# Patient Record
Sex: FEMALE | Race: OTHER | URBAN - METROPOLITAN AREA
[De-identification: names, ages, dates, MRNs, and addresses within clinical notes are randomized per-mention and may not be internally consistent; named-entity substitution may affect disease eponyms.]

---

## 2020-03-01 ENCOUNTER — OUTPATIENT (OUTPATIENT)
Dept: OUTPATIENT SERVICES | Facility: HOSPITAL | Age: 65
LOS: 1 days | End: 2020-03-01
Payer: MEDICAID

## 2020-03-01 PROCEDURE — G9001: CPT

## 2020-03-22 ENCOUNTER — INPATIENT (INPATIENT)
Facility: HOSPITAL | Age: 65
LOS: 2 days | Discharge: PSYCHIATRIC OR SUBSTANCE USE FACILITY | End: 2020-03-25
Attending: INTERNAL MEDICINE | Admitting: INTERNAL MEDICINE
Payer: MEDICAID

## 2020-03-22 VITALS
OXYGEN SATURATION: 96 % | WEIGHT: 149.91 LBS | TEMPERATURE: 97 F | DIASTOLIC BLOOD PRESSURE: 70 MMHG | HEART RATE: 67 BPM | SYSTOLIC BLOOD PRESSURE: 176 MMHG | RESPIRATION RATE: 16 BRPM

## 2020-03-22 DIAGNOSIS — Z95.1 PRESENCE OF AORTOCORONARY BYPASS GRAFT: Chronic | ICD-10-CM

## 2020-03-22 LAB
ALBUMIN SERPL ELPH-MCNC: 4.6 G/DL — SIGNIFICANT CHANGE UP (ref 3.5–5.2)
ALP SERPL-CCNC: 94 U/L — SIGNIFICANT CHANGE UP (ref 30–115)
ALT FLD-CCNC: 16 U/L — SIGNIFICANT CHANGE UP (ref 0–41)
ANION GAP SERPL CALC-SCNC: 15 MMOL/L — HIGH (ref 7–14)
APPEARANCE UR: CLEAR — SIGNIFICANT CHANGE UP
AST SERPL-CCNC: 26 U/L — SIGNIFICANT CHANGE UP (ref 0–41)
BASOPHILS # BLD AUTO: 0.03 K/UL — SIGNIFICANT CHANGE UP (ref 0–0.2)
BASOPHILS NFR BLD AUTO: 0.5 % — SIGNIFICANT CHANGE UP (ref 0–1)
BILIRUB SERPL-MCNC: 0.6 MG/DL — SIGNIFICANT CHANGE UP (ref 0.2–1.2)
BILIRUB UR-MCNC: NEGATIVE — SIGNIFICANT CHANGE UP
BUN SERPL-MCNC: 9 MG/DL — LOW (ref 10–20)
CALCIUM SERPL-MCNC: 10 MG/DL — SIGNIFICANT CHANGE UP (ref 8.5–10.1)
CHLORIDE SERPL-SCNC: 106 MMOL/L — SIGNIFICANT CHANGE UP (ref 98–110)
CO2 SERPL-SCNC: 20 MMOL/L — SIGNIFICANT CHANGE UP (ref 17–32)
COLOR SPEC: YELLOW — SIGNIFICANT CHANGE UP
CREAT SERPL-MCNC: 0.5 MG/DL — LOW (ref 0.7–1.5)
DIFF PNL FLD: NEGATIVE — SIGNIFICANT CHANGE UP
EOSINOPHIL # BLD AUTO: 0.05 K/UL — SIGNIFICANT CHANGE UP (ref 0–0.7)
EOSINOPHIL NFR BLD AUTO: 0.9 % — SIGNIFICANT CHANGE UP (ref 0–8)
ETHANOL SERPL-MCNC: <10 MG/DL — SIGNIFICANT CHANGE UP
GLUCOSE SERPL-MCNC: 94 MG/DL — SIGNIFICANT CHANGE UP (ref 70–99)
GLUCOSE UR QL: NEGATIVE — SIGNIFICANT CHANGE UP
HCT VFR BLD CALC: 46.5 % — SIGNIFICANT CHANGE UP (ref 37–47)
HGB BLD-MCNC: 15.6 G/DL — SIGNIFICANT CHANGE UP (ref 12–16)
IMM GRANULOCYTES NFR BLD AUTO: 0.5 % — HIGH (ref 0.1–0.3)
KETONES UR-MCNC: NEGATIVE — SIGNIFICANT CHANGE UP
LACTATE SERPL-SCNC: 1.5 MMOL/L — SIGNIFICANT CHANGE UP (ref 0.7–2)
LEUKOCYTE ESTERASE UR-ACNC: NEGATIVE — SIGNIFICANT CHANGE UP
LYMPHOCYTES # BLD AUTO: 1.93 K/UL — SIGNIFICANT CHANGE UP (ref 1.2–3.4)
LYMPHOCYTES # BLD AUTO: 33.3 % — SIGNIFICANT CHANGE UP (ref 20.5–51.1)
MCHC RBC-ENTMCNC: 32.4 PG — HIGH (ref 27–31)
MCHC RBC-ENTMCNC: 33.5 G/DL — SIGNIFICANT CHANGE UP (ref 32–37)
MCV RBC AUTO: 96.5 FL — SIGNIFICANT CHANGE UP (ref 81–99)
MONOCYTES # BLD AUTO: 0.44 K/UL — SIGNIFICANT CHANGE UP (ref 0.1–0.6)
MONOCYTES NFR BLD AUTO: 7.6 % — SIGNIFICANT CHANGE UP (ref 1.7–9.3)
NEUTROPHILS # BLD AUTO: 3.31 K/UL — SIGNIFICANT CHANGE UP (ref 1.4–6.5)
NEUTROPHILS NFR BLD AUTO: 57.2 % — SIGNIFICANT CHANGE UP (ref 42.2–75.2)
NITRITE UR-MCNC: NEGATIVE — SIGNIFICANT CHANGE UP
NRBC # BLD: 0 /100 WBCS — SIGNIFICANT CHANGE UP (ref 0–0)
PH UR: 6 — SIGNIFICANT CHANGE UP (ref 5–8)
PLATELET # BLD AUTO: 197 K/UL — SIGNIFICANT CHANGE UP (ref 130–400)
POTASSIUM SERPL-MCNC: 4.1 MMOL/L — SIGNIFICANT CHANGE UP (ref 3.5–5)
POTASSIUM SERPL-SCNC: 4.1 MMOL/L — SIGNIFICANT CHANGE UP (ref 3.5–5)
PROT SERPL-MCNC: 7.3 G/DL — SIGNIFICANT CHANGE UP (ref 6–8)
PROT UR-MCNC: SIGNIFICANT CHANGE UP
RBC # BLD: 4.82 M/UL — SIGNIFICANT CHANGE UP (ref 4.2–5.4)
RBC # FLD: 12.9 % — SIGNIFICANT CHANGE UP (ref 11.5–14.5)
SALICYLATES SERPL-MCNC: <0.3 MG/DL — LOW (ref 4–30)
SODIUM SERPL-SCNC: 141 MMOL/L — SIGNIFICANT CHANGE UP (ref 135–146)
SP GR SPEC: 1.02 — SIGNIFICANT CHANGE UP (ref 1.01–1.02)
UROBILINOGEN FLD QL: ABNORMAL
WBC # BLD: 5.79 K/UL — SIGNIFICANT CHANGE UP (ref 4.8–10.8)
WBC # FLD AUTO: 5.79 K/UL — SIGNIFICANT CHANGE UP (ref 4.8–10.8)

## 2020-03-22 PROCEDURE — 71045 X-RAY EXAM CHEST 1 VIEW: CPT | Mod: 26

## 2020-03-22 PROCEDURE — 99231 SBSQ HOSP IP/OBS SF/LOW 25: CPT

## 2020-03-22 PROCEDURE — 73590 X-RAY EXAM OF LOWER LEG: CPT | Mod: 26,LT

## 2020-03-22 PROCEDURE — 73700 CT LOWER EXTREMITY W/O DYE: CPT | Mod: 26,LT

## 2020-03-22 PROCEDURE — 99223 1ST HOSP IP/OBS HIGH 75: CPT | Mod: AI

## 2020-03-22 PROCEDURE — 76705 ECHO EXAM OF ABDOMEN: CPT | Mod: 26

## 2020-03-22 PROCEDURE — 70450 CT HEAD/BRAIN W/O DYE: CPT | Mod: 26

## 2020-03-22 PROCEDURE — 99285 EMERGENCY DEPT VISIT HI MDM: CPT

## 2020-03-22 PROCEDURE — 93010 ELECTROCARDIOGRAM REPORT: CPT

## 2020-03-22 PROCEDURE — 93971 EXTREMITY STUDY: CPT | Mod: 26,LT

## 2020-03-22 RX ORDER — LISINOPRIL 2.5 MG/1
2.5 TABLET ORAL DAILY
Refills: 0 | Status: DISCONTINUED | OUTPATIENT
Start: 2020-03-22 | End: 2020-03-25

## 2020-03-22 RX ORDER — SODIUM CHLORIDE 9 MG/ML
1000 INJECTION INTRAMUSCULAR; INTRAVENOUS; SUBCUTANEOUS
Refills: 0 | Status: DISCONTINUED | OUTPATIENT
Start: 2020-03-22 | End: 2020-03-23

## 2020-03-22 RX ORDER — ENOXAPARIN SODIUM 100 MG/ML
40 INJECTION SUBCUTANEOUS DAILY
Refills: 0 | Status: DISCONTINUED | OUTPATIENT
Start: 2020-03-22 | End: 2020-03-25

## 2020-03-22 RX ORDER — IPRATROPIUM/ALBUTEROL SULFATE 18-103MCG
3 AEROSOL WITH ADAPTER (GRAM) INHALATION EVERY 6 HOURS
Refills: 0 | Status: DISCONTINUED | OUTPATIENT
Start: 2020-03-22 | End: 2020-03-25

## 2020-03-22 RX ORDER — METOPROLOL TARTRATE 50 MG
12.5 TABLET ORAL
Refills: 0 | Status: DISCONTINUED | OUTPATIENT
Start: 2020-03-22 | End: 2020-03-25

## 2020-03-22 RX ORDER — ATORVASTATIN CALCIUM 80 MG/1
40 TABLET, FILM COATED ORAL AT BEDTIME
Refills: 0 | Status: DISCONTINUED | OUTPATIENT
Start: 2020-03-22 | End: 2020-03-25

## 2020-03-22 RX ORDER — MUPIROCIN 20 MG/G
1 OINTMENT TOPICAL
Refills: 0 | Status: DISCONTINUED | OUTPATIENT
Start: 2020-03-22 | End: 2020-03-25

## 2020-03-22 RX ORDER — ACETAMINOPHEN 500 MG
650 TABLET ORAL EVERY 6 HOURS
Refills: 0 | Status: DISCONTINUED | OUTPATIENT
Start: 2020-03-22 | End: 2020-03-25

## 2020-03-22 RX ORDER — PANTOPRAZOLE SODIUM 20 MG/1
40 TABLET, DELAYED RELEASE ORAL
Refills: 0 | Status: DISCONTINUED | OUTPATIENT
Start: 2020-03-22 | End: 2020-03-25

## 2020-03-22 RX ORDER — ASPIRIN/CALCIUM CARB/MAGNESIUM 324 MG
81 TABLET ORAL DAILY
Refills: 0 | Status: DISCONTINUED | OUTPATIENT
Start: 2020-03-22 | End: 2020-03-25

## 2020-03-22 RX ADMIN — SODIUM CHLORIDE 50 MILLILITER(S): 9 INJECTION INTRAMUSCULAR; INTRAVENOUS; SUBCUTANEOUS at 23:51

## 2020-03-22 RX ADMIN — Medication 100 MILLIGRAM(S): at 03:39

## 2020-03-22 RX ADMIN — ATORVASTATIN CALCIUM 40 MILLIGRAM(S): 80 TABLET, FILM COATED ORAL at 21:12

## 2020-03-22 RX ADMIN — Medication 100 MILLIGRAM(S): at 21:12

## 2020-03-22 RX ADMIN — Medication 12.5 MILLIGRAM(S): at 17:08

## 2020-03-22 RX ADMIN — MUPIROCIN 1 APPLICATION(S): 20 OINTMENT TOPICAL at 17:08

## 2020-03-22 RX ADMIN — Medication 81 MILLIGRAM(S): at 13:37

## 2020-03-22 RX ADMIN — ENOXAPARIN SODIUM 40 MILLIGRAM(S): 100 INJECTION SUBCUTANEOUS at 13:35

## 2020-03-22 RX ADMIN — Medication 100 MILLIGRAM(S): at 13:58

## 2020-03-22 NOTE — ED PROVIDER NOTE - PHYSICAL EXAMINATION
Constitutional: Well developed, well nourished. NAD  Head: Normocephalic, atraumatic.  Eyes: PERRL, EOMI.  ENT: No nasal discharge. Mucous membranes dry.  Neck: Supple. Painless ROM.  Cardiovascular:  Regular rate and rhythm.   Pulmonary: Normal respiratory rate and effort. Lungs clear to auscultation bilaterally.  Abdominal: Soft. Nondistended. No rebound, guarding, rigidity.  Extremities. Pelvis stable. + Left ant tibia 3x3 cm oval ulcer with blistering, resolving hematoma;   Skin: No rashes, cyanosis.  Neuro: AAOx3. No focal neurological deficits.  Psych: Normal mood. Normal affect.

## 2020-03-22 NOTE — ED PROVIDER NOTE - CLINICAL SUMMARY MEDICAL DECISION MAKING FREE TEXT BOX
65 y/o female with h/o htn, cad, asthma, unknown psych history, in ER with c/o redness/swelling/non-healing wound to L shin after banging it a week or 2 ago.  no f/c.  no cp/sob. labs ok.  pt given iv abx.  tib-fib x-ray with no sq air.  Pt acting bizarrely in ER, denies prior psych history, no old chart in system.  head ct with no acute pathology.  pt admitted for iv abx for LE wound (LE duplex pending on admission and MAR aware to f/u results), burn to see, and pt will need psych eval.  admitted with 1:1 observation.

## 2020-03-22 NOTE — H&P ADULT - HISTORY OF PRESENT ILLNESS
64 year old female patient with past medical history of CAD s/p CABG in 2006, asthma and hypertension here for evaluation of left lower extremity erythema and pain that she sustained after a recent trauma to the left shin. Patient non-cooperative during the encounter, no next of kin to reach so details of trauma could not be assessed. 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 Per chart no history of psychiatric disorder.   Patient was given Clindamycin in the ED, no evidence of sepsis, x ray of the tibia showed soft tissue swelling and no evidence of subcutaneous emphysema or osteomyelitis. The patient was admitted for workup of her psychosis as well as treatment of her cellulitis.

## 2020-03-22 NOTE — ED PROVIDER NOTE - ATTENDING CONTRIBUTION TO CARE
63 y/o female with h/o HTN, asthma, cad s/p cabg, in ER for eval of wound to L leg.  Pt states she hit her shin on the bus last week or a few weeks ago, and now is red and swollen.  no drainage, no fever.  no cp/sob.  no cough.  no ha/dizziness/loc.  Pt also states she was in [place] ~ 6 months ago, she [facility], she also states she was in [place] eating sushi.  Pt denies any si/hi, denies hallucinations.  denies any psych history or hospitalizations.  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  PE - nad, nc/at, eomi, perrl, op - clear, cta b/l, no w/r/r, rrr, abd - soft, nt/nd, nabs, from x 4, LLE - lower ant shin scab/blister with surrounding erythema, warmth, + swelling to lower leg, 2+ DP pulse, A&O x 3, cn 2- 12 intact, no motor/sensory deficits, pt calm, comfortable, not agitated.  - check labs, iv abx, LE duplex, will need psych eval, and also burn to see. 65 y/o female with h/o HTN, asthma, cad s/p cabg, in ER for eval of wound to L leg.  Pt states she hit her shin on the bus last week or a few weeks ago, and now is red and swollen.  no drainage, no fever.  no cp/sob.  no cough.  no ha/dizziness/loc.  Pt also states she was in [place] ~ 6 months ago, she [facility], she also states she was in [place] eating sushi.  Pt denies any si/hi, denies hallucinations.  denies any psych history or hospitalizations.  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 No prior chart/medical history available, no phone number call family.   PE - nad, nc/at, eomi, perrl, op - clear, cta b/l, no w/r/r, rrr, abd - soft, nt/nd, nabs, from x 4, LLE - lower ant shin scab/blister with surrounding erythema, warmth, + swelling to lower leg, 2+ DP pulse, A&O x 3, cn 2- 12 intact, no motor/sensory deficits, pt calm, comfortable, not agitated.  - check labs, iv abx, LE duplex, will need psych eval, and also burn to see. 63 y/o female with h/o HTN, asthma, cad s/p cabg, in ER for eval of wound to L leg.  Pt states she hit her shin on the bus last week or a few weeks ago, and now is red and swollen.  no drainage, no fever.  no cp/sob.  no cough.  no ha/dizziness/loc.  Pt also states she was in [place] ~ 6 months ago, she [facility], she also states she was in [place] eating sushi.  Pt denies any si/hi, denies hallucinations.  denies any psych history or hospitalizations.  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 No prior chart/medical history available, no phone number call family.   PE - nad, nc/at, eomi, perrl, op - clear, cta b/l, no w/r/r, rrr, abd - soft, nt/nd, nabs, from x 4, LLE - lower ant shin scab/blister with surrounding erythema, warmth, + swelling to lower leg, 2+ DP pulse, A&O x 3, cn 2- 12 intact, no motor/sensory deficits, pt calm, comfortable, not agitated.  - check labs, iv abx, LE duplex, will need psych eval, and also burn to see.

## 2020-03-22 NOTE — ED ADULT NURSE REASSESSMENT NOTE - NS ED NURSE REASSESS COMMENT FT1
Spoke with [facility]endar[facility] that is concerned about patient's travel to [place] six months ago. Although pt is suffering from acute psychosis as per MD, Primary nurse on 4b and assistant elinor feel unsafe to accept pt without pt being officially tested and ruled out for covid-19. ER MD noted that the travel was six months ago, pt has stable vital signs no fever or cough.

## 2020-03-22 NOTE — ED PROVIDER NOTE - OBJECTIVE STATEMENT
64 yold female to [facility] Cad/angina s/p cabg x 1 2006, Asthma; pt here specifically today because she sustained injury to Left ant tib area while getting on bus 1 week ago; pt c/o pain to site; denies fever, chills, n/v/back pain; pt also told triage RN about  ? getting cor[facility] from [place]? [facility] - ? visit to [place] 6 months ago; pt denies any psych hx; denies psychiatric admissions; currently lives with  in private apartment;

## 2020-03-22 NOTE — H&P ADULT - NSHPPHYSICALEXAM_GEN_ALL_CORE
Patient is disheveled, non-cooperative and not answering questions. Calm not violent.   General : No distress, alert, unable to assess if oriented as doesn't not answer questions   Lungs : Poor inspiratory effort bilaterally, on room air, no respiratory distress, no wheezing   Cardiovascular : Regular S1S2   Abdomen : Soft RUQ tenderness? (Patient states that her gallbladder hurts)   Extremities : LLE erythema and tenderness, no purulent discharge

## 2020-03-22 NOTE — H&P ADULT - ATTENDING COMMENTS
Patient seen and examined independently. Agree with resident note/ history / physical exam and plan of care with following exceptions/additions/updates. Case discussed with house-staff, nursing and patient  pt is a very poor historian.   spoke with burn team  ct leg ordered , fu results    full code

## 2020-03-22 NOTE — ED ADULT TRIAGE NOTE - CHIEF COMPLAINT QUOTE
pt BIBA for "check up". pt stated "I just gave birth and wanted a checkup" when asked about international travel pt stated "I just returned from [place], I picked up [facility]ona virus [facility]". pt has no medical complaints

## 2020-03-22 NOTE — H&P ADULT - ASSESSMENT
64 year old female patient with past medical history of CAD s/p CABG in 2006, asthma and hypertension here for evaluation of left lower extremity erythema and pain that she sustained after a recent trauma to the left shin    # left lower extremity erythema and tenderness s/p traumatic injury : rule out cellulitis   - No sepsis on admission   - No evidence of subcutaneous emphysema or OM on x ray   - F/U blood cultures   - CT of the leg non contrast to rule out hematoma    - Burn consult   - Continue Clindamycin 600 mg IV q8h   - Pain control     # Acute psychosis, unknown if any previous psychiatric history   - CT Head negative on admission   - F/U Vitamin B12, Folate, TSH, RPR, urine tox screen, ammonia level, serum alcohol and salicylate levels, urinalysis   - F/U psychiatry recommendations     # CAD s/p CABG   - Unknown home medication. Will start on Aspirin, Lopressor, Lipitor in hospital pending confirmation of home medication     # Asthma   - Duoneb q6h PRN for wheezing     # HTN   - Home medication unknown   - Will start Lisinopril 2.5 mg daily     # RUQ tenderness on exam   - LFTs normal  - F/U RUQ US     # Prolonged QTc 502 ms   - Caution with QT prolonging medication     # Miscellaneous   - DVT prophylaxis : Lovenox 40 mg daily   - GI prophylaxis : Protonix 40 mg daily   - Activity : bedrest for now   - Diet : DASH TLC   - Full code 64 year old female patient with past medical history of CAD s/p CABG in 2006, asthma and hypertension here for evaluation of left lower extremity erythema and pain that she sustained after a recent trauma to the left shin    # left lower extremity erythema and tenderness s/p traumatic injury : rule out cellulitis   - No sepsis on admission   - No evidence of subcutaneous emphysema or OM on x ray   - F/U blood cultures   - CT of the leg non contrast to rule out hematoma    - Burn consult   - Continue Clindamycin 600 mg IV q8h   - Pain control     # Acute psychosis, unknown if any previous psychiatric history   - CT Head negative on admission   - F/U Vitamin B12, Folate, TSH, RPR, urine tox screen, ammonia level, serum alcohol and salicylate levels, urinalysis   - F/U psychiatry recommendations     # CAD s/p CABG   - Unknown home medication. Will start on Aspirin, Lopressor, Lipitor in hospital pending confirmation of home medication     # Asthma   - Duoneb q6h PRN for wheezing     # HTN   - Home medication unknown   - Will start Lisinopril 2.5 mg daily     # RUQ tenderness on exam   - LFTs normal  - F/U RUQ US     # Prolonged QTc 502 ms   - Caution with QT prolonging medication   - check mag, keep mg>2 and k>4    # Miscellaneous   - DVT prophylaxis : Lovenox 40 mg daily   - GI prophylaxis : Protonix 40 mg daily   - Activity : bedrest for now   - Diet : DASH TLC   - Full code

## 2020-03-22 NOTE — ED ADULT NURSE NOTE - OBJECTIVE STATEMENT
Pt said she went to visit some ppl in [facility] and got Corona virus , she even brought the vaccine" pt looks unkept, disoriented and has flight of ideas. Her lower LL is inflamed with old wound and red. Pt denies smoking, found a lighter on her, said she had PICC on right hand, but non is visible.

## 2020-03-22 NOTE — CHART NOTE - NSCHARTNOTEFT_GEN_A_CORE
Writer attempted to evaluate pt. She would not open eyes, sit up, or engage in interview. Unable to make assessment at this time. Psychiatry team to f/u for evaluation.

## 2020-03-22 NOTE — ED PROVIDER NOTE - PROGRESS NOTE DETAILS
case d/w Burn resident Dr. Travis - will see pt in am; LE duplex pending on admission - MAR aware and they will f/u results.

## 2020-03-22 NOTE — PATIENT PROFILE ADULT - NSPROMUTPARTICIPCAREFT_GEN_A_NUR
----- Message from Kamilla Cowart [phone]   N/A

## 2020-03-22 NOTE — CONSULT NOTE ADULT - ASSESSMENT
ASSESSMENT:  LLE partial thickness wound with cellulitis    RECOMMENDATION:  Wound care - Bactroban/Xeroform/Kerlex/ACE Wrap BID to LLE  Venous Dopplers  No Surgical debridement   IV abx as indicated/ per ID  f/u Cx  Elevation

## 2020-03-22 NOTE — CONSULT NOTE ADULT - SUBJECTIVE AND OBJECTIVE BOX
64y  Female  HPI:  64 year old female patient with past medical history of CAD s/p CABG in 2006, asthma and hypertension here for evaluation of left lower extremity erythema and pain that she sustained after a recent trauma to the left shin. Patient non-cooperative during the encounter, no next of kin to reach so details of trauma could not be assessed. 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 Per chart no history of psychiatric disorder.   Patient was given Clindamycin in the ED, no evidence of sepsis, x ray of the tibia showed soft tissue swelling and no evidence of subcutaneous emphysema or osteomyelitis. The patient was admitted for workup of her psychosis as well as treatment of her cellulitis. (22 Mar 2020 10:55)    Hospital course***  Allergies    sulfa drugs (Unknown)    Intolerances      PAST MEDICAL & SURGICAL HISTORY:  Asthma  Hypertension  Coronary artery disease  S/P CABG (coronary artery bypass graft)      Labs:                        15.6   5.79  )-----------( 197      ( 22 Mar 2020 03:20 )             46.5       PE:  AAO x 3  erythema, edema to left leg  dry scab to ant leg with partial thickess wouynd  tenderness+  no fluctuation

## 2020-03-23 DIAGNOSIS — F05 DELIRIUM DUE TO KNOWN PHYSIOLOGICAL CONDITION: ICD-10-CM

## 2020-03-23 LAB
AMPHET UR-MCNC: NEGATIVE — SIGNIFICANT CHANGE UP
APPEARANCE UR: ABNORMAL
APPEARANCE UR: CLEAR — SIGNIFICANT CHANGE UP
BACTERIA # UR AUTO: ABNORMAL
BARBITURATES UR SCN-MCNC: NEGATIVE — SIGNIFICANT CHANGE UP
BENZODIAZ UR-MCNC: NEGATIVE — SIGNIFICANT CHANGE UP
BILIRUB UR-MCNC: NEGATIVE — SIGNIFICANT CHANGE UP
BILIRUB UR-MCNC: NEGATIVE — SIGNIFICANT CHANGE UP
COCAINE METAB.OTHER UR-MCNC: NEGATIVE — SIGNIFICANT CHANGE UP
COLOR SPEC: YELLOW — SIGNIFICANT CHANGE UP
COLOR SPEC: YELLOW — SIGNIFICANT CHANGE UP
CULTURE RESULTS: SIGNIFICANT CHANGE UP
DIFF PNL FLD: NEGATIVE — SIGNIFICANT CHANGE UP
DIFF PNL FLD: NEGATIVE — SIGNIFICANT CHANGE UP
EPI CELLS # UR: 2 /HPF — SIGNIFICANT CHANGE UP (ref 0–5)
FOLATE SERPL-MCNC: 14.9 NG/ML — SIGNIFICANT CHANGE UP
GLUCOSE UR QL: NEGATIVE — SIGNIFICANT CHANGE UP
GLUCOSE UR QL: NEGATIVE — SIGNIFICANT CHANGE UP
HYALINE CASTS # UR AUTO: 1 /LPF — SIGNIFICANT CHANGE UP (ref 0–7)
KETONES UR-MCNC: NEGATIVE — SIGNIFICANT CHANGE UP
KETONES UR-MCNC: NEGATIVE — SIGNIFICANT CHANGE UP
LEUKOCYTE ESTERASE UR-ACNC: NEGATIVE — SIGNIFICANT CHANGE UP
LEUKOCYTE ESTERASE UR-ACNC: NEGATIVE — SIGNIFICANT CHANGE UP
METHADONE UR-MCNC: NEGATIVE — SIGNIFICANT CHANGE UP
NITRITE UR-MCNC: NEGATIVE — SIGNIFICANT CHANGE UP
NITRITE UR-MCNC: NEGATIVE — SIGNIFICANT CHANGE UP
OPIATES UR-MCNC: NEGATIVE — SIGNIFICANT CHANGE UP
PCP SPEC-MCNC: SIGNIFICANT CHANGE UP
PH UR: 6 — SIGNIFICANT CHANGE UP (ref 5–8)
PH UR: 6.5 — SIGNIFICANT CHANGE UP (ref 5–8)
PROPOXYPHENE QUALITATIVE URINE RESULT: NEGATIVE — SIGNIFICANT CHANGE UP
PROT UR-MCNC: SIGNIFICANT CHANGE UP
PROT UR-MCNC: SIGNIFICANT CHANGE UP
RBC CASTS # UR COMP ASSIST: 2 /HPF — SIGNIFICANT CHANGE UP (ref 0–4)
SP GR SPEC: 1.02 — SIGNIFICANT CHANGE UP (ref 1.01–1.02)
SP GR SPEC: 1.02 — SIGNIFICANT CHANGE UP (ref 1.01–1.02)
SPECIMEN SOURCE: SIGNIFICANT CHANGE UP
T PALLIDUM AB TITR SER: NEGATIVE — SIGNIFICANT CHANGE UP
TSH SERPL-MCNC: 0.34 UIU/ML — SIGNIFICANT CHANGE UP (ref 0.27–4.2)
UROBILINOGEN FLD QL: ABNORMAL
UROBILINOGEN FLD QL: ABNORMAL
VIT B12 SERPL-MCNC: 932 PG/ML — SIGNIFICANT CHANGE UP (ref 232–1245)
WBC UR QL: 9 /HPF — HIGH (ref 0–5)

## 2020-03-23 PROCEDURE — 99232 SBSQ HOSP IP/OBS MODERATE 35: CPT | Mod: GC

## 2020-03-23 PROCEDURE — 99233 SBSQ HOSP IP/OBS HIGH 50: CPT

## 2020-03-23 PROCEDURE — 93010 ELECTROCARDIOGRAM REPORT: CPT

## 2020-03-23 RX ADMIN — Medication 100 MILLIGRAM(S): at 13:09

## 2020-03-23 RX ADMIN — Medication 81 MILLIGRAM(S): at 11:39

## 2020-03-23 RX ADMIN — Medication 12.5 MILLIGRAM(S): at 17:21

## 2020-03-23 RX ADMIN — Medication 12.5 MILLIGRAM(S): at 06:15

## 2020-03-23 RX ADMIN — ATORVASTATIN CALCIUM 40 MILLIGRAM(S): 80 TABLET, FILM COATED ORAL at 22:15

## 2020-03-23 RX ADMIN — Medication 100 MILLIGRAM(S): at 06:15

## 2020-03-23 RX ADMIN — LISINOPRIL 2.5 MILLIGRAM(S): 2.5 TABLET ORAL at 06:15

## 2020-03-23 RX ADMIN — Medication 650 MILLIGRAM(S): at 17:21

## 2020-03-23 RX ADMIN — MUPIROCIN 1 APPLICATION(S): 20 OINTMENT TOPICAL at 17:23

## 2020-03-23 RX ADMIN — MUPIROCIN 1 APPLICATION(S): 20 OINTMENT TOPICAL at 06:15

## 2020-03-23 RX ADMIN — Medication 100 MILLIGRAM(S): at 22:15

## 2020-03-23 RX ADMIN — PANTOPRAZOLE SODIUM 40 MILLIGRAM(S): 20 TABLET, DELAYED RELEASE ORAL at 06:15

## 2020-03-23 RX ADMIN — ENOXAPARIN SODIUM 40 MILLIGRAM(S): 100 INJECTION SUBCUTANEOUS at 11:39

## 2020-03-23 NOTE — BEHAVIORAL HEALTH ASSESSMENT NOTE - NSBHCHARTREVIEWVS_PSY_A_CORE FT
Vital Signs Last 24 Hrs  T(C): 35.7 (23 Mar 2020 08:00), Max: 37.3 (22 Mar 2020 23:48)  T(F): 96.2 (23 Mar 2020 08:00), Max: 99.2 (22 Mar 2020 23:48)  HR: 66 (23 Mar 2020 08:00) (66 - 80)  BP: 109/53 (23 Mar 2020 08:00) (101/51 - 145/64)  RR: 18 (23 Mar 2020 08:00) (18 - 18)

## 2020-03-23 NOTE — BEHAVIORAL HEALTH ASSESSMENT NOTE - NSBHCHARTREVIEWINVESTIGATE_PSY_A_CORE FT
12 Lead ECG (03.23.20 @ 08:38) >  QTC Calculation(Bezet) 488 ms  P Axis 6 degrees  R Axis 52 degrees  T Axis 54 degrees  Diagnosis Line Normal sinus rhythm  Right bundle branch block  Abnormal ECG

## 2020-03-23 NOTE — BEHAVIORAL HEALTH ASSESSMENT NOTE - OTHER PAST PSYCHIATRIC HISTORY (INCLUDE DETAILS REGARDING ONSET, COURSE OF ILLNESS, INPATIENT/OUTPATIENT TREATMENT)
Unknown - no known past psychiatric history - patient denies past suicide attempts, past IPP admissions

## 2020-03-23 NOTE — BEHAVIORAL HEALTH ASSESSMENT NOTE - COMMENTS ON SUICIDE RISK/PROTECTIVE FACTORS:
Protective factors include social support from  and children, no known past suicide attempts, no acute suicidality, access to emergency medical services.

## 2020-03-23 NOTE — BEHAVIORAL HEALTH ASSESSMENT NOTE - NSBHMEDSOTHERFT_PSY_A_CORE
Attempted to contact patients outpatient pharmacy [facility] at [phone] - they did not have a file for her patient information.

## 2020-03-23 NOTE — BEHAVIORAL HEALTH ASSESSMENT NOTE - HPI (INCLUDE ILLNESS QUALITY, SEVERITY, DURATION, TIMING, CONTEXT, MODIFYING FACTORS, ASSOCIATED SIGNS AND SYMPTOMS)
Ms. Silva is a 64-year-old, female, white, , with two adult children, domiciled in private residence in [facility]), with medical history of asthma, CAD s/p CABG in 2006, hypertension, unknown psychiatric history, who was admitted to medicine for left lower extremity cellulitis, psychiatry consulted for psychotic symptoms.     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 Silva declined talking to primary team, however last night frequently called on call resident for pain.     Upon approach, Ms. Silva was laying in bed, wincing in pain, she was alert, oriented to person, place (stated we at "[facility]"), and time (month and year tested). She stated that she had "no idea" why psychiatry was asked to see her. She denied mood disturbance, auditory hallucinations, perceptual disturbances, anxiety, sleep disturbance, appetite disturbance.     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 Denied suicidal ideations.     Attempted to contact  Abebe, and daughter (at same number) [phone] (number provided by Ms. Silva) - could not reach despite multiple attempts. Ms. Silva is a 64-year-old, female, white, , with two adult children, domiciled in private residence in [facility]), with medical history of asthma, CAD s/p CABG in 2006, hypertension, unknown psychiatric history, who was admitted to medicine for lower extremity cellulitis, psychiatry consulted for psychotic symptoms.     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 Silva declined talking to primary team, however last night frequently called on call resident for pain.     Upon approach, Ms. Silva was laying in bed, wincing in pain, she was alert, oriented to person, place (stated we at "[facility]"), and time (month and year tested). She stated that she had "no idea" why psychiatry was asked to see her. She denied mood disturbance, auditory hallucinations, perceptual disturbances, anxiety, sleep disturbance, appetite disturbance.     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 Denied suicidal ideations.     Attempted to contact  Abebe, and daughter (at same number) [phone] (number provided by Ms. Silva) - could not reach despite multiple attempts. Ms. Silva is a 64-year-old, female, white, , with two adult children, domiciled in private residence in [facility]), with medical history of asthma, CAD s/p CABG in 2006, hypertension, unknown psychiatric history, who was admitted to medicine for lower extremity cellulitis, psychiatry consulted for psychotic symptoms.     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 Silva declined talking to primary team, however last night frequently called on call resident for pain.     Upon approach, Ms. Silva was laying in bed, wincing in pain, she was alert, oriented to person, place (stated we at "[facility]"), and time (month and year tested). She stated that she had "no idea" why psychiatry was asked to see her. She denied mood disturbance, auditory hallucinations, perceptual disturbances, anxiety, sleep disturbance, appetite disturbance.     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. 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 Denied suicidal ideations.     Attempted to contact  Abebe, and daughter (at same number) [phone] (number provided by Ms. Silva) - could not reach despite multiple attempts.    As per nurse taking care of her today, patient was said to be agitated at night in the context of her pain, however patient has been in good behavioral control since the morning shift and has not been observed to be psychotic , maniac or depressed.

## 2020-03-23 NOTE — BEHAVIORAL HEALTH ASSESSMENT NOTE - SUMMARY
Ms. Silva is a 64-year-old, female, white, , with two adult children, domiciled in private residence in [facility]), with medical history of asthma, CAD s/p CABG in 2006, hypertension, unknown psychiatric history, who was admitted to medicine for lower extremity cellulitis, psychiatry consulted for psychotic symptoms.    Assessment was limited by inability to contact collateral. Recommend further attempts to contact family if possible to obtain baseline information and validate patient report. Impression; delirium secondary to a medical cause (likely cellulitis). Recommend frequent patient redirection; reassurance, circadian encouragement through natural light during day, noise control at night. Cautious use of benzodiazapines as will likely worsen cognitive sx of delirium. Ms. Silva is a 64-year-old, female, white, , with two adult children, domiciled in private residence in [facility]), with medical history of asthma, CAD s/p CABG in 2006, hypertension, unknown psychiatric history, who was admitted to medicine for lower extremity cellulitis, psychiatry consulted for psychotic symptoms.    Assessment was limited by inability to contact collateral and patient's limited participation in the linterview. Recommend further attempts to contact family if possible to obtain baseline information and validate patient report. Impression; delirium secondary to a medical cause (likely cellulitis). Recommend frequent patient redirection; reassurance, circadian encouragement through natural light during day, noise control at night. Cautious use of benzodiazapines as will likely worsen cognitive sx of delirium.

## 2020-03-23 NOTE — BEHAVIORAL HEALTH ASSESSMENT NOTE - NSBHCHARTREVIEWIMAGING_PSY_A_CORE FT
CT Tibia/Fibia No Cont, Left (03.22.20 @ 15:17) >  IMPRESSION:  Diffuse subcutaneous edema of the left leg without evidence of osteomyelitis, necrotizing fasciitis, or abscess.
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
## 2020-03-23 NOTE — BEHAVIORAL HEALTH ASSESSMENT NOTE - RISK ASSESSMENT
Low Acute Suicide Risk Risk factors include acute delirium, chronic pain, acute medical problem (cellulitis), irritability during hospitalization.  Protective factors include social support from  and children, no known past suicide attempts, no acute suicidality, access to emergency medical services.  Based on above, further information is needed for complete suicide risk assessment, however, no acute indication for IPP apparent at this moment in time.
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
## 2020-03-23 NOTE — BEHAVIORAL HEALTH ASSESSMENT NOTE - CASE SUMMARY
Ms. Silva is a 64-year-old woman with unclear psychiatric history who was admitted to the medical floor for the management of left lower extremity cellulitis. Psychiatry consult was called to evaluate patient for possible psychosis in the context of her episodic agitated and bizarre behavior.  Patient appears minimally cooperative but not bizarre. She alludes to different ideas which is unclear if they are based of facts or may be delusional. Since we were unable to obtain collateral information, we are unable to verify there information. For now, patient presentation is considered behavioral as a result of her pain versus delirium in the context of her medical problems and not as a result of a decompensation of a psychiatric illness .   Patient does not appear acutely psychotic , manic or depressed. She does not have current suicidal ideations, intent or plan.   At this time, patient is not considered an imminent danger to herself or others and does not need inpatient psychiatric hospitalization. We recommend social work evaluation to contact patient's next of kin ,possibly inquire about her baseline mental status and answers to other questions the medical team may have. There is no indication for psychotropic medication ( standing or PRN) at this time. Patient will continue to benefit from behavioral interventions and environmental modifications during her hospital stay.

## 2020-03-24 PROCEDURE — 99232 SBSQ HOSP IP/OBS MODERATE 35: CPT

## 2020-03-24 RX ADMIN — Medication 12.5 MILLIGRAM(S): at 05:11

## 2020-03-24 RX ADMIN — ATORVASTATIN CALCIUM 40 MILLIGRAM(S): 80 TABLET, FILM COATED ORAL at 21:40

## 2020-03-24 RX ADMIN — MUPIROCIN 1 APPLICATION(S): 20 OINTMENT TOPICAL at 10:09

## 2020-03-24 RX ADMIN — Medication 81 MILLIGRAM(S): at 11:14

## 2020-03-24 RX ADMIN — Medication 100 MILLIGRAM(S): at 13:34

## 2020-03-24 RX ADMIN — PANTOPRAZOLE SODIUM 40 MILLIGRAM(S): 20 TABLET, DELAYED RELEASE ORAL at 05:11

## 2020-03-24 RX ADMIN — Medication 12.5 MILLIGRAM(S): at 17:30

## 2020-03-24 RX ADMIN — LISINOPRIL 2.5 MILLIGRAM(S): 2.5 TABLET ORAL at 05:11

## 2020-03-24 RX ADMIN — Medication 450 MILLIGRAM(S): at 21:41

## 2020-03-24 RX ADMIN — ENOXAPARIN SODIUM 40 MILLIGRAM(S): 100 INJECTION SUBCUTANEOUS at 11:14

## 2020-03-24 RX ADMIN — Medication 100 MILLIGRAM(S): at 05:08
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
## 2020-03-24 NOTE — PROGRESS NOTE ADULT - ATTENDING COMMENTS
Patient seen and examined independently. Agree with resident note.  # LLE cellulitis-- on po clindamycin.patient refusing physical exam.  #psychosis--unlikely due to illness- family not available for discussion-- she is well groomed and does not seem that she has been homeless.  Psychiatry refused IPP and no major intervention suggested.

## 2020-03-24 NOTE — PROGRESS NOTE ADULT - SUBJECTIVE AND OBJECTIVE BOX
HPI:  64 year old female patient with past medical history of CAD s/p CABG in 2006, asthma and hypertension here for evaluation of left lower extremity erythema and pain that she sustained after a recent trauma to the left shin. Patient non-cooperative during the encounter, no next of kin to reach so details of trauma could not be assessed. 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 Per chart no history of psychiatric disorder.   Patient was given Clindamycin in the ED, no evidence of sepsis, x ray of the tibia showed soft tissue swelling and no evidence of subcutaneous emphysema or osteomyelitis. The patient was admitted for workup of her psychosis as well as treatment of her cellulitis. (22 Mar 2020 10:55)      T(C): 35.9 (03-24-20 @ 15:34), Max: 36.3 (03-23-20 @ 23:59)  HR: 78 (03-24-20 @ 15:34) (66 - 78)  BP: 128/66 (03-24-20 @ 15:34) (109/54 - 154/66)  RR: 18 (03-24-20 @ 15:34) (18 - 18)  SpO2: 95% (03-23-20 @ 21:50) (95% - 95%)    MOTOR EXAM 5/5      Physical Medicine And Rehabilitation Services are not indicated in this patient for the following Reason(s):    [    ] Patient is medically unstable      [    ]  Patient does not have appropriate activity orders      [     ] Patient has no weight bearing status for:      [  x   ] Patient is independently ambulating      [     ]  Patient is from Skilled Nursing Facility and is appropriate to return      [     ] Patient was non-ambulatory prior to admission      [     ]  Other      [facility] PM&R / PT request

## 2020-03-25 ENCOUNTER — INPATIENT (INPATIENT)
Facility: HOSPITAL | Age: 65
LOS: 91 days | Discharge: PSYCHIATRIC OR SUBSTANCE USE FACILITY | End: 2020-06-25
Attending: PSYCHIATRY & NEUROLOGY | Admitting: PSYCHIATRY & NEUROLOGY
Payer: MEDICAID

## 2020-03-25 VITALS
WEIGHT: 124.78 LBS | HEART RATE: 68 BPM | HEIGHT: 60 IN | SYSTOLIC BLOOD PRESSURE: 161 MMHG | RESPIRATION RATE: 17 BRPM | TEMPERATURE: 98 F | DIASTOLIC BLOOD PRESSURE: 69 MMHG

## 2020-03-25 VITALS
DIASTOLIC BLOOD PRESSURE: 63 MMHG | TEMPERATURE: 98 F | RESPIRATION RATE: 18 BRPM | HEART RATE: 70 BPM | SYSTOLIC BLOOD PRESSURE: 139 MMHG

## 2020-03-25 DIAGNOSIS — F20.9 SCHIZOPHRENIA, UNSPECIFIED: ICD-10-CM

## 2020-03-25 DIAGNOSIS — F29 UNSPECIFIED PSYCHOSIS NOT DUE TO A SUBSTANCE OR KNOWN PHYSIOLOGICAL CONDITION: ICD-10-CM

## 2020-03-25 DIAGNOSIS — Z95.1 PRESENCE OF AORTOCORONARY BYPASS GRAFT: Chronic | ICD-10-CM

## 2020-03-25 PROCEDURE — 99239 HOSP IP/OBS DSCHRG MGMT >30: CPT

## 2020-03-25 PROCEDURE — 99232 SBSQ HOSP IP/OBS MODERATE 35: CPT

## 2020-03-25 RX ORDER — LACTOBACILLUS ACIDOPHILUS 100MM CELL
1 CAPSULE ORAL
Qty: 0 | Refills: 0 | DISCHARGE
Start: 2020-03-25

## 2020-03-25 RX ORDER — LISINOPRIL 2.5 MG/1
1 TABLET ORAL
Qty: 0 | Refills: 0 | DISCHARGE
Start: 2020-03-25

## 2020-03-25 RX ORDER — LISINOPRIL 2.5 MG/1
2.5 TABLET ORAL DAILY
Refills: 0 | Status: DISCONTINUED | OUTPATIENT
Start: 2020-03-25 | End: 2020-03-29

## 2020-03-25 RX ORDER — LACTOBACILLUS ACIDOPHILUS 100MM CELL
1 CAPSULE ORAL
Refills: 0 | Status: DISCONTINUED | OUTPATIENT
Start: 2020-03-25 | End: 2020-03-25

## 2020-03-25 RX ORDER — LACTOBACILLUS ACIDOPHILUS 100MM CELL
1 CAPSULE ORAL DAILY
Refills: 0 | Status: DISCONTINUED | OUTPATIENT
Start: 2020-03-25 | End: 2020-06-25

## 2020-03-25 RX ORDER — HALOPERIDOL DECANOATE 100 MG/ML
2 INJECTION INTRAMUSCULAR EVERY 6 HOURS
Refills: 0 | Status: DISCONTINUED | OUTPATIENT
Start: 2020-03-25 | End: 2020-04-20

## 2020-03-25 RX ORDER — ASPIRIN/CALCIUM CARB/MAGNESIUM 324 MG
81 TABLET ORAL DAILY
Refills: 0 | Status: DISCONTINUED | OUTPATIENT
Start: 2020-03-25 | End: 2020-06-25

## 2020-03-25 RX ORDER — ASPIRIN/CALCIUM CARB/MAGNESIUM 324 MG
1 TABLET ORAL
Qty: 0 | Refills: 0 | DISCHARGE
Start: 2020-03-25

## 2020-03-25 RX ORDER — DIPHENHYDRAMINE HCL 50 MG
25 CAPSULE ORAL EVERY 6 HOURS
Refills: 0 | Status: DISCONTINUED | OUTPATIENT
Start: 2020-03-25 | End: 2020-03-25

## 2020-03-25 RX ORDER — HYDROXYZINE HCL 10 MG
25 TABLET ORAL EVERY 6 HOURS
Refills: 0 | Status: DISCONTINUED | OUTPATIENT
Start: 2020-03-25 | End: 2020-06-25

## 2020-03-25 RX ORDER — HALOPERIDOL DECANOATE 100 MG/ML
2.5 INJECTION INTRAMUSCULAR EVERY 6 HOURS
Refills: 0 | Status: DISCONTINUED | OUTPATIENT
Start: 2020-03-25 | End: 2020-03-25

## 2020-03-25 RX ORDER — ATORVASTATIN CALCIUM 80 MG/1
40 TABLET, FILM COATED ORAL AT BEDTIME
Refills: 0 | Status: DISCONTINUED | OUTPATIENT
Start: 2020-03-25 | End: 2020-06-25

## 2020-03-25 RX ORDER — ATORVASTATIN CALCIUM 80 MG/1
1 TABLET, FILM COATED ORAL
Qty: 0 | Refills: 0 | DISCHARGE
Start: 2020-03-25

## 2020-03-25 RX ORDER — METOPROLOL TARTRATE 50 MG
12.5 TABLET ORAL
Qty: 0 | Refills: 0 | DISCHARGE
Start: 2020-03-25

## 2020-03-25 RX ADMIN — Medication 1 TABLET(S): at 20:51

## 2020-03-25 RX ADMIN — LISINOPRIL 2.5 MILLIGRAM(S): 2.5 TABLET ORAL at 07:51

## 2020-03-25 RX ADMIN — Medication 25 MILLIGRAM(S): at 20:52

## 2020-03-25 RX ADMIN — LISINOPRIL 2.5 MILLIGRAM(S): 2.5 TABLET ORAL at 20:50

## 2020-03-25 RX ADMIN — Medication 12.5 MILLIGRAM(S): at 07:51

## 2020-03-25 RX ADMIN — Medication 450 MILLIGRAM(S): at 13:58

## 2020-03-25 RX ADMIN — Medication 450 MILLIGRAM(S): at 07:51

## 2020-03-25 RX ADMIN — Medication 81 MILLIGRAM(S): at 20:50

## 2020-03-25 RX ADMIN — ATORVASTATIN CALCIUM 40 MILLIGRAM(S): 80 TABLET, FILM COATED ORAL at 20:51

## 2020-03-25 RX ADMIN — Medication 450 MILLIGRAM(S): at 20:49
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
## 2020-03-25 NOTE — DISCHARGE NOTE PROVIDER - NSDCMRMEDTOKEN_GEN_ALL_CORE_FT
aspirin 81 mg oral tablet, chewable: 1 tab(s) orally once a day  atorvastatin 40 mg oral tablet: 1 tab(s) orally once a day (at bedtime)  clindamycin 150 mg oral capsule: 3 cap(s) orally 3 times a day  lactobacillus acidophilus oral capsule: 1 cap(s) orally once a day  lisinopril 2.5 mg oral tablet: 1 tab(s) orally once a day  metoprolol: 12.5 milligram(s) orally 2 times a day

## 2020-03-25 NOTE — H&P ADULT - ASSESSMENT
ASSESSMENT: Pt is a 63yo F w/ PMH CAD, HTN, and asthma presenting to [facility] for treatment of schizophrenia. Pt was transferred to [facility] from [facility] after a few days of treatment for LLE cellulitis. Pt treated with IV clindamycin and transitioned to PO prior to transfer (end date will be 3/28/2020). Pt c/o no current pain or edema to the LLE.        PLAN:  - Admit to inpatient level of care - [facility]  - Continue clindamycin until 3/28  - Continue acidophilus   - Wound care - bactroban w/ xeroform and kerlix QD   - Ambulate as tolerated  - DASH diet   - VTE: ambulation  - Rest of plan as per [facility]

## 2020-03-25 NOTE — PROGRESS NOTE ADULT - SUBJECTIVE AND OBJECTIVE BOX
Hospital Day:  3d    Chief Complaint: Patient is a 64y old  Female who presents with a chief complaint of Left lower extremity erythema and pain (25 Mar 2020 08:46)    24 hour events:  -No acute events overnight     Subjective:  -no new concerns  -Denies CP, SOB, palpitations, fevers/chills, abdominal pain, nausea/vomiting, diarrhea     Past Medical Hx:   Asthma  Hypertension  Coronary artery disease    Past Sx:  S/P CABG (coronary artery bypass graft)    Allergies:  sulfa drugs (Unknown)    Current Meds:   Standing Meds:  aspirin  chewable 81 milliGRAM(s) Oral daily  atorvastatin 40 milliGRAM(s) Oral at bedtime  clindamycin   Capsule 450 milliGRAM(s) Oral three times a day  enoxaparin Injectable 40 milliGRAM(s) SubCutaneous daily  lisinopril 2.5 milliGRAM(s) Oral daily  metoprolol tartrate 12.5 milliGRAM(s) Oral two times a day  mupirocin 2% Ointment 1 Application(s) Topical two times a day  pantoprazole    Tablet 40 milliGRAM(s) Oral before breakfast    PRN Meds:  acetaminophen   Tablet .. 650 milliGRAM(s) Oral every 6 hours PRN Temp greater or equal to 38C (100.4F), Mild Pain (1 - 3)  albuterol/ipratropium for Nebulization 3 milliLiter(s) Nebulizer every 6 hours PRN Shortness of Breath and/or Wheezing    HOME MEDICATIONS:      Vital Signs:   T(F): 97 (20 @ 07:30), Max: 97 (20 @ 07:30)  HR: 67 (20 @ 07:30) (67 - 78)  BP: 150/69 (20 @ 07:30) (128/66 - 150/69)  RR: 18 (20 @ 07:30) (18 - 18)  SpO2: --      20 @ 07:01  -  20 @ 07:00  --------------------------------------------------------  IN: 170 mL / OUT: 0 mL / NET: 170 mL    Physical Exam:   GENERAL: NAD  HEENT: NCAT  CHEST/LUNG: CTAB  HEART: Regular rate and rhythm. No murmurs, rubs, or gallops  ABDOMEN: soft, non-tender, non-distended  EXTREMITIES: No LE edema b/l. LLE bandaged, patient refused allowing me to unwrap dressing  NERVOUS SYSTEM: Alert and oriented x 3    Labs:     Urinalysis Basic - ( 23 Mar 2020 08:10 )    Color: Yellow / Appearance: Clear / S.024 / pH: x  Gluc: x / Ketone: Negative  / Bili: Negative / Urobili: 6 mg/dL   Blood: x / Protein: Trace / Nitrite: Negative   Leuk Esterase: Negative / RBC: x / WBC x   Sq Epi: x / Non Sq Epi: x / Bacteria: x      Culture - Blood (collected 20 @ 03:20)  Source: .Blood Blood-Peripheral  Preliminary Report (20 @ 13:02):    No growth to date.    Culture - Blood (collected 20 @ 03:20)  Source: .Blood Blood-Peripheral  Preliminary Report (20 @ 13:02):    No growth to date.        Radiology:   < from: CT Tibia/Fibia No Cont, Left (20 @ 15:17) >  IMPRESSION:    Diffuse subcutaneous edema of the left leg without evidence of osteomyelitis, necrotizing fasciitis, or abscess.    No acutely displaced fracture    < end of copied text >        Assessment and Plan:   64 year old female patient with past medical history of CAD s/p CABG in 2006, asthma and hypertension here for evaluation of left lower extremity erythema and pain that she sustained after a recent trauma to the left shin    # left lower extremity cellulitis s/p traumatic injury  - No sepsis on admission   - No evidence of subcutaneous emphysema or OM on x ray   - blood cultures - pending  - CT of the tibia/femur non contrast:  diffuse subq edema , no OM , abcess, nec fascitis  - LE duplex negative for DVT  - Burn consult : no sx Surgical debridement   - Wound care - Bactroban/Xeroform/Kerlex/ACE Wrap BID to LLE  - c/w  IV clindamycin; will switch  to PO clindamycin 450mg TID(end date: 2020)  - LLE elevation  - Pain control     # Behavioural disturbance  unknown if any previous psychiatric history   - CT Head negative on admission - ve  - Vitamin B12, Folate, TSH, RPR, urine tox screen, ammonia level, serum alcohol and salicylate levels, urinalysis : normal  - psychiatry recommendations : no rx or IPP. Will recall now that patient's daughter's phone number is available and daughter reports chronic history of schizophrenia  - re- orientation frequently and behavioural therapy    #Bacturia- resolved  - repeat UA, ucx negative  -no rx required    # CAD s/p CABG   - Unknown home medication. Will start on Aspirin, Lopressor, Lipitor in hospital pending confirmation of home medication     # Asthma   - Duoneb q6h PRN for wheezing   - stable    # HTN   - Home medication unknown   - Will start Lisinopril 2.5 mg daily     # RUQ tenderness on exam   - LFTs normal  - RUQ US - cholelithiasis without cholecystitis    # Prolonged QTc 502 ms   - Caution with QT prolonging medication   - check mag, keep mg>2 and k>4    # Miscellaneous   - DVT prophylaxis : Lovenox 40 mg daily   - GI prophylaxis : Protonix 40 mg daily   - Activity : bedrest for now   - Diet : DASH TLC   - Full code  - Pending: psych follow-up, dispo planning Hospital Day:  3d    Chief Complaint: Patient is a 64y old  Female who presents with a chief complaint of Left lower extremity erythema and pain (25 Mar 2020 08:46)    24 hour events:  -No acute events overnight     Subjective:  -no new concerns  -Denies CP, SOB, palpitations, fevers/chills, abdominal pain, nausea/vomiting, diarrhea     Past Medical Hx:   Asthma  Hypertension  Coronary artery disease    Past Sx:  S/P CABG (coronary artery bypass graft)    Allergies:  sulfa drugs (Unknown)    Current Meds:   Standing Meds:  aspirin  chewable 81 milliGRAM(s) Oral daily  atorvastatin 40 milliGRAM(s) Oral at bedtime  clindamycin   Capsule 450 milliGRAM(s) Oral three times a day  enoxaparin Injectable 40 milliGRAM(s) SubCutaneous daily  lisinopril 2.5 milliGRAM(s) Oral daily  metoprolol tartrate 12.5 milliGRAM(s) Oral two times a day  mupirocin 2% Ointment 1 Application(s) Topical two times a day  pantoprazole    Tablet 40 milliGRAM(s) Oral before breakfast    PRN Meds:  acetaminophen   Tablet .. 650 milliGRAM(s) Oral every 6 hours PRN Temp greater or equal to 38C (100.4F), Mild Pain (1 - 3)  albuterol/ipratropium for Nebulization 3 milliLiter(s) Nebulizer every 6 hours PRN Shortness of Breath and/or Wheezing    HOME MEDICATIONS:      Vital Signs:   T(F): 97 (20 @ 07:30), Max: 97 (20 @ 07:30)  HR: 67 (20 @ 07:30) (67 - 78)  BP: 150/69 (20 @ 07:30) (128/66 - 150/69)  RR: 18 (20 @ 07:30) (18 - 18)  SpO2: --      20 @ 07:01  -  20 @ 07:00  --------------------------------------------------------  IN: 170 mL / OUT: 0 mL / NET: 170 mL    Physical Exam:   GENERAL: NAD  HEENT: NCAT  CHEST/LUNG: CTAB  HEART: Regular rate and rhythm. No murmurs, rubs, or gallops  ABDOMEN: soft, non-tender, non-distended  EXTREMITIES: No LE edema b/l. LLE bandaged, patient refused allowing me to unwrap dressing  NERVOUS SYSTEM: Alert and oriented x 3    Labs:     Urinalysis Basic - ( 23 Mar 2020 08:10 )    Color: Yellow / Appearance: Clear / S.024 / pH: x  Gluc: x / Ketone: Negative  / Bili: Negative / Urobili: 6 mg/dL   Blood: x / Protein: Trace / Nitrite: Negative   Leuk Esterase: Negative / RBC: x / WBC x   Sq Epi: x / Non Sq Epi: x / Bacteria: x      Culture - Blood (collected 20 @ 03:20)  Source: .Blood Blood-Peripheral  Preliminary Report (20 @ 13:02):    No growth to date.    Culture - Blood (collected 20 @ 03:20)  Source: .Blood Blood-Peripheral  Preliminary Report (20 @ 13:02):    No growth to date.        Radiology:   < from: CT Tibia/Fibia No Cont, Left (20 @ 15:17) >  IMPRESSION:    Diffuse subcutaneous edema of the left leg without evidence of osteomyelitis, necrotizing fasciitis, or abscess.    No acutely displaced fracture    < end of copied text >        Assessment and Plan:   64 year old female patient with past medical history of CAD s/p CABG in 2006, asthma and hypertension here for evaluation of left lower extremity erythema and pain that she sustained after a recent trauma to the left shin    # left lower extremity cellulitis s/p traumatic injury  - No sepsis on admission   - No evidence of subcutaneous emphysema or OM on x ray   - blood cultures - pending  - CT of the tibia/femur non contrast:  diffuse subq edema , no OM , abcess, nec fascitis  - LE duplex negative for DVT  - Burn consult : no sx Surgical debridement   - Wound care - Bactroban/Xeroform/Kerlex/ACE Wrap BID to LLE  - c/w  IV clindamycin; will switch  to PO clindamycin 450mg TID(end date: 2020)  - LLE elevation  - Pain control     # Behavioural disturbance  unknown if any previous psychiatric history   - CT Head negative on admission - ve  - Vitamin B12, Folate, TSH, RPR, urine tox screen, ammonia level, serum alcohol and salicylate levels, urinalysis : normal  - psychiatry recommendations : no rx or IPP. Will recall now that patient's daughter's phone number is available and daughter reports chronic history of schizophrenia  - re- orientation frequently and behavioural therapy  -patient previously admitted to hospital under name Rubi Wylie    #Bacturia- resolved  - repeat UA, ucx negative  -no rx required    # CAD s/p CABG   - Unknown home medication. Will start on Aspirin, Lopressor, Lipitor in hospital pending confirmation of home medication     # Asthma   - Duoneb q6h PRN for wheezing   - stable    # HTN   - Home medication unknown   - Will start Lisinopril 2.5 mg daily     # RUQ tenderness on exam   - LFTs normal  - RUQ US - cholelithiasis without cholecystitis    # Prolonged QTc 502 ms   - Caution with QT prolonging medication   - check mag, keep mg>2 and k>4    # Miscellaneous   - DVT prophylaxis : Lovenox 40 mg daily   - GI prophylaxis : Protonix 40 mg daily   - Activity : bedrest for now   - Diet : DASH TLC   - Full code  - Pending: psych follow-up, dispo planning

## 2020-03-25 NOTE — H&P ADULT - NSHPLABSRESULTS_GEN_ALL_CORE
Lactate Trend  03-22 @ 03:20 Lactate:1.5                   Culture Results:   <10,000 CFU/mL Normal Urogenital Nallely (03-22 @ 14:32)  Culture Results:   No growth to date. (03-22 @ 03:20)  Culture Results:   No growth to date. (03-22 @ 03:20)      < from: CT Tibia/Fibia No Cont, Left (03.22.20 @ 15:17) >      IMPRESSION:    Diffuse subcutaneous edema of the left leg without evidence of osteomyelitis, necrotizing fasciitis, or abscess.    No acutely displaced fracture              MARIA ISABEL OSWALD M.D., RESIDENT RADIOLOGIST  This document has been electronically signed.  YUSEF LÓPEZ M.D., ATTENDING RADIOLOGIST  This document has been electronically signed. Mar 22 2020  3:34PM    < end of copied text >

## 2020-03-25 NOTE — PROGRESS NOTE BEHAVIORAL HEALTH - NSBHCONSULTRECOMMENDOTHER_PSY_A_CORE FT
Recommend Haldol 2.5mg p.o Q 6 hrs PRN , Benadryl 25mg P.O Q 6 hr PRN and Ativan 1mg p.o Q 6 hrs PRN  for agitation

## 2020-03-25 NOTE — PROGRESS NOTE BEHAVIORAL HEALTH - SUMMARY
Ms. Silva is a 64-year-old woman  ( otherwise known as Rubi Givens ) with unclear psychiatric history who was admitted to the medical floor for the management of left lower extremity cellulitis. Psychiatry consult was called to evaluate patient for possible psychosis in the context of her episodic agitated and bizarre behavior.  Patient is not cooperative with interview . it is unclear if her paranoid and grandiose delusions , disorganised behavior are acute or chronic. however given the collateral information we have, patient has a psychiatric illness , is non complaint with medication, is unable to take care of herself .   At this time, patient is considered an imminent danger to herself  needs inpatient psychiatric hospitalization. Patient will benefit from haldol 2.5mg p.o Q 6 hrs PRN for agitation in addition ot benadryl 25mg P.O Q 6 hr PRN and Ativan 1mg p.o Q 6 hrs PRN . The inpatient psychiatry team will need the plan for the would dressing and outpatient medical or surgical follow up .

## 2020-03-25 NOTE — PROGRESS NOTE BEHAVIORAL HEALTH - NSBHFUPINTERVALHXFT_PSY_A_CORE
Patient seen and interviewed today. interview is however limited because of poor participation by patient   When asked how she is doing , patient shrugged her shoulders and asked writer about the purpose of the visit. Patient was asked if she has ever used another last name she stated yes, I am Rubi Remy". Patient was asked if she has ever seen a psychiatrist in the past or been in any psychiatric medications. Patient states " no, why are you interested in that, I don't want to talk anymore, you need to  leave now" .  As per nurse taking care of patient, she is not compliant with medication or recommended medical care , she reports that patient is however not agitated and she has not observed that patient is talking to herself.   Collateral information was obtained from patient's daughter Britney ( [phone]). She reports that she hasn't seen patient in about 1 year and the last time she spoke to patient was about 5 months ago,. She reports that patient has estranged her self by changing her number frequently and not having a constant place of abode. She reports that patient has a history of schizophrenia , was on haldol decanote and has been admitted to the inpatient psychiatry [facility] several times . She reports that patient was admitted to [facility] inpatient psychiatry in 2008 under the name Rubi Givens  and has been admitted in [facility] in the past and also a hospital in [place]. She reports that patient is unable to take care of herself , is unable to stay in the different adult homes she has been admitted to. She reports that she was able to talk top patient yesterday and patient says that she has corona virus and also has the cure for this infection. She reports that patient continues to say Samuel is her  however , patient has no  and Abebe is a friend who has not been in contact with patient since 2009. She reports that patient does not have 2 children  as she is patient's only child. Patient's daughter reports that when ever patient is on her medication medication, she is able to function well however her current presentation does not appear to be her baseline mental status when she is taking her medication.

## 2020-03-25 NOTE — H&P ADULT - NSHPPHYSICALEXAM_GEN_ALL_CORE
PHYSICAL EXAM:  Vital Signs Last 24 Hrs  T(C): 36.4 (25 Mar 2020 17:52), Max: 36.6 (25 Mar 2020 15:02)  T(F): 97.6 (25 Mar 2020 17:52), Max: 97.8 (25 Mar 2020 15:02)  HR: 68 (25 Mar 2020 17:52) (67 - 70)  BP: 161/69 (25 Mar 2020 17:52) (139/63 - 161/69)  BP(mean): --  RR: 17 (25 Mar 2020 17:52) (17 - 18)  SpO2: --    Constitutional: NAD, A&O x3    Eyes: PERRLA, no conjuctivitis    Neck: no lymphadenopathy    Respiratory: +air entry, no rales, no rhonchi, no wheezes    Cardiovascular: +S1 and S2, regular rate and rhythm    Gastrointestinal: +BS, soft, non-tender, not distended    Extremities:  no edema, no calf tenderness, +LLE erythema w/ small anterior wound w/o pain/edema. +pulses b/l.     Skin: no rashes, normal turgor

## 2020-03-25 NOTE — H&P ADULT - HISTORY OF PRESENT ILLNESS
Pt is a 63yo F w/ PMH CAD, HTN, and asthma presenting to [facility] for treatment of schizophrenia. Pt was transferred to [facility] from [facility] after a few days of treatment for LLE cellulitis. Pt treated with IV clindamycin and transitioned to PO prior to transfer (end date will be 3/28/2020). Pt c/o no current pain or edema to the LLE. Denies HA, dizziness, cough, SOB, rhinorrhea, sore throat, fevers, chest pain, NVD, abdominal pain, change in urination and change in BM.       HPI as per behavioral health at [facility]: Ms. Silva is a 64-year-old, female, white, , with two adult children, domiciled in private residence in [facility]), with medical history of asthma, CAD s/p CABG in 2006, hypertension, unknown psychiatric history, who was admitted to medicine for lower extremity cellulitis, psychiatry consulted for psychotic symptoms.     	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 Silva declined talking to primary team, however last night frequently called on call resident for pain.     	Upon approach, Ms. Silva was laying in bed, wincing in pain, she was alert, oriented to person, place (stated we at "[facility]"), and time (month and year tested). She stated that she had "no idea" why psychiatry was asked to see her. She denied mood disturbance, auditory hallucinations, perceptual disturbances, anxiety, sleep disturbance, appetite disturbance.     	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 Denied suicidal ideations.     	Attempted to contact  Abebe, and daughter (at same number) [phone] (number provided by Ms. Silva) - could not reach despite multiple attempts.    	As per nurse taking care of her today, patient was said to be agitated at night in the context of her pain, however patient has been in good behavioral control since the morning shift and has not been observed to be psychotic , maniac or depressed.

## 2020-03-25 NOTE — PROGRESS NOTE BEHAVIORAL HEALTH - NSBHCHARTREVIEWVS_PSY_A_CORE FT
Vital Signs Last 24 Hrs  T(C): 36.1 (25 Mar 2020 07:30), Max: 36.1 (25 Mar 2020 07:30)  T(F): 97 (25 Mar 2020 07:30), Max: 97 (25 Mar 2020 07:30)  HR: 67 (25 Mar 2020 07:30) (67 - 78)  BP: 150/69 (25 Mar 2020 07:30) (128/66 - 150/69)  BP(mean): --  RR: 18 (25 Mar 2020 07:30) (18 - 18)  SpO2: --

## 2020-03-25 NOTE — PROVIDER CONTACT NOTE (OTHER) - ACTION/TREATMENT ORDERED:
OK to reschedule
ok to reschedule
Will start patient on fluids
provider aware of refusal. provider to speak with pt. provider placed order for 1:1.
provider spoke with pt and pt agreed to take medications. provider aware, pt refused skin assessment.

## 2020-03-25 NOTE — DISCHARGE NOTE NURSING/CASE MANAGEMENT/SOCIAL WORK - PATIENT PORTAL LINK FT
You can access the FollowMyHealth Patient Portal offered by [facility] by registering at the following website: [email or web address]. By joining [facility]’s FollowMyHealth portal, you will also be able to view your health information using other applications (apps) compatible with our system.

## 2020-03-25 NOTE — DISCHARGE NOTE PROVIDER - HOSPITAL COURSE
64 year old female patient with past medical history of CAD s/p CABG in 2006, asthma and hypertension here for evaluation of left lower extremity erythema and pain that she sustained after a recent trauma to the left shin. Patient non-cooperative during the encounter, no next of kin to reach so details of trauma could not be assessed. 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 Per chart no history of psychiatric disorder.     Patient was given Clindamycin in the ED, no evidence of sepsis, x ray of the tibia showed soft tissue swelling and no evidence of subcutaneous emphysema or osteomyelitis. The patient was admitted for workup of her psychosis as well as treatment of her cellulitis.         On the floor, patient was evaluated by psychiatry who did not recommend any acute intervention or IPP admission. She had a CT scan done of the left lower extremity which did not demonstrate any osteomyelitis, necrotizing fasciitis, or abscess. She was treated with IV clindamycin, with transition to oral on discharge. Her hospital course was complicated by inability to reach out to family members or obtain further medical history from patient. She was started on statin, lipitor, aspirin, and beta blocker given history of CAD a 64 year old female patient with past medical history of CAD s/p CABG in 2006, asthma and hypertension here for evaluation of left lower extremity erythema and pain that she sustained after a recent trauma to the left shin. Patient non-cooperative during the encounter, no next of kin to reach so details of trauma could not be assessed. 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 Per chart no history of psychiatric disorder.     Patient was given Clindamycin in the ED, no evidence of sepsis, x ray of the tibia showed soft tissue swelling and no evidence of subcutaneous emphysema or osteomyelitis. The patient was admitted for workup of her psychosis as well as treatment of her cellulitis.         On the floor, she had a CT scan done of the left lower extremity which did not demonstrate any osteomyelitis, necrotizing fasciitis, or abscess. She was treated with IV clindamycin, with transition to oral on discharge. The burn team evaluated her and did not recommend any surgical intervention. Her hospital course was complicated by inability to reach out to family members or obtain further medical history from patient. She was started on statin, lipitor, aspirin, and beta blocker given history of CAD and CABG. PAtient's daughter was eventually contacted who described a history of schizophrenia stretching back to 2008. The patient had been in between multiple adult homes and had poor follow-up with her doctors. The daughter had not spoken to the patient for many years as the patient had changed her phone number. Psychiatry was called to evaluate patient, they recommended involvuntary IPP admission as patient poses a danger to herself as she appears unable to take care of her own needs at home. 64 year old female patient with past medical history of CAD s/p CABG in 2006, asthma and hypertension here for evaluation of left lower extremity erythema and pain that she sustained after a recent trauma to the left shin. Patient non-cooperative during the encounter, no next of kin to reach so details of trauma could not be assessed. 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 Per chart no history of psychiatric disorder.     Patient was given Clindamycin in the ED, no evidence of sepsis, x ray of the tibia showed soft tissue swelling and no evidence of subcutaneous emphysema or osteomyelitis. The patient was admitted for workup of her psychosis as well as treatment of her cellulitis.         On the floor, she had a CT scan done of the left lower extremity which did not demonstrate any osteomyelitis, necrotizing fasciitis, or abscess. She was treated with IV clindamycin, with transition to oral on discharge. The burn team evaluated her and did not recommend any surgical intervention. Her hospital course was complicated by inability to reach out to family members or obtain further medical history from patient. She was started on statin, lipitor, aspirin, and beta blocker given history of CAD and CABG. PAtient's daughter was eventually contacted who described a history of schizophrenia stretching back to 2008. The patient had been in between multiple adult homes and had poor follow-up with her doctors. The daughter had not spoken to the patient for many years as the patient had changed her phone number. Psychiatry was called to evaluate patient, they recommended involvuntary IPP admission as patient poses a danger to herself as she appears unable to take care of her own needs at home.     Attending Attestation:    Patient was seen & examined independently. At least 10 systems were reviewed in ROS. All systems reviewed  are within normal limits. Latest vital signs and labs were reviewed today. Case was discussed with house staff in morning rounds for assessment and plan.  Patient is medically stable for discharge . About 38 mins spent on discharge disposition.

## 2020-03-25 NOTE — DISCHARGE NOTE PROVIDER - NSDCCPCAREPLAN_GEN_ALL_CORE_FT
PRINCIPAL DISCHARGE DIAGNOSIS  Diagnosis: Cellulitis of left lower extremity  Assessment and Plan of Treatment: Skin infection fo marizol lower extremity. No abscess or bone infection on imaging. No surgery per burn team.  Wound care instructions:  Bactroban/Xeroform/Kerlex/ACE Wrap BID to LLE      SECONDARY DISCHARGE DIAGNOSES  Diagnosis: S/P CABG x 1  Assessment and Plan of Treatment: History of CABG. No medication list available, started on statin, lisinopril, metoprolol, and aspirin.    Diagnosis: Psychosis  Assessment and Plan of Treatment: Patient with history of schizophrenia and has continued psychosis/delusions. Psychiatry consulted, recommending inpatient psych admission.

## 2020-03-26 DIAGNOSIS — I10 ESSENTIAL (PRIMARY) HYPERTENSION: ICD-10-CM

## 2020-03-26 DIAGNOSIS — I25.810 ATHEROSCLEROSIS OF CORONARY ARTERY BYPASS GRAFT(S) WITHOUT ANGINA PECTORIS: ICD-10-CM

## 2020-03-26 PROBLEM — J45.909 UNSPECIFIED ASTHMA, UNCOMPLICATED: Chronic | Status: ACTIVE | Noted: 2020-03-22

## 2020-03-26 PROBLEM — I25.10 ATHEROSCLEROTIC HEART DISEASE OF NATIVE CORONARY ARTERY WITHOUT ANGINA PECTORIS: Chronic | Status: ACTIVE | Noted: 2020-03-22

## 2020-03-26 LAB
CHOLEST SERPL-MCNC: 148 MG/DL — SIGNIFICANT CHANGE UP (ref 100–200)
ESTIMATED AVERAGE GLUCOSE: 103 MG/DL — SIGNIFICANT CHANGE UP (ref 68–114)
HBA1C BLD-MCNC: 5.2 % — SIGNIFICANT CHANGE UP (ref 4–5.6)
HDLC SERPL-MCNC: 32 MG/DL — LOW
LIPID PNL WITH DIRECT LDL SERPL: 91 MG/DL — SIGNIFICANT CHANGE UP (ref 4–129)
TOTAL CHOLESTEROL/HDL RATIO MEASUREMENT: 4.6 RATIO — SIGNIFICANT CHANGE UP (ref 4–5.5)
TRIGL SERPL-MCNC: 163 MG/DL — HIGH (ref 10–149)

## 2020-03-26 PROCEDURE — 99232 SBSQ HOSP IP/OBS MODERATE 35: CPT

## 2020-03-26 RX ORDER — BUDESONIDE AND FORMOTEROL FUMARATE DIHYDRATE 160; 4.5 UG/1; UG/1
2 AEROSOL RESPIRATORY (INHALATION)
Refills: 0 | Status: DISCONTINUED | OUTPATIENT
Start: 2020-03-26 | End: 2020-06-25

## 2020-03-26 RX ORDER — LORATADINE 10 MG/1
10 TABLET ORAL DAILY
Refills: 0 | Status: DISCONTINUED | OUTPATIENT
Start: 2020-03-26 | End: 2020-06-25

## 2020-03-26 RX ORDER — TIOTROPIUM BROMIDE 18 UG/1
1 CAPSULE ORAL; RESPIRATORY (INHALATION) DAILY
Refills: 0 | Status: DISCONTINUED | OUTPATIENT
Start: 2020-03-26 | End: 2020-06-25

## 2020-03-26 RX ORDER — ARIPIPRAZOLE 15 MG/1
10 TABLET ORAL AT BEDTIME
Refills: 0 | Status: DISCONTINUED | OUTPATIENT
Start: 2020-03-26 | End: 2020-03-26

## 2020-03-26 RX ADMIN — Medication 450 MILLIGRAM(S): at 13:52

## 2020-03-26 RX ADMIN — Medication 450 MILLIGRAM(S): at 20:23

## 2020-03-26 RX ADMIN — ATORVASTATIN CALCIUM 40 MILLIGRAM(S): 80 TABLET, FILM COATED ORAL at 20:23

## 2020-03-26 RX ADMIN — TIOTROPIUM BROMIDE 1 CAPSULE(S): 18 CAPSULE ORAL; RESPIRATORY (INHALATION) at 13:52

## 2020-03-26 RX ADMIN — LORATADINE 10 MILLIGRAM(S): 10 TABLET ORAL at 13:52

## 2020-03-26 RX ADMIN — Medication 1 TABLET(S): at 10:15

## 2020-03-26 RX ADMIN — Medication 25 MILLIGRAM(S): at 20:23

## 2020-03-26 RX ADMIN — LISINOPRIL 2.5 MILLIGRAM(S): 2.5 TABLET ORAL at 10:15

## 2020-03-26 RX ADMIN — BUDESONIDE AND FORMOTEROL FUMARATE DIHYDRATE 2 PUFF(S): 160; 4.5 AEROSOL RESPIRATORY (INHALATION) at 21:03

## 2020-03-26 RX ADMIN — Medication 450 MILLIGRAM(S): at 10:14

## 2020-03-26 RX ADMIN — Medication 81 MILLIGRAM(S): at 10:15

## 2020-03-26 NOTE — PROGRESS NOTE BEHAVIORAL HEALTH - NSBHCHARTREVIEWIMAGING_PSY_A_CORE FT
< from: CT Head No Cont (03.22.20 @ 06:48) >    IMPRESSION:     No acute intracranial hemorrhage or mass effect.    < end of copied text >  < from: CT Tibia/Fibia No Cont, Left (03.22.20 @ 15:17) >      IMPRESSION:    Diffuse subcutaneous edema of the left leg without evidence of osteomyelitis, necrotizing fasciitis, or abscess.    No acutely displaced fracture    < end of copied text >  < from: US Abdomen Limited (03.22.20 @ 14:44) >    IMPRESSION:    Cholelithiasis without evidence of cholecystitis.    < end of copied text >  < from: [facility] Duplex Lower Ext Vein Scan, Left (03.22.20 @ 08:43) >    Impression:    No evidence of deep venous thrombosis or superficial thrombophlebitis in left lower extremity.    < end of copied text >  < from: Xray Chest 1 View AP/PA (03.22.20 @ 03:47) >    Impression:      No radiographic evidence of acute cardiopulmonary disease.    < end of copied text >

## 2020-03-26 NOTE — CONSULT NOTE ADULT - ASSESSMENT
medically stable with no acute issues  see hpi pa note reviewd pt lying in bed in nad [address] [place], [place] [id number]

## 2020-03-26 NOTE — PROGRESS NOTE BEHAVIORAL HEALTH - NSBHADDHXPSYCHFT_PSY_A_CORE
As per daughter, multiple episodes of medication and treatment noncompliance in the past resulting in multiple IPP hospitalizations (most recent [facility] last year), 2 prior SAs (daughter does not elaborate)

## 2020-03-26 NOTE — PROGRESS NOTE BEHAVIORAL HEALTH - SUMMARY
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 On evaluation, pt presents cooperative and calm affect in bed, states her name is Rubi Silva, she is  to bAebe and has "too many children to count", states her daughter's name is . Pt denies any psychiatric diagnoses, states does not take any psychiatric medications, denies all psychiatric complaints. Pt states past medical history include asthma, COPD and heart problems, states "I go to only the best doctors", denies ever seeing a psychiatrist. Denies prior hospitalizations, SA/SIB. Pt appears to be a poor historian. Denies SI/HI, intent and plan. Collateral from only daughter shows otherwise - pt diagnosed with schizophrenia in 2008, has had multiple episodes of medication noncompliance resulting in multiple IPP hospitalizations (most recent in [facility] last year). Pt's daughter is unsure of medications, states haldol deconate and clozapine, clozapine REMS states pt is not in system, pt denies any recent shots. Of note, EKG shows QTc 488 ms. Pt will likely benefit from IPP for medication reinitiation and management at this time.     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 On evaluation, pt presents cooperative and calm affect in bed, states her name is Rubi Silva, she is  to Abebe and has "too many children to count", states her daughter's name is . Pt denies any psychiatric diagnoses, states does not take any psychiatric medications, denies all psychiatric complaints. Pt states past medical history include asthma, COPD and heart problems, states "I go to only the best doctors", denies ever seeing a psychiatrist. Denies prior hospitalizations, SA/SIB. Pt appears to be a poor historian. Denies SI/HI, intent and plan. Collateral from only daughter shows otherwise - pt diagnosed with schizophrenia in 2008, has had multiple episodes of medication noncompliance resulting in multiple IPP hospitalizations (most recent in [facility] last year). Pt's daughter is unsure of medications, states haldol deconate and clozapine, clozapine REMS states pt is not in system, pt denies any recent shots. Of note, EKG shows QTc 488 ms. Pt will likely benefit from IPP for medication reinitiation and management at this time.     #Schizophrenia  -pending repeat EKG d/t current QTc 488 ms. If QTc remains >450 ms, start abilify 10 mg qhs. 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 On evaluation, pt presents cooperative and calm affect in bed, states her name is Rubi Silva, she is  to Abebe and has "too many children to count", states her daughter's name is . Pt denies any psychiatric diagnoses, states does not take any psychiatric medications, denies all psychiatric complaints. Pt states past medical history include asthma, COPD and heart problems, states "I go to only the best doctors", denies ever seeing a psychiatrist. Denies prior hospitalizations, SA/SIB. Pt appears to be a poor historian. Denies SI/HI, intent and plan. Collateral from only daughter shows otherwise - pt diagnosed with schizophrenia in 2008, has had multiple episodes of medication noncompliance resulting in multiple IPP hospitalizations (most recent in [facility] last year). Pt's daughter is unsure of medications, states haldol decanoate and clozapine, clozapine REMS states pt is not in system, pt denies any recent shots. Of note, EKG shows QTc 488 ms. Pt will likely benefit from IPP for medication reinitiation and management at this time.     #Schizophrenia  -pending repeat EKG d/t current QTc 488 ms. If QTc remains >450 ms, start abilify 10 mg qhs. If QTc<450 ms, start haldol 5 mg bid d/t prior hx of haldol decanoate

## 2020-03-26 NOTE — PROGRESS NOTE BEHAVIORAL HEALTH - NSBHCHARTREVIEWVS_PSY_A_CORE FT
Vital Signs Last 24 Hrs  T(C): 35.6 (26 Mar 2020 07:39), Max: 36.4 (25 Mar 2020 17:52)  T(F): 96 (26 Mar 2020 07:39), Max: 97.6 (25 Mar 2020 17:52)  HR: 57 (26 Mar 2020 07:39) (57 - 68)  BP: 137/87 (26 Mar 2020 07:39) (137/87 - 161/69)  BP(mean): --  RR: 16 (26 Mar 2020 07:39) (16 - 17)  SpO2: --

## 2020-03-26 NOTE — CHART NOTE - NSCHARTNOTEFT_GEN_A_CORE
Social Work Admit Note:    Patient is 64 years of age female who was admitted for evaluation after signs of psychosis while on medicine. Patient is disorganized, delusional, unpredictable and with potential for agitation. Patient reports she has no history of mental illness and has never been treated in a psychiatric unit previously. Medica record indicates otherwise. Patient reports she lives in [place] with her  Smauel and daughter , writer attempted to contact at number provided by patient, no answer no voicemail option. Patient lacks insight as to why she is here on IPP and indicates she is here because the "MTA emani" hurt her leg.  Patient denies depression and anxiety. Patient denies history of inpatient psychiatric admissions or psychiatric treatment.  History of trauma denied.  Audio / visual hallucinations denied.  Alcohol or other substance use denied.      Sexual History – patient identifies as heterosexual. 	  Family relationships and history – patient reports she is  with two children.  She reports good relations with family members.    Leisure Activity Assessment – spending time with her family and children.       Community Supports – no known attendance in any self- help groups or other organizations.   Employment – patient is not employed at this time.   Substance Use Assessment – use of alcohol or other substances denied.     History of suicidality or self- injurious behaviors – denied.      Significant Loses – none identified.    Life Goals – patient verbalized goal of going home.       will continue to meet with patient 1:1 and with treatment team daily.  Discharge plan is for continued mental health treatment in outpatient setting.    Please refer to Social Work Psychosocial for additional information

## 2020-03-26 NOTE — CHART NOTE - NSCHARTNOTEFT_GEN_A_CORE
Patient with COPD:    Home Meds: Claritan 10mg QD                      Anoro 1 puff QD                      Spiriva 1 puff QD                      Breo-elipta 1 puff QD                      Advair 500/50 1 puff QD    Rx Add Spiriva, Symbicort, Claritan

## 2020-03-26 NOTE — PROGRESS NOTE BEHAVIORAL HEALTH - NSBHCHARTREVIEWINVESTIGATE_PSY_A_CORE FT
< from: 12 Lead ECG (03.23.20 @ 08:38) >      Ventricular Rate 65 BPM    Atrial Rate 65 BPM    P-R Interval 166 ms    QRS Duration 166 ms    Q-T Interval 470 ms    QTC Calculation(Bezet) 488 ms    P Axis 6 degrees    R Axis 52 degrees    T Axis 54 degrees    Diagnosis Line Normal sinus rhythm  Right bundle branch block  Abnormal ECG    < end of copied text >

## 2020-03-26 NOTE — CONSULT NOTE ADULT - SUBJECTIVE AND OBJECTIVE BOX
DAVE ORTIZ  64y  Female      Patient is a 64y old  Female who presents with a chief complaint of Schizophrenia (25 Mar 2020 18:35)    HPI:  Pt is a 65yo F w/ PMH CAD, HTN, and asthma presenting to [facility] for treatment of schizophrenia. Pt was transferred to [facility] from [facility] after a few days of treatment for LLE cellulitis. Pt treated with IV clindamycin and transitioned to PO prior to transfer (end date will be 3/28/2020). Pt c/o no current pain or edema to the LLE. Denies HA, dizziness, cough, SOB, rhinorrhea, sore throat, fevers, chest pain, NVD, abdominal pain, change in urination and change in BM.       HPI as per behavioral health at [place]: Ms. Ortiz is a 64-year-old, female, white, , with two adult children, domiciled in private residence in [facility], with medical history of asthma, CAD s/p CABG in 2006, hypertension, unknown psychiatric history, who was admitted to medicine for lower extremity cellulitis, psychiatry consulted for psychotic symptoms.     	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 Ortiz declined talking to primary team, however last night frequently called on call resident for pain.     	Upon approach, Ms. Ortiz was laying in bed, wincing in pain, she was alert, oriented to person, place (stated we at "[facility]"), and time (month and year tested). She stated that she had "no idea" why psychiatry was asked to see her. She denied mood disturbance, auditory hallucinations, perceptual disturbances, anxiety, sleep disturbance, appetite disturbance.     	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 Denied suicidal ideations.     	Attempted to contact  Abebe, and daughter (at same number) [phone] (number provided by Ms. Ortiz) - could not reach despite multiple attempts.    	As per nurse taking care of her today, patient was said to be agitated at night in the context of her pain, however patient has been in good behavioral control since the morning shift and has not been observed to be psychotic , maniac or depressed. (25 Mar 2020 18:35)    INTERVAL HPI/OVERNIGHT EVENTS:  HEALTH ISSUES - PROBLEM Dx:  Schizophrenia: Schizophrenia        PAST MEDICAL & SURGICAL HISTORY:  Schizophrenia  Asthma  Hypertension  Coronary artery disease  S/P CABG (coronary artery bypass graft)    FAMILY HISTORY:    aspirin  chewable 81 milliGRAM(s) Oral daily  atorvastatin 40 milliGRAM(s) Oral at bedtime  clindamycin   Capsule 450 milliGRAM(s) Oral three times a day  haloperidol     Tablet 2 milliGRAM(s) Oral every 6 hours PRN  hydrOXYzine hydrochloride 25 milliGRAM(s) Oral every 6 hours PRN  lactobacillus acidophilus 1 Tablet(s) Oral daily  lisinopril 2.5 milliGRAM(s) Oral daily      REVIEW OF SYSTEMS:  CONSTITUTIONAL: No fever, weight loss, or fatigue  EYES: No eye pain, visual disturbances, or discharge  ENMT:  No difficulty hearing, tinnitus, vertigo; No sinus or throat pain  NECK: No pain or stiffness  BREASTS: No pain, masses, or nipple discharge  RESPIRATORY: No cough, wheezing, chills or hemoptysis; No shortness of breath  CARDIOVASCULAR: No chest pain, palpitations, dizziness, or leg swelling  GASTROINTESTINAL: No abdominal or epigastric pain. No nausea, vomiting, or hematemesis; No diarrhea or constipation. No melena or hematochezia.  GENITOURINARY: No dysuria, frequency, hematuria, or incontinence  NEUROLOGICAL: No headaches, memory loss, loss of strength, numbness, or tremors  SKIN: No itching, burning, rashes, or lesions   LYMPH NODES: No enlarged glands  ENDOCRINE: No heat or cold intolerance; No hair loss  MUSCULOSKELETAL: No joint pain or swelling; No muscle, back, or extremity pain  PSYCHIATRIC: as per hpi and previous psych history  HEME/LYMPH: No easy bruising, or bleeding gums  ALLERY AND IMMUNOLOGIC: No hives or eczema    T(C): 36.4 (03-25-20 @ 17:52), Max: 36.6 (03-25-20 @ 15:02)  HR: 68 (03-25-20 @ 17:52) (67 - 70)  BP: 161/69 (03-25-20 @ 17:52) (139/63 - 161/69)  RR: 17 (03-25-20 @ 17:52) (17 - 18)  SpO2: --  Wt(kg): --Vital Signs Last 24 Hrs  T(C): 36.4 (25 Mar 2020 17:52), Max: 36.6 (25 Mar 2020 15:02)  T(F): 97.6 (25 Mar 2020 17:52), Max: 97.8 (25 Mar 2020 15:02)  HR: 68 (25 Mar 2020 17:52) (67 - 70)  BP: 161/69 (25 Mar 2020 17:52) (139/63 - 161/69)  BP(mean): --  RR: 17 (25 Mar 2020 17:52) (17 - 18)  SpO2: --    PHYSICAL EXAM:  GENERAL: NAD,well-developed  HEAD:  Atraumatic, Normocephalic  EYES: EOMI, PERRLA, conjunctiva and sclera clear  ENMT: No tonsillar erythema, exudates, or enlargement; Moist mucous membranes, Good dentition, No lesions  NECK: Supple, No JVD, Normal thyroid  CHEST/LUNG: Clear bs bilaterally; No rales, rhonchi, wheezing  HEART: Regular rate and rhythm; No murmurs, rubs, or gallops  ABDOMEN: Soft, Nontender, Nondistended; Bowel sounds present  EXTREMITIES:  2+ Peripheral Pulses, No clubbing, cyanosis, or edema  LYMPH: No lymphadenopathy noted  SKIN: No rashes venous stasis le  Neuro: alert  no focal deficits    Consultant(s) Notes Reviewed:  [x ] YES  [ ] NO  Care Discussed with Consultants/Other Providers [ x] YES  [ ] NO    LABS:              CAPILLARY BLOOD GLUCOSE                RADIOLOGY & ADDITIONAL TESTS:    Imaging Personally Reviewed:  [ ] YES  [ ] NO

## 2020-03-26 NOTE — PROGRESS NOTE BEHAVIORAL HEALTH - NSBHFUPADDHPIFT_PSY_A_CORE
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 for lower left extremity cellulitis, transferred to [facility] d/t bizarre bx, psychiatric medication non-compliance and questionable ability to care for self. On evaluation, pt presents cooperative and calm affect in bed, states her name is Rubi Silva, she is  to [place] and have "too many children to count", states her daughter's name is . Pt denies any psychiatric diagnoses, states does not take any psychiatric medications, denies all psychiatric complaints. Pt states mood "I'm happy", denies depression, carlotta, AVH, paranoia. Pt states past medical history include asthma, COPD and heart problems, states "I go to only the best doctors", denies ever seeing a psychiatrist. Denies prior hospitalizations, SA/SIB. Pt appears to be a poor historian. Denies SI/HI, intent and plan.     Collateral obtained from pt's daughter, Britney - as per daughter, pt's last name is incorrect in system, pt's name Rubi Givens (pending fax of proof to update medical records). Pt's daughter states she hasn't seen pt for 5-6 months, states when pt is taking medications she does well, but when she stops she changes her phone number and moves "I can't find her". She states pt is on SSD and last she knew, she was at [facility] and was having a home health aid managing care. Pt endorses pt was dx with schizophrenia in 2008, and has been hospitalized several times, most recent from [facility] last year. She is unsure of pt's psychiatric medications, states she remembers haldol decoanate and clozapine in the past. She states Samuel is not pt's  and she is the only child. She endorses concern that pt is homeless, is unsure if [facility] and is able to care for herself independently; states she does not feel safe letting pt move in with her d/t having 2 children in the house.     Collateral obtained from [facility] - there is no record of the pt in files (both for Rubi Silva and Rubi Givens).     Collateral obtained from pharmacy, [facility] ([phone]) - pt has no psychiatric medications listed, last medications filled for COPD 12/2019. Medication list faxed, added to chart, informed PA for medical medication reconciliation.    Attempted to obtain collateral from Dr. Jones, PCP (as per daughter) ([phone]), left VM and callback #. 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 On evaluation, pt presents cooperative and calm affect in bed, states her name is Rubi Silva, she is  to [place] and have "too many children to count", states her daughter's name is . Pt denies any psychiatric diagnoses, states does not take any psychiatric medications, denies all psychiatric complaints. Pt states mood "I'm happy", denies depression, carlotta, AVH, paranoia. Pt states past medical history include asthma, COPD and heart problems, states "I go to only the best doctors", denies ever seeing a psychiatrist. Denies prior hospitalizations, SA/SIB. Pt appears to be a poor historian. Denies SI/HI, intent and plan.     Collateral obtained from pt's daughter, Britney - as per daughter, pt's last name is incorrect in system, pt's name Rubi Givens (pending fax of proof to update medical records). Pt's daughter states she hasn't seen pt for 5-6 months, states when pt is taking medications she does well, but when she stops she changes her phone number and moves "I can't find her". She states pt is on SSD and last she knew, she was at [facility] and was having a home health aid managing care. Pt endorses pt was dx with schizophrenia in 2008, and has been hospitalized several times, most recent from [facility] last year. She is unsure of pt's psychiatric medications, states she remembers haldol decanoate and clozapine in the past. She states Samuel is not pt's  and she is the only child. She endorses concern that pt is homeless, is unsure if pt still has access to [place] [address] and is able to care for herself independently; states she does not feel safe letting pt move in with her d/t having 2 children in the house.     Collateral obtained from [facility] - there is no record of the pt in files (both for Rubi Silva and Rubi Givens).     Collateral obtained from pharmacy, [facility] ([phone]) - pt has no psychiatric medications listed, last medications filled for COPD 12/2019. Medication list faxed, added to chart, informed PA for medical medication reconciliation.    Attempted to obtain collateral from Dr. Jones, PCP (as per daughter) ([phone]), left VM and callback #.

## 2020-03-26 NOTE — PROGRESS NOTE BEHAVIORAL HEALTH - NSBHADMITIPBHPROVFT_PSY_A_CORE
Attempted to obtain collateral from [facility], unable to connect with last behavioral health provider.

## 2020-03-27 DIAGNOSIS — Z71.89 OTHER SPECIFIED COUNSELING: ICD-10-CM

## 2020-03-27 DIAGNOSIS — F20.0 PARANOID SCHIZOPHRENIA: ICD-10-CM

## 2020-03-27 LAB
CULTURE RESULTS: SIGNIFICANT CHANGE UP
CULTURE RESULTS: SIGNIFICANT CHANGE UP
HCV AB S/CO SERPL IA: 12.5 S/CO — HIGH (ref 0–0.99)
HCV AB SERPL-IMP: REACTIVE
SPECIMEN SOURCE: SIGNIFICANT CHANGE UP
SPECIMEN SOURCE: SIGNIFICANT CHANGE UP

## 2020-03-27 PROCEDURE — 99231 SBSQ HOSP IP/OBS SF/LOW 25: CPT

## 2020-03-27 RX ADMIN — TIOTROPIUM BROMIDE 1 CAPSULE(S): 18 CAPSULE ORAL; RESPIRATORY (INHALATION) at 08:11

## 2020-03-27 RX ADMIN — ATORVASTATIN CALCIUM 40 MILLIGRAM(S): 80 TABLET, FILM COATED ORAL at 20:39

## 2020-03-27 RX ADMIN — Medication 1 TABLET(S): at 08:10

## 2020-03-27 RX ADMIN — LISINOPRIL 2.5 MILLIGRAM(S): 2.5 TABLET ORAL at 08:10

## 2020-03-27 RX ADMIN — Medication 450 MILLIGRAM(S): at 14:37

## 2020-03-27 RX ADMIN — LORATADINE 10 MILLIGRAM(S): 10 TABLET ORAL at 08:11

## 2020-03-27 RX ADMIN — Medication 450 MILLIGRAM(S): at 20:39

## 2020-03-27 RX ADMIN — Medication 81 MILLIGRAM(S): at 08:11

## 2020-03-27 RX ADMIN — BUDESONIDE AND FORMOTEROL FUMARATE DIHYDRATE 2 PUFF(S): 160; 4.5 AEROSOL RESPIRATORY (INHALATION) at 20:39

## 2020-03-27 RX ADMIN — BUDESONIDE AND FORMOTEROL FUMARATE DIHYDRATE 2 PUFF(S): 160; 4.5 AEROSOL RESPIRATORY (INHALATION) at 08:11

## 2020-03-27 RX ADMIN — Medication 450 MILLIGRAM(S): at 08:11

## 2020-03-27 NOTE — CHART NOTE - NSCHARTNOTEFT_GEN_A_CORE
Social Work Note:    Treatment team met with patient to discuss treatment plan, medications and discharge plan.  Patient was in her room at time of interview.  She was calm and cooperative.  During the day patient is observed to be isolative to room.  Patient encouraged to participate in unit functions and group activities. Patient expressed understanding. Patient remains with delusions; reports to treatment team that she is a  [employer]. Patient continues to endorse that she is  to a man named Samuel and has a daughter named . Treatment team was able to obtain collateral information from daughter Britney. Britney has not seen or heard from her mother in over 5 months. Britney states she is questioning whether her mom really lives in that address she listed, states last she knew she was in [facility] and she believes her mom is homeless.  Patients true name is Rubi Givens.     Patient was encouraged to continue taking medications as prescribed and to attend groups during the day.  She was agreeable to same.  Patient denied suicidal / homicidal ideation.  Audio / visual hallucinations denied.  Patient remains delusional with poor reality testing.     Mental Status Exam:    Mood – Neutral   Sleep - Good  Appetite - Good  ADLs - Fair  Thought Process – Delusional    Observation – [id number]    Patients location prior to admission remains unclear. Possible placement/disposition barrier to discharge identified at this time. Plan is for referral for continued treatment in outpatient mental health setting.     At this time patient is not psychiatrically stable for discharge.

## 2020-03-27 NOTE — PROGRESS NOTE ADULT - SUBJECTIVE AND OBJECTIVE BOX
pt stable alert in NAD  no new complaints    DEPRESSION  ^DEPRESSION  Handoff  Schizophrenia  Asthma  Hypertension  Coronary artery disease  Coronary artery disease involving coronary bypass graft of native heart without angina pectoris  Essential hypertension  Schizophrenia  S/P CABG (coronary artery bypass graft)    HEALTH ISSUES - PROBLEM Dx:  Coronary artery disease involving coronary bypass graft of native heart without angina pectoris: Coronary artery disease involving coronary bypass graft of native heart without angina pectoris  Essential hypertension: Essential hypertension  Schizophrenia: Schizophrenia        PAST MEDICAL & SURGICAL HISTORY:  Schizophrenia  Asthma  Hypertension  Coronary artery disease  S/P CABG (coronary artery bypass graft)    Bananas (Unknown)  sulfa drugs (Unknown)      FAMILY HISTORY:      aspirin  chewable 81 milliGRAM(s) Oral daily  atorvastatin 40 milliGRAM(s) Oral at bedtime  budesonide 160 MICROgram(s)/formoterol 4.5 MICROgram(s) Inhaler 2 Puff(s) Inhalation two times a day  clindamycin   Capsule 450 milliGRAM(s) Oral three times a day  haloperidol     Tablet 2 milliGRAM(s) Oral every 6 hours PRN  hydrOXYzine hydrochloride 25 milliGRAM(s) Oral every 6 hours PRN  lactobacillus acidophilus 1 Tablet(s) Oral daily  lisinopril 2.5 milliGRAM(s) Oral daily  loratadine 10 milliGRAM(s) Oral daily  tiotropium 18 MICROgram(s) Capsule 1 Capsule(s) Inhalation daily      T(C): --  HR: --  BP: --  RR: --  SpO2: --    PE;  general:  no cahgnes nteod in nad    Lungs:    Heart:    EXT:    Neuro:  aelrt no deciits                          CAPILLARY BLOOD GLUCOSE

## 2020-03-28 PROCEDURE — 99232 SBSQ HOSP IP/OBS MODERATE 35: CPT

## 2020-03-28 RX ORDER — ARIPIPRAZOLE 15 MG/1
10 TABLET ORAL DAILY
Refills: 0 | Status: DISCONTINUED | OUTPATIENT
Start: 2020-03-28 | End: 2020-03-30

## 2020-03-28 RX ADMIN — Medication 81 MILLIGRAM(S): at 08:14

## 2020-03-28 RX ADMIN — ARIPIPRAZOLE 10 MILLIGRAM(S): 15 TABLET ORAL at 11:16

## 2020-03-28 RX ADMIN — LORATADINE 10 MILLIGRAM(S): 10 TABLET ORAL at 08:14

## 2020-03-28 RX ADMIN — Medication 1 TABLET(S): at 08:13

## 2020-03-28 RX ADMIN — BUDESONIDE AND FORMOTEROL FUMARATE DIHYDRATE 2 PUFF(S): 160; 4.5 AEROSOL RESPIRATORY (INHALATION) at 20:22

## 2020-03-28 RX ADMIN — ATORVASTATIN CALCIUM 40 MILLIGRAM(S): 80 TABLET, FILM COATED ORAL at 20:23

## 2020-03-28 RX ADMIN — Medication 450 MILLIGRAM(S): at 08:14

## 2020-03-28 RX ADMIN — LISINOPRIL 2.5 MILLIGRAM(S): 2.5 TABLET ORAL at 08:13

## 2020-03-28 NOTE — PROGRESS NOTE BEHAVIORAL HEALTH - NSBHFUPINTERVALHXFT_PSY_A_CORE
Pt remains psychotic, delusional, grandious. She was seen resting in her bed, eyes closed, reluctant to talk. She continues to report that she is a "[employer] ", Denies s/h ideation. Pt has poor insight stating that she was admitted to [facility] only "for her leg".

## 2020-03-28 NOTE — PROGRESS NOTE BEHAVIORAL HEALTH - NSBHCHARTREVIEWVS_PSY_A_CORE FT
Vital Signs Last 24 Hrs  T(C): 36.2 (28 Mar 2020 08:10), Max: 36.9 (28 Mar 2020 06:25)  T(F): 97.1 (28 Mar 2020 08:10), Max: 98.4 (28 Mar 2020 06:25)  HR: 64 (28 Mar 2020 08:10) (56 - 66)  BP: 124/60 (28 Mar 2020 08:10) (120/57 - 127/57)  BP(mean): --  RR: 18 (28 Mar 2020 08:10) (18 - 18)  SpO2: --

## 2020-03-29 RX ORDER — LISINOPRIL 2.5 MG/1
5 TABLET ORAL DAILY
Refills: 0 | Status: DISCONTINUED | OUTPATIENT
Start: 2020-03-29 | End: 2020-04-10

## 2020-03-29 RX ADMIN — TIOTROPIUM BROMIDE 1 CAPSULE(S): 18 CAPSULE ORAL; RESPIRATORY (INHALATION) at 08:22

## 2020-03-29 RX ADMIN — Medication 81 MILLIGRAM(S): at 08:22

## 2020-03-29 RX ADMIN — Medication 1 TABLET(S): at 08:22

## 2020-03-29 RX ADMIN — BUDESONIDE AND FORMOTEROL FUMARATE DIHYDRATE 2 PUFF(S): 160; 4.5 AEROSOL RESPIRATORY (INHALATION) at 08:22

## 2020-03-29 RX ADMIN — LISINOPRIL 2.5 MILLIGRAM(S): 2.5 TABLET ORAL at 08:22

## 2020-03-29 RX ADMIN — LORATADINE 10 MILLIGRAM(S): 10 TABLET ORAL at 08:22

## 2020-03-29 RX ADMIN — ATORVASTATIN CALCIUM 40 MILLIGRAM(S): 80 TABLET, FILM COATED ORAL at 20:42

## 2020-03-29 RX ADMIN — BUDESONIDE AND FORMOTEROL FUMARATE DIHYDRATE 2 PUFF(S): 160; 4.5 AEROSOL RESPIRATORY (INHALATION) at 20:42

## 2020-03-29 NOTE — PROGRESS NOTE ADULT - PROBLEM SELECTOR PLAN 1
continue present treatment as per psych plan as reviewed  Medically stable increase cwe to 5 mg  will continue to monitor medical status while being treated on psych

## 2020-03-29 NOTE — PROGRESS NOTE ADULT - SUBJECTIVE AND OBJECTIVE BOX
pt stable alert in NAD  no new complaints    DEPRESSION  ^DEPRESSION  Handoff  Schizophrenia  Asthma  Hypertension  Coronary artery disease  Paranoid schizophrenia  Coronary artery disease involving coronary bypass graft of native heart without angina pectoris  Essential hypertension  Schizophrenia  S/P CABG (coronary artery bypass graft)    HEALTH ISSUES - PROBLEM Dx:  Paranoid schizophrenia  Coronary artery disease involving coronary bypass graft of native heart without angina pectoris: Coronary artery disease involving coronary bypass graft of native heart without angina pectoris  Essential hypertension: Essential hypertension  Schizophrenia: Schizophrenia        PAST MEDICAL & SURGICAL HISTORY:  Schizophrenia  Asthma  Hypertension  Coronary artery disease  S/P CABG (coronary artery bypass graft)    Bananas (Unknown)  sulfa drugs (Unknown)      FAMILY HISTORY:      ARIPiprazole 10 milliGRAM(s) Oral daily  aspirin  chewable 81 milliGRAM(s) Oral daily  atorvastatin 40 milliGRAM(s) Oral at bedtime  budesonide 160 MICROgram(s)/formoterol 4.5 MICROgram(s) Inhaler 2 Puff(s) Inhalation two times a day  haloperidol     Tablet 2 milliGRAM(s) Oral every 6 hours PRN  hydrOXYzine hydrochloride 25 milliGRAM(s) Oral every 6 hours PRN  lactobacillus acidophilus 1 Tablet(s) Oral daily  lisinopril 2.5 milliGRAM(s) Oral daily  loratadine 10 milliGRAM(s) Oral daily  tiotropium 18 MICROgram(s) Capsule 1 Capsule(s) Inhalation daily      T(C): 36.1 (03-29-20 @ 06:18), Max: 36.1 (03-29-20 @ 06:18)  HR: 72 (03-29-20 @ 06:18) (72 - 72)  BP: 164/70 (03-29-20 @ 06:18) (164/70 - 164/70)  RR: 18 (03-29-20 @ 06:18) (18 - 18)  SpO2: --    PE;  general: stable no changes ntoed innad    Lungs:    Heart:    EXT:    Neuro:  no deficits                          CAPILLARY BLOOD GLUCOSE

## 2020-03-30 PROCEDURE — 99231 SBSQ HOSP IP/OBS SF/LOW 25: CPT

## 2020-03-30 RX ORDER — HALOPERIDOL DECANOATE 100 MG/ML
2.5 INJECTION INTRAMUSCULAR ONCE
Refills: 0 | Status: COMPLETED | OUTPATIENT
Start: 2020-03-30 | End: 2020-03-30

## 2020-03-30 RX ORDER — DIPHENHYDRAMINE HCL 50 MG
50 CAPSULE ORAL ONCE
Refills: 0 | Status: COMPLETED | OUTPATIENT
Start: 2020-03-30 | End: 2020-03-30

## 2020-03-30 RX ORDER — ARIPIPRAZOLE 15 MG/1
10 TABLET ORAL AT BEDTIME
Refills: 0 | Status: DISCONTINUED | OUTPATIENT
Start: 2020-03-30 | End: 2020-04-08

## 2020-03-30 RX ADMIN — Medication 1 MILLIGRAM(S): at 08:23

## 2020-03-30 RX ADMIN — Medication 50 MILLIGRAM(S): at 08:22

## 2020-03-30 RX ADMIN — HALOPERIDOL DECANOATE 2.5 MILLIGRAM(S): 100 INJECTION INTRAMUSCULAR at 08:22

## 2020-03-30 NOTE — PROGRESS NOTE BEHAVIORAL HEALTH - NSBHCHARTREVIEWINVESTIGATE_PSY_A_CORE FT
< from: 12 Lead ECG (03.26.20 @ 18:09) >      Ventricular Rate 61 BPM    Atrial Rate 61 BPM    P-R Interval 160 ms    QRS Duration 150 ms    Q-T Interval 474 ms    QTC Calculation(Bezet) 477 ms    P Axis 33 degrees    R Axis 65 degrees    T Axis 75 degrees    Diagnosis Line Normal sinus rhythm  Possible Left atrial enlargement  Right bundle branch block  T wave abnormality, consider lateral ischemia  Abnormal ECG    < end of copied text >

## 2020-03-30 NOTE — PROGRESS NOTE BEHAVIORAL HEALTH - NSBHCHARTREVIEWVS_PSY_A_CORE FT
Vital Signs Last 24 Hrs  T(C): 36.3 (30 Mar 2020 06:09), Max: 36.3 (30 Mar 2020 06:09)  T(F): 97.3 (30 Mar 2020 06:09), Max: 97.3 (30 Mar 2020 06:09)  HR: 72 (30 Mar 2020 06:09) (66 - 72)  BP: 146/67 (30 Mar 2020 06:09) (140/60 - 146/67)  BP(mean): --  RR: 18 (30 Mar 2020 06:09) (16 - 18)  SpO2: --

## 2020-03-30 NOTE — CHART NOTE - NSCHARTNOTEFT_GEN_A_CORE
Social Work Note:    Treatment team met with patient to discuss treatment plan, medications and discharge plan.  During the day patient is observed to be isolative to her room. Patient remains delusional unpredictable and with potential for agitation. Patient required IM PRN this morning due to agitation. Patient asleep for most of the day as a result. Treatment team unable to conduct meaningful assessment at this time.     Treatment team considering transfer to [facility] due to patients serious persistent mental illness and lack of stable safe support in the community. Treatment and discharge planning ensues.       Mental Status Exam:    Mood – Labile  Sleep - Fair  Appetite - Good  ADLs - Fair  Thought Process – Delusional    Observation – [id number]        At this time patient is not psychiatrically stable for discharge.

## 2020-03-30 NOTE — PROGRESS NOTE BEHAVIORAL HEALTH - NSBHCHARTREVIEWIMAGING_PSY_A_CORE FT
< from: CT Tibia/Fibia No Cont, Left (03.22.20 @ 15:17) >    IMPRESSION:    Diffuse subcutaneous edema of the left leg without evidence of osteomyelitis, necrotizing fasciitis, or abscess.    No acutely displaced fracture    < end of copied text >  < from: US Abdomen Limited (03.22.20 @ 14:44) >    IMPRESSION:    Cholelithiasis without evidence of cholecystitis.    < end of copied text >  < from: VA Duplex Lower Ext Vein Scan, Left (03.22.20 @ 08:43) >    Impression:    No evidence of deep venous thrombosis or superficial thrombophlebitis in left lower extremity.    < end of copied text >  < from: CT Head No Cont (03.22.20 @ 06:48) >    IMPRESSION:     No acute intracranial hemorrhage or mass effect.    < end of copied text >  < from: Xray Chest 1 View AP/PA (03.22.20 @ 03:47) >    Impression:      No radiographic evidence of acute cardiopulmonary disease.    < end of copied text >  < from: Xray Tibia + Fibula 2 Views, Left (03.22.20 @ 02:49) >    Findings/  impression: Soft tissue swelling without radiographic evidence of subcutaneous emphysema or osteomyelitis. Tricompartmental knee degenerative changes present.    < end of copied text >

## 2020-03-30 NOTE — PROGRESS NOTE BEHAVIORAL HEALTH - NSBHFUPINTERVALHXFT_PSY_A_CORE
Pt seen and evaluated, chart reviewed. As per nursing report, pt refused to take abilify over the weekend, however verbally redirectable and no behavioral issues, until this AM in which pt became agitated and started yelling requiring IM prns at 0820. On evaluation, pt presents in bed, sleeping. Later on reassessment ... Pt seen and evaluated, chart reviewed. As per nursing report, pt refused to take abilify over the weekend, however verbally redirectable and no behavioral issues, until this AM in which pt became agitated and started yelling requiring IM prns at 0820. On evaluation, pt presents in bed, sleeping. Later on reassessment, pt remains in bed, lethargic with eyes closed, states mood "I'm okay", denies all psychiatric complaints, states "I'm not sick". Pt denies SI/HI, intent and plan. Attempted to educate pt on necessity to take abilify tonight, pt does not verbalize acknowledgement, stating "no, I'm not sick", refuses to answer further questions. Limited interview.

## 2020-03-30 NOTE — CHART NOTE - NSCHARTNOTEFT_GEN_A_CORE
Patient increasing agitated, screaming, cursing at nursing staff, making verbal threats and aggressive gestures. Attempted verbal re-direction without de-escalation. Haldol 2.5mg, ativan 1 mg, benadryl 50mg IM x 1 STAT ordered as appears to be an acute danger to self/others.

## 2020-03-31 PROCEDURE — 99231 SBSQ HOSP IP/OBS SF/LOW 25: CPT

## 2020-03-31 RX ADMIN — BUDESONIDE AND FORMOTEROL FUMARATE DIHYDRATE 2 PUFF(S): 160; 4.5 AEROSOL RESPIRATORY (INHALATION) at 20:30

## 2020-03-31 RX ADMIN — BUDESONIDE AND FORMOTEROL FUMARATE DIHYDRATE 2 PUFF(S): 160; 4.5 AEROSOL RESPIRATORY (INHALATION) at 14:30

## 2020-03-31 RX ADMIN — ATORVASTATIN CALCIUM 40 MILLIGRAM(S): 80 TABLET, FILM COATED ORAL at 20:30

## 2020-03-31 RX ADMIN — Medication 1 TABLET(S): at 08:38

## 2020-03-31 RX ADMIN — TIOTROPIUM BROMIDE 1 CAPSULE(S): 18 CAPSULE ORAL; RESPIRATORY (INHALATION) at 14:32

## 2020-03-31 RX ADMIN — LISINOPRIL 5 MILLIGRAM(S): 2.5 TABLET ORAL at 08:38

## 2020-03-31 RX ADMIN — LORATADINE 10 MILLIGRAM(S): 10 TABLET ORAL at 08:38

## 2020-03-31 RX ADMIN — Medication 81 MILLIGRAM(S): at 08:38

## 2020-03-31 NOTE — PROGRESS NOTE BEHAVIORAL HEALTH - NSBHFUPINTERVALHXFT_PSY_A_CORE
Pt seen and evaluated during treatment team, chart reviewed. As per nursing report, pt refused abilify last night, otherwise no behavioral issues. On evaluation, pt presents in bed taking her AM medications, refuses abilify. Pt states "I don't need that, I don't have a mental illness", endorses good mood, sleep and appetite. Pt denies all psychiatric complaints. Pt continues with delusion of being  to [place] and having "too many children to count"; pt's daughter Britney states pt is single and she is her only child, as well as concern of pt being homeless. Pt pending possible [place] admission at d/c.

## 2020-03-31 NOTE — PROGRESS NOTE BEHAVIORAL HEALTH - NSBHCHARTREVIEWVS_PSY_A_CORE FT
Vital Signs Last 24 Hrs  T(C): 35.8 (31 Mar 2020 06:03), Max: 35.9 (30 Mar 2020 17:20)  T(F): 96.4 (31 Mar 2020 06:03), Max: 96.7 (30 Mar 2020 17:20)  HR: 70 (31 Mar 2020 06:03) (58 - 70)  BP: 142/66 (31 Mar 2020 06:03) (130/54 - 142/66)  BP(mean): --  RR: 22 (31 Mar 2020 06:03) (16 - 22)  SpO2: --

## 2020-03-31 NOTE — PROGRESS NOTE BEHAVIORAL HEALTH - NSBHCHARTREVIEWIMAGING_PSY_A_CORE FT
< from: CT Tibia/Fibia No Cont, Left (03.22.20 @ 15:17) >    IMPRESSION:    Diffuse subcutaneous edema of the left leg without evidence of osteomyelitis, necrotizing fasciitis, or abscess.    No acutely displaced fracture    < end of copied text >  < from: US Abdomen Limited (03.22.20 @ 14:44) >    IMPRESSION:    Cholelithiasis without evidence of cholecystitis.      < end of copied text >  < from: VA Duplex Lower Ext Vein Scan, Left (03.22.20 @ 08:43) >    Impression:    No evidence of deep venous thrombosis or superficial thrombophlebitis in left lower extremity.    < end of copied text >  < from: CT Head No Cont (03.22.20 @ 06:48) >    IMPRESSION:     No acute intracranial hemorrhage or mass effect.    < end of copied text >  < from: Xray Chest 1 View AP/PA (03.22.20 @ 03:47) >    Impression:      No radiographic evidence of acute cardiopulmonary disease.    < end of copied text >

## 2020-03-31 NOTE — PROGRESS NOTE BEHAVIORAL HEALTH - SUMMARY
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 On evaluation, pt presents cooperative and calm affect in bed, states her name is Rubi Silva, she is  to [place] and has "too many children to count", states her daughter's name is . Pt denies any psychiatric diagnoses, states does not take any psychiatric medications, denies all psychiatric complaints. Pt states past medical history include asthma, COPD and heart problems, states "I go to only the best doctors", denies ever seeing a psychiatrist. Denies prior hospitalizations, SA/SIB. Pt appears to be a poor historian. Denies SI/HI, intent and plan. Collateral from only daughter shows otherwise - pt diagnosed with schizophrenia in 2008, has had multiple episodes of medication noncompliance resulting in multiple [facility] hospitalizations (most recent in [facility] last year). Pt's daughter is unsure of medications, states haldol decanoate and clozapine, [facility] states pt is not in system, pt denies any recent shots. Of note, EKG shows QTc 488 ms, repeat QTc 477 ms (3/27). Pt will likely benefit from IPP for medication reinitiation and management at this time.     #Schizophrenia  -c/w abilify 10 mg qhs

## 2020-03-31 NOTE — PROGRESS NOTE ADULT - SUBJECTIVE AND OBJECTIVE BOX
pt stable alert in NAD  no new complaints    DEPRESSION  ^DEPRESSION  Handoff  Schizophrenia  Asthma  Hypertension  Coronary artery disease  Paranoid schizophrenia  Coronary artery disease involving coronary bypass graft of native heart without angina pectoris  Essential hypertension  Schizophrenia  S/P CABG (coronary artery bypass graft)    HEALTH ISSUES - PROBLEM Dx:  Paranoid schizophrenia  Coronary artery disease involving coronary bypass graft of native heart without angina pectoris: Coronary artery disease involving coronary bypass graft of native heart without angina pectoris  Essential hypertension: Essential hypertension  Schizophrenia: Schizophrenia        PAST MEDICAL & SURGICAL HISTORY:  Schizophrenia  Asthma  Hypertension  Coronary artery disease  S/P CABG (coronary artery bypass graft)    Bananas (Unknown)  sulfa drugs (Unknown)      FAMILY HISTORY:      ARIPiprazole 10 milliGRAM(s) Oral at bedtime  aspirin  chewable 81 milliGRAM(s) Oral daily  atorvastatin 40 milliGRAM(s) Oral at bedtime  budesonide 160 MICROgram(s)/formoterol 4.5 MICROgram(s) Inhaler 2 Puff(s) Inhalation two times a day  haloperidol     Tablet 2 milliGRAM(s) Oral every 6 hours PRN  hydrOXYzine hydrochloride 25 milliGRAM(s) Oral every 6 hours PRN  lactobacillus acidophilus 1 Tablet(s) Oral daily  lisinopril 5 milliGRAM(s) Oral daily  loratadine 10 milliGRAM(s) Oral daily  tiotropium 18 MICROgram(s) Capsule 1 Capsule(s) Inhalation daily      T(C): 35.8 (03-31-20 @ 06:03), Max: 35.9 (03-30-20 @ 17:20)  HR: 70 (03-31-20 @ 06:03) (58 - 70)  BP: 142/66 (03-31-20 @ 06:03) (130/54 - 142/66)  RR: 22 (03-31-20 @ 06:03) (16 - 22)  SpO2: --    PE;  general:  no changes no phillip in nad    Lungs:    Heart:    EXT:    Neuro:   alertrt no [id number]                        CAPILLARY BLOOD GLUCOSE

## 2020-04-01 PROCEDURE — 99231 SBSQ HOSP IP/OBS SF/LOW 25: CPT

## 2020-04-01 RX ADMIN — LISINOPRIL 5 MILLIGRAM(S): 2.5 TABLET ORAL at 08:37

## 2020-04-01 RX ADMIN — LORATADINE 10 MILLIGRAM(S): 10 TABLET ORAL at 08:37

## 2020-04-01 RX ADMIN — BUDESONIDE AND FORMOTEROL FUMARATE DIHYDRATE 2 PUFF(S): 160; 4.5 AEROSOL RESPIRATORY (INHALATION) at 20:28

## 2020-04-01 RX ADMIN — Medication 1 TABLET(S): at 08:37

## 2020-04-01 RX ADMIN — ATORVASTATIN CALCIUM 40 MILLIGRAM(S): 80 TABLET, FILM COATED ORAL at 20:28

## 2020-04-01 RX ADMIN — Medication 81 MILLIGRAM(S): at 08:37

## 2020-04-01 RX ADMIN — TIOTROPIUM BROMIDE 1 CAPSULE(S): 18 CAPSULE ORAL; RESPIRATORY (INHALATION) at 08:37

## 2020-04-01 RX ADMIN — BUDESONIDE AND FORMOTEROL FUMARATE DIHYDRATE 2 PUFF(S): 160; 4.5 AEROSOL RESPIRATORY (INHALATION) at 08:37

## 2020-04-01 NOTE — PROGRESS NOTE BEHAVIORAL HEALTH - NSBHCHARTREVIEWINVESTIGATE_PSY_A_CORE FT
< from: 12 Lead ECG (03.31.20 @ 14:44) >      Ventricular Rate 63 BPM    Atrial Rate 63 BPM    P-R Interval 164 ms    QRS Duration 164 ms    Q-T Interval 464 ms    QTC Calculation(Bezet) 474 ms    P Axis 71 degrees    R Axis 62 degrees    T Axis 79 degrees    Diagnosis Line Normal sinus rhythm  Possible Left atrial enlargement  Right bundle branch block  Septal infarct , age undetermined  T wave abnormality, consider lateral ischemia  Abnormal ECG    Confirmed by KIRK BERRY, GANESH ([id number]) on 3/31/2020 5:12:19 PM    < end of copied text >

## 2020-04-01 NOTE — CHART NOTE - NSCHARTNOTEFT_GEN_A_CORE
Patient remains on unit for continued treatment safety and observation. Patient is visible on unit and compliant with treatment as well as unit protocol. Writer meets with patient daily on rounds and or team meeting. Writer provides support and education to the patient daily. Patient to remain on unit until cleared by attending for discharge. Patient denies suicidal ideation, homicidal ideation and presents with no evidence of audio visual hallucinations. Patient refuses all psychotropic medications and insists she is not mentally ill. Patient has a lengthy medical history that suggests otherwise. Patient is delusional disorganized and with potential for agitation. Patient continues to report that she is [employer] and speaks to staff " yes Sir/Rachelle, thank you Sir/Rachelle, no Sir/ Rachelle.  Patient not cleared for discharge at this time    This worker outreached mitra Tan to check the status of patients identification. As per daughter Ricardo is not patient true last name rather it is Rosalia. Patients current mental state, refusal to take medications and lack of proper patient desmographic information are barriers to discharge. At this time patient is not psychiatrically stable for discharge.    Mental Status Exam:    Mood – Labile  Sleep - Fair  Appetite - Good  ADLs - Fair  Thought Process – Delusional    Observation – [id number]

## 2020-04-01 NOTE — PROGRESS NOTE BEHAVIORAL HEALTH - NSBHFUPINTERVALHXFT_PSY_A_CORE
As per nursing patient didn't present any behavioral issues yesterday evening. Patient seen and assessed on rounds. Patient was alert and calm initially during the assessment then became dismissive shortly after. Patient reported that her mood is "fine" and doesn't know why she's still in the hospital. Reports her leg is better and she doesn't have any "Psych issues". Patient was non-compliant with Abilify, stating "I don't need anything to interfere with my heart. Patient requesting to go home to live with  Abebe and daughter . Patient remains isolative to her despite encouragement to leave. Patient did eat breakfast and lunch. Patient asked for toiletries and new gowns but did not shower. Patient denies any auditory or visual hallucinations. Patient denies any suicidal or homicidal ideations. Patient did not endorse any acute medical concerns.

## 2020-04-01 NOTE — PROGRESS NOTE BEHAVIORAL HEALTH - SUMMARY
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 On evaluation, pt presents cooperative and calm affect in bed, states her name is Rubi Silva, she is  to Abebe and has "too many children to count", states her daughter's name is . Pt denies any psychiatric diagnoses, states does not take any psychiatric medications, denies all psychiatric complaints. Pt states past medical history include asthma, COPD and heart problems, states "I go to only the best doctors", denies ever seeing a psychiatrist. Denies prior hospitalizations, SA/SIB. Pt appears to be a poor historian. Denies SI/HI, intent and plan. Collateral from only daughter shows otherwise - pt diagnosed with schizophrenia in 2008, has had multiple episodes of medication noncompliance resulting in multiple IPP hospitalizations (most recent in [facility] last year). Pt's daughter is unsure of medications, states haldol decanoate and clozapine, clozapine REMS states pt is not in system, pt denies any recent shots. Of note, EKG shows QTc 488 ms, repeat QTc 477 ms (3/27). Pt will likely benefit from IPP for medication reinitiation and management at this time.     #Schizophrenia  -c/w abilify 10 mg qhs.  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 On evaluation, pt presents cooperative and calm affect in bed, states her name is Rubi Silva, she is  to Abebe and has "too many children to count", states her daughter's name is . Pt denies any psychiatric diagnoses, states does not take any psychiatric medications, denies all psychiatric complaints. Pt states past medical history include asthma, COPD and heart problems, states "I go to only the best doctors", denies ever seeing a psychiatrist. Denies prior hospitalizations, SA/SIB. Pt appears to be a poor historian. Denies SI/HI, intent and plan. Collateral from only daughter shows otherwise - pt diagnosed with schizophrenia in 2008, has had multiple episodes of medication noncompliance resulting in multiple IPP hospitalizations (most recent in [facility] last year). Pt's daughter is unsure of medications, states haldol decanoate and clozapine, clozapine REMS states pt is not in system, pt denies any recent shots. Of note, EKG shows QTc 488 ms, repeat QTc 477 ms (3/27). Pt will likely benefit from IPP for medication reinitiation and management at this time.     Seen and assessed on rounds, Patient mood irritable and dismissive. Patient guarded at times throughout assessment or would refused to answer certain questions. Patient remains isolative with poor ADL's. Patient remains delusional and lacks any insight or judgement involving her mental illness. Patient continues to refuse her Abilify at night but is compliant with her other medical medications.       #Schizophrenia  -c/w and encourage adherence with abilify 10 mg qhs.  Patient has been refusing treatment, will consider TOO

## 2020-04-01 NOTE — PROGRESS NOTE BEHAVIORAL HEALTH - NSBHCHARTREVIEWVS_PSY_A_CORE FT
Vital Signs Last 24 Hrs  T(C): 36.8 (01 Apr 2020 06:03), Max: 37 (31 Mar 2020 17:05)  T(F): 98.2 (01 Apr 2020 06:03), Max: 98.6 (31 Mar 2020 17:05)  HR: 58 (01 Apr 2020 06:03) (58 - 80)  BP: 106/56 (01 Apr 2020 06:03) (106/56 - 136/63)  RR: 20 (01 Apr 2020 06:03) (18 - 20)

## 2020-04-02 DIAGNOSIS — J44.9 CHRONIC OBSTRUCTIVE PULMONARY DISEASE, UNSPECIFIED: ICD-10-CM

## 2020-04-02 PROCEDURE — 99231 SBSQ HOSP IP/OBS SF/LOW 25: CPT

## 2020-04-02 RX ORDER — TUBERCULIN PURIFIED PROTEIN DERIVATIVE 5 [IU]/.1ML
5 INJECTION, SOLUTION INTRADERMAL ONCE
Refills: 0 | Status: COMPLETED | OUTPATIENT
Start: 2020-04-02 | End: 2020-04-27

## 2020-04-02 RX ADMIN — Medication 1 TABLET(S): at 08:38

## 2020-04-02 RX ADMIN — BUDESONIDE AND FORMOTEROL FUMARATE DIHYDRATE 2 PUFF(S): 160; 4.5 AEROSOL RESPIRATORY (INHALATION) at 20:27

## 2020-04-02 RX ADMIN — LISINOPRIL 5 MILLIGRAM(S): 2.5 TABLET ORAL at 08:38

## 2020-04-02 RX ADMIN — ATORVASTATIN CALCIUM 40 MILLIGRAM(S): 80 TABLET, FILM COATED ORAL at 20:27

## 2020-04-02 RX ADMIN — LORATADINE 10 MILLIGRAM(S): 10 TABLET ORAL at 08:38

## 2020-04-02 RX ADMIN — Medication 81 MILLIGRAM(S): at 08:38

## 2020-04-02 RX ADMIN — TIOTROPIUM BROMIDE 1 CAPSULE(S): 18 CAPSULE ORAL; RESPIRATORY (INHALATION) at 08:38

## 2020-04-02 RX ADMIN — BUDESONIDE AND FORMOTEROL FUMARATE DIHYDRATE 2 PUFF(S): 160; 4.5 AEROSOL RESPIRATORY (INHALATION) at 08:37

## 2020-04-02 NOTE — PROGRESS NOTE BEHAVIORAL HEALTH - SUMMARY
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 On evaluation, pt presents cooperative and calm affect in bed, states her name is Rubi Silva, she is  to Abebe and has "too many children to count", states her daughter's name is . Pt denies any psychiatric diagnoses, states does not take any psychiatric medications, denies all psychiatric complaints. Pt states past medical history include asthma, COPD and heart problems, states "I go to only the best doctors", denies ever seeing a psychiatrist. Denies prior hospitalizations, SA/SIB. Pt appears to be a poor historian. Denies SI/HI, intent and plan. Collateral from only daughter shows otherwise - pt diagnosed with schizophrenia in 2008, has had multiple episodes of medication noncompliance resulting in multiple IPP hospitalizations (most recent in [facility] last year). Pt's daughter is unsure of medications, states haldol decanoate and clozapine, clozapine REMS states pt is not in system, pt denies any recent shots. Of note, EKG shows QTc 488 ms, repeat QTc 477 ms (3/27). Pt will likely benefit from IPP for medication reinitiation and management at this time.     Seen and assessed on rounds, Patient mood irritable and dismissive. Patient guarded at times throughout assessment or would refused to answer certain questions. Patient remains isolative with poor ADL's. Patient remains paranoid and delusional, "your trying to kill me with these medications", lacks any insight or judgement involving her mental illness. Patient continues to refuse her Abilify at night but is compliant with her other medical medications.       #Schizophrenia  -c/w and encourage adherence with Abilify 10 mg qhs.  Patient has been refusing treatment, will consider TOO.

## 2020-04-02 NOTE — PROGRESS NOTE BEHAVIORAL HEALTH - NSBHFUPINTERVALHXFT_PSY_A_CORE
As per nursing, patient did not present any acute behavioral issues over the night. Patient seen and assessed on rounds, laying in bed. Patient would not get up from bed during the interview, not making eye contact with team. Patient mood irritable, poorly engaging in the interview. Patient denies any mental health issues and wants to be discharged. Patient reports that the treatment team is trying to kill her by giving her medication she doesn't need. Patient denies any suicidal or homicidal ideations. Patient remains isolative to room but does come out for meals. Patient ADl's and grooming remain poor despite frequent encouragement from staff.

## 2020-04-02 NOTE — PROGRESS NOTE BEHAVIORAL HEALTH - NSBHCHARTREVIEWVS_PSY_A_CORE FT
7/13/2017 1:46 PM 
 
Ms. Abad Irizarry [address] Dear Abad Irizarry: Please find your most recent results below. Resulted Orders CBC W/O DIFF Result Value Ref Range WBC 6.0 3.4 - 10.8 x10E3/uL  
 RBC 4.07 3.77 - 5.28 x10E6/uL HGB 13.2 11.1 - 15.9 g/dL HCT 38.8 34.0 - 46.6 % MCV 95 79 - 97 fL  
 MCH 32.4 26.6 - 33.0 pg  
 MCHC 34.0 31.5 - 35.7 g/dL  
 RDW 13.4 12.3 - 15.4 % PLATELET 214 495 - 554 x10E3/uL Narrative Performed at:  [address]  [id number] : Shirley Beverly MD, Phone:  [phone] CHOLESTEROL, HDL Result Value Ref Range HDL Cholesterol 72 >39 mg/dL Narrative Performed at:  [address]  [id number] : Shirley Beverly MD, Phone:  [phone] CHOLESTEROL, TOTAL Result Value Ref Range Cholesterol, total 170 100 - 199 mg/dL Narrative Performed at:  [address]  [id number] : Shirley Beverly MD, Phone:  [phone] LDL, DIRECT Result Value Ref Range LDL,Direct 90 0 - 99 mg/dL Narrative Performed at:  [address]  [id number] : Shirley Beverly MD, Phone:  [phone] TSH 3RD GENERATION Result Value Ref Range TSH 3.050 0.450 - 4.500 uIU/mL Narrative Performed at:  [address]  [id number] : Shirley Beverly MD, Phone:  [phone] METABOLIC PANEL, COMPREHENSIVE Result Value Ref Range Glucose 87 65 - 99 mg/dL BUN 16 8 - 27 mg/dL Creatinine 0.73 0.57 - 1.00 mg/dL GFR est non-AA 86 >59 mL/min/1.73 GFR est AA 99 >59 mL/min/1.73  
 BUN/Creatinine ratio 22 12 - 28 Sodium 140 134 - 144 mmol/L Potassium 4.3 3.5 - 5.2 mmol/L  Chloride 99 96 - 106 mmol/L  
 CO2 24 18 - 29 mmol/L  
 Calcium 9.5 8.7 - 10.3 mg/dL Protein, total 7.3 6.0 - 8.5 g/dL Albumin 4.4 3.6 - 4.8 g/dL GLOBULIN, TOTAL 2.9 1.5 - 4.5 g/dL A-G Ratio 1.5 1.2 - 2.2 Bilirubin, total 0.5 0.0 - 1.2 mg/dL Alk. phosphatase 104 39 - 117 IU/L  
 AST (SGOT) 30 0 - 40 IU/L  
 ALT (SGPT) 20 0 - 32 IU/L Narrative Performed at:  [address]  [id number] : Angelita Zarate MD, Phone:  [phone] CVD REPORT Result Value Ref Range INTERPRETATION Note Comment:  
   Supplement report is available. Narrative Performed at:  [address] [place] [address]  [id number] : Chiqui Reddy PhD, Phone:  [phone] Your thyroid is stable on your present dose of Synthroid 50 mcg daily.  Please continue this dose and recheck in 12 months.  Your other labs look OK If you need a hearing check, I suggest Dr. Harriett Soto #[phone]. Please call Arlander Fothergill to help arrange and authorize any tests and/or referrals. [facility] PHIPPS [facility] # [phone] Sincerely, Felice Steve MD 
 Vital Signs Last 24 Hrs  T(C): 36.5 (02 Apr 2020 06:23), Max: 36.9 (01 Apr 2020 15:50)  T(F): 97.7 (02 Apr 2020 06:23), Max: 98.5 (01 Apr 2020 15:50)  HR: 60 (02 Apr 2020 06:23) (60 - 66)  BP: 115/57 (02 Apr 2020 06:23) (115/57 - 120/60)  RR: 18 (02 Apr 2020 06:23) (18 - 18)

## 2020-04-02 NOTE — PROGRESS NOTE ADULT - SUBJECTIVE AND OBJECTIVE BOX
pt stable alert in NAD  no new complaints    DEPRESSION  ^DEPRESSION  Handoff  Schizophrenia  Asthma  Hypertension  Coronary artery disease  Paranoid schizophrenia  Coronary artery disease involving coronary bypass graft of native heart without angina pectoris  Essential hypertension  Schizophrenia  S/P CABG (coronary artery bypass graft)    HEALTH ISSUES - PROBLEM Dx:  Paranoid schizophrenia  Coronary artery disease involving coronary bypass graft of native heart without angina pectoris: Coronary artery disease involving coronary bypass graft of native heart without angina pectoris  Essential hypertension: Essential hypertension  Schizophrenia: Schizophrenia        PAST MEDICAL & SURGICAL HISTORY:  Schizophrenia  Asthma  Hypertension  Coronary artery disease  S/P CABG (coronary artery bypass graft)    Bananas (Unknown)  sulfa drugs (Unknown)      FAMILY HISTORY:      ARIPiprazole 10 milliGRAM(s) Oral at bedtime  aspirin  chewable 81 milliGRAM(s) Oral daily  atorvastatin 40 milliGRAM(s) Oral at bedtime  budesonide 160 MICROgram(s)/formoterol 4.5 MICROgram(s) Inhaler 2 Puff(s) Inhalation two times a day  haloperidol     Tablet 2 milliGRAM(s) Oral every 6 hours PRN  hydrOXYzine hydrochloride 25 milliGRAM(s) Oral every 6 hours PRN  lactobacillus acidophilus 1 Tablet(s) Oral daily  lisinopril 5 milliGRAM(s) Oral daily  loratadine 10 milliGRAM(s) Oral daily  tiotropium 18 MICROgram(s) Capsule 1 Capsule(s) Inhalation daily      T(C): 36.5 (04-02-20 @ 06:23), Max: 36.9 (04-01-20 @ 15:50)  HR: 60 (04-02-20 @ 06:23) (60 - 66)  BP: 115/57 (04-02-20 @ 06:23) (115/57 - 120/60)  RR: 18 (04-02-20 @ 06:23) (18 - 18)  SpO2: --    PE;  general:  no acute cahgn raul nad    Lungs:    Heart:    EXT:    Neuro:  no deficits                          CAPILLARY BLOOD GLUCOSE

## 2020-04-03 DIAGNOSIS — L03.116 CELLULITIS OF LEFT LOWER LIMB: ICD-10-CM

## 2020-04-03 DIAGNOSIS — J45.909 UNSPECIFIED ASTHMA, UNCOMPLICATED: ICD-10-CM

## 2020-04-03 DIAGNOSIS — Z87.891 PERSONAL HISTORY OF NICOTINE DEPENDENCE: ICD-10-CM

## 2020-04-03 DIAGNOSIS — F06.2 PSYCHOTIC DISORDER WITH DELUSIONS DUE TO KNOWN PHYSIOLOGICAL CONDITION: ICD-10-CM

## 2020-04-03 DIAGNOSIS — R82.71 BACTERIURIA: ICD-10-CM

## 2020-04-03 DIAGNOSIS — Z88.2 ALLERGY STATUS TO SULFONAMIDES: ICD-10-CM

## 2020-04-03 DIAGNOSIS — I10 ESSENTIAL (PRIMARY) HYPERTENSION: ICD-10-CM

## 2020-04-03 DIAGNOSIS — R94.31 ABNORMAL ELECTROCARDIOGRAM [ECG] [EKG]: ICD-10-CM

## 2020-04-03 DIAGNOSIS — Z91.018 ALLERGY TO OTHER FOODS: ICD-10-CM

## 2020-04-03 DIAGNOSIS — I25.10 ATHEROSCLEROTIC HEART DISEASE OF NATIVE CORONARY ARTERY WITHOUT ANGINA PECTORIS: ICD-10-CM

## 2020-04-03 DIAGNOSIS — Z95.1 PRESENCE OF AORTOCORONARY BYPASS GRAFT: ICD-10-CM

## 2020-04-03 LAB
ALBUMIN SERPL ELPH-MCNC: 4.3 G/DL — SIGNIFICANT CHANGE UP (ref 3.5–5.2)
ALP SERPL-CCNC: 90 U/L — SIGNIFICANT CHANGE UP (ref 30–115)
ALT FLD-CCNC: 15 U/L — SIGNIFICANT CHANGE UP (ref 0–41)
ANION GAP SERPL CALC-SCNC: 14 MMOL/L — SIGNIFICANT CHANGE UP (ref 7–14)
AST SERPL-CCNC: 17 U/L — SIGNIFICANT CHANGE UP (ref 0–41)
BASOPHILS # BLD AUTO: 0.05 K/UL — SIGNIFICANT CHANGE UP (ref 0–0.2)
BASOPHILS NFR BLD AUTO: 0.7 % — SIGNIFICANT CHANGE UP (ref 0–1)
BILIRUB SERPL-MCNC: 0.6 MG/DL — SIGNIFICANT CHANGE UP (ref 0.2–1.2)
BUN SERPL-MCNC: 16 MG/DL — SIGNIFICANT CHANGE UP (ref 10–20)
CALCIUM SERPL-MCNC: 9.9 MG/DL — SIGNIFICANT CHANGE UP (ref 8.5–10.1)
CHLORIDE SERPL-SCNC: 109 MMOL/L — SIGNIFICANT CHANGE UP (ref 98–110)
CO2 SERPL-SCNC: 21 MMOL/L — SIGNIFICANT CHANGE UP (ref 17–32)
CREAT SERPL-MCNC: 0.7 MG/DL — SIGNIFICANT CHANGE UP (ref 0.7–1.5)
EOSINOPHIL # BLD AUTO: 0.09 K/UL — SIGNIFICANT CHANGE UP (ref 0–0.7)
EOSINOPHIL NFR BLD AUTO: 1.3 % — SIGNIFICANT CHANGE UP (ref 0–8)
GLUCOSE SERPL-MCNC: 91 MG/DL — SIGNIFICANT CHANGE UP (ref 70–99)
HCT VFR BLD CALC: 47 % — SIGNIFICANT CHANGE UP (ref 37–47)
HGB BLD-MCNC: 15.6 G/DL — SIGNIFICANT CHANGE UP (ref 12–16)
IMM GRANULOCYTES NFR BLD AUTO: 0.3 % — SIGNIFICANT CHANGE UP (ref 0.1–0.3)
LYMPHOCYTES # BLD AUTO: 2.86 K/UL — SIGNIFICANT CHANGE UP (ref 1.2–3.4)
LYMPHOCYTES # BLD AUTO: 42.6 % — SIGNIFICANT CHANGE UP (ref 20.5–51.1)
MCHC RBC-ENTMCNC: 31.5 PG — HIGH (ref 27–31)
MCHC RBC-ENTMCNC: 33.2 G/DL — SIGNIFICANT CHANGE UP (ref 32–37)
MCV RBC AUTO: 94.9 FL — SIGNIFICANT CHANGE UP (ref 81–99)
MONOCYTES # BLD AUTO: 0.63 K/UL — HIGH (ref 0.1–0.6)
MONOCYTES NFR BLD AUTO: 9.4 % — HIGH (ref 1.7–9.3)
NEUTROPHILS # BLD AUTO: 3.06 K/UL — SIGNIFICANT CHANGE UP (ref 1.4–6.5)
NEUTROPHILS NFR BLD AUTO: 45.7 % — SIGNIFICANT CHANGE UP (ref 42.2–75.2)
NRBC # BLD: 0 /100 WBCS — SIGNIFICANT CHANGE UP (ref 0–0)
PLATELET # BLD AUTO: 170 K/UL — SIGNIFICANT CHANGE UP (ref 130–400)
POTASSIUM SERPL-MCNC: 5.2 MMOL/L — HIGH (ref 3.5–5)
POTASSIUM SERPL-SCNC: 5.2 MMOL/L — HIGH (ref 3.5–5)
PROT SERPL-MCNC: 6.9 G/DL — SIGNIFICANT CHANGE UP (ref 6–8)
RBC # BLD: 4.95 M/UL — SIGNIFICANT CHANGE UP (ref 4.2–5.4)
RBC # FLD: 13 % — SIGNIFICANT CHANGE UP (ref 11.5–14.5)
SODIUM SERPL-SCNC: 144 MMOL/L — SIGNIFICANT CHANGE UP (ref 135–146)
WBC # BLD: 6.71 K/UL — SIGNIFICANT CHANGE UP (ref 4.8–10.8)
WBC # FLD AUTO: 6.71 K/UL — SIGNIFICANT CHANGE UP (ref 4.8–10.8)

## 2020-04-03 PROCEDURE — 99231 SBSQ HOSP IP/OBS SF/LOW 25: CPT

## 2020-04-03 RX ADMIN — BUDESONIDE AND FORMOTEROL FUMARATE DIHYDRATE 2 PUFF(S): 160; 4.5 AEROSOL RESPIRATORY (INHALATION) at 08:54

## 2020-04-03 RX ADMIN — TIOTROPIUM BROMIDE 1 CAPSULE(S): 18 CAPSULE ORAL; RESPIRATORY (INHALATION) at 08:53

## 2020-04-03 RX ADMIN — LORATADINE 10 MILLIGRAM(S): 10 TABLET ORAL at 08:53

## 2020-04-03 RX ADMIN — Medication 81 MILLIGRAM(S): at 08:53

## 2020-04-03 RX ADMIN — LISINOPRIL 5 MILLIGRAM(S): 2.5 TABLET ORAL at 08:53

## 2020-04-03 RX ADMIN — ATORVASTATIN CALCIUM 40 MILLIGRAM(S): 80 TABLET, FILM COATED ORAL at 20:43

## 2020-04-03 RX ADMIN — Medication 1 TABLET(S): at 08:53

## 2020-04-03 NOTE — PROGRESS NOTE BEHAVIORAL HEALTH - NSBHFUPINTERVALHXFT_PSY_A_CORE
As per nursing patient did not present any acute behavioral issues yesterday night. Patient seen and assessed on rounds. Patient would not open eyes or even acknowledge treatment team. Several attempts made resulting in patient yelling at provider "You got my blood now leave me alone!" Patients mood is angry and irritable. Patient isolative to room. ADL's poor. Patient is eating half of her meal in her room. Patient continues to refuse Abilify despite staff encouragement.

## 2020-04-03 NOTE — PROGRESS NOTE BEHAVIORAL HEALTH - SUMMARY
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 On evaluation, pt presents cooperative and calm affect in bed, states her name is Rubi Silva, she is  to Abebe and has "too many children to count", states her daughter's name is . Pt denies any psychiatric diagnoses, states does not take any psychiatric medications, denies all psychiatric complaints. Pt states past medical history include asthma, COPD and heart problems, states "I go to only the best doctors", denies ever seeing a psychiatrist. Denies prior hospitalizations, SA/SIB. Pt appears to be a poor historian. Denies SI/HI, intent and plan. Collateral from only daughter shows otherwise - pt diagnosed with schizophrenia in 2008, has had multiple episodes of medication noncompliance resulting in multiple IPP hospitalizations (most recent in [facility] last year). Pt's daughter is unsure of medications, states haldol decanoate and clozapine, clozapine REMS states pt is not in system, pt denies any recent shots. Of note, EKG shows QTc 488 ms, repeat QTc 477 ms (3/27). Pt will likely benefit from IPP for medication reinitiation and management at this time.     Seen and assessed on rounds, Patient mood angry and wouldn't acknowledge treatment team on rounds. Several attempts made to interview patient but were unsuccessful. At one point patient did roll over and scream "you got the blood now leave me alone". Patient did not provide urine at this time. Patient remains isolative with poor ADL's. Patient refusing to come to dining-room for meals but eats her meals in her room. Patient took AM medical medication but continues to refuse Abilify QHS.     #Schizophrenia  -c/w and encourage adherence with Abilify 10 mg qhs.  Patient has been refusing treatment, will consider TOO.

## 2020-04-03 NOTE — CHART NOTE - NSCHARTNOTEFT_GEN_A_CORE
Treatment team met with patient to discuss treatment plan, medications and discharge plan. Patient refuses to engage with treatment team. During the day patient is observed to be isolative to his room.  Patient was educated to the benefits of attending and actively participating in groups that are offered on the unit.  Patient continues to refuse all psychotropic medications. Application for [facility] has been started and is underway.         Mental Status Exam:    Mood – Labile  Sleep - Fair  Appetite - Good  ADLs - Poor  Thought Process – Delusional    Observation – [id number]        At this time patient is not psychiatrically stable for discharge.

## 2020-04-04 LAB
HAV IGM SER-ACNC: SIGNIFICANT CHANGE UP
HBV CORE IGM SER-ACNC: SIGNIFICANT CHANGE UP
HBV SURFACE AG SER-ACNC: SIGNIFICANT CHANGE UP
HCV AB S/CO SERPL IA: 12.75 S/CO — HIGH (ref 0–0.99)
HCV AB SERPL-IMP: REACTIVE
TSH SERPL-MCNC: 0.53 UIU/ML — SIGNIFICANT CHANGE UP (ref 0.27–4.2)

## 2020-04-04 PROCEDURE — 99232 SBSQ HOSP IP/OBS MODERATE 35: CPT

## 2020-04-04 NOTE — PROGRESS NOTE ADULT - PROBLEM SELECTOR PLAN 1
continue present treatment as per psych plan as reviewed  Medically stable with no new changes in treatment  will continue to monitor medical status while being treated on psych  potssium noted no tx  ekg stable

## 2020-04-04 NOTE — PROGRESS NOTE BEHAVIORAL HEALTH - OTHER
Not responding to questions verbally Not assessed did not observe gait not responding verbally unable to assess unable to assess, but not noted to be responding to internal stimuli Wouldn't engage

## 2020-04-04 NOTE — PROGRESS NOTE ADULT - SUBJECTIVE AND OBJECTIVE BOX
pt stable alert in NAD  no new complaints    DEPRESSION  ^DEPRESSION  Handoff  Schizophrenia  Asthma  Hypertension  Coronary artery disease  COPD (chronic obstructive pulmonary disease)  Paranoid schizophrenia  Coronary artery disease involving coronary bypass graft of native heart without angina pectoris  Essential hypertension  Schizophrenia  S/P CABG (coronary artery bypass graft)    HEALTH ISSUES - PROBLEM Dx:  COPD (chronic obstructive pulmonary disease)  Paranoid schizophrenia  Coronary artery disease involving coronary bypass graft of native heart without angina pectoris: Coronary artery disease involving coronary bypass graft of native heart without angina pectoris  Essential hypertension: Essential hypertension  Schizophrenia: Schizophrenia        PAST MEDICAL & SURGICAL HISTORY:  Schizophrenia  Asthma  Hypertension  Coronary artery disease  S/P CABG (coronary artery bypass graft)    Bananas (Unknown)  sulfa drugs (Unknown)      FAMILY HISTORY:      ARIPiprazole 10 milliGRAM(s) Oral at bedtime  aspirin  chewable 81 milliGRAM(s) Oral daily  atorvastatin 40 milliGRAM(s) Oral at bedtime  budesonide 160 MICROgram(s)/formoterol 4.5 MICROgram(s) Inhaler 2 Puff(s) Inhalation two times a day  haloperidol     Tablet 2 milliGRAM(s) Oral every 6 hours PRN  hydrOXYzine hydrochloride 25 milliGRAM(s) Oral every 6 hours PRN  lactobacillus acidophilus 1 Tablet(s) Oral daily  lisinopril 5 milliGRAM(s) Oral daily  loratadine 10 milliGRAM(s) Oral daily  PPD  5 Tuberculin Unit(s) Injectable 5 Unit(s) IntraDermal once  tiotropium 18 MICROgram(s) Capsule 1 Capsule(s) Inhalation daily      T(C): 35.7 (04-04-20 @ 08:25), Max: 36.4 (04-03-20 @ 15:49)  HR: 56 (04-04-20 @ 08:25) (56 - 69)  BP: 145/67 (04-04-20 @ 08:25) (115/58 - 145/67)  RR: 14 (04-04-20 @ 08:25) (14 - 16)  SpO2: --    PE;  general: no acute changes in nad    Lungs:    Heart:    EXT:    Neuro:  aelrt no deficits      15.6  47.0  6.71  16  0.7  5.2  91      04-03    144  |  109  |  16  ----------------------------<  91  5.2<H>   |  21  |  0.7    Ca    9.9      03 Apr 2020 07:42    TPro  6.9  /  Alb  4.3  /  TBili  0.6  /  DBili  x   /  AST  17  /  ALT  15  /  AlkPhos  90  04-03      LIVER FUNCTIONS - ( 03 Apr 2020 07:42 )  Alb: 4.3 g/dL / Pro: 6.9 g/dL / ALK PHOS: 90 U/L / ALT: 15 U/L / AST: 17 U/L / GGT: x                                   15.6   6.71  )-----------( 170      ( 03 Apr 2020 07:42 )             47.0       CAPILLARY BLOOD GLUCOSE    ekg renetta rbbb qt ntoed

## 2020-04-04 NOTE — PROGRESS NOTE BEHAVIORAL HEALTH - NSBHFUPINTERVALHXFT_PSY_A_CORE
Pt is laying in hospital bed with eyes closed. Pt opened her eyes and nodded no when asked if any issues and if she needed anything.  Would not respond to questions otherwise.     Per nursing- pt is refusing medications. No agitation or prns required. K+ 5.2- medicine aware and no intervention indicated.  Poor po intake.

## 2020-04-04 NOTE — PROGRESS NOTE BEHAVIORAL HEALTH - SUMMARY
63 y/o single, questionably domiciled, on SS-D with Schizophrenia, Asthma, CAD s/p CABG  x2 (2016), HTN, COPD, who was originally admitted to medicine in [facility] for lower left extremity cellulitis, transferred to [facility] d/t bizarre bx, psychiatric medication non-compliance and questionable ability to care for self.   On assessment today pt is not responding verbally but did respond with shaking her head no when asked if any issues or if she needed anything. She is continuing to be isolative and poor ADLs. She is eating some meals but refused breakfast this morning. Refused her meds last night and this morning.     #Schizophrenia  -c/w and encourage adherence with Abilify 10 mg qhs.  Patient has been refusing treatment, treatment team considering TOO.    Continue to try to encourage patient to take her other medications    Hyperkalemia  Medicine aware, no intervention recommended at this time    Encourage po intake and monitor

## 2020-04-05 LAB
HCV RNA FLD QL NAA+PROBE: SIGNIFICANT CHANGE UP
HCV RNA SPEC QL PROBE+SIG AMP: SIGNIFICANT CHANGE UP

## 2020-04-05 RX ORDER — ACETAMINOPHEN 500 MG
650 TABLET ORAL EVERY 6 HOURS
Refills: 0 | Status: DISCONTINUED | OUTPATIENT
Start: 2020-04-05 | End: 2020-06-25

## 2020-04-06 PROCEDURE — 99231 SBSQ HOSP IP/OBS SF/LOW 25: CPT

## 2020-04-06 RX ADMIN — LORATADINE 10 MILLIGRAM(S): 10 TABLET ORAL at 08:38

## 2020-04-06 RX ADMIN — LISINOPRIL 5 MILLIGRAM(S): 2.5 TABLET ORAL at 08:38

## 2020-04-06 RX ADMIN — BUDESONIDE AND FORMOTEROL FUMARATE DIHYDRATE 2 PUFF(S): 160; 4.5 AEROSOL RESPIRATORY (INHALATION) at 08:38

## 2020-04-06 RX ADMIN — Medication 1 TABLET(S): at 08:38

## 2020-04-06 RX ADMIN — Medication 81 MILLIGRAM(S): at 08:38

## 2020-04-06 RX ADMIN — TIOTROPIUM BROMIDE 1 CAPSULE(S): 18 CAPSULE ORAL; RESPIRATORY (INHALATION) at 08:39

## 2020-04-06 NOTE — PROGRESS NOTE ADULT - SUBJECTIVE AND OBJECTIVE BOX
pt stable alert in NAD  no new complaints    DEPRESSION  ^DEPRESSION  Handoff  Schizophrenia  Asthma  Hypertension  Coronary artery disease  COPD (chronic obstructive pulmonary disease)  Paranoid schizophrenia  Coronary artery disease involving coronary bypass graft of native heart without angina pectoris  Essential hypertension  Schizophrenia  S/P CABG (coronary artery bypass graft)    HEALTH ISSUES - PROBLEM Dx:  COPD (chronic obstructive pulmonary disease)  Paranoid schizophrenia  Coronary artery disease involving coronary bypass graft of native heart without angina pectoris: Coronary artery disease involving coronary bypass graft of native heart without angina pectoris  Essential hypertension: Essential hypertension  Schizophrenia: Schizophrenia        PAST MEDICAL & SURGICAL HISTORY:  Schizophrenia  Asthma  Hypertension  Coronary artery disease  S/P CABG (coronary artery bypass graft)    Bananas (Unknown)  sulfa drugs (Unknown)      FAMILY HISTORY:      acetaminophen   Tablet .. 650 milliGRAM(s) Oral every 6 hours PRN  ARIPiprazole 10 milliGRAM(s) Oral at bedtime  aspirin  chewable 81 milliGRAM(s) Oral daily  atorvastatin 40 milliGRAM(s) Oral at bedtime  budesonide 160 MICROgram(s)/formoterol 4.5 MICROgram(s) Inhaler 2 Puff(s) Inhalation two times a day  haloperidol     Tablet 2 milliGRAM(s) Oral every 6 hours PRN  hydrOXYzine hydrochloride 25 milliGRAM(s) Oral every 6 hours PRN  lactobacillus acidophilus 1 Tablet(s) Oral daily  lisinopril 5 milliGRAM(s) Oral daily  loratadine 10 milliGRAM(s) Oral daily  PPD  5 Tuberculin Unit(s) Injectable 5 Unit(s) IntraDermal once  tiotropium 18 MICROgram(s) Capsule 1 Capsule(s) Inhalation daily      T(C): 35.8 (04-06-20 @ 06:06), Max: 36.1 (04-05-20 @ 16:48)  HR: 59 (04-06-20 @ 06:06) (59 - 82)  BP: 118/97 (04-06-20 @ 06:06) (111/56 - 158/95)  RR: 15 (04-06-20 @ 06:06) (15 - 18)  SpO2: --    PE;  general: no acute changes innad    Lungs:    Heart:    EXT:    Neuro:  aelrt no defciits                          CAPILLARY BLOOD GLUCOSE

## 2020-04-06 NOTE — PROGRESS NOTE BEHAVIORAL HEALTH - OTHER
Not assessed did not observe gait angry unable to assess unable to assess, but not noted to be responding to internal stimuli Wouldn't engage

## 2020-04-06 NOTE — PROGRESS NOTE BEHAVIORAL HEALTH - NSBHFUPINTERVALHXFT_PSY_A_CORE
As per nursing patient has been refusing all medication. Patient refused to participate in treatment team this morning. Patient seen at bedside, initially would open eyes or engage with team. Patient then requested a transfer to another hospital. Asked about taking her medical medication and what prompted her to stop she replied that she "doesn't want them". Patient would not continue with the rest of the interview, closing her eyes and not responding to staff questions.

## 2020-04-06 NOTE — PROGRESS NOTE BEHAVIORAL HEALTH - SUMMARY
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 On evaluation, pt presents cooperative and calm affect in bed, states her name is Rubi Silva, she is  to [place]be and has "too many children to count", states her daughter's name is . Pt denies any psychiatric diagnoses, states does not take any psychiatric medications, denies all psychiatric complaints. Pt states past medical history include asthma, COPD and heart problems, states "I go to only the best doctors", denies ever seeing a psychiatrist. Denies prior hospitalizations, SA/SIB. Pt appears to be a poor historian. Denies SI/HI, intent and plan. Collateral from only daughter shows otherwise - pt diagnosed with schizophrenia in 2008, has had multiple episodes of medication noncompliance resulting in multiple IPP hospitalizations (most recent in [facility] last year). Pt's daughter is unsure of medications, states haldol decanoate and clozapine, clozapine REMS states pt is not in system, pt denies any recent shots. Of note, EKG shows QTc 488 ms, repeat QTc 477 ms (3/27). Pt will likely benefit from IPP for medication reinitiation and management at this time.     Seen and assessed on rounds, as patient would not engage in treatment team meeting. Patient mood angry and wouldn't acknowledge treatment team on rounds. Patient requested to be transferred to another hospital and would not further engage after that. Patient remains isolative with poor ADL's. Patient refusing to come to dining-room for meals staff report  eats her meals in her room. Patient has now started to refuse medical medication which she was taking during the beginning of her stay.    #Schizophrenia  -c/w and encourage adherence with Abilify 10 mg qhs.  Patient has been refusing treatment, will consider TOO.    Hypertension  Continue to try to encourage patient to take her other medications  Monitor vital signs closely due to her recent non-adherence    Hyperkalemia  Medicine aware, no intervention recommended at this time    Encourage po intake and monitor

## 2020-04-06 NOTE — PROGRESS NOTE BEHAVIORAL HEALTH - NSBHCHARTREVIEWVS_PSY_A_CORE FT
Vital Signs Last 24 Hrs  T(C): 35.8 (06 Apr 2020 06:06), Max: 36.1 (05 Apr 2020 16:48)  T(F): 96.5 (06 Apr 2020 06:06), Max: 97 (05 Apr 2020 16:48)  HR: 59 (06 Apr 2020 06:06) (59 - 82)  BP: 118/97 (06 Apr 2020 06:06) (111/56 - 118/97)  RR: 15 (06 Apr 2020 06:06) (15 - 18)

## 2020-04-07 PROCEDURE — 99231 SBSQ HOSP IP/OBS SF/LOW 25: CPT

## 2020-04-07 RX ADMIN — BUDESONIDE AND FORMOTEROL FUMARATE DIHYDRATE 2 PUFF(S): 160; 4.5 AEROSOL RESPIRATORY (INHALATION) at 09:05

## 2020-04-07 RX ADMIN — LISINOPRIL 5 MILLIGRAM(S): 2.5 TABLET ORAL at 09:05

## 2020-04-07 RX ADMIN — Medication 81 MILLIGRAM(S): at 09:05

## 2020-04-07 RX ADMIN — LORATADINE 10 MILLIGRAM(S): 10 TABLET ORAL at 09:05

## 2020-04-07 RX ADMIN — ARIPIPRAZOLE 10 MILLIGRAM(S): 15 TABLET ORAL at 19:51

## 2020-04-07 RX ADMIN — TIOTROPIUM BROMIDE 1 CAPSULE(S): 18 CAPSULE ORAL; RESPIRATORY (INHALATION) at 09:06

## 2020-04-07 RX ADMIN — Medication 1 TABLET(S): at 09:04

## 2020-04-07 RX ADMIN — BUDESONIDE AND FORMOTEROL FUMARATE DIHYDRATE 2 PUFF(S): 160; 4.5 AEROSOL RESPIRATORY (INHALATION) at 19:51

## 2020-04-07 RX ADMIN — ATORVASTATIN CALCIUM 40 MILLIGRAM(S): 80 TABLET, FILM COATED ORAL at 19:52

## 2020-04-07 NOTE — PROGRESS NOTE BEHAVIORAL HEALTH - NSBHCHARTREVIEWVS_PSY_A_CORE FT
Vital Signs Last 24 Hrs  T(C): 37.2 (07 Apr 2020 09:00), Max: 37.2 (07 Apr 2020 09:00)  T(F): 98.9 (07 Apr 2020 09:00), Max: 98.9 (07 Apr 2020 09:00)  HR: 29 (07 Apr 2020 09:00) (29 - 89)  BP: 106/52 (07 Apr 2020 09:00) (106/52 - 124/58)  RR: 18 (07 Apr 2020 09:00) (18 - 18)

## 2020-04-07 NOTE — PROGRESS NOTE BEHAVIORAL HEALTH - NSBHFUPINTERVALHXFT_PSY_A_CORE
As per nursing patient did not present any acute behavioral issues yesterday evening. Staff report patient came out of her room for a pizza party yesterday and was more sociable. Patient seen and assessed on rounds today. Patient was cooperative with the entire interview. Patient reports her mood is good and she is ready to be discharged. She reports shes needs to get back to "[place] to help fight the COVID outbreak". She reports she a doctor there and her being in the hospital is not putting "skills to use". Patient showered today and has been eating her meals in the dining room at this time. Patient denies any suicidal ideations. Patient denies any homicidal ideations. Patient was compliant with AM medication but continues to refuse Abilify. Patient denies any medical concerns at this time.

## 2020-04-07 NOTE — PROGRESS NOTE BEHAVIORAL HEALTH - SUMMARY
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 On evaluation, pt presents cooperative and calm affect in bed, states her name is Rubi Silva, she is  to [place]be and has "too many children to count", states her daughter's name is . Pt denies any psychiatric diagnoses, states does not take any psychiatric medications, denies all psychiatric complaints. Pt states past medical history include asthma, COPD and heart problems, states "I go to only the best doctors", denies ever seeing a psychiatrist. Denies prior hospitalizations, SA/SIB. Pt appears to be a poor historian. Denies SI/HI, intent and plan. Collateral from only daughter shows otherwise - pt diagnosed with schizophrenia in 2008, has had multiple episodes of medication noncompliance resulting in multiple IPP hospitalizations (most recent in [facility] last year). Pt's daughter is unsure of medications, states haldol decanoate and clozapine, clozapine REMS states pt is not in system, pt denies any recent shots. Of note, EKG shows QTc 488 ms, repeat QTc 477 ms (3/27). Pt will likely benefit from IPP for medication reinitiation and management at this time.     Seen and assessed on rounds, patient cooperative with interview today. Patient mood anxious and delusional. Patient more visible on the unit and ADL's improving. Patient has started to eat meals in the dining-room. Patient was compliant with medical medication but continues to refuse Abilify.    #Schizophrenia  -c/w and encourage adherence with Abilify 10 mg qhs.  Patient has been refusing treatment, will consider TOO.  Application submitted to [facility] [place] [place] on 4/7/2020    Hypertension  Continue to try to encourage patient to take her other medications  Monitor vital signs closely due to her recent non-adherence    Hyperkalemia  Medicine aware, no intervention recommended at this time    Encourage po intake and monitor.

## 2020-04-08 PROCEDURE — 99231 SBSQ HOSP IP/OBS SF/LOW 25: CPT

## 2020-04-08 RX ORDER — DIPHENHYDRAMINE HCL 50 MG
25 CAPSULE ORAL ONCE
Refills: 0 | Status: COMPLETED | OUTPATIENT
Start: 2020-04-08 | End: 2020-04-08

## 2020-04-08 RX ORDER — HALOPERIDOL DECANOATE 100 MG/ML
5 INJECTION INTRAMUSCULAR ONCE
Refills: 0 | Status: COMPLETED | OUTPATIENT
Start: 2020-04-08 | End: 2020-04-08

## 2020-04-08 RX ADMIN — TIOTROPIUM BROMIDE 1 CAPSULE(S): 18 CAPSULE ORAL; RESPIRATORY (INHALATION) at 09:04

## 2020-04-08 RX ADMIN — Medication 81 MILLIGRAM(S): at 08:09

## 2020-04-08 RX ADMIN — BUDESONIDE AND FORMOTEROL FUMARATE DIHYDRATE 2 PUFF(S): 160; 4.5 AEROSOL RESPIRATORY (INHALATION) at 20:01

## 2020-04-08 RX ADMIN — LISINOPRIL 5 MILLIGRAM(S): 2.5 TABLET ORAL at 08:09

## 2020-04-08 RX ADMIN — Medication 1 TABLET(S): at 08:09

## 2020-04-08 RX ADMIN — ATORVASTATIN CALCIUM 40 MILLIGRAM(S): 80 TABLET, FILM COATED ORAL at 20:01

## 2020-04-08 RX ADMIN — HALOPERIDOL DECANOATE 5 MILLIGRAM(S): 100 INJECTION INTRAMUSCULAR at 09:04

## 2020-04-08 RX ADMIN — Medication 1 MILLIGRAM(S): at 09:04

## 2020-04-08 RX ADMIN — BUDESONIDE AND FORMOTEROL FUMARATE DIHYDRATE 2 PUFF(S): 160; 4.5 AEROSOL RESPIRATORY (INHALATION) at 08:09

## 2020-04-08 RX ADMIN — LORATADINE 10 MILLIGRAM(S): 10 TABLET ORAL at 08:09

## 2020-04-08 RX ADMIN — Medication 25 MILLIGRAM(S): at 09:03

## 2020-04-08 NOTE — CHART NOTE - NSCHARTNOTEFT_GEN_A_CORE
Treatment team met with patient to discuss treatment plan, medications and discharge plan.  During the day patient is observed to be isolative to her room. Patient remains delusional unpredictable and with potential for agitation. Patient required IM PRN this morning due to agitation. Patient asleep for most of the day as a result. Treatment team unable to conduct meaningful assessment at this time.     Treatment team considering treatment over objection. Treatment team aslo considering transfer to [facility] due to patients serious persistent mental illness and lack of stable safe support in the community. Treatment and discharge planning ensues.       Mental Status Exam:    Mood – Labile  Sleep - Fair  Appetite - Good  ADLs - Fair  Thought Process – Delusional    Observation – [id number]        At this time patient is not psychiatrically stable for discharge.

## 2020-04-08 NOTE — PROGRESS NOTE ADULT - SUBJECTIVE AND OBJECTIVE BOX
pt stable alert in NAD  no new complaints    DEPRESSION  ^DEPRESSION  Handoff  Schizophrenia  Asthma  Hypertension  Coronary artery disease  COPD (chronic obstructive pulmonary disease)  Paranoid schizophrenia  Coronary artery disease involving coronary bypass graft of native heart without angina pectoris  Essential hypertension  Schizophrenia  S/P CABG (coronary artery bypass graft)    HEALTH ISSUES - PROBLEM Dx:  COPD (chronic obstructive pulmonary disease)  Paranoid schizophrenia  Coronary artery disease involving coronary bypass graft of native heart without angina pectoris: Coronary artery disease involving coronary bypass graft of native heart without angina pectoris  Essential hypertension: Essential hypertension  Schizophrenia: Schizophrenia        PAST MEDICAL & SURGICAL HISTORY:  Schizophrenia  Asthma  Hypertension  Coronary artery disease  S/P CABG (coronary artery bypass graft)    Bananas (Unknown)  sulfa drugs (Unknown)      FAMILY HISTORY:      acetaminophen   Tablet .. 650 milliGRAM(s) Oral every 6 hours PRN  ARIPiprazole 10 milliGRAM(s) Oral at bedtime  aspirin  chewable 81 milliGRAM(s) Oral daily  atorvastatin 40 milliGRAM(s) Oral at bedtime  budesonide 160 MICROgram(s)/formoterol 4.5 MICROgram(s) Inhaler 2 Puff(s) Inhalation two times a day  haloperidol     Tablet 2 milliGRAM(s) Oral every 6 hours PRN  hydrOXYzine hydrochloride 25 milliGRAM(s) Oral every 6 hours PRN  lactobacillus acidophilus 1 Tablet(s) Oral daily  lisinopril 5 milliGRAM(s) Oral daily  loratadine 10 milliGRAM(s) Oral daily  PPD  5 Tuberculin Unit(s) Injectable 5 Unit(s) IntraDermal once  tiotropium 18 MICROgram(s) Capsule 1 Capsule(s) Inhalation daily      T(C): 36.2 (04-08-20 @ 08:16), Max: 36.2 (04-07-20 @ 15:45)  HR: 73 (04-08-20 @ 08:16) (72 - 73)  BP: 132/76 (04-08-20 @ 08:16) (114/61 - 132/76)  RR: 18 (04-08-20 @ 08:16) (16 - 18)  SpO2: --    PE;  general:  no acute changes in nad    Lungs:    Heart:    EXT:    Neuro:  no deficits                          CAPILLARY BLOOD GLUCOSE

## 2020-04-08 NOTE — PROGRESS NOTE BEHAVIORAL HEALTH - NSBHCHARTREVIEWVS_PSY_A_CORE FT
Vital Signs Last 24 Hrs  T(C): 36.2 (08 Apr 2020 08:16), Max: 36.2 (07 Apr 2020 15:45)  T(F): 97.1 (08 Apr 2020 08:16), Max: 97.1 (07 Apr 2020 15:45)  HR: 73 (08 Apr 2020 08:16) (72 - 73)  BP: 132/76 (08 Apr 2020 08:16) (114/61 - 132/76)  RR: 18 (08 Apr 2020 08:16) (16 - 18)

## 2020-04-08 NOTE — PROGRESS NOTE BEHAVIORAL HEALTH - NSBHFUPINTERVALHXFT_PSY_A_CORE
As per nursing patient did not present any behavioral issues last evening. Staff called for assistance at 0815 am. Patient was acutely agitated, screaming in the hallway, cursing at staff and appeared very paranoid. Attempt to redirect patient to her was met with difficulty. Patient making sexually inappropriate statements and gestures  to the provider. Patient refused PO PRN stating "it was poison" statin that she would take "A hot shot in her p***y", continuing to rub her genital. Stat Haldol 5mg, Ativan 1 mg and benadryl 25mg IM was give at 0830, with good effects. Patient much calmer after medication, was able to sit in TV room appropriately.

## 2020-04-08 NOTE — PROGRESS NOTE BEHAVIORAL HEALTH - SUMMARY
63 y/o, female, white, single, one adult child, domiciled in private residence in 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 On evaluation, pt presents cooperative and calm affect in bed, states her name is Rubi Silva, she is  to [place]be and has "too many children to count", states her daughter's name is . Pt denies any psychiatric diagnoses, states does not take any psychiatric medications, denies all psychiatric complaints. Pt states past medical history include asthma, COPD and heart problems, states "I go to only the best doctors", denies ever seeing a psychiatrist. Denies prior hospitalizations, SA/SIB. Pt appears to be a poor historian. Denies SI/HI, intent and plan. Collateral from only daughter shows otherwise - pt diagnosed with schizophrenia in 2008, has had multiple episodes of medication noncompliance resulting in multiple IPP hospitalizations (most recent in [facility] last year). Pt's daughter is unsure of medications, states haldol decanoate and clozapine, clozapine REMS states pt is not in system, pt denies any recent shots. Of note, EKG shows QTc 488 ms, repeat QTc 477 ms (3/27). Pt will likely benefit from IPP for medication reinitiation and management at this time.     Staff called provider and noted patient was acute agitated this morning. Patient observed with psychomotor agitation and increased paranoia. Patient behavior more disorganized and sexually inappropriate, rubbing her genitalia and making sexuall inappropriate comments towards provider.  Patient refused PO PRN and was given STAT IM.     #Schizophrenia  D/C Abilify due to non-adherence  ECG to monitor QTC after receiving haldol IM (cardiac history/ previous )  If QTC is stable will start Haldol 5mg PO QHS.  Patient has been refusing treatment, will consider TOO.    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 On evaluation, pt presents cooperative and calm affect in bed, states her name is Rubi Silva, she is  to [place] and has "too many children to count", states her daughter's name is . Pt denies any psychiatric diagnoses, states does not take any psychiatric medications, denies all psychiatric complaints. Pt states past medical history include asthma, COPD and heart problems, states "I go to only the best doctors", denies ever seeing a psychiatrist. Denies prior hospitalizations, SA/SIB. Pt appears to be a poor historian. Denies SI/HI, intent and plan. Collateral from only daughter shows otherwise - pt diagnosed with schizophrenia in 2008, has had multiple episodes of medication noncompliance resulting in multiple IPP hospitalizations (most recent in [facility] last year). Pt's daughter is unsure of medications, states haldol decanoate and clozapine, clozapine REMS states pt is not in system, pt denies any recent shots. Of note, EKG shows QTc 488 ms, repeat QTc 477 ms (3/27). Pt will likely benefit from IPP for medication reinitiation and management at this time.     Staff called provider and noted patient was acute agitated this morning. Patient observed with psychomotor agitation and increased paranoia. Patient behavior more disorganized and sexually inappropriate, rubbing her genitalia and making sexuall inappropriate comments towards provider.  Patient refused PO PRN and was given STAT IM.     #Schizophrenia  D/C Abilify due to non-adherence  ECG to monitor QTC after receiving haldol IM (cardiac history/ previous )  If QTC is stable will start Haldol 5mg PO QHS.  Patient has been refusing treatment, will consider TOO.    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 On evaluation, pt presents cooperative and calm affect in bed, states her name is Rubi Silva, she is  to [place] and has "too many children to count", states her daughter's name is . Pt denies any psychiatric diagnoses, states does not take any psychiatric medications, denies all psychiatric complaints. Pt states past medical history include asthma, COPD and heart problems, states "I go to only the best doctors", denies ever seeing a psychiatrist. Denies prior hospitalizations, SA/SIB. Pt appears to be a poor historian. Denies SI/HI, intent and plan. Collateral from only daughter shows otherwise - pt diagnosed with schizophrenia in 2008, has had multiple episodes of medication noncompliance resulting in multiple IPP hospitalizations (most recent in [facility] last year). Pt's daughter is unsure of medications, states haldol decanoate and clozapine, clozapine REMS states pt is not in system, pt denies any recent shots. Of note, EKG shows QTc 488 ms, repeat QTc 477 ms (3/27). Pt will likely benefit from IPP for medication reinitiation and management at this time.     Staff called provider and noted patient was acute agitated this morning. Patient observed with psychomotor agitation and increased paranoia. Patient behavior more disorganized and sexually inappropriate, rubbing her genitalia and making sexuall inappropriate comments towards provider.  Patient refused PO PRN and was given STAT IM.     #Schizophrenia  D/C Abilify due to non-adherence  ECG to monitor QTC after receiving haldol IM (cardiac history/ previous )  If QTC is stable will start Haldol 5mg PO QHS   Patient has been refusing treatment, will consider TOO.    COPD  Symbicort 160mcg 2 puffs BID  Spiriva 1 cap inhal daily      Hypertension  Lisinopril 5mg PO Daily    CAD  ASA 81mg PO daily  Atrovastatin 40mg PO QHS    Hyperkalemia  Medicine aware, no intervention recommended at this time

## 2020-04-09 PROCEDURE — 99231 SBSQ HOSP IP/OBS SF/LOW 25: CPT

## 2020-04-09 RX ORDER — HALOPERIDOL DECANOATE 100 MG/ML
5 INJECTION INTRAMUSCULAR AT BEDTIME
Refills: 0 | Status: DISCONTINUED | OUTPATIENT
Start: 2020-04-09 | End: 2020-04-20

## 2020-04-09 RX ADMIN — LISINOPRIL 5 MILLIGRAM(S): 2.5 TABLET ORAL at 08:39

## 2020-04-09 RX ADMIN — ATORVASTATIN CALCIUM 40 MILLIGRAM(S): 80 TABLET, FILM COATED ORAL at 20:52

## 2020-04-09 RX ADMIN — Medication 1 TABLET(S): at 08:39

## 2020-04-09 RX ADMIN — BUDESONIDE AND FORMOTEROL FUMARATE DIHYDRATE 2 PUFF(S): 160; 4.5 AEROSOL RESPIRATORY (INHALATION) at 20:52

## 2020-04-09 RX ADMIN — BUDESONIDE AND FORMOTEROL FUMARATE DIHYDRATE 2 PUFF(S): 160; 4.5 AEROSOL RESPIRATORY (INHALATION) at 08:39

## 2020-04-09 RX ADMIN — TIOTROPIUM BROMIDE 1 CAPSULE(S): 18 CAPSULE ORAL; RESPIRATORY (INHALATION) at 08:39

## 2020-04-09 RX ADMIN — LORATADINE 10 MILLIGRAM(S): 10 TABLET ORAL at 08:39

## 2020-04-09 RX ADMIN — Medication 81 MILLIGRAM(S): at 08:39

## 2020-04-09 NOTE — PROGRESS NOTE BEHAVIORAL HEALTH - SUMMARY
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 On evaluation, pt presents cooperative and calm affect in bed, states her name is Rubi Silva, she is  to [place] and has "too many children to count", states her daughter's name is . Pt denies any psychiatric diagnoses, states does not take any psychiatric medications, denies all psychiatric complaints. Pt states past medical history include asthma, COPD and heart problems, states "I go to only the best doctors", denies ever seeing a psychiatrist. Denies prior hospitalizations, SA/SIB. Pt appears to be a poor historian. Denies SI/HI, intent and plan. Collateral from only daughter shows otherwise - pt diagnosed with schizophrenia in 2008, has had multiple episodes of medication noncompliance resulting in multiple IPP hospitalizations (most recent in [facility] last year). Pt's daughter is unsure of medications, states haldol decanoate and clozapine, clozapine REMS states pt is not in system, pt denies any recent injections. Of note, EKG shows QTc 488 ms, repeat QTc 477 ms (3/27). Pt will likely benefit from IPP for medication reinitiation and management at this time.     Patient seen and assessed, compliant with all medical medication. Patient remains irritable and paranoid. Discussed treatment recommendation of starting Haldol PO tonight as patient had previous success on this medication and was meet with increasing hostility, refusing to take "that poison" and dismissing provider. Patient stating having no "psychiatric issues". Insight and judgement remain poor. TOO application submitted.      #Schizophrenia  4/8 , Dr. Carlitos hernández, cleared to start Haldol.  Haldol 5mg PO QHS   Patient has been refusing treatment, hospital applying for TOO.    COPD  Symbicort 160mcg 2 puffs BID  Spiriva 1 cap inhal daily      Hypertension  Lisinopril 5mg PO Daily    CAD  ASA 81mg PO daily  Atrovastatin 40mg PO QHS    Hyperkalemia  Medicine aware, no intervention recommended at this time.

## 2020-04-09 NOTE — PROGRESS NOTE BEHAVIORAL HEALTH - NSBHFUPINTERVALHXFT_PSY_A_CORE
As per nursing patient with periods of increased anxiety and paranoia yesterday. Staff report patient stating people were watching her in her room. Patient seen and assessed on rounds, later in the morning. Patient mood irritable and angry. Reports her leg has healed and she "doesn't need to be here". Asked about events yesterday night of people watching her in her and quickly terminated the interview walking away from the provider. Discussed treatment with Haldol and patient responded "take your own poison". Patient compliant with medical medication this morning. Denies any suicidal ideations, "never did"

## 2020-04-09 NOTE — PROGRESS NOTE BEHAVIORAL HEALTH - NSBHCHARTREVIEWINVESTIGATE_PSY_A_CORE FT
< from: 12 Lead ECG (04.08.20 @ 19:59) >      Ventricular Rate 56 BPM    Atrial Rate 56 BPM    P-R Interval 156 ms    QRS Duration 168 ms    Q-T Interval 492 ms    QTC Calculation(Bezet) 474 ms    P Axis 19 degrees    R Axis 58 degrees    T Axis 75 degrees    Diagnosis Line Sinus bradycardia  Right bundle branch block  Septal infarct , age undetermined  T wave abnormality, consider lateral ischemia  Abnormal ECG    Confirmed by VILLA BERRY, GABINO ([id number]) on 4/9/2020 8:19:45 AM    < end of copied text >

## 2020-04-09 NOTE — PROGRESS NOTE BEHAVIORAL HEALTH - NSBHCHARTREVIEWVS_PSY_A_CORE FT
Vital Signs Last 24 Hrs  T(C): --  T(F): --  HR: 73 (09 Apr 2020 06:01) (64 - 73)  BP: 109/54 (08 Apr 2020 15:40) (109/54 - 109/54)  RR: 16 (08 Apr 2020 15:40) (16 - 16)

## 2020-04-10 PROCEDURE — 99231 SBSQ HOSP IP/OBS SF/LOW 25: CPT

## 2020-04-10 RX ORDER — LISINOPRIL 2.5 MG/1
10 TABLET ORAL DAILY
Refills: 0 | Status: DISCONTINUED | OUTPATIENT
Start: 2020-04-10 | End: 2020-05-13

## 2020-04-10 RX ADMIN — LISINOPRIL 10 MILLIGRAM(S): 2.5 TABLET ORAL at 20:45

## 2020-04-10 RX ADMIN — LISINOPRIL 5 MILLIGRAM(S): 2.5 TABLET ORAL at 08:16

## 2020-04-10 RX ADMIN — Medication 1 TABLET(S): at 08:16

## 2020-04-10 RX ADMIN — TIOTROPIUM BROMIDE 1 CAPSULE(S): 18 CAPSULE ORAL; RESPIRATORY (INHALATION) at 08:16

## 2020-04-10 RX ADMIN — BUDESONIDE AND FORMOTEROL FUMARATE DIHYDRATE 2 PUFF(S): 160; 4.5 AEROSOL RESPIRATORY (INHALATION) at 08:17

## 2020-04-10 RX ADMIN — ATORVASTATIN CALCIUM 40 MILLIGRAM(S): 80 TABLET, FILM COATED ORAL at 20:44

## 2020-04-10 RX ADMIN — LORATADINE 10 MILLIGRAM(S): 10 TABLET ORAL at 08:16

## 2020-04-10 RX ADMIN — Medication 81 MILLIGRAM(S): at 08:16

## 2020-04-10 RX ADMIN — BUDESONIDE AND FORMOTEROL FUMARATE DIHYDRATE 2 PUFF(S): 160; 4.5 AEROSOL RESPIRATORY (INHALATION) at 20:44

## 2020-04-10 NOTE — PROGRESS NOTE BEHAVIORAL HEALTH - NSBHCHARTREVIEWVS_PSY_A_CORE FT
Vital Signs Last 24 Hrs  T(C): 36.7 (09 Apr 2020 15:54), Max: 36.7 (09 Apr 2020 15:54)  T(F): 98 (09 Apr 2020 15:54), Max: 98 (09 Apr 2020 15:54)  HR: 77 (09 Apr 2020 15:54) (77 - 77)  BP: 175/67 (09 Apr 2020 15:54) (175/67 - 175/67)  RR: 18 (09 Apr 2020 15:54) (18 - 18)

## 2020-04-10 NOTE — CHART NOTE - NSCHARTNOTEFT_GEN_A_CORE
Treatment over objectionhearing held today via phone with [facility] [place] .  Medication treatment plan was reviewed with patient; she states she doesn't need any, because she is in the [employer], and is also a .  Pt states that she wants to go to court.

## 2020-04-10 NOTE — PROGRESS NOTE BEHAVIORAL HEALTH - SECONDARY DX1
COPD (chronic obstructive pulmonary disease)
COPD (chronic obstructive pulmonary disease)
Essential hypertension
Coronary artery disease involving coronary bypass graft of native heart without angina pectoris
COPD (chronic obstructive pulmonary disease)
Essential hypertension
Essential hypertension
Coronary artery disease involving coronary bypass graft of native heart without angina pectoris

## 2020-04-10 NOTE — PROGRESS NOTE ADULT - SUBJECTIVE AND OBJECTIVE BOX
pt stable alert in NAD  no new complaints    DEPRESSION  ^DEPRESSION  Handoff  Schizophrenia  Asthma  Hypertension  Coronary artery disease  COPD (chronic obstructive pulmonary disease)  Paranoid schizophrenia  Coronary artery disease involving coronary bypass graft of native heart without angina pectoris  Essential hypertension  Schizophrenia  S/P CABG (coronary artery bypass graft)    HEALTH ISSUES - PROBLEM Dx:  COPD (chronic obstructive pulmonary disease)  Paranoid schizophrenia  Coronary artery disease involving coronary bypass graft of native heart without angina pectoris: Coronary artery disease involving coronary bypass graft of native heart without angina pectoris  Essential hypertension: Essential hypertension  Schizophrenia: Schizophrenia        PAST MEDICAL & SURGICAL HISTORY:  Schizophrenia  Asthma  Hypertension  Coronary artery disease  S/P CABG (coronary artery bypass graft)    Bananas (Unknown)  sulfa drugs (Unknown)      FAMILY HISTORY:      acetaminophen   Tablet .. 650 milliGRAM(s) Oral every 6 hours PRN  aspirin  chewable 81 milliGRAM(s) Oral daily  atorvastatin 40 milliGRAM(s) Oral at bedtime  budesonide 160 MICROgram(s)/formoterol 4.5 MICROgram(s) Inhaler 2 Puff(s) Inhalation two times a day  haloperidol     Tablet 5 milliGRAM(s) Oral at bedtime  haloperidol     Tablet 2 milliGRAM(s) Oral every 6 hours PRN  hydrOXYzine hydrochloride 25 milliGRAM(s) Oral every 6 hours PRN  lactobacillus acidophilus 1 Tablet(s) Oral daily  lisinopril 5 milliGRAM(s) Oral daily  loratadine 10 milliGRAM(s) Oral daily  PPD  5 Tuberculin Unit(s) Injectable 5 Unit(s) IntraDermal once  tiotropium 18 MICROgram(s) Capsule 1 Capsule(s) Inhalation daily      T(C): 36.7 (04-09-20 @ 15:54), Max: 36.7 (04-09-20 @ 15:54)  HR: 77 (04-09-20 @ 15:54) (77 - 77)  BP: 175/67 (04-09-20 @ 15:54) (175/67 - 175/67)  RR: 18 (04-09-20 @ 15:54) (18 - 18)  SpO2: --    PE;  general:  no changes noted in nad    Lungs:    Heart:    EXT:    Neuro:  alrt no deficits                          CAPILLARY BLOOD GLUCOSE

## 2020-04-10 NOTE — PROGRESS NOTE ADULT - PROBLEM SELECTOR PLAN 1
continue present treatment as per psych plan as reviewed  Medically stable with change in bp meds  will continue to monitor medical status while being treated on psych

## 2020-04-10 NOTE — PROGRESS NOTE BEHAVIORAL HEALTH - SUMMARY
65 y/o, female, white, single, one adult child, domiciled in private residence in 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 On evaluation, pt presents cooperative and calm affect in bed, states her name is Rubi Silva, she is  to [place] and has "too many children to count", states her daughter's name is . Pt denies any psychiatric diagnoses, states does not take any psychiatric medications, denies all psychiatric complaints. Pt states past medical history include asthma, COPD and heart problems, states "I go to only the best doctors", denies ever seeing a psychiatrist. Denies prior hospitalizations, SA/SIB. Pt appears to be a poor historian. Denies SI/HI, intent and plan. Collateral from only daughter shows otherwise - pt diagnosed with schizophrenia in 2008, has had multiple episodes of medication noncompliance resulting in multiple IPP hospitalizations (most recent in [facility] last year). Pt's daughter is unsure of medications, states haldol decanoate and clozapine, clozapine REMS states pt is not in system, pt denies any recent injections. Of note, EKG shows QTc 488 ms, repeat QTc 477 ms (3/27). Pt will likely benefit from IPP for medication reinitiation and management at this time.     Patient seen and assessed, compliant with all medical medication. Patient remains irritable, disorganized paranoid. Patient refused Haldol PO QHS. Patient reporting "I'm an army doctor" and she will tell that to the .  Insight and judgement remain poor.       #Schizophrenia  4/8 , Dr. Carlitos hernández, cleared to start Haldol.  Haldol 5mg PO QHS   Patient has been refusing treatment, hospital applying for TOO.    COPD  Symbicort 160mcg 2 puffs BID  Spiriva 1 cap inhal daily      Hypertension  Lisinopril 10mg PO Daily    CAD  ASA 81mg PO daily  Atrovastatin 40mg PO QHS    Hyperkalemia  Will repeat CMP in am

## 2020-04-10 NOTE — CHART NOTE - NSCHARTNOTEFT_GEN_A_CORE
Treatment team met with patient to discuss treatment plan, medications and discharge plan.  Patient refuses to wake up for assessment. During the day patient is observed to be isolative to his room.  Patient is unpredictable with potentia for agitation. Patient is refusing all psychotropic medications. Treatment over objection being perused. Patient referred to [facility] for long term inpatient psychiatric care. [facility] and medication [facility]. Writer unable to assess patient as patient refuses to engage.           At this time patient is not psychiatrically stable for discharge.

## 2020-04-10 NOTE — PROGRESS NOTE BEHAVIORAL HEALTH - SECONDARY DX3
Essential hypertension
Coronary artery disease involving coronary bypass graft of native heart without angina pectoris
Essential hypertension
COPD (chronic obstructive pulmonary disease)
Essential hypertension
Coronary artery disease involving coronary bypass graft of native heart without angina pectoris
COPD (chronic obstructive pulmonary disease)

## 2020-04-10 NOTE — PROGRESS NOTE BEHAVIORAL HEALTH - NSBHFUPINTERVALHXFT_PSY_A_CORE
Patient seen and assessed on rounds. Patient remains uncooperative with treatment team. Patient mood remains irritable and easily agitated. Patient behavior is disorganized and remains unpredictable. Discussed plan with patient and that we are moving forwards with treatment over objection. Patient stated she can't wait to go to court, she "an army doctor". Patient denies any auditory hallucination. Patient denies any suicidal/ homicidal ideations. BP as been elevated, Dr. Urbina aware, Lisinopril was increased. Will recheck CMP in am.

## 2020-04-10 NOTE — PROGRESS NOTE BEHAVIORAL HEALTH - SECONDARY DX2
Coronary artery disease involving coronary bypass graft of native heart without angina pectoris
COPD (chronic obstructive pulmonary disease)
Coronary artery disease involving coronary bypass graft of native heart without angina pectoris
Essential hypertension
Coronary artery disease involving coronary bypass graft of native heart without angina pectoris
COPD (chronic obstructive pulmonary disease)
Coronary artery disease involving coronary bypass graft of native heart without angina pectoris
Essential hypertension

## 2020-04-11 RX ADMIN — BUDESONIDE AND FORMOTEROL FUMARATE DIHYDRATE 2 PUFF(S): 160; 4.5 AEROSOL RESPIRATORY (INHALATION) at 08:20

## 2020-04-11 RX ADMIN — BUDESONIDE AND FORMOTEROL FUMARATE DIHYDRATE 2 PUFF(S): 160; 4.5 AEROSOL RESPIRATORY (INHALATION) at 20:29

## 2020-04-11 RX ADMIN — ATORVASTATIN CALCIUM 40 MILLIGRAM(S): 80 TABLET, FILM COATED ORAL at 20:15

## 2020-04-12 RX ADMIN — BUDESONIDE AND FORMOTEROL FUMARATE DIHYDRATE 2 PUFF(S): 160; 4.5 AEROSOL RESPIRATORY (INHALATION) at 20:02

## 2020-04-12 RX ADMIN — ATORVASTATIN CALCIUM 40 MILLIGRAM(S): 80 TABLET, FILM COATED ORAL at 20:01

## 2020-04-12 NOTE — PROGRESS NOTE ADULT - SUBJECTIVE AND OBJECTIVE BOX
pt stable alert in NAD  no new complaints    DEPRESSION  ^DEPRESSION  Handoff  Schizophrenia  Asthma  Hypertension  Coronary artery disease  COPD (chronic obstructive pulmonary disease)  Paranoid schizophrenia  Coronary artery disease involving coronary bypass graft of native heart without angina pectoris  Essential hypertension  Schizophrenia  S/P CABG (coronary artery bypass graft)    HEALTH ISSUES - PROBLEM Dx:  COPD (chronic obstructive pulmonary disease)  Paranoid schizophrenia  Coronary artery disease involving coronary bypass graft of native heart without angina pectoris: Coronary artery disease involving coronary bypass graft of native heart without angina pectoris  Essential hypertension: Essential hypertension  Schizophrenia: Schizophrenia        PAST MEDICAL & SURGICAL HISTORY:  Schizophrenia  Asthma  Hypertension  Coronary artery disease  S/P CABG (coronary artery bypass graft)    Bananas (Unknown)  sulfa drugs (Unknown)      FAMILY HISTORY:      acetaminophen   Tablet .. 650 milliGRAM(s) Oral every 6 hours PRN  aspirin  chewable 81 milliGRAM(s) Oral daily  atorvastatin 40 milliGRAM(s) Oral at bedtime  budesonide 160 MICROgram(s)/formoterol 4.5 MICROgram(s) Inhaler 2 Puff(s) Inhalation two times a day  haloperidol     Tablet 5 milliGRAM(s) Oral at bedtime  haloperidol     Tablet 2 milliGRAM(s) Oral every 6 hours PRN  hydrOXYzine hydrochloride 25 milliGRAM(s) Oral every 6 hours PRN  lactobacillus acidophilus 1 Tablet(s) Oral daily  lisinopril 10 milliGRAM(s) Oral daily  loratadine 10 milliGRAM(s) Oral daily  PPD  5 Tuberculin Unit(s) Injectable 5 Unit(s) IntraDermal once  tiotropium 18 MICROgram(s) Capsule 1 Capsule(s) Inhalation daily      T(C): 36.4 (04-11-20 @ 15:53), Max: 36.8 (04-11-20 @ 08:37)  HR: 64 (04-11-20 @ 15:53) (50 - 64)  BP: 101/65 (04-11-20 @ 15:53) (101/65 - 116/54)  RR: 16 (04-11-20 @ 15:53) (16 - 16)  SpO2: --    PE;  general:  no acute cahnges in nad lying in bed    Lungs:    Heart:    EXT:    Neuro:  alert no deficits                          CAPILLARY BLOOD GLUCOSE

## 2020-04-13 PROCEDURE — 99231 SBSQ HOSP IP/OBS SF/LOW 25: CPT

## 2020-04-13 RX ADMIN — Medication 1 TABLET(S): at 09:06

## 2020-04-13 RX ADMIN — TIOTROPIUM BROMIDE 1 CAPSULE(S): 18 CAPSULE ORAL; RESPIRATORY (INHALATION) at 09:05

## 2020-04-13 RX ADMIN — ATORVASTATIN CALCIUM 40 MILLIGRAM(S): 80 TABLET, FILM COATED ORAL at 20:26

## 2020-04-13 RX ADMIN — BUDESONIDE AND FORMOTEROL FUMARATE DIHYDRATE 2 PUFF(S): 160; 4.5 AEROSOL RESPIRATORY (INHALATION) at 09:05

## 2020-04-13 RX ADMIN — LISINOPRIL 10 MILLIGRAM(S): 2.5 TABLET ORAL at 09:05

## 2020-04-13 RX ADMIN — Medication 81 MILLIGRAM(S): at 09:06

## 2020-04-13 RX ADMIN — LORATADINE 10 MILLIGRAM(S): 10 TABLET ORAL at 09:05

## 2020-04-13 NOTE — PROGRESS NOTE BEHAVIORAL HEALTH - NSBHCHARTREVIEWVS_PSY_A_CORE FT
Vital Signs Last 24 Hrs  T(C): 36.1 (13 Apr 2020 08:25), Max: 36.1 (13 Apr 2020 08:25)  T(F): 96.9 (13 Apr 2020 08:25), Max: 96.9 (13 Apr 2020 08:25)  HR: 50 (13 Apr 2020 08:25) (50 - 50)  BP: 116/50 (13 Apr 2020 08:25) (116/50 - 116/50)  BP(mean): --  RR: 18 (13 Apr 2020 08:25) (18 - 18)  SpO2: --

## 2020-04-13 NOTE — CHART NOTE - NSCHARTNOTEFT_GEN_A_CORE
Social Work Note:    Treatment team met with patient to discuss treatment plan, medications and discharge plan.  During the day patient is observed to be isolative to her room.  Patient was educated to the benefits of attending and actively participating in groups that are offered on the unit. Patient is selective with medications but reports she will take the haldol tonight. Patient remains delusional and disorganized however patient is in good mood and is pleasant with treatment team during assessment. Patient denies SI HI and AVH. Patient reports she wants to go home to her . Application for [facility] is underway. [facility] consideration  is ongoing.       Mental Status Exam:    Mood – Neutral  Sleep - Good  Appetite - Good  ADLs - Fair  Thought Process – Delusional    Observation – [id number]    No barriers to discharge identified at this time.   At this time patient is not psychiatrically stable for discharge.

## 2020-04-13 NOTE — PROGRESS NOTE BEHAVIORAL HEALTH - SUMMARY
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 On evaluation, pt presents cooperative and calm affect in bed, states her name is Rubi Silva, she is  to [place] and has "too many children to count", states her daughter's name is . Pt denies any psychiatric diagnoses, states does not take any psychiatric medications, denies all psychiatric complaints. Pt states past medical history include asthma, COPD and heart problems, states "I go to only the best doctors", denies ever seeing a psychiatrist. Denies prior hospitalizations, SA/SIB. Pt appears to be a poor historian. Denies SI/HI, intent and plan. Collateral from only daughter shows otherwise - pt diagnosed with schizophrenia in 2008, has had multiple episodes of medication noncompliance resulting in multiple IPP hospitalizations (most recent in [facility] last year). Pt's daughter is unsure of medications, states haldol decanoate and clozapine, clozapine REMS states pt is not in system, pt denies any recent injections. Of note, EKG shows QTc 488 ms, repeat QTc 474 ms (4/8). Pt will likely benefit from IPP for medication reinitiation and management at this time.     #Schizophrenia  -c/w Haldol 5mg PO QHS (Dr. Urbina aware of QTc 474, cleared to start Haldol)  -Patient has been refusing treatment, hospital applying for TOO.    #COPD  -c/w Symbicort 160mcg 2 puffs BID  -c/w Spiriva 1 cap inhal daily      #Hypertension  -c/w Lisinopril 10mg PO Daily    #CAD  -c/w ASA 81mg PO daily  -c/w Atrovastatin 40mg PO QHS

## 2020-04-13 NOTE — PROGRESS NOTE BEHAVIORAL HEALTH - NSBHCHARTREVIEWINVESTIGATE_PSY_A_CORE FT
< from: 12 Lead ECG (04.08.20 @ 19:59) >      Ventricular Rate 56 BPM    Atrial Rate 56 BPM    P-R Interval 156 ms    QRS Duration 168 ms    Q-T Interval 492 ms    QTC Calculation(Bezet) 474 ms    P Axis 19 degrees    R Axis 58 degrees    T Axis 75 degrees    Diagnosis Line Sinus bradycardia  Right bundle branch block  Septal infarct , age undetermined  T wave abnormality, consider lateral ischemia  Abnormal ECG    < end of copied text >

## 2020-04-13 NOTE — PROGRESS NOTE BEHAVIORAL HEALTH - NSBHFUPINTERVALHXFT_PSY_A_CORE
Pt seen and evaluated, chart reviewed. As per nursing report, pt refused haldol all weekend and at times refused AM medications, otherwise no behavioral issues. On evaluation, pt presents in bed with continued delusion of being  to [employer] and living in a private apartment in [place]; pt's daughter Britney states pt is single and believes pt is homeless. Pt denies all psychiatric complaints, poor insight. Pt denies SI/HI, intent and plan. Visible on unit. In educating pt on benefits of taking haldol, pt states she will start taking the medication tonight.

## 2020-04-13 NOTE — PROGRESS NOTE BEHAVIORAL HEALTH - NSBHCHARTREVIEWIMAGING_PSY_A_CORE FT
< from: CT Tibia/Fibia No Cont, Left (03.22.20 @ 15:17) >    IMPRESSION:    Diffuse subcutaneous edema of the left leg without evidence of osteomyelitis, necrotizing fasciitis, or abscess.    No acutely displaced fracture    < end of copied text >  < from: VA Duplex Lower Ext Vein Scan, Left (03.22.20 @ 08:43) >    Impression:    No evidence of deep venous thrombosis or superficial thrombophlebitis in left lower extremity.    < end of copied text >  < from: CT Head No Cont (03.22.20 @ 06:48) >    IMPRESSION:     No acute intracranial hemorrhage or mass effect.    < end of copied text >

## 2020-04-14 PROCEDURE — 99231 SBSQ HOSP IP/OBS SF/LOW 25: CPT

## 2020-04-14 RX ADMIN — BUDESONIDE AND FORMOTEROL FUMARATE DIHYDRATE 2 PUFF(S): 160; 4.5 AEROSOL RESPIRATORY (INHALATION) at 08:15

## 2020-04-14 RX ADMIN — Medication 1 TABLET(S): at 08:15

## 2020-04-14 RX ADMIN — LISINOPRIL 10 MILLIGRAM(S): 2.5 TABLET ORAL at 08:15

## 2020-04-14 RX ADMIN — Medication 81 MILLIGRAM(S): at 08:15

## 2020-04-14 RX ADMIN — ATORVASTATIN CALCIUM 40 MILLIGRAM(S): 80 TABLET, FILM COATED ORAL at 21:09

## 2020-04-14 RX ADMIN — LORATADINE 10 MILLIGRAM(S): 10 TABLET ORAL at 08:15

## 2020-04-14 RX ADMIN — TIOTROPIUM BROMIDE 1 CAPSULE(S): 18 CAPSULE ORAL; RESPIRATORY (INHALATION) at 08:15

## 2020-04-14 NOTE — PROGRESS NOTE BEHAVIORAL HEALTH - NSBHCHARTREVIEWVS_PSY_A_CORE FT
Vital Signs Last 24 Hrs  T(C): 36.7 (14 Apr 2020 09:21), Max: 36.7 (14 Apr 2020 09:21)  T(F): 98.1 (14 Apr 2020 09:21), Max: 98.1 (14 Apr 2020 09:21)  HR: 54 (14 Apr 2020 09:21) (54 - 60)  BP: 110/70 (14 Apr 2020 09:21) (106/66 - 167/67)  BP(mean): --  RR: 16 (14 Apr 2020 09:21) (16 - 18)  SpO2: --

## 2020-04-14 NOTE — PROGRESS NOTE BEHAVIORAL HEALTH - NSBHCHARTREVIEWIMAGING_PSY_A_CORE FT
< from: CT Tibia/Fibia No Cont, Left (03.22.20 @ 15:17) >    IMPRESSION:    Diffuse subcutaneous edema of the left leg without evidence of osteomyelitis, necrotizing fasciitis, or abscess.    No acutely displaced fracture    < end of copied text >  < from: VA Duplex Lower Ext Vein Scan, Left (03.22.20 @ 08:43) >    Impression:    No evidence of deep venous thrombosis or superficial thrombophlebitis in left lower extremity.    < end of copied text >

## 2020-04-14 NOTE — PROGRESS NOTE BEHAVIORAL HEALTH - NSBHFUPINTERVALHXFT_PSY_A_CORE
Pt seen and evaluated during treatment team, chart reviewed. As per nursing report, pt continues to refuse haldol last night, otherwise no behavioral issues. On evaluation, pt presents in irritable in bed, stating "I am fine" with continued delusion of being  to [employer] and living in a private apartment in [place] she has no psychiatric history and denies all psychiatric complaints. Pt denies SI/HI, intent and plan. As per staff, pt with continued poor ADLs. Visible on unit. Pt seen and evaluated during treatment team, chart reviewed. As per nursing report, pt continues to refuse haldol last night, otherwise no behavioral issues. On evaluation, pt presents in pacing in the hallway, irritable with continued delusion of being  to [employer] and living in a private apartment in [place], states she has no psychiatric history and denies all psychiatric complaints. Pt presents disorganized and paranoid, states she is a  and an adhd doctor with a cure to the coronavirus, states the SW is a heart doctor trying to take her blood to donate, states this writer is trying to steal her heart for a donation. Pt denies need for medication. Pt denies SI/HI, intent and plan. As per staff, pt with continued poor ADLs. TOO started, pending.

## 2020-04-14 NOTE — PROGRESS NOTE BEHAVIORAL HEALTH - SUMMARY
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 On evaluation, pt presents cooperative and calm affect in bed, states her name is Rubi Silva, she is  to [place] and has "too many children to count", states her daughter's name is . Pt denies any psychiatric diagnoses, states does not take any psychiatric medications, denies all psychiatric complaints. Pt states past medical history include asthma, COPD and heart problems, states "I go to only the best doctors", denies ever seeing a psychiatrist. Denies prior hospitalizations, SA/SIB. Pt appears to be a poor historian. Denies SI/HI, intent and plan. Collateral from only daughter shows otherwise - pt diagnosed with schizophrenia in 2008, has had multiple episodes of medication noncompliance resulting in multiple IPP hospitalizations (most recent in [facility] last year). Pt's daughter is unsure of medications, states haldol decanoate and clozapine, clozapine REMS states pt is not in system, pt denies any recent injections. Of note, EKG shows QTc 488 ms, repeat QTc 474 ms (4/8). Pt will likely benefit from IPP for medication reinitiation and management at this time.     #Schizophrenia  -c/w Haldol 5mg PO QHS (Dr. Urbina aware of QTc 474, cleared to start Haldol)  -Patient has been refusing treatment, hospital applying for TOO.    #COPD  -c/w Symbicort 160mcg 2 puffs BID  -c/w Spiriva 1 cap inhal daily      #Hypertension  -c/w Lisinopril 10mg PO Daily    #CAD  -c/w ASA 81mg PO daily  -c/w Atorvastatin 40mg PO QHS

## 2020-04-14 NOTE — PROGRESS NOTE ADULT - SUBJECTIVE AND OBJECTIVE BOX
pt stable alert in NAD  no new complaints    DEPRESSION  ^DEPRESSION  Handoff  Schizophrenia  Asthma  Hypertension  Coronary artery disease  COPD (chronic obstructive pulmonary disease)  Paranoid schizophrenia  Coronary artery disease involving coronary bypass graft of native heart without angina pectoris  Essential hypertension  Schizophrenia  S/P CABG (coronary artery bypass graft)    HEALTH ISSUES - PROBLEM Dx:  COPD (chronic obstructive pulmonary disease)  Paranoid schizophrenia  Coronary artery disease involving coronary bypass graft of native heart without angina pectoris: Coronary artery disease involving coronary bypass graft of native heart without angina pectoris  Essential hypertension: Essential hypertension  Schizophrenia: Schizophrenia        PAST MEDICAL & SURGICAL HISTORY:  Schizophrenia  Asthma  Hypertension  Coronary artery disease  S/P CABG (coronary artery bypass graft)    Bananas (Unknown)  sulfa drugs (Unknown)      FAMILY HISTORY:      acetaminophen   Tablet .. 650 milliGRAM(s) Oral every 6 hours PRN  aspirin  chewable 81 milliGRAM(s) Oral daily  atorvastatin 40 milliGRAM(s) Oral at bedtime  budesonide 160 MICROgram(s)/formoterol 4.5 MICROgram(s) Inhaler 2 Puff(s) Inhalation two times a day  haloperidol     Tablet 5 milliGRAM(s) Oral at bedtime  haloperidol     Tablet 2 milliGRAM(s) Oral every 6 hours PRN  hydrOXYzine hydrochloride 25 milliGRAM(s) Oral every 6 hours PRN  lactobacillus acidophilus 1 Tablet(s) Oral daily  lisinopril 10 milliGRAM(s) Oral daily  loratadine 10 milliGRAM(s) Oral daily  PPD  5 Tuberculin Unit(s) Injectable 5 Unit(s) IntraDermal once  tiotropium 18 MICROgram(s) Capsule 1 Capsule(s) Inhalation daily      T(C): 35.7 (04-14-20 @ 05:41), Max: 36.1 (04-13-20 @ 08:25)  HR: 60 (04-14-20 @ 05:41) (50 - 60)  BP: 106/66 (04-14-20 @ 05:41) (106/66 - 167/67)  RR: 18 (04-14-20 @ 05:41) (18 - 18)  SpO2: --    PE;  general:  lying in bed innnad    Lungs:    Heart:    EXT:    Neuro:  alert no deficits                          CAPILLARY BLOOD GLUCOSE

## 2020-04-15 PROCEDURE — 99231 SBSQ HOSP IP/OBS SF/LOW 25: CPT

## 2020-04-15 RX ADMIN — BUDESONIDE AND FORMOTEROL FUMARATE DIHYDRATE 2 PUFF(S): 160; 4.5 AEROSOL RESPIRATORY (INHALATION) at 20:10

## 2020-04-15 RX ADMIN — ATORVASTATIN CALCIUM 40 MILLIGRAM(S): 80 TABLET, FILM COATED ORAL at 20:10

## 2020-04-15 NOTE — PROGRESS NOTE BEHAVIORAL HEALTH - SUMMARY
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 On evaluation, pt presents cooperative and calm affect in bed, states her name is Rubi Silva, she is  to [employer]be and has "too many children to count", states her daughter's name is . Pt denies any psychiatric diagnoses, states does not take any psychiatric medications, denies all psychiatric complaints. Pt states past medical history include asthma, COPD and heart problems, states "I go to only the best doctors", denies ever seeing a psychiatrist. Denies prior hospitalizations, SA/SIB. Pt appears to be a poor historian. Denies SI/HI, intent and plan. Collateral from only daughter shows otherwise - pt diagnosed with schizophrenia in 2008, has had multiple episodes of medication noncompliance resulting in multiple IPP hospitalizations (most recent in [facility] last year). Pt's daughter is unsure of medications, states haldol decanoate and clozapine, clozapine REMS states pt is not in system, pt denies any recent injections. Of note, EKG shows QTc 488 ms, repeat QTc 474 ms (4/8). Pt will likely benefit from IPP for medication reinitiation and management at this time. TOO started, pending.     #Schizophrenia  -c/w Haldol 5mg PO QHS (Dr. Urbina aware of QTc 474, cleared to start Haldol)  -Patient has been refusing treatment, hospital applying for TOO.    #COPD  -c/w Symbicort 160mcg 2 puffs BID  -c/w Spiriva 1 cap inhal daily      #Hypertension  -c/w Lisinopril 10mg PO Daily    #CAD  -c/w ASA 81mg PO daily  -c/w Atorvastatin 40mg PO QHS

## 2020-04-15 NOTE — CHART NOTE - NSCHARTNOTEFT_GEN_A_CORE
Writer met with patient; Pt assessed writer provided support and education. Patient became agitated and aggressive with writer during assessment. Patient got in writer face and threatened harm and death. Patient refusing all medications. Treatment over objection is in process court appearance is expected to be Monday 4-. Patient remains delusional, unpredictable and with potential for agitation and aggression.  Patient denies current suicidal and or homicidal ideations. Patient denies audio visual hallucinations. Activities of daily living are poor.    Mental Status Exam:    Mood – Labile  Sleep - Fair  Appetite - Good  ADLs - Poor  Thought Process – Delusional Disorganized Tangental  Observation – [id number]    No barriers to discharge identified at this time. Plan is for referral to [facility].

## 2020-04-15 NOTE — PROGRESS NOTE BEHAVIORAL HEALTH - NSBHFUPINTERVALHXFT_PSY_A_CORE
Pt seen and evaluated, chart reviewed. As per nursing report, pt continues to refuse haldol last night, at times irritable but verbally redirectable. On evaluation, pt continues to present disorganized and paranoid, states she is a  and works for [employer]. Pt became quickly agitated with aggressive posturing when questioned on the reality of her living situation. As per pt, she has lived at her private residence in [place] since 1982, then states it was condemned d/t unsanitary living conditions, but then states she will return there after discharge "it's mine, of course I'll go back". Pt continues to states she has no psychiatric history and denies all psychiatric complaints. Pt denies need for medication. Pt denies SI/HI, intent and plan. As per staff, pt with continued poor ADLs. TOO started, pending.    Spoke with pt's daughter, Britney- address listed in [place], [address], was pt's old address when pt first moved to [place]. As per daughter, pt was happiest during that time. She states Silva ("ll" pronounced as "y") is pt's maiden name. She states her father's name was Lucas and Abebe was pt's friend who she hasn't spoken to in years.

## 2020-04-15 NOTE — PROGRESS NOTE BEHAVIORAL HEALTH - NSBHCHARTREVIEWVS_PSY_A_CORE FT
Vital Signs Last 24 Hrs  T(C): 36.7 (15 Apr 2020 08:06), Max: 36.9 (15 Apr 2020 06:21)  T(F): 98 (15 Apr 2020 08:06), Max: 98.4 (15 Apr 2020 06:21)  HR: 71 (15 Apr 2020 08:06) (59 - 80)  BP: 111/63 (15 Apr 2020 08:06) (103/50 - 146/63)  BP(mean): --  RR: 16 (15 Apr 2020 08:06) (16 - 18)  SpO2: --

## 2020-04-16 PROCEDURE — 99231 SBSQ HOSP IP/OBS SF/LOW 25: CPT

## 2020-04-16 RX ORDER — HALOPERIDOL DECANOATE 100 MG/ML
5 INJECTION INTRAMUSCULAR ONCE
Refills: 0 | Status: COMPLETED | OUTPATIENT
Start: 2020-04-16 | End: 2020-04-16

## 2020-04-16 RX ADMIN — HALOPERIDOL DECANOATE 5 MILLIGRAM(S): 100 INJECTION INTRAMUSCULAR at 20:41

## 2020-04-16 RX ADMIN — BUDESONIDE AND FORMOTEROL FUMARATE DIHYDRATE 2 PUFF(S): 160; 4.5 AEROSOL RESPIRATORY (INHALATION) at 20:22

## 2020-04-16 RX ADMIN — ATORVASTATIN CALCIUM 40 MILLIGRAM(S): 80 TABLET, FILM COATED ORAL at 20:22

## 2020-04-16 NOTE — PROVIDER CONTACT NOTE (MEDICATION) - SITUATION
Patient came out of her room for snacks & began to yell & scream at staff for not bringing snacks out early: slamming doors  and cursing also. Pt did the same yesterday. very irritable & angry. Refuses redirection, ventilation, or active listening. Went to her room & came out again to curse, yelled in day room too. Pt has been refusing her antipsychotics for days. TOO on 4/20 pending.

## 2020-04-16 NOTE — PROVIDER CONTACT NOTE (MEDICATION) - ASSESSMENT
Pt angry, yelling, and refuses PO antipsychotics for days. Pt's ADLs are poor & she is scheduled for TOO on April 20th. Disorganized thought, sexually explicit comments.

## 2020-04-16 NOTE — PROGRESS NOTE ADULT - SUBJECTIVE AND OBJECTIVE BOX
pt stable alert in NAD  no new complaints    DEPRESSION  ^DEPRESSION  Handoff  Schizophrenia  Asthma  Hypertension  Coronary artery disease  COPD (chronic obstructive pulmonary disease)  Paranoid schizophrenia  Coronary artery disease involving coronary bypass graft of native heart without angina pectoris  Essential hypertension  Schizophrenia  S/P CABG (coronary artery bypass graft)    HEALTH ISSUES - PROBLEM Dx:  COPD (chronic obstructive pulmonary disease)  Paranoid schizophrenia  Coronary artery disease involving coronary bypass graft of native heart without angina pectoris: Coronary artery disease involving coronary bypass graft of native heart without angina pectoris  Essential hypertension: Essential hypertension  Schizophrenia: Schizophrenia        PAST MEDICAL & SURGICAL HISTORY:  Schizophrenia  Asthma  Hypertension  Coronary artery disease  S/P CABG (coronary artery bypass graft)    Bananas (Unknown)  sulfa drugs (Unknown)      FAMILY HISTORY:      acetaminophen   Tablet .. 650 milliGRAM(s) Oral every 6 hours PRN  aspirin  chewable 81 milliGRAM(s) Oral daily  atorvastatin 40 milliGRAM(s) Oral at bedtime  budesonide 160 MICROgram(s)/formoterol 4.5 MICROgram(s) Inhaler 2 Puff(s) Inhalation two times a day  haloperidol     Tablet 5 milliGRAM(s) Oral at bedtime  haloperidol     Tablet 2 milliGRAM(s) Oral every 6 hours PRN  hydrOXYzine hydrochloride 25 milliGRAM(s) Oral every 6 hours PRN  lactobacillus acidophilus 1 Tablet(s) Oral daily  lisinopril 10 milliGRAM(s) Oral daily  loratadine 10 milliGRAM(s) Oral daily  PPD  5 Tuberculin Unit(s) Injectable 5 Unit(s) IntraDermal once  tiotropium 18 MICROgram(s) Capsule 1 Capsule(s) Inhalation daily      T(C): 35.9 (04-16-20 @ 05:57), Max: 36.4 (04-15-20 @ 15:39)  HR: 53 (04-16-20 @ 05:57) (53 - 69)  BP: 154/65 (04-16-20 @ 05:57) (111/64 - 154/65)  RR: 18 (04-16-20 @ 05:57) (16 - 18)  SpO2: --    PE;  general:  no acuate cahnges innad    Lungs:    Heart:    EXT:    Neuro:  no deficits                          CAPILLARY BLOOD GLUCOSE

## 2020-04-16 NOTE — PROGRESS NOTE BEHAVIORAL HEALTH - NSBHCHARTREVIEWVS_PSY_A_CORE FT
Vital Signs Last 24 Hrs  T(C): 36.2 (16 Apr 2020 08:51), Max: 36.4 (15 Apr 2020 15:39)  T(F): 97.1 (16 Apr 2020 08:51), Max: 97.5 (15 Apr 2020 15:39)  HR: 74 (16 Apr 2020 08:51) (53 - 74)  BP: 145/71 (16 Apr 2020 08:51) (111/64 - 154/65)  BP(mean): --  RR: 16 (16 Apr 2020 08:51) (16 - 18)  SpO2: --

## 2020-04-16 NOTE — PROGRESS NOTE BEHAVIORAL HEALTH - SUMMARY
65 y/o, female, white, single, one adult child, domiciled in private residence in [facility]) vs homeless, previously worked in business administration for [employer] but now currently on SSD, PPhx: schizophrenia (dx 2008), PMH: asthma, CAD s/p CABG x2 (2016), HTN, COPD, who was originally admitted to medicine in [facility] for lower left extremity cellulitis, transferred to IPP d/t bizarre bx, psychiatric medication non-compliance and questionable ability to care for self. On evaluation, pt presents cooperative and calm affect in bed, states her name is Rubi Sivla, she is  to Abebe and has "too many children to count", states her daughter's name is . Pt denies any psychiatric diagnoses, states does not take any psychiatric medications, denies all psychiatric complaints. Pt states past medical history include asthma, COPD and heart problems, states "I go to only the best doctors", denies ever seeing a psychiatrist. Denies prior hospitalizations, SA/SIB. Pt appears to be a poor historian. Denies SI/HI, intent and plan. Collateral from only daughter shows otherwise - pt diagnosed with schizophrenia in 2008, has had multiple episodes of medication noncompliance resulting in multiple IPP hospitalizations (most recent in [facility] last year). Pt's daughter is unsure of medications, states haldol decanoate and clozapine, clozapine REMS states pt is not in system, pt denies any recent injections. Of note, EKG shows QTc 488 ms, repeat QTc 474 ms (4/8). Pt will likely benefit from IPP for medication reinitiation and management at this time.  TOO in process, court appearance expected 4/20/2020.  #Schizophrenia  -c/w Haldol 5mg PO QHS (Dr. Urbina aware of QTc 474, cleared to start Haldol)  -Patient has been refusing treatment, hospital applying for TOO.    #COPD  -c/w Symbicort 160mcg 2 puffs BID  -c/w Spiriva 1 cap inhal daily      #Hypertension  -c/w Lisinopril 10mg PO Daily    #CAD  -c/w ASA 81mg PO daily  -c/w Atorvastatin 40mg PO QHS

## 2020-04-17 PROCEDURE — 99231 SBSQ HOSP IP/OBS SF/LOW 25: CPT

## 2020-04-17 RX ADMIN — ATORVASTATIN CALCIUM 40 MILLIGRAM(S): 80 TABLET, FILM COATED ORAL at 20:10

## 2020-04-17 RX ADMIN — BUDESONIDE AND FORMOTEROL FUMARATE DIHYDRATE 2 PUFF(S): 160; 4.5 AEROSOL RESPIRATORY (INHALATION) at 08:46

## 2020-04-17 RX ADMIN — LISINOPRIL 10 MILLIGRAM(S): 2.5 TABLET ORAL at 16:16

## 2020-04-17 RX ADMIN — TIOTROPIUM BROMIDE 1 CAPSULE(S): 18 CAPSULE ORAL; RESPIRATORY (INHALATION) at 08:46

## 2020-04-17 NOTE — PROGRESS NOTE BEHAVIORAL HEALTH - SUMMARY
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 On evaluation, pt presents cooperative and calm affect in bed, states her name is Rubi Silva, she is  to Abebe and has "too many children to count", states her daughter's name is . Pt denies any psychiatric diagnoses, states does not take any psychiatric medications, denies all psychiatric complaints. Pt states past medical history include asthma, COPD and heart problems, states "I go to only the best doctors", denies ever seeing a psychiatrist. Denies prior hospitalizations, SA/SIB. Pt appears to be a poor historian. Denies SI/HI, intent and plan. Collateral from only daughter shows otherwise - pt diagnosed with schizophrenia in 2008, has had multiple episodes of medication noncompliance resulting in multiple IPP hospitalizations (most recent in [facility] last year). Pt's daughter is unsure of medications, states haldol decanoate and clozapine, clozapine REMS states pt is not in system, pt denies any recent injections. Of note, EKG shows QTc 488 ms, repeat QTc 474 ms (4/8). Pt will likely benefit from IPP for medication reinitiation and management at this time.  TOO in process, court appearance expected 4/20/2020.    #Schizophrenia  -c/w Haldol 5mg PO QHS (Dr. Urbina aware of QTc 474, cleared to start Haldol)  -Patient has been refusing treatment, hospital applying for TOO  -ECG q3 days to monitor QTc when pt begins treatment    #COPD  -c/w Symbicort 160mcg 2 puffs BID  -c/w Spiriva 1 cap inhal daily      #Hypertension  -c/w Lisinopril 10mg PO Daily    #CAD  -c/w ASA 81mg PO daily  -c/w Atorvastatin 40mg PO QHS

## 2020-04-17 NOTE — PROGRESS NOTE BEHAVIORAL HEALTH - NSBHFUPINTERVALHXFT_PSY_A_CORE
Pt seen and evaluated, chart reviewed. As per nursing report, pt continues to refuse haldol last night, had an episode of agitation (slamming doors, cursing at staff) not amenable to verbal redirection requiring haldol IM prn. On evaluation, pt presents in bed with covers over face, states "I'm fine", refuses to answer any further questions. Limited interview. As per staff, pt with continued poor ADLs. TOO in process, court appearance expected 4/20/2020.

## 2020-04-17 NOTE — PROGRESS NOTE BEHAVIORAL HEALTH - NSBHCHARTREVIEWVS_PSY_A_CORE FT
Pt refused     Vital Signs Last 24 Hrs  T(C): --  T(F): --  HR: --  BP: --  BP(mean): --  RR: --  SpO2: --

## 2020-04-17 NOTE — PROGRESS NOTE BEHAVIORAL HEALTH - NSBHCHARTREVIEWIMAGING_PSY_A_CORE FT
< from: CT Tibia/Fibia No Cont, Left (03.22.20 @ 15:17) >    IMPRESSION:    Diffuse subcutaneous edema of the left leg without evidence of osteomyelitis, necrotizing fasciitis, or abscess.    No acutely displaced fracture    < end of copied text >  < from: [facility] Duplex Lower Ext Vein Scan, Left (03.22.20 @ 08:43) >    Impression:    No evidence of deep venous thrombosis or superficial thrombophlebitis in left lower extremity.    < end of copied text >  < from: US Abdomen Limited (03.22.20 @ 14:44) >    IMPRESSION:    Cholelithiasis without evidence of cholecystitis.    < end of copied text >

## 2020-04-17 NOTE — CHART NOTE - NSCHARTNOTEFT_GEN_A_CORE
Treatment team met with patient to discuss treatment plan, medications and discharge plan.  Patient refused to engage with writer. Patient insists writer leave room. During the day patient is observed to be isolative to his room.  Treatment over objection scheduled for Monday 4-.    Mental Status Exam:    Mood – Agitated  Sleep - Fair  Appetite - Good  ADLs - Poor  Thought Process – Delusional   Observation – [id number]         At this time patient is not psychiatrically stable for discharge.

## 2020-04-18 RX ADMIN — ATORVASTATIN CALCIUM 40 MILLIGRAM(S): 80 TABLET, FILM COATED ORAL at 20:30

## 2020-04-18 NOTE — PROGRESS NOTE ADULT - SUBJECTIVE AND OBJECTIVE BOX
pt stable alert in NAD  no new complaints    DEPRESSION  ^DEPRESSION  Handoff  Schizophrenia  Asthma  Hypertension  Coronary artery disease  COPD (chronic obstructive pulmonary disease)  Paranoid schizophrenia  Coronary artery disease involving coronary bypass graft of native heart without angina pectoris  Essential hypertension  Schizophrenia  S/P CABG (coronary artery bypass graft)    HEALTH ISSUES - PROBLEM Dx:  COPD (chronic obstructive pulmonary disease)  Paranoid schizophrenia  Coronary artery disease involving coronary bypass graft of native heart without angina pectoris: Coronary artery disease involving coronary bypass graft of native heart without angina pectoris  Essential hypertension: Essential hypertension  Schizophrenia: Schizophrenia        PAST MEDICAL & SURGICAL HISTORY:  Schizophrenia  Asthma  Hypertension  Coronary artery disease  S/P CABG (coronary artery bypass graft)    Bananas (Unknown)  sulfa drugs (Unknown)      FAMILY HISTORY:      acetaminophen   Tablet .. 650 milliGRAM(s) Oral every 6 hours PRN  aspirin  chewable 81 milliGRAM(s) Oral daily  atorvastatin 40 milliGRAM(s) Oral at bedtime  budesonide 160 MICROgram(s)/formoterol 4.5 MICROgram(s) Inhaler 2 Puff(s) Inhalation two times a day  haloperidol     Tablet 5 milliGRAM(s) Oral at bedtime  haloperidol     Tablet 2 milliGRAM(s) Oral every 6 hours PRN  hydrOXYzine hydrochloride 25 milliGRAM(s) Oral every 6 hours PRN  lactobacillus acidophilus 1 Tablet(s) Oral daily  lisinopril 10 milliGRAM(s) Oral daily  loratadine 10 milliGRAM(s) Oral daily  PPD  5 Tuberculin Unit(s) Injectable 5 Unit(s) IntraDermal once  tiotropium 18 MICROgram(s) Capsule 1 Capsule(s) Inhalation daily      T(C): 36.1 (04-18-20 @ 08:08), Max: 36.4 (04-17-20 @ 15:43)  HR: 55 (04-18-20 @ 08:08) (55 - 64)  BP: 150/62 (04-18-20 @ 08:08) (150/62 - 174/71)  RR: 16 (04-18-20 @ 08:08) (16 - 18)  SpO2: --    PE;  general:  no changesa not ed in nad    Lungs:    Heart:    EXT:    Neuro:  no deficits                          CAPILLARY BLOOD GLUCOSE

## 2020-04-18 NOTE — PROGRESS NOTE ADULT - PROBLEM SELECTOR PLAN 1
continue present treatment as per psych plan as reviewed  Medically stable with no new changes in treatment  will continue to monitor medical status while being treated on psych  bp stable on current tx

## 2020-04-19 RX ADMIN — ATORVASTATIN CALCIUM 40 MILLIGRAM(S): 80 TABLET, FILM COATED ORAL at 20:26

## 2020-04-20 PROCEDURE — 99231 SBSQ HOSP IP/OBS SF/LOW 25: CPT

## 2020-04-20 RX ORDER — HALOPERIDOL DECANOATE 100 MG/ML
3 INJECTION INTRAMUSCULAR AT BEDTIME
Refills: 0 | Status: DISCONTINUED | OUTPATIENT
Start: 2020-04-20 | End: 2020-04-20

## 2020-04-20 RX ORDER — HALOPERIDOL DECANOATE 100 MG/ML
3 INJECTION INTRAMUSCULAR AT BEDTIME
Refills: 0 | Status: DISCONTINUED | OUTPATIENT
Start: 2020-04-20 | End: 2020-04-24

## 2020-04-20 RX ADMIN — ATORVASTATIN CALCIUM 40 MILLIGRAM(S): 80 TABLET, FILM COATED ORAL at 20:11

## 2020-04-20 NOTE — CHART NOTE - NSCHARTNOTEFT_GEN_A_CORE
Social Work Note:     Treatment team met with patient to discuss treatment plan, medications and discharge plan.  Patient was not agreeable to interview.  Attempt made to engage with patient with the goal of encouraging patient to be visible on the unit and sit in the day room.  Treatment over objection hearing occurred earlier this morning.      Discharge plan to be determined in collaboration with patient and treatment team.      Mental Status Exam:    Mood – Irritable  Sleep - Fair  Appetite - Good  ADLs - Poor  Thought Process – Delusional   Observation – [id number]         At this time patient is not psychiatrically stable for discharge.

## 2020-04-20 NOTE — PROGRESS NOTE BEHAVIORAL HEALTH - NSBHCHARTREVIEWVS_PSY_A_CORE FT
Vital Signs Last 24 Hrs  T(C): 35.3 (20 Apr 2020 09:37), Max: 36.2 (19 Apr 2020 16:30)  T(F): 95.6 (20 Apr 2020 09:37), Max: 97.1 (19 Apr 2020 16:30)  HR: 45 (20 Apr 2020 09:37) (45 - 61)  BP: 130/57 (20 Apr 2020 09:37) (130/57 - 160/69)  BP(mean): --  RR: 16 (20 Apr 2020 09:37) (16 - 16)  SpO2: --

## 2020-04-20 NOTE — PROGRESS NOTE BEHAVIORAL HEALTH - NSBHFUPINTERVALHXFT_PSY_A_CORE
Pt seen and evaluated, chart reviewed. As per nursing report, pt continues to refuse haldol last night, remains unpredictable with potential for agitation and aggression but has been verbally redirectable. On evaluation, pt presents in bed with covers over face, states "I'm fine", denies all psychiatric complaints with yes or no answers. Limited interview. As per staff, pt with continued poor ADLs. TOO in process, court appearance 4/20/2020, pending results. Pt seen and evaluated, chart reviewed. As per nursing report, pt continues to refuse haldol last night, remains unpredictable with potential for agitation and aggression but has been verbally redirectable. On evaluation, pt presents in bed with covers over face, states "I'm fine", denies all psychiatric complaints with yes or no answers. Limited interview. As per staff, pt with continued poor ADLs. Treatment over objection hearing today, TOO started. Pt seen and evaluated, chart reviewed. As per nursing report, pt continues to refuse haldol last night, remains unpredictable with potential for agitation and aggression but has been verbally redirectable. On evaluation, pt presents in bed with covers over face, states "I'm fine", denies all psychiatric complaints with yes or no answers. Limited interview. As per staff, pt with continued poor ADLs. Treatment over objection hearing today, approved, pending receipt of signed papers. Pt seen and evaluated, chart reviewed. As per nursing report, pt continues to refuse haldol and labs, remains unpredictable with potential for agitation and aggression but has been verbally redirectable. On evaluation, pt presents in bed with covers over face, states "I'm fine", denies all psychiatric complaints with yes or no answers. Limited interview. As per staff, pt with continued poor ADLs. Treatment over objection hearing today, approved, pending receipt of signed papers.

## 2020-04-20 NOTE — PROGRESS NOTE BEHAVIORAL HEALTH - NSBHCHARTREVIEWIMAGING_PSY_A_CORE FT
< from: CT Tibia/Fibia No Cont, Left (03.22.20 @ 15:17) >    IMPRESSION:    Diffuse subcutaneous edema of the left leg without evidence of osteomyelitis, necrotizing fasciitis, or abscess.    No acutely displaced fracture    < end of copied text >  < from: US Abdomen Limited (03.22.20 @ 14:44) >      IMPRESSION:    Cholelithiasis without evidence of cholecystitis.    < end of copied text >  < from: VA Duplex Lower Ext Vein Scan, Left (03.22.20 @ 08:43) >    Impression:    No evidence of deep venous thrombosis or superficial thrombophlebitis in left lower extremity.    < end of copied text >  < from: CT Head No Cont (03.22.20 @ 06:48) >    IMPRESSION:     No acute intracranial hemorrhage or mass effect.    < end of copied text >

## 2020-04-20 NOTE — PROGRESS NOTE ADULT - SUBJECTIVE AND OBJECTIVE BOX
pt stable alert in NAD  no new complaints    DEPRESSION  ^DEPRESSION  Handoff  Schizophrenia  Asthma  Hypertension  Coronary artery disease  COPD (chronic obstructive pulmonary disease)  Paranoid schizophrenia  Coronary artery disease involving coronary bypass graft of native heart without angina pectoris  Essential hypertension  Schizophrenia  S/P CABG (coronary artery bypass graft)    HEALTH ISSUES - PROBLEM Dx:  COPD (chronic obstructive pulmonary disease)  Paranoid schizophrenia  Coronary artery disease involving coronary bypass graft of native heart without angina pectoris: Coronary artery disease involving coronary bypass graft of native heart without angina pectoris  Essential hypertension: Essential hypertension  Schizophrenia: Schizophrenia        PAST MEDICAL & SURGICAL HISTORY:  Schizophrenia  Asthma  Hypertension  Coronary artery disease  S/P CABG (coronary artery bypass graft)    Bananas (Unknown)  sulfa drugs (Unknown)      FAMILY HISTORY:      acetaminophen   Tablet .. 650 milliGRAM(s) Oral every 6 hours PRN  aspirin  chewable 81 milliGRAM(s) Oral daily  atorvastatin 40 milliGRAM(s) Oral at bedtime  budesonide 160 MICROgram(s)/formoterol 4.5 MICROgram(s) Inhaler 2 Puff(s) Inhalation two times a day  haloperidol     Tablet 5 milliGRAM(s) Oral at bedtime  haloperidol     Tablet 2 milliGRAM(s) Oral every 6 hours PRN  hydrOXYzine hydrochloride 25 milliGRAM(s) Oral every 6 hours PRN  lactobacillus acidophilus 1 Tablet(s) Oral daily  lisinopril 10 milliGRAM(s) Oral daily  loratadine 10 milliGRAM(s) Oral daily  PPD  5 Tuberculin Unit(s) Injectable 5 Unit(s) IntraDermal once  tiotropium 18 MICROgram(s) Capsule 1 Capsule(s) Inhalation daily      T(C): 35.3 (04-20-20 @ 09:37), Max: 36.2 (04-19-20 @ 16:30)  HR: 45 (04-20-20 @ 09:37) (45 - 61)  BP: 130/57 (04-20-20 @ 09:37) (130/57 - 160/69)  RR: 16 (04-20-20 @ 09:37) (16 - 16)  SpO2: --    PE;  general:  no acute cahgnes in nnad    Lungs:    Heart:    EXT:    Neuro:  no deficits                          CAPILLARY BLOOD GLUCOSE

## 2020-04-20 NOTE — PROGRESS NOTE BEHAVIORAL HEALTH - SUMMARY
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 for lower left extremity cellulitis, transferred to IPP d/t bizarre bx, psychiatric medication non-compliance and questionable ability to care for self. On evaluation, pt presents cooperative and calm affect in bed, states her name is Rubi Silva, she is  to [place] and has "too many children to count", states her daughter's name is . Pt denies any psychiatric diagnoses, states does not take any psychiatric medications, denies all psychiatric complaints. Pt states past medical history include asthma, COPD and heart problems, states "I go to only the best doctors", denies ever seeing a psychiatrist. Denies prior hospitalizations, SA/SIB. Pt appears to be a poor historian. Denies SI/HI, intent and plan. Collateral from only daughter shows otherwise - pt diagnosed with schizophrenia in 2008, has had multiple episodes of medication noncompliance resulting in multiple IPP hospitalizations (most recent in [facility] last year). Pt's daughter is unsure of medications, states haldol decanoate and clozapine, clozapine REMS states pt is not in system, pt denies any recent injections. Of note, EKG shows QTc 488 ms, repeat QTc 474 ms (4/8). Pt will likely benefit from IPP for medication reinitiation and management at this time.  TOO in process, court appearance expected 4/20/2020.    #Schizophrenia  -c/w Haldol 5mg PO QHS (Dr. Urbina aware of QTc 474, cleared to start Haldol)  -Patient has been refusing treatment, hospital applying for TOO  -ECG q3 days to monitor QTc when pt begins treatment    #COPD  -c/w Symbicort 160mcg 2 puffs BID  -c/w Spiriva 1 cap inhal daily      #Hypertension  -c/w Lisinopril 10mg PO Daily    #CAD  -c/w ASA 81mg PO daily  -c/w Atorvastatin 40mg PO QHS    #Bradycardia, irregular rhythm  -pt denies cardiac sx  -ECG pending  -repeat CBC/CMP pending  -PA consult, appreciate recs 65 y/o, female, white, single, one adult child, domiciled in private residence in [facility]) vs homeless, 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 On evaluation, pt presents cooperative and calm affect in bed, states her name is Rubi Silav, she is  to [place] and has "too many children to count", states her daughter's name is . Pt denies any psychiatric diagnoses, states does not take any psychiatric medications, denies all psychiatric complaints. Pt states past medical history include asthma, COPD and heart problems, states "I go to only the best doctors", denies ever seeing a psychiatrist. Denies prior hospitalizations, SA/SIB. Pt appears to be a poor historian. Denies SI/HI, intent and plan. Collateral from only daughter shows otherwise - pt diagnosed with schizophrenia in 2008, has had multiple episodes of medication noncompliance resulting in multiple IPP hospitalizations (most recent in [facility] last year). Pt's daughter is unsure of medications, states haldol decanoate and clozapine, clozapine REMS states pt is not in system, pt denies any recent injections. Of note, EKG shows QTc 488 ms, repeat QTc 474 ms (4/8). Pt will likely benefit from IPP for medication reinitiation and management at this time.  TOO in process, court appearance expected 4/20/2020.    #Schizophrenia  -c/w Haldol 5mg PO QHS (Dr. 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 On evaluation, pt presents cooperative and calm affect in bed, states her name is Rubi Silva, she is  to [place]be and has "too many children to count", states her daughter's name is . Pt denies any psychiatric diagnoses, states does not take any psychiatric medications, denies all psychiatric complaints. Pt states past medical history include asthma, COPD and heart problems, states "I go to only the best doctors", denies ever seeing a psychiatrist. Denies prior hospitalizations, SA/SIB. Pt appears to be a poor historian. Denies SI/HI, intent and plan. Collateral from only daughter shows otherwise - pt diagnosed with schizophrenia in 2008, has had multiple episodes of medication noncompliance resulting in multiple IPP hospitalizations (most recent in [facility] last year). Pt's daughter is unsure of medications, states haldol decanoate and clozapine, clozapine REMS states pt is not in system, pt denies any recent injections. Of note, EKG shows QTc 488 ms, repeat QTc 474 ms (4/8). Pt will likely benefit from IPP for medication reinitiation and management at this time.  TOO in process, court appearance expected 4/20/2020.    #Schizophrenia  -c/w Haldol 3 mg PO QHS (Dr. Urbina aware of QTc 474, cleared to start Haldol)  -patient has been refusing treatment, TOO hearing today, started TOO 4/20/2020  -if pt refuses Haldol 3 mg PO QHS, then will give Haldol 3 mg IM QHS  -ECG q3 days to monitor QTc    #COPD  -c/w Symbicort 160mcg 2 puffs BID  -c/w Spiriva 1 cap inhal daily      #Hypertension  -c/w Lisinopril 10mg PO Daily    #CAD  -c/w ASA 81mg PO daily  -c/w Atorvastatin 40mg PO QHS    #Bradycardia, irregular rhythm  -pt denies cardiac sx  -ECG pending  -repeat CBC/CMP pending 65 y/o, female, white, single, one adult child, domiciled in private residence in [facility]) vs homeless, 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 On evaluation, pt presents cooperative and calm affect in bed, states her name is Rubi Silva, she is  to [place] and has "too many children to count", states her daughter's name is . Pt denies any psychiatric diagnoses, states does not take any psychiatric medications, denies all psychiatric complaints. Pt states past medical history include asthma, COPD and heart problems, states "I go to only the best doctors", denies ever seeing a psychiatrist. Denies prior hospitalizations, SA/SIB. Pt appears to be a poor historian. Denies SI/HI, intent and plan. Collateral from only daughter shows otherwise - pt diagnosed with schizophrenia in 2008, has had multiple episodes of medication noncompliance resulting in multiple IPP hospitalizations (most recent in [facility] last year). Pt's daughter is unsure of medications, states haldol decanoate and clozapine, clozapine REMS states pt is not in system, pt denies any recent injections. Of note, EKG shows QTc 488 ms, repeat QTc 474 ms (4/8). Pt will likely benefit from IPP for medication reinitiation and management at this time.  Treatment over objection hearing today, approved, pending receipt of signed papers.    #Schizophrenia  -c/w Haldol 3 mg PO QHS (Dr. 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 On evaluation, pt presents cooperative and calm affect in bed, states her name is Rubi Silva, she is  to [place] and has "too many children to count", states her daughter's name is . Pt denies any psychiatric diagnoses, states does not take any psychiatric medications, denies all psychiatric complaints. Pt states past medical history include asthma, COPD and heart problems, states "I go to only the best doctors", denies ever seeing a psychiatrist. Denies prior hospitalizations, SA/SIB. Pt appears to be a poor historian. Denies SI/HI, intent and plan. Collateral from only daughter shows otherwise - pt diagnosed with schizophrenia in 2008, has had multiple episodes of medication noncompliance resulting in multiple IPP hospitalizations (most recent in [facility] last year). Pt's daughter is unsure of medications, states haldol decanoate and clozapine, clozapine REMS states pt is not in system, pt denies any recent injections. Of note, EKG shows QTc 488 ms, repeat QTc 474 ms (4/8). Pt will likely benefit from IPP for medication reinitiation and management at this time.  Treatment over objection hearing today, approved, pending receipt of signed papers.    #Schizophrenia  -c/w Haldol 3 mg PO QHS (Dr. Urbina aware of QTc 474, cleared to start Haldol)  -patient has been refusing treatment, TOO hearing 4/20/20, approved, pending receipt of signed papers  -ECG q3 days to monitor QTc    #COPD  -c/w Symbicort 160mcg 2 puffs BID  -c/w Spiriva 1 cap inhal daily      #Hypertension  -c/w Lisinopril 10mg PO Daily    #CAD  -c/w ASA 81mg PO daily  -c/w Atorvastatin 40mg PO QHS    #Bradycardia, irregular rhythm  -pt denies cardiac sx  -ECG shows sinus bradycardia  -pending CMP, pt has been refusing labs

## 2020-04-20 NOTE — PROGRESS NOTE ADULT - PROBLEM SELECTOR PLAN 1
continue present treatment as per psych plan as reviewed  Medically stable with no new changes in treatment  will continue to monitor medical status while being treated on psych  \bp remasin stable

## 2020-04-21 LAB
ALBUMIN SERPL ELPH-MCNC: 4.3 G/DL — SIGNIFICANT CHANGE UP (ref 3.5–5.2)
ALP SERPL-CCNC: 97 U/L — SIGNIFICANT CHANGE UP (ref 30–115)
ALT FLD-CCNC: 11 U/L — SIGNIFICANT CHANGE UP (ref 0–41)
ANION GAP SERPL CALC-SCNC: 11 MMOL/L — SIGNIFICANT CHANGE UP (ref 7–14)
AST SERPL-CCNC: 13 U/L — SIGNIFICANT CHANGE UP (ref 0–41)
BILIRUB SERPL-MCNC: 0.6 MG/DL — SIGNIFICANT CHANGE UP (ref 0.2–1.2)
BUN SERPL-MCNC: 18 MG/DL — SIGNIFICANT CHANGE UP (ref 10–20)
CALCIUM SERPL-MCNC: 9.8 MG/DL — SIGNIFICANT CHANGE UP (ref 8.5–10.1)
CHLORIDE SERPL-SCNC: 105 MMOL/L — SIGNIFICANT CHANGE UP (ref 98–110)
CO2 SERPL-SCNC: 22 MMOL/L — SIGNIFICANT CHANGE UP (ref 17–32)
CREAT SERPL-MCNC: 0.7 MG/DL — SIGNIFICANT CHANGE UP (ref 0.7–1.5)
GLUCOSE SERPL-MCNC: 91 MG/DL — SIGNIFICANT CHANGE UP (ref 70–99)
POTASSIUM SERPL-MCNC: 4.9 MMOL/L — SIGNIFICANT CHANGE UP (ref 3.5–5)
POTASSIUM SERPL-SCNC: 4.9 MMOL/L — SIGNIFICANT CHANGE UP (ref 3.5–5)
PROT SERPL-MCNC: 6.8 G/DL — SIGNIFICANT CHANGE UP (ref 6–8)
SODIUM SERPL-SCNC: 138 MMOL/L — SIGNIFICANT CHANGE UP (ref 135–146)

## 2020-04-21 PROCEDURE — 99231 SBSQ HOSP IP/OBS SF/LOW 25: CPT

## 2020-04-21 RX ORDER — HALOPERIDOL DECANOATE 100 MG/ML
3 INJECTION INTRAMUSCULAR AT BEDTIME
Refills: 0 | Status: DISCONTINUED | OUTPATIENT
Start: 2020-04-21 | End: 2020-04-24

## 2020-04-21 RX ADMIN — LORATADINE 10 MILLIGRAM(S): 10 TABLET ORAL at 08:19

## 2020-04-21 RX ADMIN — Medication 1 TABLET(S): at 08:19

## 2020-04-21 RX ADMIN — Medication 81 MILLIGRAM(S): at 08:20

## 2020-04-21 RX ADMIN — HALOPERIDOL DECANOATE 3 MILLIGRAM(S): 100 INJECTION INTRAMUSCULAR at 20:37

## 2020-04-21 RX ADMIN — TIOTROPIUM BROMIDE 1 CAPSULE(S): 18 CAPSULE ORAL; RESPIRATORY (INHALATION) at 08:20

## 2020-04-21 RX ADMIN — BUDESONIDE AND FORMOTEROL FUMARATE DIHYDRATE 2 PUFF(S): 160; 4.5 AEROSOL RESPIRATORY (INHALATION) at 08:20

## 2020-04-21 RX ADMIN — LISINOPRIL 10 MILLIGRAM(S): 2.5 TABLET ORAL at 08:19

## 2020-04-21 NOTE — PROGRESS NOTE BEHAVIORAL HEALTH - SUMMARY
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 On evaluation, pt presents cooperative and calm affect in bed, states her name is Rubi Silva, she is  to [place] and has "too many children to count", states her daughter's name is . Pt denies any psychiatric diagnoses, states does not take any psychiatric medications, denies all psychiatric complaints. Pt states past medical history include asthma, COPD and heart problems, states "I go to only the best doctors", denies ever seeing a psychiatrist. Denies prior hospitalizations, SA/SIB. Pt appears to be a poor historian. Denies SI/HI, intent and plan. Collateral from only daughter shows otherwise - pt diagnosed with schizophrenia in 2008, has had multiple episodes of medication noncompliance resulting in multiple IPP hospitalizations (most recent in [facility] last year). Pt's daughter is unsure of medications, states haldol decanoate and clozapine, clozapine REMS states pt is not in system, pt denies any recent injections. Of note, EKG shows QTc 488 ms, repeat QTc 474 ms (4/8). Pt will likely benefit from IPP for medication reinitiation and management at this time.  Treatment over objection hearing 4/20/2020, approved, started TOO 4/21/2020.    #Schizophrenia  -c/w haldol 3 mg PO qhs (Dr. Urbina aware of QTc 474 ms, cleared to start Haldol)  -patient has been refusing treatment, TOO hearing 4/20/2020, approved, started TOO on 4/21/2020  -as per TOO, if pt refuses haldol 3 mg PO, then will give haldol 3 mg IM  -ECG q3 days to monitor QTc (4/20/2020, QTc 484 ms)    #COPD  -c/w Symbicort 160mcg 2 puffs BID  -c/w Spiriva 1 cap inhal daily      #Hypertension  -c/w Lisinopril 10mg PO Daily    #CAD  -c/w ASA 81mg PO daily  -c/w Atorvastatin 40mg PO QHS    #Bradycardia  -pt denies cardiac sx  -ECG shows sinus bradycardia  -CMP wnl

## 2020-04-21 NOTE — PROGRESS NOTE BEHAVIORAL HEALTH - NSBHCHARTREVIEWVS_PSY_A_CORE FT
Vital Signs Last 24 Hrs  T(C): 35.6 (21 Apr 2020 06:04), Max: 36.3 (20 Apr 2020 21:09)  T(F): 96 (21 Apr 2020 06:04), Max: 97.3 (20 Apr 2020 21:09)  HR: 58 (21 Apr 2020 06:04) (58 - 62)  BP: 98/52 (21 Apr 2020 06:04) (98/52 - 140/61)  BP(mean): --  RR: 17 (21 Apr 2020 06:04) (17 - 18)  SpO2: --

## 2020-04-21 NOTE — PROGRESS NOTE BEHAVIORAL HEALTH - NSBHCHARTREVIEWIMAGING_PSY_A_CORE FT
< from: CT Tibia/Fibia No Cont, Left (03.22.20 @ 15:17) >    IMPRESSION:    Diffuse subcutaneous edema of the left leg without evidence of osteomyelitis, necrotizing fasciitis, or abscess.    No acutely displaced fracture    < end of copied text >  < from: US Abdomen Limited (03.22.20 @ 14:44) >    IMPRESSION:    Cholelithiasis without evidence of cholecystitis.    < end of copied text >  < from: VA Duplex Lower Ext Vein Scan, Left (03.22.20 @ 08:43) >    Impression:    No evidence of deep venous thrombosis or superficial thrombophlebitis in left lower extremity.    < end of copied text >

## 2020-04-21 NOTE — PROGRESS NOTE BEHAVIORAL HEALTH - NSBHFUPINTERVALHXFT_PSY_A_CORE
Pt seen and evaluated, refused treatment team, chart reviewed. As per nursing report, pt continues to refuse haldol and labs, remains unpredictable with potential for agitation and aggression but has been verbally redirectable. On evaluation, pt presents in bed, states "I'm fine, thank you", denies all psychiatric complaints with yes or no answers. Limited interview. As per staff, pt with continued poor ADLs. Re-educated pt on the benefits of haldol. Treatment over objection hearing 4/20, approved and started TOO 4/21/2020, educated pt on TOO results and treatment plan.

## 2020-04-21 NOTE — PROGRESS NOTE BEHAVIORAL HEALTH - NSBHCHARTREVIEWINVESTIGATE_PSY_A_CORE FT
< from: 12 Lead ECG (04.20.20 @ 14:44) >      Ventricular Rate 58 BPM    Atrial Rate 58 BPM    P-R Interval 148 ms    QRS Duration 156 ms    Q-T Interval 492 ms    QTC Calculation(Bezet) 482 ms    P Axis 16 degrees    R Axis 63 degrees    T Axis 77 degrees    Diagnosis Line Sinus bradycardia  Possible Left atrial enlargement  Right bundle branch block  Abnormal ECG    < end of copied text >

## 2020-04-22 PROCEDURE — 99231 SBSQ HOSP IP/OBS SF/LOW 25: CPT

## 2020-04-22 RX ADMIN — TIOTROPIUM BROMIDE 1 CAPSULE(S): 18 CAPSULE ORAL; RESPIRATORY (INHALATION) at 08:12

## 2020-04-22 RX ADMIN — HALOPERIDOL DECANOATE 3 MILLIGRAM(S): 100 INJECTION INTRAMUSCULAR at 21:21

## 2020-04-22 RX ADMIN — LORATADINE 10 MILLIGRAM(S): 10 TABLET ORAL at 08:11

## 2020-04-22 RX ADMIN — ATORVASTATIN CALCIUM 40 MILLIGRAM(S): 80 TABLET, FILM COATED ORAL at 21:21

## 2020-04-22 RX ADMIN — Medication 81 MILLIGRAM(S): at 08:11

## 2020-04-22 RX ADMIN — BUDESONIDE AND FORMOTEROL FUMARATE DIHYDRATE 2 PUFF(S): 160; 4.5 AEROSOL RESPIRATORY (INHALATION) at 08:12

## 2020-04-22 RX ADMIN — Medication 25 MILLIGRAM(S): at 09:14

## 2020-04-22 RX ADMIN — Medication 1 TABLET(S): at 08:11

## 2020-04-22 RX ADMIN — LISINOPRIL 10 MILLIGRAM(S): 2.5 TABLET ORAL at 08:11

## 2020-04-22 NOTE — PROGRESS NOTE BEHAVIORAL HEALTH - NSBHFUPINTERVALHXFT_PSY_A_CORE
Pt seen and evaluated, chart reviewed. As per nursing report, pt refused haldol PO but took haldol IM with consent after TOO explained, remains unpredictable with potential for agitation and aggression but has been verbally redirectable. On evaluation, pt presents socializing with peers in hallway, continues with grandiose delusions of being an ADHD doctor and . Pt states she does not need haldol PO "it causes psychosis", denies all psychiatric complaints with yes or no answers. Limited interview. As per staff, pt with continued poor ADLs. Re-educated pt on the benefits of haldol. Treatment over objection hearing 4/20, approved and started TOO 4/21/2020, educated pt on TOO results and treatment plan.

## 2020-04-22 NOTE — CHART NOTE - NSCHARTNOTEFT_GEN_A_CORE
Writer met with patient; Pt assessed writer provided support and education. Treatment plan and discharge plan discussed. Patient is compliant with court ordered treatment; patient indicates she "wont do that to the , he doesn't deserve it no not his life:. Patient remains disorganized and delusional. Patient is unpredictable with potential for agitation. Patient denies current suicidal and or homicidal ideations. Patient denies audio visual hallucinations. Activities of daily living remain poor. Patient to remain on unit until cleared by attending for discharge or transferred to [place]. Discharge planning ensues. Trials required of [facility] at this time. TOO obtained.    Mental Status Exam:    Mood – Labile  Sleep - Fair  Appetite - Good  ADLs - Poor  Thought Process – Disorganized and delusional Linear    Observation – [id number]    No barriers to discharge identified at this time.

## 2020-04-22 NOTE — PROGRESS NOTE ADULT - PROBLEM SELECTOR PLAN 1
continue present treatment as per psych plan as reviewed  Medically stable with no new changes in treatment  will continue to monitor medical status while being treated on psych  copnt on med for bp  has been stable

## 2020-04-22 NOTE — PROGRESS NOTE BEHAVIORAL HEALTH - SUMMARY
63 y/o, female, white, single, one adult child, domiciled in private residence in 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 On evaluation, pt presents cooperative and calm affect in bed, states her name is Rubi Silva, she is  to [place] and has "too many children to count", states her daughter's name is . Pt denies any psychiatric diagnoses, states does not take any psychiatric medications, denies all psychiatric complaints. Pt states past medical history include asthma, COPD and heart problems, states "I go to only the best doctors", denies ever seeing a psychiatrist. Denies prior hospitalizations, SA/SIB. Pt appears to be a poor historian. Denies SI/HI, intent and plan. Collateral from only daughter shows otherwise - pt diagnosed with schizophrenia in 2008, has had multiple episodes of medication noncompliance resulting in multiple IPP hospitalizations (most recent in [facility] last year). Pt's daughter is unsure of medications, states haldol decanoate and clozapine, clozapine REMS states pt is not in system, pt denies any recent injections. Of note, EKG shows QTc 488 ms, repeat QTc 474 ms (4/8). Pt will likely benefit from IPP for medication reinitiation and management at this time.  Treatment over objection hearing 4/20/2020, approved, started TOO 4/21/2020.    #Schizophrenia  -c/w haldol 3 mg PO qhs (Dr. Urbina aware of QTc 474 ms, cleared to start Haldol)  -patient has been refusing treatment, TOO hearing 4/20/2020, approved, started TOO on 4/21/2020  -as per TOO, if pt refuses haldol 3 mg PO, then will give haldol 3 mg IM  -ECG q3 days to monitor QTc (4/20/2020, QTc 484 ms)    #COPD  -c/w Symbicort 160mcg 2 puffs BID  -c/w Spiriva 1 cap inhal daily      #Hypertension  -c/w Lisinopril 10mg PO Daily    #CAD  -c/w ASA 81mg PO daily  -c/w Atorvastatin 40mg PO QHS    #Bradycardia  -pt denies cardiac sx  -ECG shows sinus bradycardia  -CMP wnl

## 2020-04-22 NOTE — PROGRESS NOTE ADULT - SUBJECTIVE AND OBJECTIVE BOX
pt stable alert in NAD  no new complaints    DEPRESSION  ^DEPRESSION  Handoff  Schizophrenia  Asthma  Hypertension  Coronary artery disease  COPD (chronic obstructive pulmonary disease)  Paranoid schizophrenia  Coronary artery disease involving coronary bypass graft of native heart without angina pectoris  Essential hypertension  Schizophrenia  S/P CABG (coronary artery bypass graft)    HEALTH ISSUES - PROBLEM Dx:  COPD (chronic obstructive pulmonary disease)  Paranoid schizophrenia  Coronary artery disease involving coronary bypass graft of native heart without angina pectoris: Coronary artery disease involving coronary bypass graft of native heart without angina pectoris  Essential hypertension: Essential hypertension  Schizophrenia: Schizophrenia        PAST MEDICAL & SURGICAL HISTORY:  Schizophrenia  Asthma  Hypertension  Coronary artery disease  S/P CABG (coronary artery bypass graft)    Bananas (Unknown)  sulfa drugs (Unknown)      FAMILY HISTORY:      acetaminophen   Tablet .. 650 milliGRAM(s) Oral every 6 hours PRN  aspirin  chewable 81 milliGRAM(s) Oral daily  atorvastatin 40 milliGRAM(s) Oral at bedtime  budesonide 160 MICROgram(s)/formoterol 4.5 MICROgram(s) Inhaler 2 Puff(s) Inhalation two times a day  haloperidol     Tablet 3 milliGRAM(s) Oral at bedtime  haloperidol    Injectable 3 milliGRAM(s) IntraMuscular at bedtime PRN  hydrOXYzine hydrochloride 25 milliGRAM(s) Oral every 6 hours PRN  lactobacillus acidophilus 1 Tablet(s) Oral daily  lisinopril 10 milliGRAM(s) Oral daily  loratadine 10 milliGRAM(s) Oral daily  PPD  5 Tuberculin Unit(s) Injectable 5 Unit(s) IntraDermal once  tiotropium 18 MICROgram(s) Capsule 1 Capsule(s) Inhalation daily      T(C): --  HR: --  BP: --  RR: --  SpO2: --    PE;  general:  no cahnges noted in nad    Lungs:    Heart:    EXT:    Neuro:  alert no derfciits      --  --  --  18  0.7  4.9  91      04-21    138  |  105  |  18  ----------------------------<  91  4.9   |  22  |  0.7    Ca    9.8      21 Apr 2020 06:30    TPro  6.8  /  Alb  4.3  /  TBili  0.6  /  DBili  x   /  AST  13  /  ALT  11  /  AlkPhos  97  04-21      LIVER FUNCTIONS - ( 21 Apr 2020 06:30 )  Alb: 4.3 g/dL / Pro: 6.8 g/dL / ALK PHOS: 97 U/L / ALT: 11 U/L / AST: 13 U/L / GGT: x                 CAPILLARY BLOOD GLUCOSE

## 2020-04-22 NOTE — PROGRESS NOTE BEHAVIORAL HEALTH - NSBHCHARTREVIEWVS_PSY_A_CORE FT
Vital Signs Last 24 Hrs  T(C): --  T(F): --  HR: --  BP: --  BP(mean): --  RR: --  SpO2: --    (Pt refusing)

## 2020-04-23 PROCEDURE — 99231 SBSQ HOSP IP/OBS SF/LOW 25: CPT

## 2020-04-23 RX ADMIN — TIOTROPIUM BROMIDE 1 CAPSULE(S): 18 CAPSULE ORAL; RESPIRATORY (INHALATION) at 08:54

## 2020-04-23 RX ADMIN — Medication 81 MILLIGRAM(S): at 08:55

## 2020-04-23 RX ADMIN — Medication 1 TABLET(S): at 08:55

## 2020-04-23 RX ADMIN — ATORVASTATIN CALCIUM 40 MILLIGRAM(S): 80 TABLET, FILM COATED ORAL at 21:19

## 2020-04-23 RX ADMIN — LISINOPRIL 10 MILLIGRAM(S): 2.5 TABLET ORAL at 08:55

## 2020-04-23 RX ADMIN — BUDESONIDE AND FORMOTEROL FUMARATE DIHYDRATE 2 PUFF(S): 160; 4.5 AEROSOL RESPIRATORY (INHALATION) at 08:55

## 2020-04-23 RX ADMIN — LORATADINE 10 MILLIGRAM(S): 10 TABLET ORAL at 08:55

## 2020-04-23 RX ADMIN — HALOPERIDOL DECANOATE 3 MILLIGRAM(S): 100 INJECTION INTRAMUSCULAR at 18:06

## 2020-04-23 NOTE — PROGRESS NOTE BEHAVIORAL HEALTH - SUMMARY
63 y/o, female, white, single, one adult child, domiciled in private residence in 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 On evaluation, pt presents cooperative and calm affect in bed, states her name is Rubi Silva, she is  to Abebe and has "too many children to count", states her daughter's name is . Pt denies any psychiatric diagnoses, states does not take any psychiatric medications, denies all psychiatric complaints. Pt states past medical history include asthma, COPD and heart problems, states "I go to only the best doctors", denies ever seeing a psychiatrist. Denies prior hospitalizations, SA/SIB. Pt appears to be a poor historian. Denies SI/HI, intent and plan. Collateral from only daughter shows otherwise - pt diagnosed with schizophrenia in 2008, has had multiple episodes of medication noncompliance resulting in multiple IPP hospitalizations (most recent in [facility] last year). Pt's daughter is unsure of medications, states haldol decanoate and clozapine, clozapine REMS states pt is not in system, pt denies any recent injections. Of note, EKG shows QTc 488 ms, repeat QTc 474 ms (4/8). Pt will likely benefit from IPP for medication reinitiation and management at this time.  Treatment over objection approved, started 4/21/2020.    #Schizophrenia  -c/w haldol 3 mg PO qhs (Dr. Urbina aware of QTc 474 ms, cleared to start Haldol)  -patient has been refusing treatment, TOO approved, started 4/21/2020  -as per TOO, if pt refuses haldol 3 mg PO, then will give haldol 3 mg IM  -ECG q3 days to monitor QTc (4/20/2020, QTc 484 ms)    #COPD  -c/w Symbicort 160mcg 2 puffs BID  -c/w Spiriva 1 cap inhal daily      #Hypertension  -c/w Lisinopril 10mg PO Daily    #CAD  -c/w ASA 81mg PO daily  -c/w Atorvastatin 40mg PO QHS    #Bradycardia  -pt denies cardiac sx  -ECG shows sinus bradycardia  -CMP wnl

## 2020-04-23 NOTE — PROGRESS NOTE BEHAVIORAL HEALTH - NSBHFUPINTERVALHXFT_PSY_A_CORE
Pt seen and evaluated, chart reviewed. As per nursing report, pt refused haldol PO but took haldol IM after TOO explained, remains unpredictable with potential for agitation and aggression but has been verbally redirectable. On evaluation, pt presents in bed unwilling to engage with this writer despite several attempts, giving a nod only when asked if pt is fine. Limited interview. Treatment over objection approved, started 4/21/2020.    denies all psychiatric complaints with yes or no answers. Limited interview. As per staff, pt with continued poor ADLs. Re-educated pt on the benefits of haldol. Treatment over objection hearing 4/20, approved and started TOO 4/21/2020, educated pt on TOO results and treatment plan. Pt seen and evaluated, chart reviewed. As per nursing report, pt refused haldol PO but took haldol IM after TOO explained, remains unpredictable with potential for agitation and aggression but has been verbally redirectable. On evaluation, pt presents in bed unwilling to engage with this writer despite several attempts, giving a nod only when asked if pt is fine. Limited interview. Treatment over objection approved, started 4/21/2020.

## 2020-04-23 NOTE — PROGRESS NOTE BEHAVIORAL HEALTH - NSBHCHARTREVIEWVS_PSY_A_CORE FT
Vital Signs Last 24 Hrs  T(C): 36.3 (23 Apr 2020 06:08), Max: 36.3 (23 Apr 2020 06:08)  T(F): 97.3 (23 Apr 2020 06:08), Max: 97.3 (23 Apr 2020 06:08)  HR: 973 (23 Apr 2020 06:08) (973 - 973) **Error- manual pulse count, HR 52 on 4/23/2020 0945**  BP: 163/72 (23 Apr 2020 06:08) (163/72 - 163/72)  BP(mean): --  RR: 18 (23 Apr 2020 06:08) (18 - 18)  SpO2: --

## 2020-04-24 PROCEDURE — 99231 SBSQ HOSP IP/OBS SF/LOW 25: CPT

## 2020-04-24 RX ORDER — HALOPERIDOL DECANOATE 100 MG/ML
5 INJECTION INTRAMUSCULAR AT BEDTIME
Refills: 0 | Status: DISCONTINUED | OUTPATIENT
Start: 2020-04-24 | End: 2020-05-01

## 2020-04-24 RX ADMIN — LISINOPRIL 10 MILLIGRAM(S): 2.5 TABLET ORAL at 09:16

## 2020-04-24 RX ADMIN — Medication 1 TABLET(S): at 09:16

## 2020-04-24 RX ADMIN — ATORVASTATIN CALCIUM 40 MILLIGRAM(S): 80 TABLET, FILM COATED ORAL at 20:42

## 2020-04-24 RX ADMIN — BUDESONIDE AND FORMOTEROL FUMARATE DIHYDRATE 2 PUFF(S): 160; 4.5 AEROSOL RESPIRATORY (INHALATION) at 09:16

## 2020-04-24 RX ADMIN — TIOTROPIUM BROMIDE 1 CAPSULE(S): 18 CAPSULE ORAL; RESPIRATORY (INHALATION) at 09:16

## 2020-04-24 RX ADMIN — HALOPERIDOL DECANOATE 5 MILLIGRAM(S): 100 INJECTION INTRAMUSCULAR at 20:41

## 2020-04-24 RX ADMIN — Medication 81 MILLIGRAM(S): at 09:16

## 2020-04-24 RX ADMIN — BUDESONIDE AND FORMOTEROL FUMARATE DIHYDRATE 2 PUFF(S): 160; 4.5 AEROSOL RESPIRATORY (INHALATION) at 20:43

## 2020-04-24 RX ADMIN — LORATADINE 10 MILLIGRAM(S): 10 TABLET ORAL at 09:16

## 2020-04-24 NOTE — PROGRESS NOTE ADULT - SUBJECTIVE AND OBJECTIVE BOX
pt stable alert in NAD  no new complaints    DEPRESSION  ^DEPRESSION  Handoff  Schizophrenia  Asthma  Hypertension  Coronary artery disease  COPD (chronic obstructive pulmonary disease)  Paranoid schizophrenia  Coronary artery disease involving coronary bypass graft of native heart without angina pectoris  Essential hypertension  Schizophrenia  S/P CABG (coronary artery bypass graft)    HEALTH ISSUES - PROBLEM Dx:  COPD (chronic obstructive pulmonary disease)  Paranoid schizophrenia  Coronary artery disease involving coronary bypass graft of native heart without angina pectoris: Coronary artery disease involving coronary bypass graft of native heart without angina pectoris  Essential hypertension: Essential hypertension  Schizophrenia: Schizophrenia        PAST MEDICAL & SURGICAL HISTORY:  Schizophrenia  Asthma  Hypertension  Coronary artery disease  S/P CABG (coronary artery bypass graft)    Bananas (Unknown)  sulfa drugs (Unknown)      FAMILY HISTORY:      acetaminophen   Tablet .. 650 milliGRAM(s) Oral every 6 hours PRN  aspirin  chewable 81 milliGRAM(s) Oral daily  atorvastatin 40 milliGRAM(s) Oral at bedtime  budesonide 160 MICROgram(s)/formoterol 4.5 MICROgram(s) Inhaler 2 Puff(s) Inhalation two times a day  haloperidol     Tablet 3 milliGRAM(s) Oral at bedtime  haloperidol    Injectable 3 milliGRAM(s) IntraMuscular at bedtime PRN  hydrOXYzine hydrochloride 25 milliGRAM(s) Oral every 6 hours PRN  lactobacillus acidophilus 1 Tablet(s) Oral daily  lisinopril 10 milliGRAM(s) Oral daily  loratadine 10 milliGRAM(s) Oral daily  PPD  5 Tuberculin Unit(s) Injectable 5 Unit(s) IntraDermal once  tiotropium 18 MICROgram(s) Capsule 1 Capsule(s) Inhalation daily      T(C): 36.2 (04-24-20 @ 05:35), Max: 36.2 (04-24-20 @ 05:35)  HR: 67 (04-24-20 @ 05:35) (67 - 67)  BP: 108/57 (04-24-20 @ 05:35) (108/57 - 108/57)  RR: 17 (04-24-20 @ 05:35) (17 - 17)  SpO2: --    PE;  general:  no cahnges ntoeda in nad    Lungs:    Heart:    EXT:    Neuro:  aelrt no deficits                          CAPILLARY BLOOD GLUCOSE

## 2020-04-24 NOTE — PROGRESS NOTE BEHAVIORAL HEALTH - SUMMARY
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 On evaluation, pt presents cooperative and calm affect in bed, states her name is Rubi Silva, she is  to Abebe and has "too many children to count", states her daughter's name is . Pt denies any psychiatric diagnoses, states does not take any psychiatric medications, denies all psychiatric complaints. Pt states past medical history include asthma, COPD and heart problems, states "I go to only the best doctors", denies ever seeing a psychiatrist. Denies prior hospitalizations, SA/SIB. Pt appears to be a poor historian. Denies SI/HI, intent and plan. Collateral from only daughter shows otherwise - pt diagnosed with schizophrenia in 2008, has had multiple episodes of medication noncompliance resulting in multiple IPP hospitalizations (most recent in [facility] last year). Pt's daughter is unsure of medications, states haldol decanoate and clozapine, clozapine REMS states pt is not in system, pt denies any recent injections. Of note, EKG showed QTc 474 ms, cleared by medical to start haldol. TOO approved, started 4/21/2020. On evaluation, pt denies prior delusions, continues to deny psychiatric diagnosis and need for medication, continues with poor ADLs. Pt will likely benefit from IPP for medication management at this time.    #Schizophrenia  -titrate haldol 5 mg PO qhs (medical cleared to start Haldol)  -patient has been refusing treatment, TOO approved, started 4/21/2020  -as per TOO, if pt refuses haldol 5 mg PO, then will give haldol 5 mg IM  -ECG q3 days to monitor QTc (recent 4/24/2020, QTc 478 ms)    #COPD  -c/w Symbicort 160mcg 2 puffs BID  -c/w Spiriva 1 cap inhal daily      #Hypertension  -c/w Lisinopril 10mg PO Daily    #CAD  -c/w ASA 81mg PO daily  -c/w Atorvastatin 40mg PO QHS    #Bradycardia  -pt denies cardiac sx  -ECG shows sinus bradycardia  -CMP wnl

## 2020-04-24 NOTE — PROGRESS NOTE BEHAVIORAL HEALTH - NSBHCHARTREVIEWINVESTIGATE_PSY_A_CORE FT
< from: 12 Lead ECG (04.24.20 @ 09:29) >      Ventricular Rate 65 BPM    Atrial Rate 65 BPM    P-R Interval 178 ms    QRS Duration 160 ms    Q-T Interval 460 ms    QTC Calculation(Bezet) 478 ms    P Axis 39 degrees    R Axis 54 degrees    T Axis 79 degrees    Diagnosis Line Normal sinus rhythm  Right bundle branch block  T wave abnormality, consider lateral ischemia  Abnormal ECG    < end of copied text >
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
## 2020-04-24 NOTE — PROGRESS NOTE BEHAVIORAL HEALTH - NSBHCHARTREVIEWVS_PSY_A_CORE FT
Vital Signs Last 24 Hrs  T(C): 36.2 (24 Apr 2020 05:35), Max: 36.2 (24 Apr 2020 05:35)  T(F): 97.1 (24 Apr 2020 05:35), Max: 97.1 (24 Apr 2020 05:35)  HR: 67 (24 Apr 2020 05:35) (67 - 67)  BP: 108/57 (24 Apr 2020 05:35) (108/57 - 108/57)  BP(mean): --  RR: 17 (24 Apr 2020 05:35) (17 - 17)  SpO2: --

## 2020-04-25 RX ADMIN — LISINOPRIL 10 MILLIGRAM(S): 2.5 TABLET ORAL at 11:22

## 2020-04-25 RX ADMIN — BUDESONIDE AND FORMOTEROL FUMARATE DIHYDRATE 2 PUFF(S): 160; 4.5 AEROSOL RESPIRATORY (INHALATION) at 08:31

## 2020-04-25 RX ADMIN — BUDESONIDE AND FORMOTEROL FUMARATE DIHYDRATE 2 PUFF(S): 160; 4.5 AEROSOL RESPIRATORY (INHALATION) at 11:23

## 2020-04-25 RX ADMIN — TIOTROPIUM BROMIDE 1 CAPSULE(S): 18 CAPSULE ORAL; RESPIRATORY (INHALATION) at 08:31

## 2020-04-25 RX ADMIN — Medication 1 TABLET(S): at 11:23

## 2020-04-25 RX ADMIN — ATORVASTATIN CALCIUM 40 MILLIGRAM(S): 80 TABLET, FILM COATED ORAL at 20:43

## 2020-04-25 RX ADMIN — HALOPERIDOL DECANOATE 5 MILLIGRAM(S): 100 INJECTION INTRAMUSCULAR at 20:49

## 2020-04-25 RX ADMIN — LORATADINE 10 MILLIGRAM(S): 10 TABLET ORAL at 11:23

## 2020-04-25 RX ADMIN — Medication 81 MILLIGRAM(S): at 11:23

## 2020-04-26 RX ADMIN — Medication 25 MILLIGRAM(S): at 20:18

## 2020-04-26 RX ADMIN — BUDESONIDE AND FORMOTEROL FUMARATE DIHYDRATE 2 PUFF(S): 160; 4.5 AEROSOL RESPIRATORY (INHALATION) at 11:32

## 2020-04-26 RX ADMIN — Medication 1 TABLET(S): at 11:32

## 2020-04-26 RX ADMIN — LISINOPRIL 10 MILLIGRAM(S): 2.5 TABLET ORAL at 11:32

## 2020-04-26 RX ADMIN — ATORVASTATIN CALCIUM 40 MILLIGRAM(S): 80 TABLET, FILM COATED ORAL at 20:18

## 2020-04-26 RX ADMIN — TIOTROPIUM BROMIDE 1 CAPSULE(S): 18 CAPSULE ORAL; RESPIRATORY (INHALATION) at 11:32

## 2020-04-26 RX ADMIN — LORATADINE 10 MILLIGRAM(S): 10 TABLET ORAL at 11:32

## 2020-04-26 RX ADMIN — Medication 81 MILLIGRAM(S): at 11:32

## 2020-04-26 RX ADMIN — HALOPERIDOL DECANOATE 5 MILLIGRAM(S): 100 INJECTION INTRAMUSCULAR at 20:18

## 2020-04-26 NOTE — CHART NOTE - NSCHARTNOTEFT_GEN_A_CORE
PPD to be placed: My self and another Therapist Arrived to [facility] at 2115.WE  rang the bell and knocked on the door  for 15min with no response.

## 2020-04-26 NOTE — PROGRESS NOTE ADULT - SUBJECTIVE AND OBJECTIVE BOX
pt stable alert in NAD  no new complaints    DEPRESSION  ^DEPRESSION  Handoff  Schizophrenia  Asthma  Hypertension  Coronary artery disease  COPD (chronic obstructive pulmonary disease)  Paranoid schizophrenia  Coronary artery disease involving coronary bypass graft of native heart without angina pectoris  Essential hypertension  Schizophrenia  S/P CABG (coronary artery bypass graft)    HEALTH ISSUES - PROBLEM Dx:  COPD (chronic obstructive pulmonary disease)  Paranoid schizophrenia  Coronary artery disease involving coronary bypass graft of native heart without angina pectoris: Coronary artery disease involving coronary bypass graft of native heart without angina pectoris  Essential hypertension: Essential hypertension  Schizophrenia: Schizophrenia        PAST MEDICAL & SURGICAL HISTORY:  Schizophrenia  Asthma  Hypertension  Coronary artery disease  S/P CABG (coronary artery bypass graft)    Bananas (Unknown)  sulfa drugs (Unknown)      FAMILY HISTORY:      acetaminophen   Tablet .. 650 milliGRAM(s) Oral every 6 hours PRN  aspirin  chewable 81 milliGRAM(s) Oral daily  atorvastatin 40 milliGRAM(s) Oral at bedtime  budesonide 160 MICROgram(s)/formoterol 4.5 MICROgram(s) Inhaler 2 Puff(s) Inhalation two times a day  haloperidol     Tablet 5 milliGRAM(s) Oral at bedtime  haloperidol    Injectable 5 milliGRAM(s) IntraMuscular at bedtime PRN  hydrOXYzine hydrochloride 25 milliGRAM(s) Oral every 6 hours PRN  lactobacillus acidophilus 1 Tablet(s) Oral daily  lisinopril 10 milliGRAM(s) Oral daily  loratadine 10 milliGRAM(s) Oral daily  PPD  5 Tuberculin Unit(s) Injectable 5 Unit(s) IntraDermal once  tiotropium 18 MICROgram(s) Capsule 1 Capsule(s) Inhalation daily      T(C): 37.1 (04-26-20 @ 10:13), Max: 37.1 (04-26-20 @ 10:13)  HR: 65 (04-26-20 @ 10:13) (62 - 65)  BP: 115/60 (04-26-20 @ 10:13) (115/60 - 121/70)  RR: 16 (04-26-20 @ 10:13) (16 - 18)  SpO2: --    PE;  general:  no acute cahngeas in nad    Lungs:    Heart:    EXT:    Neuro:  aelrt no deficits                          CAPILLARY BLOOD GLUCOSE

## 2020-04-27 PROCEDURE — 99231 SBSQ HOSP IP/OBS SF/LOW 25: CPT

## 2020-04-27 RX ADMIN — BUDESONIDE AND FORMOTEROL FUMARATE DIHYDRATE 2 PUFF(S): 160; 4.5 AEROSOL RESPIRATORY (INHALATION) at 08:53

## 2020-04-27 RX ADMIN — ATORVASTATIN CALCIUM 40 MILLIGRAM(S): 80 TABLET, FILM COATED ORAL at 20:05

## 2020-04-27 RX ADMIN — LORATADINE 10 MILLIGRAM(S): 10 TABLET ORAL at 08:53

## 2020-04-27 RX ADMIN — TIOTROPIUM BROMIDE 1 CAPSULE(S): 18 CAPSULE ORAL; RESPIRATORY (INHALATION) at 15:19

## 2020-04-27 RX ADMIN — TUBERCULIN PURIFIED PROTEIN DERIVATIVE 5 UNIT(S): 5 INJECTION, SOLUTION INTRADERMAL at 20:19

## 2020-04-27 RX ADMIN — Medication 81 MILLIGRAM(S): at 08:53

## 2020-04-27 RX ADMIN — Medication 25 MILLIGRAM(S): at 20:05

## 2020-04-27 RX ADMIN — BUDESONIDE AND FORMOTEROL FUMARATE DIHYDRATE 2 PUFF(S): 160; 4.5 AEROSOL RESPIRATORY (INHALATION) at 20:05

## 2020-04-27 RX ADMIN — Medication 650 MILLIGRAM(S): at 21:22

## 2020-04-27 RX ADMIN — HALOPERIDOL DECANOATE 5 MILLIGRAM(S): 100 INJECTION INTRAMUSCULAR at 20:05

## 2020-04-27 RX ADMIN — LISINOPRIL 10 MILLIGRAM(S): 2.5 TABLET ORAL at 08:53

## 2020-04-27 RX ADMIN — Medication 1 TABLET(S): at 08:53

## 2020-04-27 NOTE — CHART NOTE - NSCHARTNOTEFT_GEN_A_CORE
Writer met with patient; Pt assessed writer provided support and education. Treatment plan and discharge plan discussed. Patient is compliant with court ordered  treatment and unit protocol. Patient is taking medications as prescribed and is compliant with unit rules. Patient contracts for safety on the unit. Patient is oriented on all spheres, denies current suicidal and or homicidal ideations. Patient denies audio visual hallucinations but remains delusional and unpredictable. Patient is in good behavioral control at this time. Activities of daily living are within normal limits. Patient to remain on unit until cleared by attending for discharge.    Mental Status Exam:    Mood – Labile  Sleep - Good  Appetite - Good  ADLs - Fair  Thought Process – Delusional    Observation – [id number]    No barriers to discharge identified at this time. Plan is for referral to [facility]. Application is underway.

## 2020-04-27 NOTE — PROGRESS NOTE BEHAVIORAL HEALTH - NSBHCHARTREVIEWVS_PSY_A_CORE FT
Vital Signs Last 24 Hrs  T(C): 35.6 (27 Apr 2020 05:47), Max: 35.6 (27 Apr 2020 05:47)  T(F): 96 (27 Apr 2020 05:47), Max: 96 (27 Apr 2020 05:47)  HR: 53 (27 Apr 2020 05:47) (53 - 53)  BP: 143/63 (27 Apr 2020 05:47) (143/63 - 143/63)  BP(mean): --  RR: 18 (27 Apr 2020 05:47) (18 - 18)  SpO2: --

## 2020-04-27 NOTE — PROGRESS NOTE BEHAVIORAL HEALTH - SUMMARY
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 for lower left extremity cellulitis, transferred to IPP d/t bizarre bx, psychiatric medication non-compliance and questionable ability to care for self. On evaluation, pt presents cooperative and calm affect in bed, states her name is Rubi Silva, she is  to [place]be and has "too many children to count", states her daughter's name is . Pt denies any psychiatric diagnoses, states does not take any psychiatric medications, denies all psychiatric complaints. Pt states past medical history include asthma, COPD and heart problems, states "I go to only the best doctors", denies ever seeing a psychiatrist. Denies prior hospitalizations, SA/SIB. Pt appears to be a poor historian. Denies SI/HI, intent and plan. Collateral from only daughter shows otherwise - pt diagnosed with schizophrenia in 2008, has had multiple episodes of medication noncompliance resulting in multiple IPP hospitalizations (most recent in [facility] last year). Pt's daughter is unsure of medications, states haldol decanoate and clozapine, clozapine REMS states pt is not in system, pt denies any recent injections. Of note, EKG showed QTc 474 ms, cleared by medical to start haldol. TOO approved, started 4/21/2020. On evaluation, pt denies prior delusions, continues to deny psychiatric diagnosis and need for medication, continues with poor ADLs. Pt will likely benefit from IPP for medication management at this time.    #Schizophrenia  -c/w haldol 5 mg PO qhs (medical cleared to start Haldol)  -patient has been refusing treatment, TOO approved, started 4/21/2020  -as per TOO, if pt refuses haldol 5 mg PO, then will give haldol 5 mg IM  -ECG q3 days to monitor QTc (recent 4/24/2020, QTc 478 ms)    #COPD  -c/w Symbicort 160mcg 2 puffs BID  -c/w Spiriva 1 cap inhal daily      #Hypertension  -c/w Lisinopril 10mg PO Daily    #CAD  -c/w ASA 81mg PO daily  -c/w Atorvastatin 40mg PO QHS    #Bradycardia  -pt denies cardiac sx  -ECG shows sinus bradycardia  -CMP wnl

## 2020-04-27 NOTE — CHART NOTE - NSCHARTNOTEFT_GEN_A_CORE
Called by nurse Luna to see pt with c/o "angina"  Upon entering room patient is currently sleeping with no signs of discomfort or distress.  Patient states she has had angina and pain x many years    BP -135/63  HR 55  O2 sat- 94% on RA    EKG- no changes when c/w study done approx 1 hr ago    exam-    NAD  S1, S2  Lungs CTA      No evidence of any active cardiac issue at this time  will monitor    plan dw Dr. Urbina

## 2020-04-27 NOTE — CHART NOTE - NSCHARTNOTEFT_GEN_A_CORE
PPD PLACED ON RFA, TO BE READ IN 48-72HRS PPD PLACED ON RFA, TO BE READ IN 48-72HRS    4/29/2020  PPD negative, 0mm  RRT: Lio Turner

## 2020-04-27 NOTE — PROGRESS NOTE BEHAVIORAL HEALTH - NSBHFUPINTERVALHXFT_PSY_A_CORE
Pt seen and evaluated, chart reviewed. As per nursing report, pt requested haldol IM rather than PO x1 over the weekend, otherwise medication compliant and no behavioral issues. Of note, pt c/o angina earlier s/p PA evaluation with no ECG changes and pt currently denying angina. On evaluation, pt presents in bed with covers over head, states "I'm fine". Pt AOx4, continues to deny prior delusions. Pt continues to state she does not need psychiatric medications and does not have a psychiatric diagnosis. States mood "fine", endorses good sleep and appetite. Denies AVH, paranoia. Denies SI/HI, intent and plan. Recent EKG shows QTc 464 ms. As per staff, pt continues to have poor ADLs. Treatment over objection approved, started 4/21/2020.

## 2020-04-28 PROCEDURE — 99231 SBSQ HOSP IP/OBS SF/LOW 25: CPT

## 2020-04-28 RX ADMIN — Medication 1 TABLET(S): at 08:35

## 2020-04-28 RX ADMIN — TIOTROPIUM BROMIDE 1 CAPSULE(S): 18 CAPSULE ORAL; RESPIRATORY (INHALATION) at 08:35

## 2020-04-28 RX ADMIN — LISINOPRIL 10 MILLIGRAM(S): 2.5 TABLET ORAL at 08:35

## 2020-04-28 RX ADMIN — Medication 650 MILLIGRAM(S): at 17:43

## 2020-04-28 RX ADMIN — Medication 81 MILLIGRAM(S): at 08:35

## 2020-04-28 RX ADMIN — HALOPERIDOL DECANOATE 5 MILLIGRAM(S): 100 INJECTION INTRAMUSCULAR at 20:17

## 2020-04-28 RX ADMIN — LORATADINE 10 MILLIGRAM(S): 10 TABLET ORAL at 08:35

## 2020-04-28 RX ADMIN — ATORVASTATIN CALCIUM 40 MILLIGRAM(S): 80 TABLET, FILM COATED ORAL at 20:17

## 2020-04-28 RX ADMIN — BUDESONIDE AND FORMOTEROL FUMARATE DIHYDRATE 2 PUFF(S): 160; 4.5 AEROSOL RESPIRATORY (INHALATION) at 20:17

## 2020-04-28 RX ADMIN — BUDESONIDE AND FORMOTEROL FUMARATE DIHYDRATE 2 PUFF(S): 160; 4.5 AEROSOL RESPIRATORY (INHALATION) at 08:35

## 2020-04-28 NOTE — PROGRESS NOTE ADULT - SUBJECTIVE AND OBJECTIVE BOX
pt stable alert in NAD  reviewed issue of chest pain yesteday with pa   pt stable in nad today ekg doen normal  no angingal sxs of ntoe    DEPRESSION  ^DEPRESSION  Handoff  Schizophrenia  Asthma  Hypertension  Coronary artery disease  COPD (chronic obstructive pulmonary disease)  Paranoid schizophrenia  Coronary artery disease involving coronary bypass graft of native heart without angina pectoris  Essential hypertension  Schizophrenia  S/P CABG (coronary artery bypass graft)    HEALTH ISSUES - PROBLEM Dx:  COPD (chronic obstructive pulmonary disease)  Paranoid schizophrenia  Coronary artery disease involving coronary bypass graft of native heart without angina pectoris: Coronary artery disease involving coronary bypass graft of native heart without angina pectoris  Essential hypertension: Essential hypertension  Schizophrenia: Schizophrenia        PAST MEDICAL & SURGICAL HISTORY:  Schizophrenia  Asthma  Hypertension  Coronary artery disease  S/P CABG (coronary artery bypass graft)    Bananas (Unknown)  sulfa drugs (Unknown)      FAMILY HISTORY:      acetaminophen   Tablet .. 650 milliGRAM(s) Oral every 6 hours PRN  aspirin  chewable 81 milliGRAM(s) Oral daily  atorvastatin 40 milliGRAM(s) Oral at bedtime  budesonide 160 MICROgram(s)/formoterol 4.5 MICROgram(s) Inhaler 2 Puff(s) Inhalation two times a day  haloperidol     Tablet 5 milliGRAM(s) Oral at bedtime  haloperidol    Injectable 5 milliGRAM(s) IntraMuscular at bedtime PRN  hydrOXYzine hydrochloride 25 milliGRAM(s) Oral every 6 hours PRN  lactobacillus acidophilus 1 Tablet(s) Oral daily  lisinopril 10 milliGRAM(s) Oral daily  loratadine 10 milliGRAM(s) Oral daily  tiotropium 18 MICROgram(s) Capsule 1 Capsule(s) Inhalation daily      T(C): 36.4 (04-28-20 @ 07:05), Max: 36.4 (04-28-20 @ 07:05)  HR: 57 (04-28-20 @ 07:05) (57 - 57)  BP: 138/56 (04-28-20 @ 07:05) (117/55 - 138/56)  RR: 18 (04-28-20 @ 07:05) (18 - 18)  SpO2: 98% (04-28-20 @ 07:05) (97% - 98%)    PE;  general:  nop acute changes noted    Lungs:    Heart:    EXT:    Neuro:  no defciits                          CAPILLARY BLOOD GLUCOSE      ekg reviewed and stable no acute changes

## 2020-04-28 NOTE — PROGRESS NOTE BEHAVIORAL HEALTH - SUMMARY
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 Pt denies any psychiatric diagnoses, states does not take any psychiatric medications, denies all psychiatric complaints. Pt presents with several delusions including living in a private apartment in [place], living with her  Abebe and having "too many children to count". Collateral from only daughter shows otherwise - pt diagnosed with schizophrenia in 2008, has had multiple episodes of medication noncompliance resulting in multiple IPP hospitalizations (most recent in [facility] last year). Pt's daughter is unsure of medications, states haldol decanoate and clozapine, clozapine REMS states pt is not in system, pt denies any recent injections. TOO approved, started 4/21/2020, haldol started with medical clearance d/t borderline QTc. On evaluation, pt denies prior delusions, continues to deny psychiatric diagnosis and need for medication, continues with poor ADLs. Pt will likely benefit from IPP for medication management at this time.    #Schizophrenia  -c/w haldol 5 mg PO qhs (medical cleared to start Haldol)  -patient has been refusing treatment, TOO approved, started 4/21/2020  -as per TOO, if pt refuses haldol 5 mg PO, then will give haldol 5 mg IM  -ECG q3 days to monitor QTc (recent 4/27/2020, QTc 462 ms)    #COPD  -c/w Symbicort 160mcg 2 puffs BID  -c/w Spiriva 1 cap inhal daily      #Hypertension  -c/w Lisinopril 10mg PO Daily    #CAD  -c/w ASA 81mg PO daily  -c/w Atorvastatin 40mg PO QHS    #Bradycardia  -pt denies cardiac sx  -ECG shows sinus bradycardia  -CMP wnl

## 2020-04-28 NOTE — PROGRESS NOTE BEHAVIORAL HEALTH - NSBHCHARTREVIEWIMAGING_PSY_A_CORE FT
97 < from: CT Tibia/Fibia No Cont, Left (03.22.20 @ 15:17) >    IMPRESSION:    Diffuse subcutaneous edema of the left leg without evidence of osteomyelitis, necrotizing fasciitis, or abscess.    No acutely displaced fracture    < end of copied text >  < from: US Abdomen Limited (03.22.20 @ 14:44) >    IMPRESSION:    Cholelithiasis without evidence of cholecystitis.    < end of copied text >  < from: VA Duplex Lower Ext Vein Scan, Left (03.22.20 @ 08:43) >    Impression:    No evidence of deep venous thrombosis or superficial thrombophlebitis in left lower extremity.    < end of copied text >  < from: CT Head No Cont (03.22.20 @ 06:48) >    IMPRESSION:     No acute intracranial hemorrhage or mass effect.    < end of copied text >  < from: Xray Chest 1 View AP/PA (03.22.20 @ 03:47) >    Impression:      No radiographic evidence of acute cardiopulmonary disease.    < end of copied text >

## 2020-04-28 NOTE — PROGRESS NOTE BEHAVIORAL HEALTH - NSBHFUPINTERVALHXFT_PSY_A_CORE
Pt seen and evaluated, chart reviewed. As per nursing report, pt medication compliant and no behavioral issues. On evaluation, pt presents in bed with covers over head, eyes closed, responsive however refuses to engage with this writer. Limited interview. Pt refuses to go to treatment team. As per nurse, pt c/o "not feeling well", did not elaborate, but is observed OOR for meals with no issues, VS WNL. As per staff, pt continues to have poor ADLs. Treatment over objection approved, started 4/21/2020.

## 2020-04-28 NOTE — PROGRESS NOTE BEHAVIORAL HEALTH - NSBHCHARTREVIEWINVESTIGATE_PSY_A_CORE FT
< from: 12 Lead ECG (04.27.20 @ 09:12) >      Ventricular Rate 51 BPM    Atrial Rate 51 BPM    P-R Interval 160 ms    QRS Duration 166 ms    Q-T Interval 502 ms    QTC Calculation(Bezet) 462 ms    P Axis 9 degrees    R Axis 43 degrees    T Axis 80 degrees    Diagnosis Line Sinus bradycardia  Right bundle branch block  Abnormal ECG    < end of copied text >

## 2020-04-28 NOTE — PROGRESS NOTE BEHAVIORAL HEALTH - NSBHCHARTREVIEWVS_PSY_A_CORE FT
Vital Signs Last 24 Hrs  T(C): 36.4 (28 Apr 2020 07:05), Max: 36.4 (28 Apr 2020 07:05)  T(F): 97.5 (28 Apr 2020 07:05), Max: 97.5 (28 Apr 2020 07:05)  HR: 72 (28 Apr 2020 09:00) (57 - 72)  BP: 126/60 (28 Apr 2020 09:00) (117/55 - 138/56)  BP(mean): --  RR: 18 (28 Apr 2020 09:00) (18 - 18)  SpO2: 98% (28 Apr 2020 07:05) (97% - 98%)

## 2020-04-28 NOTE — PROGRESS NOTE ADULT - PROBLEM SELECTOR PLAN 2
no current sx  ekg reviewed rbbb no acute changes  no changes from previos   no futher w/u  cotjaime m,eds

## 2020-04-29 PROCEDURE — 99231 SBSQ HOSP IP/OBS SF/LOW 25: CPT

## 2020-04-29 RX ADMIN — TIOTROPIUM BROMIDE 1 CAPSULE(S): 18 CAPSULE ORAL; RESPIRATORY (INHALATION) at 08:08

## 2020-04-29 RX ADMIN — ATORVASTATIN CALCIUM 40 MILLIGRAM(S): 80 TABLET, FILM COATED ORAL at 20:18

## 2020-04-29 RX ADMIN — LORATADINE 10 MILLIGRAM(S): 10 TABLET ORAL at 08:07

## 2020-04-29 RX ADMIN — Medication 1 TABLET(S): at 08:07

## 2020-04-29 RX ADMIN — BUDESONIDE AND FORMOTEROL FUMARATE DIHYDRATE 2 PUFF(S): 160; 4.5 AEROSOL RESPIRATORY (INHALATION) at 20:17

## 2020-04-29 RX ADMIN — LISINOPRIL 10 MILLIGRAM(S): 2.5 TABLET ORAL at 08:09

## 2020-04-29 RX ADMIN — HALOPERIDOL DECANOATE 5 MILLIGRAM(S): 100 INJECTION INTRAMUSCULAR at 20:18

## 2020-04-29 RX ADMIN — Medication 650 MILLIGRAM(S): at 21:34

## 2020-04-29 RX ADMIN — BUDESONIDE AND FORMOTEROL FUMARATE DIHYDRATE 2 PUFF(S): 160; 4.5 AEROSOL RESPIRATORY (INHALATION) at 08:08

## 2020-04-29 RX ADMIN — Medication 81 MILLIGRAM(S): at 08:07

## 2020-04-29 NOTE — PROGRESS NOTE BEHAVIORAL HEALTH - NSBHFUPINTERVALHXFT_PSY_A_CORE
Pt seen and evaluated, chart reviewed. As per nursing report, pt medication compliant and no behavioral issues. On evaluation, pt presents in bed with covers over head. Pt states mood "I'm fine", states she is ready to go home and requested this writer to order a car. Pt states she has no psychiatric illness, states she was previously diagnosed with schizophrenia "to keep me locked up because my sister wanted to steal my apartment". Pt states she used to take haldol and then haldol decoanate for her mother, "so she can sleep". Pt states she does not need to continue taking haldol presently, but states she will because it is ordered by the . Pt denies prior delusions, AVH, paranoia. Denies SI/HI, intent and plan. As per staff, pt continues to have poor ADLs. Treatment over objection approved, started 4/21/2020. [place] application started.

## 2020-04-29 NOTE — PROGRESS NOTE BEHAVIORAL HEALTH - NSBHCHARTREVIEWVS_PSY_A_CORE FT
Vital Signs Last 24 Hrs  T(C): 36.2 (29 Apr 2020 08:15), Max: 36.2 (29 Apr 2020 08:15)  T(F): 97.2 (29 Apr 2020 08:15), Max: 97.2 (29 Apr 2020 08:15)  HR: 58 (29 Apr 2020 08:15) (52 - 67)  BP: 152/69 (29 Apr 2020 08:15) (149/67 - 171/72)  BP(mean): --  RR: 16 (29 Apr 2020 08:15) (16 - 18)  SpO2: 96% (28 Apr 2020 17:55) (96% - 97%)

## 2020-04-29 NOTE — PROGRESS NOTE BEHAVIORAL HEALTH - SUMMARY
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 Pt denies any psychiatric diagnoses, states does not take any psychiatric medications, denies all psychiatric complaints. Pt presents with several delusions including living in a private apartment in [place], living with her  Abebe and having "too many children to count". Collateral from only daughter shows otherwise - pt diagnosed with schizophrenia in 2008, has had multiple episodes of medication noncompliance resulting in multiple IPP hospitalizations (most recent in [facility] last year). Pt's daughter is unsure of medications, states haldol decanoate and clozapine, clozapine REMS states pt is not in system, pt denies any recent injections. TOO approved, started 4/21/2020, haldol started with medical clearance d/t borderline QTc. On evaluation, pt denies prior delusions, continues to deny psychiatric diagnosis and need for medication, continues with poor ADLs. Pt will likely benefit from IPP for medication management at this time.    #Schizophrenia  -c/w haldol 5 mg PO qhs (medical cleared to start Haldol)  -patient has been refusing treatment, TOO approved, started 4/21/2020  -as per TOO, if pt refuses haldol 5 mg PO, then will give haldol 5 mg IM  -ECG q3 days to monitor QTc (recent 4/27/2020, QTc 462 ms)    #COPD  -c/w Symbicort 160mcg 2 puffs BID  -c/w Spiriva 1 cap inhal daily      #Hypertension  -c/w Lisinopril 10mg PO Daily    #CAD  -c/w ASA 81mg PO daily  -c/w Atorvastatin 40mg PO QHS    #Bradycardia  -pt denies cardiac sx  -ECG shows sinus bradycardia  -CMP wnl

## 2020-04-29 NOTE — CHART NOTE - NSCHARTNOTEFT_GEN_A_CORE
Social Work Note:    Treatment team met with patient to discuss treatment plan, medications and discharge plan.  Patient is currently taking medications as prescribed pursuant to treatment over objection order. Patient reports she wants to be discharged and provides the name and phone number of her friend Burak Mims who she lives with in [place]. Patient also provides information about a nurse  named Katelyn .  Writer contacted Nurse Katelyn collateral information obtained. Nurse Zepeda is part of pathways home a mobile integration team through [facility].  Nurse Zepeda reports patient is seriously persistently mentally ill and chronically homeless. Patient asks writer to work with Nurse Katelyn and "get her out of here".      Patient denies SI HI and AVH. Patient remains delusional and unpredictable. Patient asleep most of the day. Patient encouraged to participate in groups and other unit functions. Patient expressed understanding.      This worker met with patient to discuss discharge plan.  Patient is being referred to [place] for long term inpatient services. Application is underway.      Mental Status Exam:    Mood – unpredictable  Sleep - fair  Appetite - good  ADLs - fair  Thought Process – delusional   Observation – [id number]        At this time patient is not psychiatrically stable for discharge.

## 2020-04-30 PROCEDURE — 99231 SBSQ HOSP IP/OBS SF/LOW 25: CPT

## 2020-04-30 RX ADMIN — Medication 81 MILLIGRAM(S): at 08:32

## 2020-04-30 RX ADMIN — ATORVASTATIN CALCIUM 40 MILLIGRAM(S): 80 TABLET, FILM COATED ORAL at 20:04

## 2020-04-30 RX ADMIN — BUDESONIDE AND FORMOTEROL FUMARATE DIHYDRATE 2 PUFF(S): 160; 4.5 AEROSOL RESPIRATORY (INHALATION) at 19:51

## 2020-04-30 RX ADMIN — HALOPERIDOL DECANOATE 5 MILLIGRAM(S): 100 INJECTION INTRAMUSCULAR at 20:04

## 2020-04-30 RX ADMIN — Medication 650 MILLIGRAM(S): at 19:51

## 2020-04-30 RX ADMIN — LISINOPRIL 10 MILLIGRAM(S): 2.5 TABLET ORAL at 08:32

## 2020-04-30 RX ADMIN — TIOTROPIUM BROMIDE 1 CAPSULE(S): 18 CAPSULE ORAL; RESPIRATORY (INHALATION) at 08:32

## 2020-04-30 RX ADMIN — LORATADINE 10 MILLIGRAM(S): 10 TABLET ORAL at 08:32

## 2020-04-30 RX ADMIN — Medication 1 TABLET(S): at 08:32

## 2020-04-30 NOTE — PROGRESS NOTE BEHAVIORAL HEALTH - NSBHCHARTREVIEWVS_PSY_A_CORE FT
Vital Signs Last 24 Hrs  T(C): 36.1 (30 Apr 2020 08:40), Max: 36.8 (29 Apr 2020 19:02)  T(F): 96.9 (30 Apr 2020 08:40), Max: 98.3 (29 Apr 2020 19:02)  HR: 74 (30 Apr 2020 08:40) (56 - 74)  BP: 133/62 (30 Apr 2020 08:40) (121/57 - 146/67)  BP(mean): --  RR: 16 (30 Apr 2020 08:40) (16 - 16)  SpO2: --

## 2020-04-30 NOTE — PROGRESS NOTE BEHAVIORAL HEALTH - SUMMARY
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 Pt denies any psychiatric diagnoses, states does not take any psychiatric medications, denies all psychiatric complaints. Pt presents with several delusions including living in a private apartment in [place], living with her  Abebe and having "too many children to count". Collateral from only daughter shows otherwise - pt diagnosed with schizophrenia in 2008, has had multiple episodes of medication noncompliance resulting in multiple IPP hospitalizations (most recent in [facility] last year). Pt's daughter is unsure of medications, states haldol decanoate and clozapine, clozapine REMS states pt is not in system, pt denies any recent injections. TOO approved, started 4/21/2020, haldol started with medical clearance d/t borderline QTc. On evaluation, pt denies prior delusions, continues to deny psychiatric diagnosis and need for medication, continues with poor ADLs. Pt will likely benefit from IPP for medication management at this time.    #Schizophrenia  -c/w haldol 5 mg PO qhs (medical cleared to start Haldol)  -patient has been refusing treatment, TOO approved, started 4/21/2020  -as per TOO, if pt refuses haldol 5 mg PO, then will give haldol 5 mg IM  -ECG q3 days to monitor QTc (recent 4/27/2020, QTc 462 ms)    #COPD  -c/w Symbicort 160mcg 2 puffs BID  -c/w Spiriva 1 cap inhal daily      #Hypertension  -c/w Lisinopril 10mg PO Daily    #CAD  -c/w ASA 81mg PO daily  -c/w Atorvastatin 40mg PO QHS    #Bradycardia  -pt denies cardiac sx  -ECG shows sinus bradycardia  -CMP wnl

## 2020-04-30 NOTE — PROGRESS NOTE BEHAVIORAL HEALTH - NSBHFUPINTERVALHXFT_PSY_A_CORE
Pt seen and evaluated, chart reviewed. As per nursing report, pt medication compliant and no behavioral issues. On evaluation, pt presents in bed with covers over head. Pt states mood "I'm fine", states she is ready to go home, states she lives with her friend Burak in [place]. Pt continues to state she has no psychiatric illness, was previously diagnosed with schizophrenia "to keep me locked up because my sister wanted to steal my apartment". Pt states she used to take haldol and then haldol decoanate for her mother, "so she can sleep". Pt states she does not need to continue taking haldol but will do so while admitted because of TOO (started 4/21/20). Pt denies prior delusions and does not acknowledge having prior delusions. Denies AVH, paranoia. Denies SI/HI, intent and plan. As per staff, pt continues to have poor ADLs. Spoke with pt's friend Burak - states pt does not live with him, met pt in February 2020 while receiving psychiatric services at [facility], endorses pt uses his address for mailing address of her social security checks.

## 2020-04-30 NOTE — PROGRESS NOTE ADULT - SUBJECTIVE AND OBJECTIVE BOX
pt stable alert in NAD  no new complaints    DEPRESSION  ^DEPRESSION  Handoff  Schizophrenia  Asthma  Hypertension  Coronary artery disease  COPD (chronic obstructive pulmonary disease)  Paranoid schizophrenia  Coronary artery disease involving coronary bypass graft of native heart without angina pectoris  Essential hypertension  Schizophrenia  S/P CABG (coronary artery bypass graft)    HEALTH ISSUES - PROBLEM Dx:  COPD (chronic obstructive pulmonary disease)  Paranoid schizophrenia  Coronary artery disease involving coronary bypass graft of native heart without angina pectoris: Coronary artery disease involving coronary bypass graft of native heart without angina pectoris  Essential hypertension: Essential hypertension  Schizophrenia: Schizophrenia        PAST MEDICAL & SURGICAL HISTORY:  Schizophrenia  Asthma  Hypertension  Coronary artery disease  S/P CABG (coronary artery bypass graft)    Bananas (Unknown)  sulfa drugs (Unknown)      FAMILY HISTORY:      acetaminophen   Tablet .. 650 milliGRAM(s) Oral every 6 hours PRN  aspirin  chewable 81 milliGRAM(s) Oral daily  atorvastatin 40 milliGRAM(s) Oral at bedtime  budesonide 160 MICROgram(s)/formoterol 4.5 MICROgram(s) Inhaler 2 Puff(s) Inhalation two times a day  haloperidol     Tablet 5 milliGRAM(s) Oral at bedtime  haloperidol    Injectable 5 milliGRAM(s) IntraMuscular at bedtime PRN  hydrOXYzine hydrochloride 25 milliGRAM(s) Oral every 6 hours PRN  lactobacillus acidophilus 1 Tablet(s) Oral daily  lisinopril 10 milliGRAM(s) Oral daily  loratadine 10 milliGRAM(s) Oral daily  tiotropium 18 MICROgram(s) Capsule 1 Capsule(s) Inhalation daily      T(C): 36.7 (04-30-20 @ 05:50), Max: 36.8 (04-29-20 @ 19:02)  HR: 56 (04-30-20 @ 05:50) (56 - 64)  BP: 121/57 (04-30-20 @ 05:50) (121/57 - 146/67)  RR: 16 (04-30-20 @ 05:50) (16 - 16)  SpO2: --    PE;  general:  no changes no cardaic sxs noted    Lungs:    Heart:    EXT:    Neuro:  alert nod efciits                          CAPILLARY BLOOD GLUCOSE

## 2020-05-01 PROCEDURE — 99231 SBSQ HOSP IP/OBS SF/LOW 25: CPT

## 2020-05-01 RX ORDER — ARIPIPRAZOLE 15 MG/1
2 TABLET ORAL AT BEDTIME
Refills: 0 | Status: DISCONTINUED | OUTPATIENT
Start: 2020-05-01 | End: 2020-05-08

## 2020-05-01 RX ORDER — NICOTINE POLACRILEX 2 MG
1 GUM BUCCAL DAILY
Refills: 0 | Status: DISCONTINUED | OUTPATIENT
Start: 2020-05-02 | End: 2020-06-25

## 2020-05-01 RX ORDER — HALOPERIDOL DECANOATE 100 MG/ML
2 INJECTION INTRAMUSCULAR AT BEDTIME
Refills: 0 | Status: DISCONTINUED | OUTPATIENT
Start: 2020-05-01 | End: 2020-05-15

## 2020-05-01 RX ORDER — ARIPIPRAZOLE 15 MG/1
2 TABLET ORAL DAILY
Refills: 0 | Status: DISCONTINUED | OUTPATIENT
Start: 2020-05-01 | End: 2020-05-08

## 2020-05-01 RX ORDER — ARIPIPRAZOLE 15 MG/1
5 TABLET ORAL AT BEDTIME
Refills: 0 | Status: DISCONTINUED | OUTPATIENT
Start: 2020-05-01 | End: 2020-05-01

## 2020-05-01 RX ORDER — RANOLAZINE 500 MG/1
500 TABLET, FILM COATED, EXTENDED RELEASE ORAL
Refills: 0 | Status: DISCONTINUED | OUTPATIENT
Start: 2020-05-01 | End: 2020-06-25

## 2020-05-01 RX ADMIN — BUDESONIDE AND FORMOTEROL FUMARATE DIHYDRATE 2 PUFF(S): 160; 4.5 AEROSOL RESPIRATORY (INHALATION) at 08:21

## 2020-05-01 RX ADMIN — BUDESONIDE AND FORMOTEROL FUMARATE DIHYDRATE 2 PUFF(S): 160; 4.5 AEROSOL RESPIRATORY (INHALATION) at 20:37

## 2020-05-01 RX ADMIN — LORATADINE 10 MILLIGRAM(S): 10 TABLET ORAL at 08:22

## 2020-05-01 RX ADMIN — ARIPIPRAZOLE 2 MILLIGRAM(S): 15 TABLET ORAL at 20:37

## 2020-05-01 RX ADMIN — ATORVASTATIN CALCIUM 40 MILLIGRAM(S): 80 TABLET, FILM COATED ORAL at 20:37

## 2020-05-01 RX ADMIN — TIOTROPIUM BROMIDE 1 CAPSULE(S): 18 CAPSULE ORAL; RESPIRATORY (INHALATION) at 09:04

## 2020-05-01 RX ADMIN — Medication 650 MILLIGRAM(S): at 14:08

## 2020-05-01 RX ADMIN — RANOLAZINE 500 MILLIGRAM(S): 500 TABLET, FILM COATED, EXTENDED RELEASE ORAL at 20:37

## 2020-05-01 RX ADMIN — LISINOPRIL 10 MILLIGRAM(S): 2.5 TABLET ORAL at 08:21

## 2020-05-01 RX ADMIN — Medication 1 TABLET(S): at 08:21

## 2020-05-01 RX ADMIN — Medication 81 MILLIGRAM(S): at 08:21

## 2020-05-01 NOTE — PROGRESS NOTE BEHAVIORAL HEALTH - DETAILS
numbness and tingling of b/l feet Left lower leg wound scabbed sulfa drugs, bananas numbness and tingling of b/l feet, ?chronic angina

## 2020-05-01 NOTE — PROGRESS NOTE BEHAVIORAL HEALTH - NSBHCHARTREVIEWVS_PSY_A_CORE FT
Vital Signs Last 24 Hrs  T(C): 36.1 (01 May 2020 08:23), Max: 36.7 (30 Apr 2020 15:56)  T(F): 97 (01 May 2020 08:23), Max: 98.1 (30 Apr 2020 15:56)  HR: 73 (01 May 2020 08:23) (60 - 73)  BP: 150/67 (01 May 2020 08:23) (119/67 - 180/78)  BP(mean): --  RR: 16 (01 May 2020 08:23) (16 - 16)  SpO2: --

## 2020-05-01 NOTE — PROGRESS NOTE BEHAVIORAL HEALTH - SUMMARY
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 Pt denies any psychiatric diagnoses, states does not take any psychiatric medications, denies all psychiatric complaints. Pt presents with several delusions including living in a private apartment in [place], living with her  Abebe and having "too many children to count". Collateral from only daughter shows otherwise - pt diagnosed with schizophrenia in 2008, has had multiple episodes of medication noncompliance resulting in multiple IPP hospitalizations (most recent in [facility] last year). Pt's daughter is unsure of medications, states haldol decanoate and clozapine, clozapine REMS states pt is not in system, pt denies any recent injections. TOO approved, started 4/21/2020, haldol started with medical clearance d/t borderline QTc. On evaluation, pt denies prior delusions, continues to deny psychiatric diagnosis and need for medication, continues with poor ADLs. Pt will likely benefit from IPP for medication management at this time.    #Schizophrenia  -c/w haldol 5 mg PO qhs (medical cleared to start Haldol)  -patient has been refusing treatment, TOO approved, started 4/21/2020  -as per TOO, if pt refuses haldol 5 mg PO, then will give haldol 5 mg IM  -ECG q3 days to monitor QTc (recent 4/27/2020, QTc 462 ms)    #COPD  -c/w Symbicort 160mcg 2 puffs BID  -c/w Spiriva 1 cap inhal daily      #Hypertension  -c/w Lisinopril 10mg PO Daily    #CAD  -c/w ASA 81mg PO daily  -c/w Atorvastatin 40mg PO QHS    #Bradycardia  -ECG shows sinus bradycardia  -CMP wnl  -pt c/o numbness and tingling of feet, PA consult pending 65 y/o, female, white, single, one adult child, domiciled in private residence in 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 Pt denies any psychiatric diagnoses, states does not take any psychiatric medications, denies all psychiatric complaints. Pt presents with several delusions including living in a private apartment in [place], living with her  Abebe and having "too many children to count". Collateral from only daughter shows otherwise - pt diagnosed with schizophrenia in 2008, has had multiple episodes of medication noncompliance resulting in multiple IPP hospitalizations (most recent in [facility] last year). Pt's daughter is unsure of medications, states haldol decanoate and clozapine, clozapine REMS states pt is not in system, pt denies any recent injections. TOO approved, started 4/21/2020, haldol started with medical clearance d/t borderline QTc. On evaluation, pt denies prior delusions, continues to deny psychiatric diagnosis and need for medication with poor insight in behavioral health diagnosis. Pt will likely benefit from IPP for medication management at this time, [place] referral started.    #Schizophrenia  -c/w haldol 5 mg PO qhs (medical cleared to start Haldol)  -patient has been refusing treatment, TOO approved, started 4/21/2020  -as per TOO, if pt refuses haldol 5 mg PO, then will give haldol 5 mg IM  -ECG q3 days to monitor QTc (recent 4/27/2020, QTc 462 ms)    #COPD  -c/w Symbicort 160mcg 2 puffs BID  -c/w Spiriva 1 cap inhal daily      #Hypertension  -c/w Lisinopril 10mg PO Daily    #CAD  -c/w ASA 81mg PO daily  -c/w Atorvastatin 40mg PO QHS    #Bradycardia  -ECG shows sinus bradycardia  -CMP wnl  -pt c/o numbness and tingling of feet, PA consult pending 65 y/o, female, white, single, one adult child, domiciled in private residence in [facility]) vs homeless, 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 Pt denies any psychiatric diagnoses, states does not take any psychiatric medications, denies all psychiatric complaints. Pt presents with several delusions including living in a private apartment in [place], living with her  Abebe and having "too many children to count". Collateral from only daughter shows otherwise - pt diagnosed with schizophrenia in 2008, has had multiple episodes of medication noncompliance resulting in multiple IPP hospitalizations (most recent in [facility] last year). Pt's daughter is unsure of medications, states haldol decanoate and clozapine, clozapine REMS states pt is not in system, pt denies any recent injections. TOO approved, started 4/21/2020, haldol started with medical clearance d/t borderline QTc. On evaluation, pt denies prior delusions, continues to deny psychiatric diagnosis and need for medication with poor insight in behavioral health diagnosis. Pt will likely benefit from IP for medication management at this time, [place] referral started.    #Schizophrenia  -d/c haldol 5 mg PO qhs d/t QTc 511 ms (5/1/2020), pt c/o chronic? angina, pending cardiology consult  -cardiology consult, appreciate recs on haldol continuation  -start abilify 5 mg qhs  -patient has been refusing treatment, TOO approved, started 4/21/2020  -as per TOO, if pt refuses abilify 5 mg PO, then will give haldol 2 mg IM with f/u ECG  -ECG daily to monitor QTc (recent 5/1/2020, QTc 511 ms)    #COPD  -c/w Symbicort 160mcg 2 puffs BID  -c/w Spiriva 1 cap inhal daily      #Hypertension  -c/w Lisinopril 10mg PO Daily    #CAD  -c/w ASA 81mg PO daily  -c/w Atorvastatin 40mg PO QHS    #Angina  -pt states chronic?  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 Pt denies any psychiatric diagnoses, states does not take any psychiatric medications, denies all psychiatric complaints. Pt presents with several delusions including living in a private apartment in [place], living with her  Abebe and having "too many children to count". Collateral from only daughter shows otherwise - pt diagnosed with schizophrenia in 2008, has had multiple episodes of medication noncompliance resulting in multiple IPP hospitalizations (most recent in [facility] last year). Pt's daughter is unsure of medications, states haldol decanoate and clozapine, clozapine REMS states pt is not in system, pt denies any recent injections. TOO approved, started 4/21/2020, haldol started with medical clearance d/t borderline QTc.   On evaluation, pt presents clearer than yesterday with linear thought process, able to state last admission at [facility] and was on abilify 2 mg bid with good effect. Pt now endorses hx chronic angina and hx of heart failure, provided last pharmacy information [facility], medication list faxed and in chart for review. Pt will likely benefit from IPP for medication management at this time, [place] referral started.    #Schizophrenia  -d/c haldol 5 mg PO qhs d/t QTc 511 ms (5/1/2020), pt c/o chronic? angina, pending cardiology consult  -cardiology consult, appreciate recs on haldol continuation  -start abilify 2 mg bid  -patient has been refusing treatment, TOO approved, started 4/21/2020  -as per TOO, if pt refuses abilify 2 mg PO, then will give haldol 2 mg IM with f/u ECG  -ECG daily to monitor QTc (recent 5/1/2020, QTc 511 ms)    #COPD  -c/w Symbicort 160mcg 2 puffs BID  -c/w Spiriva 1 cap inhal daily      #Hypertension  -c/w Lisinopril 10mg PO Daily    #CAD  -c/w ASA 81mg PO daily  -c/w Atorvastatin 40mg PO QHS    #Angina, hx of HF?  -pt states hx of chronic angina and heart failure  -PA consult, restart home medication ranexa 500 mg bid ([facility] states last fill 3/2020)  -cardiology consult, appreciate recs

## 2020-05-01 NOTE — CHART NOTE - NSCHARTNOTEFT_GEN_A_CORE
Patient claims she is on Ranexa 500mg Q12H  per her Cardiologist.  -I called [facility] in [place] who stated she is on this medicaton and last filled a 30 day script in December 2019.  - Patient claims she got a script from [facility] pharmacy thereafter.    Patient also reporting tingling in right foot: known to have vascular disease: followed by her Cardiologist/Vascular physician.    PE  Right: + palpable dorsalis pedis pulse, warm    -told to follow up with Cardiologist who also is Vascular specialist.  -restart Renaxa

## 2020-05-01 NOTE — PROGRESS NOTE BEHAVIORAL HEALTH - NSBHFUPINTERVALHXFT_PSY_A_CORE
Pt seen and evaluated, chart reviewed. As per nursing report, pt medication compliant and no behavioral issues (TOO started 4/21). On evaluation, pt presents walking in hallway c/o numbness and tingling in feet, PA consult pending. Pt states mood "I'm fine", endorses good sleep and appetite, states she is ready to go home. Pt continues to have poor insight on her behavioral health diagnosis, continues to state she has never had a psychiatric illness despite her multiple psychiatric hospitalizations. Pt denies prior delusions, however does not acknowledge having prior delusions. Denies AVH, paranoia. Denies SI/HI, intent and plan. Spoke with pt's daughter Britney who states this is the cycle her mother has been having last several years - restarts medications while admitted, improves, then stops taking medications and decompensates. [place] referral started. Pt seen and evaluated, chart reviewed. As per nursing report, pt medication compliant and no behavioral issues (TOO started 4/21). On evaluation, pt presents walking in hallway c/o numbness and tingling in feet, PA consult done. Pt states mood "I'm fine", endorses good sleep and appetite, states she is ready to go home. Pt continues to have poor insight on her behavioral health diagnosis, states she has never had a psychiatric illness despite her multiple psychiatric hospitalizations. Pt denies prior delusions, however does not acknowledge having prior delusions. Denies AVH, paranoia. Denies SI/HI, intent and plan. Spoke with pt's daughter Britney who states this is the cycle her mother has been having last several years - restarts medications while admitted, improves, then stops taking medications and decompensates. [place] referral started.    Addendum (1400): pt presents clearer with linear thought process, able to state she was admitted to [facility] in February 2020. States she was placed on abilify 2 mg bid (confirmed by [facility], [phone], medication list faxed and in chart for review) with good effect. Pt states she was offered Abilify Maintena at [facility], but declined. As per pt, she is now willing to take Abilify Maintena for discharge. Pt also states she has chronic angina? with history of heart failure, cardiology consult pending. Pt seen and evaluated, chart reviewed. As per nursing report, pt medication compliant and no behavioral issues (TOO started 4/21). On evaluation, pt presents walking in hallway c/o numbness and tingling in feet, PA consult done. Pt states mood "I'm fine", endorses good sleep and appetite, states she is ready to go home. Pt continues to have poor insight on her behavioral health diagnosis, states she has never had a psychiatric illness despite her multiple psychiatric hospitalizations. Pt denies prior delusions, however does not acknowledge having prior delusions. Denies AVH, paranoia. Denies SI/HI, intent and plan. [place] referral started.    Addendum (1400): pt presents clearer with linear thought process, able to state she was admitted to [facility] in February 2020. States she was placed on abilify 2 mg bid (confirmed by [facility], [phone], medication list faxed and in chart for review) with good effect. Pt states she was offered Abilify Maintena at [facility], but declined. As per pt, she is now willing to take Abilify Maintena for discharge. Pt also states she has chronic angina? with history of heart failure, cardiology consult pending.

## 2020-05-01 NOTE — CHART NOTE - NSCHARTNOTEFT_GEN_A_CORE
Patient remains on unit for treatment safety and observation. Patient is visible on unit, compliant with unit protocol but continues to refuse all medications. Writer meets with patient daily on rounds and or team meeting. Writer provides support and education to the patient daily. Patient to remain on unit until cleared by attending for discharge. Patient denies suicidal ideation, homicidal ideation and presents with no evidence of audio visual hallucinations. Patient is labile, delusional, grandiose and disorganized. Patient not cleared for discharge at this time. Treatment over objection being explored.         Mental Status Exam:    Mood – Manic  Sleep - Fair  Appetite - Good  ADLs - Fair  Thought Process – Psychotic   Observation – [id number]    At this time patient is not psychiatrically stable for discharge. Patient remains on unit for treatment safety and observation. Patient is visible on unit, compliant with unit protocol and compliant with court ordered treatment.  Writer meets with patient daily on rounds and or team meeting. Writer provides support and education to the patient daily. Patient is a bit more clear in her thought process and in good behavioral control at this time. Patient remains with poor insight and poor judgement. Patient is eager for discharge and reports she will take her medications in the community. Patient agrees to long acting injectable. Application to [facility] underway. Patient is seriously persistently mentally ill and chronically homeless. Patient has a long history of noncompliance and is often hospitalized for long stretches of time as a result. In speaking with [facility] team Nurse Katelyn, patient unable to sustain herself safely in community; advocating strongly for state placement at this juncture. Patient to remain on unit until cleared by attending for discharge or transferred to [facility]. Patient denies suicidal ideation, homicidal ideation and AVH. Patient is not stable for discharge at this time.         Mental Status Exam:    Mood – Neutral  Sleep - Fair  Appetite - Good  ADLs - Fair  Thought Process – disorganized   Observation – [id number]    At this time patient is not psychiatrically stable for discharge.

## 2020-05-02 LAB
A1C WITH ESTIMATED AVERAGE GLUCOSE RESULT: 5.2 % — SIGNIFICANT CHANGE UP (ref 4–5.6)
ALBUMIN SERPL ELPH-MCNC: 4.4 G/DL — SIGNIFICANT CHANGE UP (ref 3.5–5.2)
ALP SERPL-CCNC: 93 U/L — SIGNIFICANT CHANGE UP (ref 30–115)
ALT FLD-CCNC: 10 U/L — SIGNIFICANT CHANGE UP (ref 0–41)
ANION GAP SERPL CALC-SCNC: 11 MMOL/L — SIGNIFICANT CHANGE UP (ref 7–14)
AST SERPL-CCNC: 12 U/L — SIGNIFICANT CHANGE UP (ref 0–41)
BILIRUB SERPL-MCNC: 0.3 MG/DL — SIGNIFICANT CHANGE UP (ref 0.2–1.2)
BUN SERPL-MCNC: 19 MG/DL — SIGNIFICANT CHANGE UP (ref 10–20)
CALCIUM SERPL-MCNC: 9.6 MG/DL — SIGNIFICANT CHANGE UP (ref 8.5–10.1)
CHLORIDE SERPL-SCNC: 109 MMOL/L — SIGNIFICANT CHANGE UP (ref 98–110)
CHOLEST SERPL-MCNC: 139 MG/DL — SIGNIFICANT CHANGE UP (ref 100–200)
CO2 SERPL-SCNC: 22 MMOL/L — SIGNIFICANT CHANGE UP (ref 17–32)
CREAT SERPL-MCNC: 0.6 MG/DL — LOW (ref 0.7–1.5)
ESTIMATED AVERAGE GLUCOSE: 103 MG/DL — SIGNIFICANT CHANGE UP (ref 68–114)
GLUCOSE SERPL-MCNC: 86 MG/DL — SIGNIFICANT CHANGE UP (ref 70–99)
HCT VFR BLD CALC: 44.9 % — SIGNIFICANT CHANGE UP (ref 37–47)
HDLC SERPL-MCNC: 46 MG/DL — LOW
HGB BLD-MCNC: 15.2 G/DL — SIGNIFICANT CHANGE UP (ref 12–16)
LIPID PNL WITH DIRECT LDL SERPL: 78 MG/DL — SIGNIFICANT CHANGE UP (ref 4–129)
MCHC RBC-ENTMCNC: 30.9 PG — SIGNIFICANT CHANGE UP (ref 27–31)
MCHC RBC-ENTMCNC: 33.9 G/DL — SIGNIFICANT CHANGE UP (ref 32–37)
MCV RBC AUTO: 91.3 FL — SIGNIFICANT CHANGE UP (ref 81–99)
NRBC # BLD: 0 /100 WBCS — SIGNIFICANT CHANGE UP (ref 0–0)
PLATELET # BLD AUTO: 122 K/UL — LOW (ref 130–400)
POTASSIUM SERPL-MCNC: 4.4 MMOL/L — SIGNIFICANT CHANGE UP (ref 3.5–5)
POTASSIUM SERPL-SCNC: 4.4 MMOL/L — SIGNIFICANT CHANGE UP (ref 3.5–5)
PROT SERPL-MCNC: 6.5 G/DL — SIGNIFICANT CHANGE UP (ref 6–8)
RBC # BLD: 4.92 M/UL — SIGNIFICANT CHANGE UP (ref 4.2–5.4)
RBC # FLD: 12.9 % — SIGNIFICANT CHANGE UP (ref 11.5–14.5)
SODIUM SERPL-SCNC: 142 MMOL/L — SIGNIFICANT CHANGE UP (ref 135–146)
TOTAL CHOLESTEROL/HDL RATIO MEASUREMENT: 3 RATIO — LOW (ref 4–5.5)
TRIGL SERPL-MCNC: 152 MG/DL — HIGH (ref 10–149)
WBC # BLD: 5.96 K/UL — SIGNIFICANT CHANGE UP (ref 4.8–10.8)
WBC # FLD AUTO: 5.96 K/UL — SIGNIFICANT CHANGE UP (ref 4.8–10.8)

## 2020-05-02 PROCEDURE — 99221 1ST HOSP IP/OBS SF/LOW 40: CPT

## 2020-05-02 RX ADMIN — Medication 1 TABLET(S): at 08:22

## 2020-05-02 RX ADMIN — LISINOPRIL 10 MILLIGRAM(S): 2.5 TABLET ORAL at 08:22

## 2020-05-02 RX ADMIN — ARIPIPRAZOLE 2 MILLIGRAM(S): 15 TABLET ORAL at 20:14

## 2020-05-02 RX ADMIN — TIOTROPIUM BROMIDE 1 CAPSULE(S): 18 CAPSULE ORAL; RESPIRATORY (INHALATION) at 08:23

## 2020-05-02 RX ADMIN — Medication 1 PATCH: at 17:08

## 2020-05-02 RX ADMIN — Medication 1 PATCH: at 08:34

## 2020-05-02 RX ADMIN — RANOLAZINE 500 MILLIGRAM(S): 500 TABLET, FILM COATED, EXTENDED RELEASE ORAL at 20:14

## 2020-05-02 RX ADMIN — Medication 650 MILLIGRAM(S): at 19:31

## 2020-05-02 RX ADMIN — Medication 81 MILLIGRAM(S): at 08:22

## 2020-05-02 RX ADMIN — ATORVASTATIN CALCIUM 40 MILLIGRAM(S): 80 TABLET, FILM COATED ORAL at 20:14

## 2020-05-02 RX ADMIN — BUDESONIDE AND FORMOTEROL FUMARATE DIHYDRATE 2 PUFF(S): 160; 4.5 AEROSOL RESPIRATORY (INHALATION) at 08:23

## 2020-05-02 RX ADMIN — LORATADINE 10 MILLIGRAM(S): 10 TABLET ORAL at 08:22

## 2020-05-02 RX ADMIN — BUDESONIDE AND FORMOTEROL FUMARATE DIHYDRATE 2 PUFF(S): 160; 4.5 AEROSOL RESPIRATORY (INHALATION) at 20:14

## 2020-05-02 RX ADMIN — RANOLAZINE 500 MILLIGRAM(S): 500 TABLET, FILM COATED, EXTENDED RELEASE ORAL at 08:22

## 2020-05-02 RX ADMIN — BUDESONIDE AND FORMOTEROL FUMARATE DIHYDRATE 2 PUFF(S): 160; 4.5 AEROSOL RESPIRATORY (INHALATION) at 09:23

## 2020-05-02 RX ADMIN — ARIPIPRAZOLE 2 MILLIGRAM(S): 15 TABLET ORAL at 08:22

## 2020-05-02 NOTE — PROGRESS NOTE ADULT - SUBJECTIVE AND OBJECTIVE BOX
pt stable alert in NAD  no new complaints    DEPRESSION  ^DEPRESSION  Handoff  Schizophrenia  Asthma  Hypertension  Coronary artery disease  COPD (chronic obstructive pulmonary disease)  Paranoid schizophrenia  Coronary artery disease involving coronary bypass graft of native heart without angina pectoris  Essential hypertension  Schizophrenia  S/P CABG (coronary artery bypass graft)    HEALTH ISSUES - PROBLEM Dx:  COPD (chronic obstructive pulmonary disease)  Paranoid schizophrenia  Coronary artery disease involving coronary bypass graft of native heart without angina pectoris: Coronary artery disease involving coronary bypass graft of native heart without angina pectoris  Essential hypertension: Essential hypertension  Schizophrenia: Schizophrenia        PAST MEDICAL & SURGICAL HISTORY:  Schizophrenia  Asthma  Hypertension  Coronary artery disease  S/P CABG (coronary artery bypass graft)    Bananas (Unknown)  sulfa drugs (Unknown)      FAMILY HISTORY:      acetaminophen   Tablet .. 650 milliGRAM(s) Oral every 6 hours PRN  ARIPiprazole 2 milliGRAM(s) Oral daily  ARIPiprazole 2 milliGRAM(s) Oral at bedtime  aspirin  chewable 81 milliGRAM(s) Oral daily  atorvastatin 40 milliGRAM(s) Oral at bedtime  budesonide 160 MICROgram(s)/formoterol 4.5 MICROgram(s) Inhaler 2 Puff(s) Inhalation two times a day  haloperidol    Injectable 2 milliGRAM(s) IntraMuscular at bedtime PRN  hydrOXYzine hydrochloride 25 milliGRAM(s) Oral every 6 hours PRN  lactobacillus acidophilus 1 Tablet(s) Oral daily  lisinopril 10 milliGRAM(s) Oral daily  loratadine 10 milliGRAM(s) Oral daily  nicotine - 21 mG/24Hr(s) Patch 1 patch Transdermal daily  ranolazine 500 milliGRAM(s) Oral two times a day  tiotropium 18 MICROgram(s) Capsule 1 Capsule(s) Inhalation daily      T(C): 35 (05-01-20 @ 18:07), Max: 36.1 (05-01-20 @ 08:23)  HR: 66 (05-01-20 @ 18:07) (66 - 73)  BP: 160/71 (05-01-20 @ 18:07) (150/67 - 160/71)  RR: 18 (05-01-20 @ 18:07) (16 - 18)  SpO2: --    PE;  general:  no acute changes note [facility]    Lungs:    Heart:    EXT:    Neuro:    aelrt no defciits                        CAPILLARY BLOOD GLUCOSE

## 2020-05-02 NOTE — CONSULT NOTE ADULT - SUBJECTIVE AND OBJECTIVE BOX
S :   64y year old Female seen at bedside for diabetic risk foot assessment with a chief complaint of   painful thickened, dystrophic, ingrowing  and long toenails digits 1-5 bilaterally  and preventative foot examination. Patient is medically managed  by Medicine/Hospitalists.  Patient denies any history o f trauma to both feet.  Patient has no other pedal complaints.  Patient is experiencing pain while standing, walking and in shoe gear.       Patient admits to  (-) Fevers, (-) Chills, (-) Nausea, (-) Vomiting, (-) Shortness of Breath (-) calf pain (-) chest pain       PMH: Schizophrenia  Asthma  Hypertension  Coronary artery disease    PSH:S/P CABG (coronary artery bypass graft)      Allergies:Bananas (Unknown)  sulfa drugs (Unknown)      Labs:                        15.2   5.96  )-----------( 122      ( 02 May 2020 07:00 )             44.9       WBC Trend  5.96 Date (05-02 @ 07:00)      Chem  05-02    142  |  109  |  19  ----------------------------<  86  4.4   |  22  |  0.6<L>    Ca    9.6      02 May 2020 07:00    TPro  6.5  /  Alb  4.4  /  TBili  0.3  /  DBili  x   /  AST  12  /  ALT  10  /  AlkPhos  93  05-02          T(F): 97.5 (05-02-20 @ 08:11), Max: 97.5 (05-02-20 @ 08:11)  HR: 65 (05-02-20 @ 08:11) (65 - 66)  BP: 108/61 (05-02-20 @ 08:11) (108/61 - 160/71)  RR: 18 (05-02-20 @ 08:11) (18 - 18)  SpO2: --  Wt(kg): --    O:   Integument:  Skin warm, dry and supple bilateral.  No open lesions or inter-digital macerations noted bilateral.   Toenails 1-5 Right and Left feet thickened, elongated, discolored, and dystrophic with subungual debris. There is pain upon palpation of all fungal and ingrowing nails 1-5 bilaterally.   Vascular: Dorsalis Pedis and Posterior Tibial pulses 2/4.  Capillary re-fill time less than 3 seconds digits 1-5 bilateral.   Neuro: Protective sensation intact to the level of the digits bilateral.  MSK: Muscle strength 5/5 all major muscle groups bilateral. No structural abnormality, bilaterally      A:   1. bilateral hypertrohic Onychomycosis digits 1-5   2. Pain from Elongated nails, ingrowing  and dystrophic nails    P: Discussed diagnosis and treatment with patient  Aseptic debridement and curretage  of all fungal and ingrowing  nails 1-5 bilateral with sterile nail pack  Discussed importance of daily foot examinations and proper shoe gear  Please re-consult as needed.    Spectra: [phone] S :   64y year old Female seen at bedside with a chief complaint of   painful thickened, dystrophic, ingrowing  and long toenails digits 1-5 bilaterally  and preventative foot examination. Patient is medically managed  by Medicine/Hospitalists.  Patient denies any history o f trauma to both feet.  Patient has no other pedal complaints.  Patient is experiencing pain while standing, walking and in shoe gear.       Patient admits to  (-) Fevers, (-) Chills, (-) Nausea, (-) Vomiting, (-) Shortness of Breath (-) calf pain (-) chest pain       PMH: Schizophrenia  Asthma  Hypertension  Coronary artery disease    PSH:S/P CABG (coronary artery bypass graft)      Allergies:Bananas (Unknown)  sulfa drugs (Unknown)      Labs:                        15.2   5.96  )-----------( 122      ( 02 May 2020 07:00 )             44.9       WBC Trend  5.96 Date (05-02 @ 07:00)      Chem  05-02    142  |  109  |  19  ----------------------------<  86  4.4   |  22  |  0.6<L>    Ca    9.6      02 May 2020 07:00    TPro  6.5  /  Alb  4.4  /  TBili  0.3  /  DBili  x   /  AST  12  /  ALT  10  /  AlkPhos  93  05-02          T(F): 97.5 (05-02-20 @ 08:11), Max: 97.5 (05-02-20 @ 08:11)  HR: 65 (05-02-20 @ 08:11) (65 - 66)  BP: 108/61 (05-02-20 @ 08:11) (108/61 - 160/71)  RR: 18 (05-02-20 @ 08:11) (18 - 18)  SpO2: --  Wt(kg): --    O:   Integument:  Skin warm, dry and supple bilateral.  No open lesions or inter-digital macerations noted bilateral.   Toenails 1-5 Right and Left feet thickened, elongated, discolored, and dystrophic with subungual debris. There is pain upon palpation of all fungal and ingrowing nails 1-5 bilaterally.   Vascular: Dorsalis Pedis and Posterior Tibial pulses 2/4.  Capillary re-fill time less than 3 seconds digits 1-5 bilateral.   Neuro: Protective sensation intact to the level of the digits bilateral.  MSK: Muscle strength 5/5 all major muscle groups bilateral. No structural abnormality, bilaterally      A:   1. bilateral hypertrohic Onychomycosis digits 1-5   2. Pain from Elongated nails, ingrowing  and dystrophic nails    P: Discussed diagnosis and treatment with patient  Aseptic debridement and curretage  of all fungal and ingrowing  nails 1-5 bilateral with sterile nail pack  Discussed importance of daily foot examinations and proper shoe gear  Please re-consult as needed.    Spectra: [phone]

## 2020-05-02 NOTE — CONSULT NOTE ADULT - ASSESSMENT
Patient history as above. She had CABG 2006. She claims chest pain for months. All the time. Not exertional. Reportedly  QT prolonged. EKG reviewed. QT acceptable. Can continue current meds. Qt was not prolonged upon review. Chest pain appears non cardiac

## 2020-05-02 NOTE — CONSULT NOTE ADULT - SUBJECTIVE AND OBJECTIVE BOX
CARDIOLOGY CONSULT NOTE     CHIEF COMPLAINT/REASON FOR CONSULT:    HPI:  Pt is a 65yo F w/ PMH CAD, HTN, and asthma presenting to [facility] for treatment of schizophrenia. Pt was transferred to [facility] from [facility] after a few days of treatment for LLE cellulitis. Pt treated with IV clindamycin and transitioned to PO prior to transfer (end date will be 3/28/2020). Pt c/o no current pain or edema to the LLE. Denies HA, dizziness, cough, SOB, rhinorrhea, sore throat, fevers, chest pain, NVD, abdominal pain, change in urination and change in BM.       HPI as per behavioral health at [place]: Ms. Silva is a 64-year-old, female, white, , with two adult children, domiciled in private residence in [facility]), with medical history of asthma, CAD s/p CABG in 2006, hypertension, unknown psychiatric history, who was admitted to medicine for lower extremity cellulitis, psychiatry consulted for psychotic symptoms.     	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 Silva declined talking to primary team, however last night frequently called on call resident for pain.     	Upon approach, Ms. Silva was laying in bed, wincing in pain, she was alert, oriented to person, place (stated we at "[facility]"), and time (month and year tested). She stated that she had "no idea" why psychiatry was asked to see her. She denied mood disturbance, auditory hallucinations, perceptual disturbances, anxiety, sleep disturbance, appetite disturbance.     	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 Denied suicidal ideations.     	Attempted to contact  Abebe, and daughter (at same number) [phone] (number provided by Ms. Silva) - could not reach despite multiple attempts.    	As per nurse taking care of her today, patient was said to be agitated at night in the context of her pain, however patient has been in good behavioral control since the morning shift and has not been observed to be psychotic , maniac or depressed. (25 Mar 2020 18:35)      PAST MEDICAL & SURGICAL HISTORY:  Schizophrenia  Asthma  Hypertension  Coronary artery disease  S/P CABG (coronary artery bypass graft)      Cardiac Risks:   [ x]HTN, [ ] DM, [ ] Smoking, [ ] FH,  [ ] Lipids        MEDICATIONS:  MEDICATIONS  (STANDING):  ARIPiprazole 2 milliGRAM(s) Oral daily  ARIPiprazole 2 milliGRAM(s) Oral at bedtime  aspirin  chewable 81 milliGRAM(s) Oral daily  atorvastatin 40 milliGRAM(s) Oral at bedtime  budesonide 160 MICROgram(s)/formoterol 4.5 MICROgram(s) Inhaler 2 Puff(s) Inhalation two times a day  lactobacillus acidophilus 1 Tablet(s) Oral daily  lisinopril 10 milliGRAM(s) Oral daily  loratadine 10 milliGRAM(s) Oral daily  nicotine - 21 mG/24Hr(s) Patch 1 patch Transdermal daily  ranolazine 500 milliGRAM(s) Oral two times a day  tiotropium 18 MICROgram(s) Capsule 1 Capsule(s) Inhalation daily      FAMILY HISTORY:      SOCIAL HISTORY:        Allergies    Bananas (Unknown)  sulfa drugs (Unknown)    	    REVIEW OF SYSTEMS:  CONSTITUTIONAL: No fever, weight loss, or fatigue  EYES: No eye pain, visual disturbances, or discharge  ENMT:  No difficulty hearing, tinnitus, vertigo; No sinus or throat pain  NECK: No pain or stiffness  RESPIRATORY: No cough, wheezing, chills or hemoptysis; No Shortness of Breath  CARDIOVASCULAR: See above  GASTROINTESTINAL: No abdominal or epigastric pain. No nausea, vomiting, or hematemesis; No diarrhea or constipation. No melena or hematochezia.  GENITOURINARY: No dysuria, frequency, hematuria, or incontinence  NEUROLOGICAL: No headaches, memory loss, loss of strength, numbness, or tremors  SKIN: No itching, burning, rashes, or lesions   	      PHYSICAL EXAM:  T(C): 36.4 (05-02-20 @ 08:11), Max: 36.4 (05-02-20 @ 08:11)  HR: 65 (05-02-20 @ 08:11) (65 - 66)  BP: 108/61 (05-02-20 @ 08:11) (108/61 - 160/71)  RR: 18 (05-02-20 @ 08:11) (18 - 18)  SpO2: --  Wt(kg): --  I&O's Summary      Appearance: Normal	  Psychiatry: A & O x 3, Mood & affect appropriate  HEENT:   Normal oral mucosa, PERRL, EOMI	  Lymphatic: No lymphadenopathy  Cardiovascular: Normal S1 S2,RRR, No JVD, No murmurs  Respiratory: Lungs clear to auscultation	  Gastrointestinal:  Soft, Non-tender, + BS	  Skin: No rashes, No ecchymoses, No cyanosis	  Neurologic: Non-focal  Extremities: Normal range of motion, No clubbing, cyanosis or edema  Vascular: Peripheral pulses palpable 2+ bilaterally      ECG:  	< from: 12 Lead ECG (05.02.20 @ 08:16) >    Diagnosis Line Sinus bradycardia  Right bundle branch block  Abnormal ECG    Confirmed by GABINO DRIVER MD ([id number]) on 5/2/2020 8:38:43 AM    < end of copied text >      	  LABS:	 	    CARDIAC MARKERS:                                    15.2   5.96  )-----------( 122      ( 02 May 2020 07:00 )             44.9     05-02    142  |  109  |  19  ----------------------------<  86  4.4   |  22  |  0.6<L>    Ca    9.6      02 May 2020 07:00    TPro  6.5  /  Alb  4.4  /  TBili  0.3  /  DBili  x   /  AST  12  /  ALT  10  /  AlkPhos  93  05-02

## 2020-05-03 RX ADMIN — Medication 81 MILLIGRAM(S): at 08:22

## 2020-05-03 RX ADMIN — ATORVASTATIN CALCIUM 40 MILLIGRAM(S): 80 TABLET, FILM COATED ORAL at 20:03

## 2020-05-03 RX ADMIN — Medication 1 PATCH: at 08:23

## 2020-05-03 RX ADMIN — BUDESONIDE AND FORMOTEROL FUMARATE DIHYDRATE 2 PUFF(S): 160; 4.5 AEROSOL RESPIRATORY (INHALATION) at 20:03

## 2020-05-03 RX ADMIN — ARIPIPRAZOLE 2 MILLIGRAM(S): 15 TABLET ORAL at 20:03

## 2020-05-03 RX ADMIN — RANOLAZINE 500 MILLIGRAM(S): 500 TABLET, FILM COATED, EXTENDED RELEASE ORAL at 20:03

## 2020-05-03 RX ADMIN — Medication 1 PATCH: at 20:03

## 2020-05-03 RX ADMIN — Medication 650 MILLIGRAM(S): at 08:30

## 2020-05-03 RX ADMIN — Medication 25 MILLIGRAM(S): at 21:44

## 2020-05-03 RX ADMIN — Medication 1 TABLET(S): at 08:22

## 2020-05-03 RX ADMIN — ARIPIPRAZOLE 2 MILLIGRAM(S): 15 TABLET ORAL at 08:22

## 2020-05-03 RX ADMIN — Medication 1 PATCH: at 08:29

## 2020-05-03 RX ADMIN — TIOTROPIUM BROMIDE 1 CAPSULE(S): 18 CAPSULE ORAL; RESPIRATORY (INHALATION) at 08:23

## 2020-05-03 RX ADMIN — LORATADINE 10 MILLIGRAM(S): 10 TABLET ORAL at 08:22

## 2020-05-03 RX ADMIN — LISINOPRIL 10 MILLIGRAM(S): 2.5 TABLET ORAL at 08:22

## 2020-05-03 RX ADMIN — BUDESONIDE AND FORMOTEROL FUMARATE DIHYDRATE 2 PUFF(S): 160; 4.5 AEROSOL RESPIRATORY (INHALATION) at 08:22

## 2020-05-03 RX ADMIN — RANOLAZINE 500 MILLIGRAM(S): 500 TABLET, FILM COATED, EXTENDED RELEASE ORAL at 08:22

## 2020-05-03 RX ADMIN — Medication 650 MILLIGRAM(S): at 16:49

## 2020-05-03 NOTE — PROGRESS NOTE ADULT - SUBJECTIVE AND OBJECTIVE BOX
pt stable alert in NAD  no new complaints    DEPRESSION  ^DEPRESSION  Handoff  Schizophrenia  Asthma  Hypertension  Coronary artery disease  COPD (chronic obstructive pulmonary disease)  Paranoid schizophrenia  Coronary artery disease involving coronary bypass graft of native heart without angina pectoris  Essential hypertension  Schizophrenia  S/P CABG (coronary artery bypass graft)    HEALTH ISSUES - PROBLEM Dx:  COPD (chronic obstructive pulmonary disease)  Paranoid schizophrenia  Coronary artery disease involving coronary bypass graft of native heart without angina pectoris: Coronary artery disease involving coronary bypass graft of native heart without angina pectoris  Essential hypertension: Essential hypertension  Schizophrenia: Schizophrenia        PAST MEDICAL & SURGICAL HISTORY:  Schizophrenia  Asthma  Hypertension  Coronary artery disease  S/P CABG (coronary artery bypass graft)    Bananas (Unknown)  sulfa drugs (Unknown)      FAMILY HISTORY:      acetaminophen   Tablet .. 650 milliGRAM(s) Oral every 6 hours PRN  ARIPiprazole 2 milliGRAM(s) Oral daily  ARIPiprazole 2 milliGRAM(s) Oral at bedtime  aspirin  chewable 81 milliGRAM(s) Oral daily  atorvastatin 40 milliGRAM(s) Oral at bedtime  budesonide 160 MICROgram(s)/formoterol 4.5 MICROgram(s) Inhaler 2 Puff(s) Inhalation two times a day  haloperidol    Injectable 2 milliGRAM(s) IntraMuscular at bedtime PRN  hydrOXYzine hydrochloride 25 milliGRAM(s) Oral every 6 hours PRN  lactobacillus acidophilus 1 Tablet(s) Oral daily  lisinopril 10 milliGRAM(s) Oral daily  loratadine 10 milliGRAM(s) Oral daily  nicotine - 21 mG/24Hr(s) Patch 1 patch Transdermal daily  ranolazine 500 milliGRAM(s) Oral two times a day  tiotropium 18 MICROgram(s) Capsule 1 Capsule(s) Inhalation daily      T(C): 35.7 (05-03-20 @ 08:07), Max: 36.7 (05-02-20 @ 16:30)  HR: 51 (05-03-20 @ 08:07) (51 - 54)  BP: 133/82 (05-03-20 @ 08:07) (125/56 - 133/82)  RR: 18 (05-03-20 @ 08:07) (16 - 18)  SpO2: --    PE;  general:   no acuate cahnges in nad    Lungs:    Heart:    EXT:    Neuro:   no deficits      15.2  44.9  5.96  19  0.6  4.4  86      05-02    142  |  109  |  19  ----------------------------<  86  4.4   |  22  |  0.6<L>    Ca    9.6      02 May 2020 07:00    TPro  6.5  /  Alb  4.4  /  TBili  0.3  /  DBili  x   /  AST  12  /  ALT  10  /  AlkPhos  93  05-02      LIVER FUNCTIONS - ( 02 May 2020 07:00 )  Alb: 4.4 g/dL / Pro: 6.5 g/dL / ALK PHOS: 93 U/L / ALT: 10 U/L / AST: 12 U/L / GGT: x                                   15.2   5.96  )-----------( 122      ( 02 May 2020 07:00 )             44.9       CAPILLARY BLOOD GLUCOSE

## 2020-05-04 PROCEDURE — 99231 SBSQ HOSP IP/OBS SF/LOW 25: CPT

## 2020-05-04 RX ADMIN — Medication 81 MILLIGRAM(S): at 17:28

## 2020-05-04 RX ADMIN — ARIPIPRAZOLE 2 MILLIGRAM(S): 15 TABLET ORAL at 17:28

## 2020-05-04 RX ADMIN — LORATADINE 10 MILLIGRAM(S): 10 TABLET ORAL at 17:28

## 2020-05-04 RX ADMIN — BUDESONIDE AND FORMOTEROL FUMARATE DIHYDRATE 2 PUFF(S): 160; 4.5 AEROSOL RESPIRATORY (INHALATION) at 17:48

## 2020-05-04 RX ADMIN — Medication 25 MILLIGRAM(S): at 09:55

## 2020-05-04 RX ADMIN — LISINOPRIL 10 MILLIGRAM(S): 2.5 TABLET ORAL at 17:28

## 2020-05-04 RX ADMIN — BUDESONIDE AND FORMOTEROL FUMARATE DIHYDRATE 2 PUFF(S): 160; 4.5 AEROSOL RESPIRATORY (INHALATION) at 21:00

## 2020-05-04 RX ADMIN — Medication 1 PATCH: at 17:28

## 2020-05-04 RX ADMIN — Medication 1 TABLET(S): at 17:28

## 2020-05-04 RX ADMIN — Medication 1 PATCH: at 17:48

## 2020-05-04 RX ADMIN — Medication 650 MILLIGRAM(S): at 09:55

## 2020-05-04 RX ADMIN — TIOTROPIUM BROMIDE 1 CAPSULE(S): 18 CAPSULE ORAL; RESPIRATORY (INHALATION) at 17:48

## 2020-05-04 RX ADMIN — RANOLAZINE 500 MILLIGRAM(S): 500 TABLET, FILM COATED, EXTENDED RELEASE ORAL at 17:29

## 2020-05-04 NOTE — CHART NOTE - NSCHARTNOTEFT_GEN_A_CORE
Writer met with patient; Pt assessed writer provided support and education. Treatment plan and discharge plan discussed. Patient is compliant with treatment and unit protocol. Patient is taking medications as prescribed pursuant to treatment over objection order. Patient is compliant with unit rules. Patient contracts for safety on the unit. Patient is oriented x2, not situation. Patient denies she is mentally ill. Patient denies events leading up to hospitalization and prior psychiatric treatment history. Patient denies current suicidal and or homicidal ideations, reports "no, not ever". Patient denies audio visual hallucinations. Patient is in good behavioral control at this time. Activities of daily living are within normal limits. Patient to remain on unit until cleared by attending for discharge. [place] application remains underway.     Mental Status Exam:    Mood – Neutral  Sleep - Fair  Appetite - Good  ADLs - Fair  Thought Process – Linear    Observation – [id number]

## 2020-05-04 NOTE — PROGRESS NOTE BEHAVIORAL HEALTH - NSBHFUPINTERVALHXFT_PSY_A_CORE
Pt seen and evaluated, chart reviewed. As per nursing report, pt medication compliant and no behavioral issues (TOO started 4/21). On evaluation, pt states mood "I'm fine", endorses good sleep and appetite, states she is ready to go home. Pt continues to have poor insight on her behavioral health diagnosis, states she has never had a psychiatric illness despite her multiple psychiatric hospitalizations. However, pt states she plans to continue to take abilify because it helps her stay off the streets. Denies AVH, paranoia. Denies SI/HI, intent and plan.

## 2020-05-04 NOTE — PROGRESS NOTE BEHAVIORAL HEALTH - SUMMARY
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 for lower left extremity cellulitis, transferred to IPP d/t bizarre bx, psychiatric medication non-compliance and questionable ability to care for self. Pt denies any psychiatric diagnoses, states does not take any psychiatric medications, denies all psychiatric complaints. Pt presents with several delusions including living in a private apartment in [place], living with her  Abebe and having "too many children to count". Collateral from only daughter shows otherwise - pt diagnosed with schizophrenia in 2008, has had multiple episodes of medication noncompliance resulting in multiple IPP hospitalizations (most recent in [facility] last year). Pt's daughter is unsure of medications, states haldol decanoate and clozapine, clozapine REMS states pt is not in system, pt denies any recent injections. TOO approved, started 4/21/2020, haldol started with medical clearance d/t borderline QTc.   On evaluation, pt presents clearer than yesterday with linear thought process, able to state last admission at [facility] and was on abilify 2 mg bid with good effect. Pt now endorses hx chronic angina and hx of heart failure, provided last pharmacy information [facility], medication list faxed and in chart for review. Pt will likely benefit from IPP for medication management at this time, [place] referral started.    #Schizophrenia  -d/c haldol 5 mg PO qhs d/t QTc 511 ms (5/1/2020), pt c/o chronic? angina, pending cardiology consult  -cardiology consulted  -c/w abilify 2 mg bid  -ECG QTc 484 ms (5/3)  -patient has been refusing treatment, TOO approved, started 4/21/2020  -as per TOO, if pt refuses abilify 2 mg PO, then will give haldol 2 mg IM with f/u ECG    #COPD  -c/w Symbicort 160mcg 2 puffs BID  -c/w Spiriva 1 cap inhal daily      #Hypertension  -c/w Lisinopril 10mg PO Daily    #CAD  -c/w ASA 81mg PO daily  -c/w Atorvastatin 40mg PO QHS    #Chronic angina  -PA consult, c/w home medication ranexa 500 mg bid ([facility]/2020)  -cardiology consulted  -pt denies cardiac sx

## 2020-05-04 NOTE — PROGRESS NOTE BEHAVIORAL HEALTH - NSBHCHARTREVIEWINVESTIGATE_PSY_A_CORE FT
< from: 12 Lead ECG (05.03.20 @ 09:18) >      Ventricular Rate 56 BPM    Atrial Rate 56 BPM    P-R Interval 144 ms    QRS Duration 146 ms    Q-T Interval 504 ms    QTC Calculation(Bezet) 486 ms    P Axis 25 degrees    R Axis 66 degrees    T Axis 74 degrees    Diagnosis Line Sinus bradycardia  Right bundle branch block  Abnormal ECG    < end of copied text >

## 2020-05-04 NOTE — PROGRESS NOTE BEHAVIORAL HEALTH - NSBHCHARTREVIEWVS_PSY_A_CORE FT
Vital Signs Last 24 Hrs  T(C): 35.9 (04 May 2020 06:14), Max: 36.8 (03 May 2020 16:43)  T(F): 96.6 (04 May 2020 06:14), Max: 98.3 (03 May 2020 16:43)  HR: 84 (04 May 2020 08:09) (56 - 84)  BP: 129/80 (04 May 2020 08:09) (124/68 - 149/64)  BP(mean): --  RR: 16 (04 May 2020 08:09) (16 - 18)  SpO2: --

## 2020-05-05 PROCEDURE — 99231 SBSQ HOSP IP/OBS SF/LOW 25: CPT

## 2020-05-05 RX ADMIN — Medication 25 MILLIGRAM(S): at 08:32

## 2020-05-05 RX ADMIN — ARIPIPRAZOLE 2 MILLIGRAM(S): 15 TABLET ORAL at 20:46

## 2020-05-05 RX ADMIN — Medication 1 TABLET(S): at 08:32

## 2020-05-05 RX ADMIN — LISINOPRIL 10 MILLIGRAM(S): 2.5 TABLET ORAL at 08:32

## 2020-05-05 RX ADMIN — RANOLAZINE 500 MILLIGRAM(S): 500 TABLET, FILM COATED, EXTENDED RELEASE ORAL at 01:25

## 2020-05-05 RX ADMIN — ATORVASTATIN CALCIUM 40 MILLIGRAM(S): 80 TABLET, FILM COATED ORAL at 01:24

## 2020-05-05 RX ADMIN — Medication 1 PATCH: at 20:46

## 2020-05-05 RX ADMIN — ARIPIPRAZOLE 2 MILLIGRAM(S): 15 TABLET ORAL at 01:24

## 2020-05-05 RX ADMIN — Medication 25 MILLIGRAM(S): at 01:25

## 2020-05-05 RX ADMIN — TIOTROPIUM BROMIDE 1 CAPSULE(S): 18 CAPSULE ORAL; RESPIRATORY (INHALATION) at 08:32

## 2020-05-05 RX ADMIN — Medication 1 PATCH: at 08:33

## 2020-05-05 RX ADMIN — RANOLAZINE 500 MILLIGRAM(S): 500 TABLET, FILM COATED, EXTENDED RELEASE ORAL at 20:46

## 2020-05-05 RX ADMIN — RANOLAZINE 500 MILLIGRAM(S): 500 TABLET, FILM COATED, EXTENDED RELEASE ORAL at 08:32

## 2020-05-05 RX ADMIN — ATORVASTATIN CALCIUM 40 MILLIGRAM(S): 80 TABLET, FILM COATED ORAL at 20:46

## 2020-05-05 RX ADMIN — LORATADINE 10 MILLIGRAM(S): 10 TABLET ORAL at 08:32

## 2020-05-05 RX ADMIN — Medication 1 PATCH: at 01:25

## 2020-05-05 RX ADMIN — ARIPIPRAZOLE 2 MILLIGRAM(S): 15 TABLET ORAL at 08:32

## 2020-05-05 RX ADMIN — Medication 650 MILLIGRAM(S): at 20:45

## 2020-05-05 RX ADMIN — BUDESONIDE AND FORMOTEROL FUMARATE DIHYDRATE 2 PUFF(S): 160; 4.5 AEROSOL RESPIRATORY (INHALATION) at 20:46

## 2020-05-05 RX ADMIN — Medication 650 MILLIGRAM(S): at 08:33

## 2020-05-05 RX ADMIN — Medication 25 MILLIGRAM(S): at 20:45

## 2020-05-05 RX ADMIN — Medication 81 MILLIGRAM(S): at 08:32

## 2020-05-05 NOTE — PROGRESS NOTE ADULT - SUBJECTIVE AND OBJECTIVE BOX
pt stable alert in NAD  no new complaints    DEPRESSION  ^DEPRESSION  Handoff  Schizophrenia  Asthma  Hypertension  Coronary artery disease  COPD (chronic obstructive pulmonary disease)  Paranoid schizophrenia  Coronary artery disease involving coronary bypass graft of native heart without angina pectoris  Essential hypertension  Schizophrenia  S/P CABG (coronary artery bypass graft)    HEALTH ISSUES - PROBLEM Dx:  COPD (chronic obstructive pulmonary disease)  Paranoid schizophrenia  Coronary artery disease involving coronary bypass graft of native heart without angina pectoris: Coronary artery disease involving coronary bypass graft of native heart without angina pectoris  Essential hypertension: Essential hypertension  Schizophrenia: Schizophrenia        PAST MEDICAL & SURGICAL HISTORY:  Schizophrenia  Asthma  Hypertension  Coronary artery disease  S/P CABG (coronary artery bypass graft)    Bananas (Unknown)  sulfa drugs (Unknown)      FAMILY HISTORY:      acetaminophen   Tablet .. 650 milliGRAM(s) Oral every 6 hours PRN  ARIPiprazole 2 milliGRAM(s) Oral daily  ARIPiprazole 2 milliGRAM(s) Oral at bedtime  aspirin  chewable 81 milliGRAM(s) Oral daily  atorvastatin 40 milliGRAM(s) Oral at bedtime  budesonide 160 MICROgram(s)/formoterol 4.5 MICROgram(s) Inhaler 2 Puff(s) Inhalation two times a day  haloperidol    Injectable 2 milliGRAM(s) IntraMuscular at bedtime PRN  hydrOXYzine hydrochloride 25 milliGRAM(s) Oral every 6 hours PRN  lactobacillus acidophilus 1 Tablet(s) Oral daily  lisinopril 10 milliGRAM(s) Oral daily  loratadine 10 milliGRAM(s) Oral daily  nicotine - 21 mG/24Hr(s) Patch 1 patch Transdermal daily  ranolazine 500 milliGRAM(s) Oral two times a day  tiotropium 18 MICROgram(s) Capsule 1 Capsule(s) Inhalation daily      T(C): --  HR: 84 (05-04-20 @ 08:09) (84 - 84)  BP: 129/80 (05-04-20 @ 08:09) (129/80 - 129/80)  RR: 16 (05-04-20 @ 08:09) (16 - 16)  SpO2: --    PE;  general:  stabel ambulating on unit in nad    Lungs:    Heart:    EXT:    Neuro:  no deficits                          CAPILLARY BLOOD GLUCOSE

## 2020-05-05 NOTE — PROGRESS NOTE BEHAVIORAL HEALTH - NSBHCHARTREVIEWVS_PSY_A_CORE FT
Vital Signs Last 24 Hrs  T(C): 36.7 (05 May 2020 08:22), Max: 36.7 (05 May 2020 08:22)  T(F): 98.1 (05 May 2020 08:22), Max: 98.1 (05 May 2020 08:22)  HR: 58 (05 May 2020 08:22) (58 - 58)  BP: 170/74 (05 May 2020 08:22) (170/74 - 170/74)  BP(mean): --  RR: 18 (05 May 2020 08:22) (18 - 18)  SpO2: --

## 2020-05-05 NOTE — PROGRESS NOTE BEHAVIORAL HEALTH - SUMMARY
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 Pt denies any psychiatric diagnoses, states does not take any psychiatric medications, denies all psychiatric complaints. Pt presents with several delusions including living in a private apartment in [place], living with her  Abebe and having "too many children to count". Collateral from only daughter shows otherwise - pt diagnosed with schizophrenia in 2008, has had multiple episodes of medication noncompliance resulting in multiple IPP hospitalizations (most recent in [facility] last year). Pt's daughter is unsure of medications, states haldol decanoate and clozapine, clozapine REMS states pt is not in system, pt denies any recent injections. TOO approved, started 4/21/2020, haldol started with medical clearance d/t borderline QTc.   On evaluation, pt presents clearer than yesterday with linear thought process, able to state last admission at [facility] and was on abilify 2 mg bid with good effect. Pt now endorses hx chronic angina and hx of heart failure, provided last pharmacy information [facility], medication list faxed and in chart for review. Pt will likely benefit from IPP for medication management at this time, [place] referral started.    #Schizophrenia  -d/c haldol 5 mg PO qhs d/t QTc 511 ms (5/1/2020), pt c/o chronic? angina  -cardiology consulted  -c/w abilify 2 mg bid  -ECG QTc 484 ms (5/3)  -patient has been refusing treatment, TOO approved, started 4/21/2020  -as per TOO, if pt refuses abilify 2 mg PO, then will give haldol 2 mg IM with f/u ECG    #COPD  -c/w Symbicort 160mcg 2 puffs BID  -c/w Spiriva 1 cap inhal daily      #Hypertension  -c/w Lisinopril 10mg PO Daily    #CAD  -c/w ASA 81mg PO daily  -c/w Atorvastatin 40mg PO QHS    #Chronic angina  -PA consult, c/w home medication ranexa 500 mg bid ([facility] last fill 3/2020)  -cardiology consulted  -pt denies cardiac sx

## 2020-05-05 NOTE — PROGRESS NOTE BEHAVIORAL HEALTH - NSBHFUPINTERVALHXFT_PSY_A_CORE
Pt seen and evaluated, chart reviewed. As per nursing report, pt medication compliant and no behavioral issues (TOO started 4/21). On evaluation, pt states mood "I'm fine", endorses good sleep and appetite, states she is ready to go home. Pt continues to have poor insight on her behavioral health diagnosis, however states abilify "clears my mind" and agrees to continue to take medication after discharge. Denies AVH, paranoia. Denies SI/HI, intent and plan. [place] referral made.

## 2020-05-06 PROCEDURE — 99231 SBSQ HOSP IP/OBS SF/LOW 25: CPT

## 2020-05-06 RX ORDER — ARIPIPRAZOLE 15 MG/1
300 TABLET ORAL ONCE
Refills: 0 | Status: COMPLETED | OUTPATIENT
Start: 2020-05-06 | End: 2020-05-06

## 2020-05-06 RX ADMIN — Medication 1 TABLET(S): at 09:38

## 2020-05-06 RX ADMIN — RANOLAZINE 500 MILLIGRAM(S): 500 TABLET, FILM COATED, EXTENDED RELEASE ORAL at 09:38

## 2020-05-06 RX ADMIN — Medication 1 PATCH: at 09:39

## 2020-05-06 RX ADMIN — BUDESONIDE AND FORMOTEROL FUMARATE DIHYDRATE 2 PUFF(S): 160; 4.5 AEROSOL RESPIRATORY (INHALATION) at 20:48

## 2020-05-06 RX ADMIN — Medication 1 PATCH: at 09:38

## 2020-05-06 RX ADMIN — ARIPIPRAZOLE 2 MILLIGRAM(S): 15 TABLET ORAL at 20:45

## 2020-05-06 RX ADMIN — LORATADINE 10 MILLIGRAM(S): 10 TABLET ORAL at 09:38

## 2020-05-06 RX ADMIN — ARIPIPRAZOLE 300 MILLIGRAM(S): 15 TABLET ORAL at 15:32

## 2020-05-06 RX ADMIN — Medication 1 PATCH: at 18:00

## 2020-05-06 RX ADMIN — ATORVASTATIN CALCIUM 40 MILLIGRAM(S): 80 TABLET, FILM COATED ORAL at 20:45

## 2020-05-06 RX ADMIN — Medication 81 MILLIGRAM(S): at 09:38

## 2020-05-06 RX ADMIN — TIOTROPIUM BROMIDE 1 CAPSULE(S): 18 CAPSULE ORAL; RESPIRATORY (INHALATION) at 09:38

## 2020-05-06 RX ADMIN — BUDESONIDE AND FORMOTEROL FUMARATE DIHYDRATE 2 PUFF(S): 160; 4.5 AEROSOL RESPIRATORY (INHALATION) at 09:39

## 2020-05-06 RX ADMIN — LISINOPRIL 10 MILLIGRAM(S): 2.5 TABLET ORAL at 09:38

## 2020-05-06 RX ADMIN — RANOLAZINE 500 MILLIGRAM(S): 500 TABLET, FILM COATED, EXTENDED RELEASE ORAL at 20:45

## 2020-05-06 RX ADMIN — ARIPIPRAZOLE 2 MILLIGRAM(S): 15 TABLET ORAL at 09:38

## 2020-05-06 NOTE — PROGRESS NOTE BEHAVIORAL HEALTH - NSBHADMITMEDEDUDETAILS_A_CORE FT
Continued discussion of the benefits of haldol and that the hospital is going for treatment over objection at this time
Continued discussion of the benefits of haldol and that the hospital is going for treatment over objection at this time
Continued discussion of the benefits of haldol and that the hospital is going for treatment over objection at this time.
Discussed starting Haldol tonight due to patient being on it previously and as per chart daughter reports patient did well on it. [id number], Dr. Urbina aware and cleared medically to start treatment. Will monitor ECG throughout course of treatment.
Educated on the risks and benefits of abilify maintena, and side effects profile. Provided medication information handout from [employer]. Pt verbalized understanding.
Continued discussion of the benefits of haldol and that the hospital is going for treatment over objection at this time
Continue to provide education on Abilify, patient not receptive at this time.
Educated pt on the risks and benefits of abilify, and side effects profile, pt verbalized understanding.

## 2020-05-06 NOTE — PROGRESS NOTE BEHAVIORAL HEALTH - NSBHCHARTREVIEWVS_PSY_A_CORE FT
Vital Signs Last 24 Hrs  T(C): 35.6 (06 May 2020 06:45), Max: 35.8 (05 May 2020 15:39)  T(F): 96 (06 May 2020 06:45), Max: 96.4 (05 May 2020 15:39)  HR: 51 (06 May 2020 06:45) (51 - 64)  BP: 106/59 (06 May 2020 06:45) (106/59 - 188/77)  BP(mean): --  RR: 16 (06 May 2020 06:45) (16 - 18)  SpO2: --

## 2020-05-06 NOTE — CHART NOTE - NSCHARTNOTEFT_GEN_A_CORE
Social Work Note:    Treatment team meets with patient daily to discuss treatment plan, medications, and discharge plan.  Patient engages appropriately with treatment team members.  On interview patient endorsed "I'm fine."  During the day patient has been observed to be visible on the unit.  She has been engaging with staff and peers appropriately.  Sleep endorsed as good.  Appetite also endorsed as good.  ADLs are improved.  Patient has been educated to the benefits of being adherent to prescribed medications and she was able to verbalize an understanding of same.  Suicidal / homicidal ideation denied.      Discharge plan discussed with patient.  Application for placement to [facility] has been in progress.  Patient is no longer appropriate for that level of care.  She is agreeable to a discharge to a shelter.  Department of Homeless Services contacted.  Patient has an assigned [facility] ID - [id number].  Her assigned shelter is [place] [address] [phone].        Mental Status Exam:    Mood – Neutral    Sleep - Fair    Appetite - Good    ADLs - Fair    Thought Process – Linear      Observation – [id number].      No barriers to discharge anticipated at this time.      At this time patient is not psychiatrically stable for discharge.

## 2020-05-06 NOTE — PROGRESS NOTE BEHAVIORAL HEALTH - NSBHFUPINTERVALHXFT_PSY_A_CORE
Pt seen and evaluated, chart reviewed. As per nursing report, pt medication compliant and no behavioral issues (TOO started 4/21). On evaluation, pt states mood "I'm fine", endorses good sleep and appetite, states she is ready to go home, agrees to discharge to shelter. Pt continues to have poor insight on her behavioral health diagnosis, however states abilify "clears my mind" and agrees to continue to take medication after discharge, agrees to [facility]. Denies AVH, paranoia. Denies SI/HI, intent and plan. Pt appears future-oriented, states plan to  SSI check and start housing application, endorses desire to follow-up with outpatient treatment and continue medications.

## 2020-05-06 NOTE — PROGRESS NOTE BEHAVIORAL HEALTH - SUMMARY
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 for lower left extremity cellulitis, transferred to [facility] d/t bizarre bx, psychiatric medication non-compliance and questionable ability to care for self. Pt denies any psychiatric diagnoses, states does not take any psychiatric medications, denies all psychiatric complaints. Pt presents with several delusions including living in a private apartment in [place], living with her  Abebe and having "too many children to count". Collateral from only daughter shows otherwise - pt diagnosed with schizophrenia in 2008, has had multiple episodes of medication noncompliance resulting in multiple P hospitalizations (most recent in [facility] last year). Pt's daughter is unsure of medications, states haldol decanoate and clozapine, clozapine REMS states pt is not in system, pt denies any recent injections. TOO approved, started 4/21/2020, haldol started with medical clearance d/t borderline QTc.   On evaluation, pt compensanted, presents clear with linear thought process, able to state last admission at [facility] and was on abilify 2 mg bid with good effect. Pt endorses hx chronic angina and hx of heart failure, provided last pharmacy information [facility], medication list faxed and in chart for review. Abilify 2 mg bid restarted with plan to transition to [facility] prior to discharge, pt amenable. Pt is future-oriented and hopeful about current treatment plan.     #Schizophrenia  -d/c haldol 5 mg PO qhs d/t QTc 511 ms (5/1/2020), pt c/o chronic? angina  -cardiology consulted  -ECG QTc 484 ms (5/3)  -c/w abilify 2 mg bid  -pt agrees to abilify maintena, start abilify maintena 300 mg IM once  -patient has been refusing treatment, TOO approved, started 4/21/2020  -as per TOO, if pt refuses abilify 2 mg PO, then will give haldol 2 mg IM with f/u ECG    #COPD  -c/w Symbicort 160mcg 2 puffs BID  -c/w Spiriva 1 cap inhal daily      #Hypertension  -c/w Lisinopril 10mg PO Daily    #CAD  -c/w ASA 81mg PO daily  -c/w Atorvastatin 40mg PO QHS    #Chronic angina  -PA consult, c/w home medication ranexa 500 mg bid ([facility] last fill 3/2020)  -cardiology consulted  -pt denies cardiac sx

## 2020-05-07 PROCEDURE — 99231 SBSQ HOSP IP/OBS SF/LOW 25: CPT

## 2020-05-07 RX ORDER — LISINOPRIL 2.5 MG/1
10 TABLET ORAL ONCE
Refills: 0 | Status: COMPLETED | OUTPATIENT
Start: 2020-05-07 | End: 2020-05-07

## 2020-05-07 RX ADMIN — Medication 1 PATCH: at 18:04

## 2020-05-07 RX ADMIN — TIOTROPIUM BROMIDE 1 CAPSULE(S): 18 CAPSULE ORAL; RESPIRATORY (INHALATION) at 09:39

## 2020-05-07 RX ADMIN — ARIPIPRAZOLE 2 MILLIGRAM(S): 15 TABLET ORAL at 08:53

## 2020-05-07 RX ADMIN — Medication 1 PATCH: at 13:47

## 2020-05-07 RX ADMIN — LISINOPRIL 10 MILLIGRAM(S): 2.5 TABLET ORAL at 08:53

## 2020-05-07 RX ADMIN — LORATADINE 10 MILLIGRAM(S): 10 TABLET ORAL at 08:53

## 2020-05-07 RX ADMIN — Medication 650 MILLIGRAM(S): at 16:18

## 2020-05-07 RX ADMIN — Medication 1 PATCH: at 09:38

## 2020-05-07 RX ADMIN — Medication 81 MILLIGRAM(S): at 08:53

## 2020-05-07 RX ADMIN — BUDESONIDE AND FORMOTEROL FUMARATE DIHYDRATE 2 PUFF(S): 160; 4.5 AEROSOL RESPIRATORY (INHALATION) at 09:39

## 2020-05-07 RX ADMIN — Medication 1 TABLET(S): at 08:53

## 2020-05-07 RX ADMIN — Medication 1 PATCH: at 08:53

## 2020-05-07 RX ADMIN — ATORVASTATIN CALCIUM 40 MILLIGRAM(S): 80 TABLET, FILM COATED ORAL at 20:31

## 2020-05-07 RX ADMIN — RANOLAZINE 500 MILLIGRAM(S): 500 TABLET, FILM COATED, EXTENDED RELEASE ORAL at 20:31

## 2020-05-07 RX ADMIN — LISINOPRIL 10 MILLIGRAM(S): 2.5 TABLET ORAL at 18:03

## 2020-05-07 RX ADMIN — ARIPIPRAZOLE 2 MILLIGRAM(S): 15 TABLET ORAL at 20:31

## 2020-05-07 RX ADMIN — BUDESONIDE AND FORMOTEROL FUMARATE DIHYDRATE 2 PUFF(S): 160; 4.5 AEROSOL RESPIRATORY (INHALATION) at 20:31

## 2020-05-07 RX ADMIN — RANOLAZINE 500 MILLIGRAM(S): 500 TABLET, FILM COATED, EXTENDED RELEASE ORAL at 08:53

## 2020-05-07 RX ADMIN — Medication 650 MILLIGRAM(S): at 07:19

## 2020-05-07 NOTE — CHART NOTE - NSCHARTNOTEFT_GEN_A_CORE
called by rn for elevated bp  pt asymptomatic no cp or sob.       Vital Signs Last 24 Hrs  T(C): 36.2 (07 May 2020 07:42), Max: 36.2 (07 May 2020 07:42)  T(F): 97.2 (07 May 2020 07:42), Max: 97.2 (07 May 2020 07:42)  HR: 63 (07 May 2020 07:42) (63 - 66)  BP: 183/74 (07 May 2020 07:42) (166/73 - 183/74)  BP(mean): --  RR: 16 (07 May 2020 07:42) (16 - 16)  SpO2: --    1.uncontrolled htn  -will give additional 10mg lisinopril now  -monitor bp  -attending to f/u

## 2020-05-07 NOTE — DISCHARGE NOTE BEHAVIORAL HEALTH - PAST PSYCHIATRIC HISTORY
As per daughter, dx with schizophrenia in 2008, has had multiple IPP hospitalizations and hx of medication and treatment noncompliance leading to decompensations. As per pt's last provider at the [facility]- dx schizoaffective disorder, tobacco use disorder, marijuana use; client has an extensive history of Psychiatric hospitalizations (approximately 11 inpatient medical and  admission in the last 12 months, last from [facility] February 2020), pt lacks insight into her  Diagnosis and has therefore refused to engage in treatment with a therapist.

## 2020-05-07 NOTE — DISCHARGE NOTE BEHAVIORAL HEALTH - NSBHDCADMRISKMITFT_PSY_A_CORE
Pt appears future-oriented, endorses plan to  SSI check and start housing application, reconnecting with her daughter for social support, endorses desire to follow-up with outpatient treatment and continue medications. Pt transferring to [facility] for further treatment

## 2020-05-07 NOTE — DISCHARGE NOTE BEHAVIORAL HEALTH - NSBHDCMEDSFT_PSY_A_CORE
Started pt on abilify 10 mg qhs, however, pt refused to take any antipsychotic medication. Treatment over objection pursued and approved, started 4/21/2020. Pt started on haldol 3 mg PO bid (as per TOO, if pt refused haldol 3 mg PO, then haldol 3 mg IM to be given), titrated to haldol 5 mg bid on 4/24/2020. However, d/t prolonged QTc 511 ms on 5/2/2020, haldol discontinued and abilify 2 mg bid started. Pt agreed to [facility], received Abilify Maintena 300 mg IM once on 5/6/2020. Pt tolerated the medications well, denied side effects. Started pt on abilify 10 mg qhs, however, pt refused to take any antipsychotic medication. Treatment over objection pursued and approved, started 4/21/2020. Pt started on haldol 3 mg PO bid (as per TOO, if pt refused haldol 3 mg PO, then haldol 3 mg IM to be given), titrated to haldol 5 mg bid on 4/24/2020. However, d/t prolonged QTc 511 ms on 5/2/2020, haldol discontinued and abilify 2 mg bid started. Pt agreed to TELLEZ, received Abilify Maintena 300 mg IM once on 5/6/2020. Pt had anticipated d/c on 5/8, however, pt became increasingly labile and d/c held. Pt continued abilify 2 mg po bid overlap, which was titrated to abilify 10 mg qhs (last dose 5/27). Pt received 2nd dose Abilify Maintena 400 mg IM once on 6/1/2020. D/t continued lability and psychosis, started on haldol 0.5 mg po qhs with parameter ECG QTc <500 ms. Pt tolerated medications well.

## 2020-05-07 NOTE — PROGRESS NOTE BEHAVIORAL HEALTH - SUMMARY
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 Pt denies any psychiatric diagnoses, states does not take any psychiatric medications, denies all psychiatric complaints. Pt presents with several delusions including living in a private apartment in [place], living with her  Abebe and having "too many children to count". Collateral from only daughter shows otherwise - pt diagnosed with schizophrenia in 2008, has had multiple episodes of medication noncompliance resulting in multiple P hospitalizations (most recent in [facility] last year). Pt's daughter is unsure of medications, states haldol decanoate and clozapine, clozapine REMS states pt is not in system, pt denies any recent injections. TOO approved, started 4/21/2020, haldol started with medical clearance d/t borderline QTc.   On evaluation, pt compensanted, presents clear with linear thought process, able to state last admission at [facility] and was on abilify 2 mg bid with good effect. Pt endorses hx chronic angina and hx of heart failure, provided last pharmacy information [facility], medication list faxed and in chart for review. Abilify 2 mg bid restarted with plan to transition to [facility] prior to discharge, pt amenable. Pt is future-oriented and hopeful about current treatment plan.     #Schizophrenia  -d/c haldol 5 mg PO qhs d/t QTc 511 ms (5/1/2020), pt c/o chronic? angina  -cardiology consulted  -ECG QTc 484 ms (5/3)  -c/w abilify 2 mg bid  -pt received abilify maintena 300 mg IM once on 5/6/2020  -patient has been refusing treatment, TOO approved, started 4/21/2020  -as per TOO, if pt refuses abilify 2 mg PO, then will give haldol 2 mg IM with f/u ECG    #COPD  -c/w Symbicort 160mcg 2 puffs BID  -c/w Spiriva 1 cap inhal daily      #Hypertension  -c/w Lisinopril 10mg PO Daily    #CAD  -c/w ASA 81mg PO daily  -c/w Atorvastatin 40mg PO QHS    #Chronic angina  -PA consult, c/w home medication ranexa 500 mg bid ([facility] last fill 3/2020)  -cardiology consulted  -pt denies cardiac sx

## 2020-05-07 NOTE — DISCHARGE NOTE BEHAVIORAL HEALTH - NSBHDCTESTSFT_PSY_A_CORE
Prolonged QTc 511 ms on 5/1/2020. Most recent ECG QTc 486 ms on 5/3/2020. Prolonged QTc 511 ms on 5/1/2020. Most recent ECG QTc 492 ms on 6/22/2020.

## 2020-05-07 NOTE — DISCHARGE NOTE BEHAVIORAL HEALTH - MEDICATION SUMMARY - MEDICATIONS TO STOP TAKING
I will STOP taking the medications listed below when I get home from the hospital:    metoprolol  -- 12.5 milligram(s) by mouth 2 times a day    clindamycin 150 mg oral capsule  -- 3 cap(s) by mouth 3 times a day

## 2020-05-07 NOTE — DISCHARGE NOTE BEHAVIORAL HEALTH - NSBHDCLABSFT_PSY_A_CORE
As appropriate for follow-up on medications monitoring. Last CBC, CMP, lipid profile, A1C on 5/2/2020, last ECG 5/2/2020. Continue to monitor weight, BP, CBC, CMP, Hemoglobin A1c, fasting glucose and Lipid profile, ECG.

## 2020-05-07 NOTE — DISCHARGE NOTE BEHAVIORAL HEALTH - NSBHDCTHERAPYFT_PSY_A_CORE
group milieu, supportive psychotherapy Patient was provided with milieu therapy as well as daily supportive psychotherapy. Patient has been actively engaged in treatment, attending several groups. Patient has attended group related to illness management and recovery as well as DBT crisis skill group. Reinforced positive coping skills learned on the unit, ie STOP skill.

## 2020-05-07 NOTE — DISCHARGE NOTE BEHAVIORAL HEALTH - NSBHDCSUBSTHXFT_PSY_A_CORE
Pt endorses hx of marijuana use, denies daily and recent use, does not quantify. Denies other illicit substance use, alcohol, cigarettes.
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
## 2020-05-07 NOTE — DISCHARGE NOTE BEHAVIORAL HEALTH - NSBHDCRESPONSEFT_PSY_A_CORE
Pt has made significant progress over the course of hospitalization. With continuous psychotherapy from the treatment team and the medications, patient reports feeling better, sleeping and eating well. Thought process and insight improved. Pt continues to have poor insight on her behavioral health diagnosis, however states abilify "clears my mind" and agrees to continue to take medication after discharge, agreed to TELLEZ. Denies AVH, paranoia. Denies SI/HI, intent and plan. Pt appears future-oriented, states plan to  SSI check and start housing application, endorses desire to follow-up with outpatient treatment and continue medications. Pt was calm and cooperative and was in good behavioral control. Patient denied any suicidal or homicidal ideations. Patient denied any auditory or visual hallucinations. Pt was evaluated by treatment team, pt is stable for discharge and patient shows no imminent danger to self, others or property at this time. Pt understands and agrees with treatment plan and following up with outpatient. Psychoeducation provided regarding diagnosis, medications, treatment and follow up. Risks, benefits, alternatives discussed, all questions and concerns addressed and answered. Initially, pt had made significant progress over the course of hospitalization with planned d/c on 5/8/2020 after TOO and starting haldol 2 mg bid with continuous psychotherapy. However, d/t prolonged QTc >500 ms, haldol PO switched to abilify PO and then abilify maintena leading to pt becoming increasingly labile and unpredictable, d/c held. Pt continues with poor insight on her behavioral health diagnosis. Denies AVH, paranoia. Denies SI/HI, intent and plan. Pt was evaluated by treatment team, pt is stable for transfer to [facility] for further treatment d/t concern for ability to care for self. Pt understands and agrees with treatment plan, and transfer to [facility]. Psychoeducation provided regarding diagnosis, medications, treatment and follow up. Risks, benefits, alternatives discussed, all questions and concerns addressed and answered.

## 2020-05-07 NOTE — DISCHARGE NOTE BEHAVIORAL HEALTH - MEDICATION SUMMARY - MEDICATIONS TO CHANGE
I will SWITCH the dose or number of times a day I take the medications listed below when I get home from the hospital:    lisinopril 2.5 mg oral tablet  -- 1 tab(s) by mouth once a day    atorvastatin 40 mg oral tablet  -- 1 tab(s) by mouth once a day (at bedtime)    aspirin 81 mg oral tablet, chewable  -- 1 tab(s) by mouth once a day    lactobacillus acidophilus oral capsule  -- 1 cap(s) by mouth once a day    Ranexa 500 mg oral tablet, extended release  -- 1 tab(s) by mouth 2 times a day

## 2020-05-07 NOTE — DISCHARGE NOTE BEHAVIORAL HEALTH - NSBHDCADDFT_PSY_A_CORE
**Abilify Maintena 300 mg IM (received 1st shot on 05/06/2020); Next shot due in 4 weeks: **06/03/2020  **Abilify 2 mg bid PO to be continued for 2 weeks, last PO dose: **05/20/2020 **Abilify Maintena 300 mg IM on 05/06/2020 (1st shot) --> Abilify Maintena 400 mg IM on 06/01/2020 (2nd shot) --> Next shot due (4 weeks): 06/29/2020

## 2020-05-07 NOTE — PROGRESS NOTE ADULT - SUBJECTIVE AND OBJECTIVE BOX
pt stable alert in NAD  no new complaints    DEPRESSION  ^DEPRESSION  Handoff  Schizophrenia  Asthma  Hypertension  Coronary artery disease  COPD (chronic obstructive pulmonary disease)  Paranoid schizophrenia  Coronary artery disease involving coronary bypass graft of native heart without angina pectoris  Essential hypertension  Schizophrenia  S/P CABG (coronary artery bypass graft)    HEALTH ISSUES - PROBLEM Dx:  COPD (chronic obstructive pulmonary disease)  Paranoid schizophrenia  Coronary artery disease involving coronary bypass graft of native heart without angina pectoris: Coronary artery disease involving coronary bypass graft of native heart without angina pectoris  Essential hypertension: Essential hypertension  Schizophrenia: Schizophrenia        PAST MEDICAL & SURGICAL HISTORY:  Schizophrenia  Asthma  Hypertension  Coronary artery disease  S/P CABG (coronary artery bypass graft)    Bananas (Unknown)  sulfa drugs (Unknown)      FAMILY HISTORY:      acetaminophen   Tablet .. 650 milliGRAM(s) Oral every 6 hours PRN  aluminum hydroxide/magnesium hydroxide/simethicone Suspension 30 milliLiter(s) Oral every 4 hours PRN  ARIPiprazole 2 milliGRAM(s) Oral daily  ARIPiprazole 2 milliGRAM(s) Oral at bedtime  aspirin  chewable 81 milliGRAM(s) Oral daily  atorvastatin 40 milliGRAM(s) Oral at bedtime  budesonide 160 MICROgram(s)/formoterol 4.5 MICROgram(s) Inhaler 2 Puff(s) Inhalation two times a day  haloperidol    Injectable 2 milliGRAM(s) IntraMuscular at bedtime PRN  hydrOXYzine hydrochloride 25 milliGRAM(s) Oral every 6 hours PRN  lactobacillus acidophilus 1 Tablet(s) Oral daily  lisinopril 10 milliGRAM(s) Oral daily  loratadine 10 milliGRAM(s) Oral daily  nicotine - 21 mG/24Hr(s) Patch 1 patch Transdermal daily  ranolazine 500 milliGRAM(s) Oral two times a day  tiotropium 18 MICROgram(s) Capsule 1 Capsule(s) Inhalation daily      T(C): 35.6 (05-07-20 @ 05:56), Max: 36 (05-06-20 @ 15:19)  HR: 66 (05-07-20 @ 05:56) (61 - 66)  BP: 166/73 (05-07-20 @ 05:56) (163/71 - 166/73)  RR: 16 (05-07-20 @ 05:56) (16 - 16)  SpO2: --    PE;  general:  stable lying in bed innad    Lungs:    Heart:    EXT:    Neuro:  aelrt no deficits                          CAPILLARY BLOOD GLUCOSE

## 2020-05-07 NOTE — DISCHARGE NOTE BEHAVIORAL HEALTH - MEDICATION SUMMARY - MEDICATIONS TO TAKE
I will START or STAY ON the medications listed below when I get home from the hospital:    aspirin 81 mg oral tablet, chewable  -- 1 tab(s) by mouth once a day Continue as prescribed until told otherwise by your provider  -- Indication: For CAD    lisinopril 20 mg oral tablet  -- 1 tab(s) by mouth once a day Continue as prescribed until told otherwise by your provider  -- Indication: For HTN    ranolazine 500 mg oral tablet, extended release  -- 1 tab(s) by mouth 2 times a day Continue as prescribed until told otherwise by your provider  -- Indication: For Chronic angina    loratadine 10 mg oral tablet  -- 1 tab(s) by mouth once a day Continue as prescribed until told otherwise by your provider  -- Indication: For Allergies    atorvastatin 40 mg oral tablet  -- 1 tab(s) by mouth once a day (at bedtime) Continue as prescribed until told otherwise by your provider  -- Indication: For HLD    tiotropium 18 mcg inhalation capsule  -- 1 cap(s) inhaled once a day Continue as prescribed until told otherwise by your provider  -- Indication: For COPD    budesonide-formoterol 160 mcg-4.5 mcg/inh inhalation aerosol  -- 2 puff(s) inhaled 2 times a day Continue as prescribed until told otherwise by your provider  -- Indication: For COPD    amLODIPine 5 mg oral tablet  -- 1 tab(s) by mouth once a day (at bedtime) Continue as prescribed until told otherwise by your provider  -- Indication: For HTN    lidocaine 5% topical film  -- Apply on skin to affected area daily Continue as prescribed until told otherwise by your provider  -- Indication: For Pain    lactobacillus acidophilus oral capsule  -- 1 cap(s) by mouth once a day Continue as prescribed until told otherwise by your provider  -- Indication: For Supplement    nicotine 21 mg/24 hr transdermal film, extended release  -- 1 patch by transdermal patch once a day Continue as prescribed until told otherwise by your provider  -- Indication: For NRT I will START or STAY ON the medications listed below when I get home from the hospital:    aspirin 81 mg oral tablet, chewable  -- 1 tab(s) by mouth once a day Continue as prescribed until told otherwise by your provider  -- Indication: For CAD    lisinopril 20 mg oral tablet  -- 1 tab(s) by mouth once a day Continue as prescribed until told otherwise by your provider  -- Indication: For HTN    ranolazine 500 mg oral tablet, extended release  -- 1 tab(s) by mouth 2 times a day Continue as prescribed until told otherwise by your provider  -- Indication: For Chronic angina    loratadine 10 mg oral tablet  -- 1 tab(s) by mouth once a day Continue as prescribed until told otherwise by your provider  -- Indication: For Allergies    atorvastatin 40 mg oral tablet  -- 1 tab(s) by mouth once a day (at bedtime) Continue as prescribed until told otherwise by your provider  -- Indication: For HLD    tiotropium 18 mcg inhalation capsule  -- 1 cap(s) inhaled once a day Continue as prescribed until told otherwise by your provider  -- Indication: For COPD    budesonide-formoterol 160 mcg-4.5 mcg/inh inhalation aerosol  -- 2 puff(s) inhaled 2 times a day Continue as prescribed until told otherwise by your provider  -- Indication: For COPD    amLODIPine 5 mg oral tablet  -- 1 tab(s) by mouth once a day (at bedtime) Continue as prescribed until told otherwise by your provider  -- Indication: For HTN    lidocaine 5% topical film  -- Apply on skin to affected area daily Continue as prescribed until told otherwise by your provider  -- Indication: For Pain    nicotine 21 mg/24 hr transdermal film, extended release  -- 1 patch by transdermal patch once a day Continue as prescribed until told otherwise by your provider  -- Indication: For NRT I will START or STAY ON the medications listed below when I get home from the hospital:    aspirin 81 mg oral tablet, chewable  -- 1 tab(s) by mouth once a day Continue as prescribed until told otherwise by your provider  -- Indication: For CAD    lisinopril 20 mg oral tablet  -- 1 tab(s) by mouth once a day Continue as prescribed until told otherwise by your provider  -- Indication: For HTN    ranolazine 500 mg oral tablet, extended release  -- 1 tab(s) by mouth 2 times a day Continue as prescribed until told otherwise by your provider  -- Indication: For Chronic angina    loratadine 10 mg oral tablet  -- 1 tab(s) by mouth once a day Continue as prescribed until told otherwise by your provider  -- Indication: For Allergies    atorvastatin 40 mg oral tablet  -- 1 tab(s) by mouth once a day (at bedtime) Continue as prescribed until told otherwise by your provider  -- Indication: For HLD    Abilify Maintena 400 mg intramuscular injection, extended release  -- 400 milligram(s) intramuscular once  Last dose given 06/01/2020  Next dose due 06/29/2020  Continue as prescribed until told otherwise by your prescriber  -- Indication: For Schizophrenia    budesonide-formoterol 160 mcg-4.5 mcg/inh inhalation aerosol  -- 2 puff(s) inhaled 2 times a day Continue as prescribed until told otherwise by your provider  -- Indication: For COPD    tiotropium 18 mcg inhalation capsule  -- 1 cap(s) inhaled once a day Continue as prescribed until told otherwise by your provider  -- Indication: For COPD    amLODIPine 5 mg oral tablet  -- 1 tab(s) by mouth once a day (at bedtime) Continue as prescribed until told otherwise by your provider  -- Indication: For HTN    lidocaine 5% topical film  -- Apply on skin to affected area daily Continue as prescribed until told otherwise by your provider  -- Indication: For Pain    nicotine 21 mg/24 hr transdermal film, extended release  -- 1 patch by transdermal patch once a day Continue as prescribed until told otherwise by your provider  -- Indication: For NRT

## 2020-05-07 NOTE — DISCHARGE NOTE BEHAVIORAL HEALTH - NSBHDCADMRISKREASONS_PSY_A_CORE
Poor engagement in outpt treatment/High utilizer of Inpateint/ED services/Non-adherence with medications/Co-occur mental health and medical/Poor residential stability

## 2020-05-07 NOTE — DISCHARGE NOTE BEHAVIORAL HEALTH - HPI (INCLUDE ILLNESS QUALITY, SEVERITY, DURATION, TIMING, CONTEXT, MODIFYING FACTORS, ASSOCIATED SIGNS AND SYMPTOMS)
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 On evaluation, pt presents cooperative and calm affect in bed, states her name is Rubi Silva, she is  to [place] and have "too many children to count", states her daughter's name is . Pt denies any psychiatric diagnoses, states does not take any psychiatric medications, denies all psychiatric complaints. Pt states mood "I'm happy", denies depression, carlotta, AVH, paranoia. Pt states past medical history include asthma, COPD and heart problems, states "I go to only the best doctors", denies ever seeing a psychiatrist. Denies prior hospitalizations, SA/SIB. Pt appears to be a poor historian. Denies SI/HI, intent and plan.     Collateral obtained from pt's daughter, Britney - as per daughter, pt's last name is incorrect in system, pt's name Rubi Lindsey (pending fax of proof to update medical records). Pt's daughter states she hasn't seen pt for 5-6 months, states when pt is taking medications she does well, but when she stops she changes her phone number and moves "I can't find her". She states pt is on SSD and last she knew, she was at [facility] and was having a home health aid managing care. Pt endorses pt was dx with schizophrenia in 2008, and has been hospitalized several times, most recent from [facility] last year. She is unsure of pt's psychiatric medications, states she remembers haldol decanoate and clozapine in the past. She states Samuel is not pt's  and she is the only child. She endorses concern that pt is homeless, states [place] [address] if an old address pt was evicted from years ago, and questions pt's ability to care for herself independently; states she does not feel safe letting pt move in with her d/t having 2 children in the house.     Collateral obtained from [facility] - there is no record of the pt in files (both for Rubi Ricardo and Rubi Givens).     Collateral obtained from pharmacy, [facility] ([phone]) - pt has no psychiatric medications listed, last medications filled for COPD 12/2019. Medication list faxed, added to chart, informed PA for medical medication reconciliation.    Attempted to obtain collateral from Dr. Jones, PCP (as per daughter) ([phone]), left VM and callback #. 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 On evaluation, pt presents cooperative and calm affect in bed, states her name is Rubi Silva, she is  to [place]be and have "too many children to count", states her daughter's name is . Pt denies any psychiatric diagnoses, states does not take any psychiatric medications, denies all psychiatric complaints. Pt states mood "I'm happy", denies depression, carlotta, AVH, paranoia. Pt states past medical history include asthma, COPD and heart problems, states "I go to only the best doctors", denies ever seeing a psychiatrist. Denies prior hospitalizations, SA/SIB. Pt appears to be a poor historian. Denies SI/HI, intent and plan.     Collateral obtained from pt's daughter, Britney Montelongo ([phone]) as per daughter, pt's last name is incorrect in system, pt's name Rubi Lindsey (pending fax of proof to update medical records). Pt's daughter states she hasn't seen pt for 5-6 months, states when pt is taking medications she does well, but when she stops she changes her phone number and moves "I can't find her". She states pt is on SSD and last she knew, she was at [facility] and was having a home health aid managing care. Pt endorses pt was dx with schizophrenia in 2008, and has been hospitalized several times, most recent from [facility] last year. She is unsure of pt's psychiatric medications, states she remembers haldol decanoate and clozapine in the past. She states Samuel is not pt's  and she is the only child. She endorses concern that pt is homeless, [place] [facility] an old [place] pt was evicted from years ago, and questions pt's ability to care for herself independently; states she does not feel safe letting pt move in with her d/t having 2 children in the house.     Collateral obtained from [facility] - there is no record of the pt in files (both for Rubi Silva and Rubi Tosha).     Collateral obtained from pharmacy, [facility] ([phone]) - pt has no psychiatric medications listed, last medications filled for COPD 12/2019. Medication list faxed, added to chart, informed PA for medical medication reconciliation.    Attempted to obtain collateral from Dr. Jones, PCP (as per daughter) ([phone]), left VM and callback #.

## 2020-05-07 NOTE — DISCHARGE NOTE BEHAVIORAL HEALTH - NSBHDCSWCOMMENTSFT_PSY_A_CORE
D/C Paperwork email over to [email or web address] @ [email or web address] on 6/25/2020 @8:30am

## 2020-05-07 NOTE — DISCHARGE NOTE BEHAVIORAL HEALTH - NSTOBACCOREFERRAL_GEN_A_NCS
Yes Registered and referral made for a callback two days in advance./Yes Patient declined information/Patient will be followed-up with nicotine cessation at [facility].

## 2020-05-07 NOTE — DISCHARGE NOTE BEHAVIORAL HEALTH - NSBHDCMEDICALFT_PSY_A_CORE
Pt originally admitted to medicine in [facility] for lower left extremity cellulitis on 3/22/2020 s/p clindamycin course. On admission, pt with prolonged QTc 502 ms with repeat ECG QTc 477 ms on 3/26/2020 when transferred to [facility]. Medical cleared pt to start haldol with ECG q3 days for QTc monitoring. On 5/1/2020, QTc 511 ms requiring discontinuation of haldol and switch to abilify. Follow-up ECG QTc 480 ms (5/2/2020). Also on admission, pt c/o SOB on transfer to [facility] and states has COPD, PA consult done with rx of Spiriva, Symbicort, Claritan. On 5/1/2020, pt also c/o chest pain and endorses hx of chronic angina. Cardiology consult done, states chest pain is non-cardiac, home medication ranexa 500 mg bid restarted. Podiatry debrided mycotic nails on 5/2/2020.

## 2020-05-07 NOTE — PROGRESS NOTE BEHAVIORAL HEALTH - NSBHCHARTREVIEWVS_PSY_A_CORE FT
Vital Signs Last 24 Hrs  T(C): 36.2 (07 May 2020 07:42), Max: 36.2 (07 May 2020 07:42)  T(F): 97.2 (07 May 2020 07:42), Max: 97.2 (07 May 2020 07:42)  HR: 63 (07 May 2020 07:42) (61 - 66)  BP: 183/74 (07 May 2020 07:42) (163/71 - 183/74)  BP(mean): --  RR: 16 (07 May 2020 07:42) (16 - 16)  SpO2: --

## 2020-05-08 PROCEDURE — 99231 SBSQ HOSP IP/OBS SF/LOW 25: CPT

## 2020-05-08 RX ORDER — ARIPIPRAZOLE 15 MG/1
5 TABLET ORAL DAILY
Refills: 0 | Status: DISCONTINUED | OUTPATIENT
Start: 2020-05-09 | End: 2020-05-15

## 2020-05-08 RX ADMIN — RANOLAZINE 500 MILLIGRAM(S): 500 TABLET, FILM COATED, EXTENDED RELEASE ORAL at 08:40

## 2020-05-08 RX ADMIN — Medication 650 MILLIGRAM(S): at 00:19

## 2020-05-08 RX ADMIN — Medication 1 MILLIGRAM(S): at 09:51

## 2020-05-08 RX ADMIN — ATORVASTATIN CALCIUM 40 MILLIGRAM(S): 80 TABLET, FILM COATED ORAL at 20:17

## 2020-05-08 RX ADMIN — Medication 1 PATCH: at 18:52

## 2020-05-08 RX ADMIN — Medication 1 TABLET(S): at 08:40

## 2020-05-08 RX ADMIN — ARIPIPRAZOLE 2 MILLIGRAM(S): 15 TABLET ORAL at 08:40

## 2020-05-08 RX ADMIN — Medication 1 PATCH: at 08:44

## 2020-05-08 RX ADMIN — LISINOPRIL 10 MILLIGRAM(S): 2.5 TABLET ORAL at 08:40

## 2020-05-08 RX ADMIN — Medication 81 MILLIGRAM(S): at 08:40

## 2020-05-08 RX ADMIN — Medication 650 MILLIGRAM(S): at 06:24

## 2020-05-08 RX ADMIN — Medication 1 PATCH: at 08:45

## 2020-05-08 RX ADMIN — Medication 650 MILLIGRAM(S): at 16:36

## 2020-05-08 RX ADMIN — BUDESONIDE AND FORMOTEROL FUMARATE DIHYDRATE 2 PUFF(S): 160; 4.5 AEROSOL RESPIRATORY (INHALATION) at 20:18

## 2020-05-08 RX ADMIN — TIOTROPIUM BROMIDE 1 CAPSULE(S): 18 CAPSULE ORAL; RESPIRATORY (INHALATION) at 08:40

## 2020-05-08 RX ADMIN — RANOLAZINE 500 MILLIGRAM(S): 500 TABLET, FILM COATED, EXTENDED RELEASE ORAL at 20:17

## 2020-05-08 RX ADMIN — LORATADINE 10 MILLIGRAM(S): 10 TABLET ORAL at 08:40

## 2020-05-08 RX ADMIN — BUDESONIDE AND FORMOTEROL FUMARATE DIHYDRATE 2 PUFF(S): 160; 4.5 AEROSOL RESPIRATORY (INHALATION) at 08:40

## 2020-05-08 RX ADMIN — Medication 650 MILLIGRAM(S): at 20:24

## 2020-05-08 NOTE — CHART NOTE - NSCHARTNOTEFT_GEN_A_CORE
Social Work Note     Treatment team met with patient on rounds this morning to discuss recent events and to go over discharge plan. Patient became irate and insistent upon discharge at that moment. Patient was difficult to redirect. Patient shouted expletives at treatment team and began posturing in a threatening manner. Patient presented with delusions similar in nature to admission. Limited interview due to need for IM PRN medication STAT. Discharge tabled, [place] application to be reconsidered.

## 2020-05-08 NOTE — PROGRESS NOTE BEHAVIORAL HEALTH - NSBHCHARTREVIEWIMAGING_PSY_A_CORE FT
< from: CT Tibia/Fibia No Cont, Left (03.22.20 @ 15:17) >    IMPRESSION:    Diffuse subcutaneous edema of the left leg without evidence of osteomyelitis, necrotizing fasciitis, or abscess.    No acutely displaced fracture    < end of copied text >  < from: US Abdomen Limited (03.22.20 @ 14:44) >    IMPRESSION:    Cholelithiasis without evidence of cholecystitis.    < end of copied text > < from: CT Tibia/Fibia No Cont, Left (03.22.20 @ 15:17) >    IMPRESSION:    Diffuse subcutaneous edema of the left leg without evidence of osteomyelitis, necrotizing fasciitis, or abscess.    No acutely displaced fracture    < end of copied text >  < from: US Abdomen Limited (03.22.20 @ 14:44) >    IMPRESSION:    Cholelithiasis without evidence of cholecystitis.    < end of copied text >  < from: VA Duplex Lower Ext Vein Scan, Left (03.22.20 @ 08:43) >    Impression:    No evidence of deep venous thrombosis or superficial thrombophlebitis in left lower extremity.    < end of copied text >  < from: CT Head No Cont (03.22.20 @ 06:48) >    IMPRESSION:     No acute intracranial hemorrhage or mass effect.    < end of copied text >  < from: Xray Chest 1 View AP/PA (03.22.20 @ 03:47) >    Impression:      No radiographic evidence of acute cardiopulmonary disease.    < end of copied text >  Findings/  impression: Soft tissue swelling without radiographic evidence of subcutaneous emphysema or osteomyelitis. Tricompartmental knee degenerative changes present.    < end of copied text >

## 2020-05-08 NOTE — PROGRESS NOTE ADULT - PROBLEM SELECTOR PLAN 1
continue present treatment as per psych plan as reviewed  Medically stable with no new changes in treatment  will continue to monitor medical status while being treated on psych  medically stable for d/c as per psych

## 2020-05-08 NOTE — PROGRESS NOTE BEHAVIORAL HEALTH - NSBHCHARTREVIEWVS_PSY_A_CORE FT
Vital Signs Last 24 Hrs  T(C): 35.5 (08 May 2020 07:43), Max: 36.1 (07 May 2020 16:10)  T(F): 95.9 (08 May 2020 07:43), Max: 96.9 (07 May 2020 16:10)  HR: 81 (08 May 2020 07:43) (76 - 81)  BP: 171/78 (08 May 2020 07:43) (152/81 - 178/80)  BP(mean): --  RR: 16 (08 May 2020 07:43) (16 - 18)  SpO2: --

## 2020-05-08 NOTE — PROGRESS NOTE ADULT - SUBJECTIVE AND OBJECTIVE BOX
pt stable alert in NAD  no new complaints    DEPRESSION  ^DEPRESSION  Handoff  Schizophrenia  Asthma  Hypertension  Coronary artery disease  Schizophrenia  COPD (chronic obstructive pulmonary disease)  Paranoid schizophrenia  Coronary artery disease involving coronary bypass graft of native heart without angina pectoris  Essential hypertension  Schizophrenia  S/P CABG (coronary artery bypass graft)    HEALTH ISSUES - PROBLEM Dx:  COPD (chronic obstructive pulmonary disease)  Paranoid schizophrenia  Coronary artery disease involving coronary bypass graft of native heart without angina pectoris: Coronary artery disease involving coronary bypass graft of native heart without angina pectoris  Essential hypertension: Essential hypertension  Schizophrenia: Schizophrenia        PAST MEDICAL & SURGICAL HISTORY:  Schizophrenia  Asthma  Hypertension  Coronary artery disease  S/P CABG (coronary artery bypass graft)    Bananas (Unknown)  sulfa drugs (Unknown)      FAMILY HISTORY:      acetaminophen   Tablet .. 650 milliGRAM(s) Oral every 6 hours PRN  aluminum hydroxide/magnesium hydroxide/simethicone Suspension 30 milliLiter(s) Oral every 4 hours PRN  ARIPiprazole 2 milliGRAM(s) Oral daily  ARIPiprazole 2 milliGRAM(s) Oral at bedtime  aspirin  chewable 81 milliGRAM(s) Oral daily  atorvastatin 40 milliGRAM(s) Oral at bedtime  budesonide 160 MICROgram(s)/formoterol 4.5 MICROgram(s) Inhaler 2 Puff(s) Inhalation two times a day  haloperidol    Injectable 2 milliGRAM(s) IntraMuscular at bedtime PRN  hydrOXYzine hydrochloride 25 milliGRAM(s) Oral every 6 hours PRN  lactobacillus acidophilus 1 Tablet(s) Oral daily  lisinopril 10 milliGRAM(s) Oral daily  loratadine 10 milliGRAM(s) Oral daily  nicotine - 21 mG/24Hr(s) Patch 1 patch Transdermal daily  ranolazine 500 milliGRAM(s) Oral two times a day  tiotropium 18 MICROgram(s) Capsule 1 Capsule(s) Inhalation daily      T(C): 35 (05-08-20 @ 06:12), Max: 36.1 (05-07-20 @ 16:10)  HR: 79 (05-08-20 @ 06:12) (76 - 79)  BP: 152/81 (05-08-20 @ 06:12) (152/81 - 178/80)  RR: 18 (05-08-20 @ 06:12) (16 - 18)  SpO2: --    PE;  general:  stable no changes ambulating on unitin nad    Lungs:    Heart:    EXT:    Neuro:  no deficits                          CAPILLARY BLOOD GLUCOSE
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
## 2020-05-08 NOTE — PROGRESS NOTE BEHAVIORAL HEALTH - SUMMARY
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 d/t bizarre bx, psychiatric medication non-compliance and questionable ability to care for self. Pt denies any psychiatric diagnoses, states does not take any psychiatric medications, denies all psychiatric complaints. Pt presents with several delusions including living in a private apartment in [place], living with her  Abebe and having "too many children to count". Collateral from only daughter shows otherwise - pt diagnosed with schizophrenia in 2008, has had multiple episodes of medication noncompliance resulting in multiple [facility] hospitalizations (most recent in [facility] last year). TOO approved, started 4/21/2020, haldol started with medical clearance d/t borderline QTc, which had to be switched to abilify d/t prolonged QTc. Pt compensated, agreed to abilify maintena on 5/6, with plan for discharge on 5/8. However, on day of discharge, discharge held d/t pt decompensating with return of prior delusions and agitation.     #Schizophrenia  -d/c haldol 5 mg PO qhs d/t QTc 511 ms (5/1/2020), pt c/o chronic angina  -cardiology consulted  -ECG QTc 484 ms (5/3)  -titrate abilify 5 mg daily (5/8/2020)  -pt received abilify maintena 300 mg IM once (5/6/2020)  -patient has been refusing treatment, TOO approved, started 4/21/2020  -as per TOO, if pt refuses abilify 5 mg PO, then will give haldol 2 mg IM with f/u ECG    #COPD  -c/w Symbicort 160mcg 2 puffs BID  -c/w Spiriva 1 cap inhal daily      #Hypertension  -c/w Lisinopril 10mg PO Daily  -medical consult, appreciate rec    #CAD  -c/w ASA 81mg PO daily  -c/w Atorvastatin 40mg PO QHS    #Chronic angina  -PA consult, c/w home medication ranexa 500 mg bid ([facility] last fill 3/2020)  -cardiology consulted  -pt denies cardiac sx 65 y/o, female, white, single, one adult child, undomiciled, 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 Pt denies any psychiatric diagnoses, states does not take any psychiatric medications, denies all psychiatric complaints. Pt presents with several delusions including living in a private apartment in [place], living with her  Abebe and having "too many children to count". Collateral from only daughter shows otherwise - pt diagnosed with schizophrenia in 2008, has had multiple episodes of medication noncompliance resulting in multiple [facility] hospitalizations (most recent in [facility] last year). TOO approved, started 4/21/2020, haldol started with medical clearance d/t borderline QTc, which had to be switched to abilify d/t prolonged QTc. Pt compensated, agreed to abilify maintena on 5/6, with plan for discharge on 5/8. However, on day of discharge, discharge held d/t pt decompensating with return of prior delusions and agitation.     #Schizophrenia  -d/c haldol 5 mg PO qhs d/t QTc 511 ms (5/1/2020), pt c/o chronic angina  -cardiology consulted  -ECG QTc 484 ms (5/3)  -titrate abilify 5 mg daily (5/8/2020)  -pt received abilify maintena 300 mg IM once (5/6/2020)  -patient has been refusing treatment, TOO approved, started 4/21/2020  -as per TOO, if pt refuses abilify 5 mg PO, then will give haldol 2 mg IM with f/u ECG    #COPD  -c/w Symbicort 160mcg 2 puffs BID  -c/w Spiriva 1 cap inhal daily      #Hypertension  -c/w Lisinopril 10mg PO Daily  -medical consult, appreciate rec    #CAD  -c/w ASA 81mg PO daily  -c/w Atorvastatin 40mg PO QHS    #Chronic angina  -PA consult, c/w home medication ranexa 500 mg bid (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 Pt denies any psychiatric diagnoses, states does not take any psychiatric medications, denies all psychiatric complaints. Pt presents with several delusions including living in a private apartment in [place], living with her  Abebe and having "too many children to count". Collateral from only daughter shows otherwise - pt diagnosed with schizophrenia in 2008, has had multiple episodes of medication noncompliance resulting in multiple [facility] hospitalizations (most recent known [facility] 2/2020). TOO approved, started 4/21/2020, haldol started with medical clearance d/t borderline QTc, which had to be switched to abilify d/t prolonged QTc. Pt compensated, agreed to abilify maintena on 5/6, with plan for discharge on 5/8. However, on day of discharge, discharge held d/t pt decompensating with return of prior delusions and agitation.     #Schizophrenia  -d/c haldol 5 mg PO qhs d/t QTc 511 ms (5/1/2020), pt c/o chronic angina  -cardiology consulted  -ECG QTc 484 ms (5/3)  -titrate abilify 5 mg daily (5/8/2020)  -pt received abilify maintena 300 mg IM once (5/6/2020)  -patient has been refusing treatment, TOO approved, started 4/21/2020  -as per TOO, if pt refuses abilify 5 mg PO, then will give haldol 2 mg IM with f/u ECG    #COPD  -c/w Symbicort 160mcg 2 puffs BID  -c/w Spiriva 1 cap inhal daily      #Hypertension  -c/w Lisinopril 10mg PO Daily  -medical consult, appreciate rec    #CAD  -c/w ASA 81mg PO daily  -c/w Atorvastatin 40mg PO QHS    #Chronic angina  -PA consult, c/w home medication ranexa 500 mg bid ([facility] last fill 3/2020)  -cardiology consulted  -pt denies cardiac sx 65 y/o, female, white, single, one adult child, undomiciled, 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 Pt denies any psychiatric diagnoses, states does not take any psychiatric medications, denies all psychiatric complaints. Pt presents with several delusions including living in a private apartment in [place], living with her  Abebe and having "too many children to count". Collateral from only daughter shows otherwise - pt diagnosed with schizophrenia in 2008, has had multiple episodes of medication noncompliance resulting in multiple [facility] hospitalizations ([facility] TOO approved, started 4/21/2020, haldol started with medical clearance d/t borderline QTc, which had to be switched to abilify d/t prolonged QTc. Pt compensated, agreed to abilify maintena on 5/6, with plan for discharge on 5/8. However, on day of discharge, discharge held d/t pt decompensating with return of prior delusions and agitation.     #Schizophrenia  -d/c haldol 5 mg PO qhs d/t QTc 511 ms (5/1/2020), pt c/o chronic angina  -cardiology consulted  -ECG QTc 484 ms (5/3)  -titrate abilify 5 mg daily (5/8/2020)  -pt received abilify maintena 300 mg IM once (5/6/2020)  -patient has been refusing treatment, TOO approved, started 4/21/2020  -as per TOO, if pt refuses abilify 5 mg PO, then will give haldol 2 mg IM with f/u ECG    #COPD  -c/w Symbicort 160mcg 2 puffs BID  -c/w Spiriva 1 cap inhal daily      #Hypertension  -c/w Lisinopril 10mg PO Daily  -medical consult, appreciate rec on elevated BP    #CAD  -c/w ASA 81mg PO daily  -c/w Atorvastatin 40mg PO QHS    #Chronic angina  -PA consult, c/w home medication ranexa 500 mg bid ([facility] last fill 3/2020)  -cardiology consulted  -pt denies cardiac sx 65 y/o, female, white, single, one adult child, undomiciled, 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 Pt denies any psychiatric diagnoses, states does not take any psychiatric medications, denies all psychiatric complaints. Pt presents with several delusions including living in a private apartment in [place], living with her  Abebe and having "too many children to count". Collateral from only daughter shows otherwise - pt diagnosed with schizophrenia in 2008, has had multiple episodes of medication noncompliance resulting in multiple [facility] hospitalizations (most recent known [facility] 2/2020). TOO approved, started 4/21/2020, haldol started with medical clearance d/t borderline QTc, which had to be switched to abilify d/t prolonged QTc. Pt compensated, agreed to abilify maintena on 5/6, with plan for discharge on 5/8. However, on day of discharge, discharge held d/t pt decompensating with return of prior delusions and agitation.     #Schizophrenia  -d/c haldol 5 mg PO qhs d/t QTc 511 ms (5/1/2020), pt c/o chronic angina  -cardiology consulted  -ECG QTc 484 ms (5/3)  -titrate abilify 5 mg daily (5/8/2020)  -pt received abilify maintena 300 mg IM once (5/6/2020)  -patient has been refusing treatment, TOO approved, started 4/21/2020  -as per TOO, if pt refuses abilify 5 mg PO, then will give haldol 2 mg IM with f/u ECG    #COPD  -c/w Symbicort 160mcg 2 puffs BID  -c/w Spiriva 1 cap inhal daily      #Hypertension  -c/w Lisinopril 10mg PO Daily  -medical consult, appreciate rec on elevated BP    #CAD  -c/w ASA 81mg PO daily  -c/w Atorvastatin 40mg PO QHS    #Chronic angina  -PA consult, c/w home medication ranexa 500 mg bid ([facility] states last fill 3/2020)  -cardiology consulted  -pt denies cardiac sx    #Agitation  -In the case of severe agitation not responsive to verbal redirection, may give ativan 1 mg PO (with escalation to IM if patient refusing)

## 2020-05-09 RX ADMIN — RANOLAZINE 500 MILLIGRAM(S): 500 TABLET, FILM COATED, EXTENDED RELEASE ORAL at 20:57

## 2020-05-09 RX ADMIN — Medication 1 TABLET(S): at 08:05

## 2020-05-09 RX ADMIN — ATORVASTATIN CALCIUM 40 MILLIGRAM(S): 80 TABLET, FILM COATED ORAL at 20:57

## 2020-05-09 RX ADMIN — Medication 1 PATCH: at 08:06

## 2020-05-09 RX ADMIN — LISINOPRIL 10 MILLIGRAM(S): 2.5 TABLET ORAL at 08:05

## 2020-05-09 RX ADMIN — Medication 25 MILLIGRAM(S): at 03:28

## 2020-05-09 RX ADMIN — LORATADINE 10 MILLIGRAM(S): 10 TABLET ORAL at 08:05

## 2020-05-09 RX ADMIN — TIOTROPIUM BROMIDE 1 CAPSULE(S): 18 CAPSULE ORAL; RESPIRATORY (INHALATION) at 08:05

## 2020-05-09 RX ADMIN — RANOLAZINE 500 MILLIGRAM(S): 500 TABLET, FILM COATED, EXTENDED RELEASE ORAL at 08:05

## 2020-05-09 RX ADMIN — Medication 81 MILLIGRAM(S): at 08:05

## 2020-05-09 RX ADMIN — ARIPIPRAZOLE 5 MILLIGRAM(S): 15 TABLET ORAL at 08:05

## 2020-05-09 RX ADMIN — BUDESONIDE AND FORMOTEROL FUMARATE DIHYDRATE 2 PUFF(S): 160; 4.5 AEROSOL RESPIRATORY (INHALATION) at 08:05

## 2020-05-10 RX ADMIN — Medication 1 TABLET(S): at 08:44

## 2020-05-10 RX ADMIN — Medication 1 PATCH: at 08:46

## 2020-05-10 RX ADMIN — BUDESONIDE AND FORMOTEROL FUMARATE DIHYDRATE 2 PUFF(S): 160; 4.5 AEROSOL RESPIRATORY (INHALATION) at 21:11

## 2020-05-10 RX ADMIN — TIOTROPIUM BROMIDE 1 CAPSULE(S): 18 CAPSULE ORAL; RESPIRATORY (INHALATION) at 08:45

## 2020-05-10 RX ADMIN — ATORVASTATIN CALCIUM 40 MILLIGRAM(S): 80 TABLET, FILM COATED ORAL at 21:11

## 2020-05-10 RX ADMIN — RANOLAZINE 500 MILLIGRAM(S): 500 TABLET, FILM COATED, EXTENDED RELEASE ORAL at 08:44

## 2020-05-10 RX ADMIN — LORATADINE 10 MILLIGRAM(S): 10 TABLET ORAL at 08:44

## 2020-05-10 RX ADMIN — LISINOPRIL 10 MILLIGRAM(S): 2.5 TABLET ORAL at 08:44

## 2020-05-10 RX ADMIN — Medication 25 MILLIGRAM(S): at 16:51

## 2020-05-10 RX ADMIN — Medication 81 MILLIGRAM(S): at 08:44

## 2020-05-10 RX ADMIN — Medication 1 PATCH: at 18:00

## 2020-05-10 RX ADMIN — BUDESONIDE AND FORMOTEROL FUMARATE DIHYDRATE 2 PUFF(S): 160; 4.5 AEROSOL RESPIRATORY (INHALATION) at 08:44

## 2020-05-10 RX ADMIN — ARIPIPRAZOLE 5 MILLIGRAM(S): 15 TABLET ORAL at 08:44

## 2020-05-10 RX ADMIN — RANOLAZINE 500 MILLIGRAM(S): 500 TABLET, FILM COATED, EXTENDED RELEASE ORAL at 21:11

## 2020-05-10 NOTE — PROGRESS NOTE ADULT - SUBJECTIVE AND OBJECTIVE BOX
pt stable alert in NAD  no new complaints    DEPRESSION  ^DEPRESSION  Handoff  Schizophrenia  Asthma  Hypertension  Coronary artery disease  Schizophrenia  COPD (chronic obstructive pulmonary disease)  Paranoid schizophrenia  Coronary artery disease involving coronary bypass graft of native heart without angina pectoris  Essential hypertension  Schizophrenia  S/P CABG (coronary artery bypass graft)    HEALTH ISSUES - PROBLEM Dx:  COPD (chronic obstructive pulmonary disease)  Paranoid schizophrenia  Coronary artery disease involving coronary bypass graft of native heart without angina pectoris: Coronary artery disease involving coronary bypass graft of native heart without angina pectoris  Essential hypertension: Essential hypertension  Schizophrenia: Schizophrenia        PAST MEDICAL & SURGICAL HISTORY:  Schizophrenia  Asthma  Hypertension  Coronary artery disease  S/P CABG (coronary artery bypass graft)    Bananas (Unknown)  sulfa drugs (Unknown)      FAMILY HISTORY:      acetaminophen   Tablet .. 650 milliGRAM(s) Oral every 6 hours PRN  aluminum hydroxide/magnesium hydroxide/simethicone Suspension 30 milliLiter(s) Oral every 4 hours PRN  ARIPiprazole 5 milliGRAM(s) Oral daily  aspirin  chewable 81 milliGRAM(s) Oral daily  atorvastatin 40 milliGRAM(s) Oral at bedtime  budesonide 160 MICROgram(s)/formoterol 4.5 MICROgram(s) Inhaler 2 Puff(s) Inhalation two times a day  haloperidol    Injectable 2 milliGRAM(s) IntraMuscular at bedtime PRN  hydrOXYzine hydrochloride 25 milliGRAM(s) Oral every 6 hours PRN  lactobacillus acidophilus 1 Tablet(s) Oral daily  lisinopril 10 milliGRAM(s) Oral daily  loratadine 10 milliGRAM(s) Oral daily  nicotine - 21 mG/24Hr(s) Patch 1 patch Transdermal daily  ranolazine 500 milliGRAM(s) Oral two times a day  tiotropium 18 MICROgram(s) Capsule 1 Capsule(s) Inhalation daily      T(C): 35.9 (05-10-20 @ 06:27), Max: 36.4 (05-09-20 @ 17:22)  HR: 61 (05-10-20 @ 06:27) (59 - 69)  BP: 128/58 (05-10-20 @ 06:27) (106/50 - 133/60)  RR: 16 (05-10-20 @ 06:27) (16 - 18)  SpO2: --    PE;  general:  no cahnge s in status in nad    Lungs:    Heart:    EXT:    Neuro:   aelrt nod efciits                        CAPILLARY BLOOD GLUCOSE

## 2020-05-11 PROCEDURE — 99231 SBSQ HOSP IP/OBS SF/LOW 25: CPT

## 2020-05-11 RX ADMIN — Medication 30 MILLILITER(S): at 17:49

## 2020-05-11 RX ADMIN — ATORVASTATIN CALCIUM 40 MILLIGRAM(S): 80 TABLET, FILM COATED ORAL at 20:21

## 2020-05-11 RX ADMIN — Medication 1 TABLET(S): at 08:16

## 2020-05-11 RX ADMIN — LISINOPRIL 10 MILLIGRAM(S): 2.5 TABLET ORAL at 08:16

## 2020-05-11 RX ADMIN — Medication 650 MILLIGRAM(S): at 09:19

## 2020-05-11 RX ADMIN — Medication 1 PATCH: at 08:17

## 2020-05-11 RX ADMIN — Medication 650 MILLIGRAM(S): at 19:19

## 2020-05-11 RX ADMIN — ARIPIPRAZOLE 5 MILLIGRAM(S): 15 TABLET ORAL at 08:16

## 2020-05-11 RX ADMIN — BUDESONIDE AND FORMOTEROL FUMARATE DIHYDRATE 2 PUFF(S): 160; 4.5 AEROSOL RESPIRATORY (INHALATION) at 08:17

## 2020-05-11 RX ADMIN — TIOTROPIUM BROMIDE 1 CAPSULE(S): 18 CAPSULE ORAL; RESPIRATORY (INHALATION) at 08:16

## 2020-05-11 RX ADMIN — RANOLAZINE 500 MILLIGRAM(S): 500 TABLET, FILM COATED, EXTENDED RELEASE ORAL at 20:21

## 2020-05-11 RX ADMIN — Medication 81 MILLIGRAM(S): at 08:16

## 2020-05-11 RX ADMIN — Medication 1 PATCH: at 08:23

## 2020-05-11 RX ADMIN — RANOLAZINE 500 MILLIGRAM(S): 500 TABLET, FILM COATED, EXTENDED RELEASE ORAL at 08:16

## 2020-05-11 RX ADMIN — LORATADINE 10 MILLIGRAM(S): 10 TABLET ORAL at 08:16

## 2020-05-11 RX ADMIN — BUDESONIDE AND FORMOTEROL FUMARATE DIHYDRATE 2 PUFF(S): 160; 4.5 AEROSOL RESPIRATORY (INHALATION) at 20:24

## 2020-05-11 NOTE — CHART NOTE - NSCHARTNOTEFT_GEN_A_CORE
Social Work Note:    Treatment team met with patient to discuss treatment plan, medications and discharge plan.  Patient reports "I feel fine I want to leave". Patient apologies for losing temper last week. Patient indicates she was just upset that she could not leave to spend the mothers day weekend with her daughter. When asked if she spoke to her daughter patient  responded with "no it would devastate her that I was not coming we have not spoken for very long time". Patient reports she must leave to interview for a job at [employer] that she applied to last year. 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 Patient remains delusional circumstantial and with very poor insight.  Patient agrees to Abilify indicates it helps her think more clearly. Patient visible on unit and in some groups. Patient was educated to the benefits of attending and actively participating in groups that are offered on the unit. Patient expressed understanding. Patient remains with a desire to discharge. Reports she is homeless and will stay at the shelter in [place]. Patient agrees to follow up with [facility] Nurse Kori. Treatment team unsure of plan at this time. Given patients serious persistent mental illness, chronic homelessness, poor treatment compliance and lack of any significant period of mental stability treatment team feels patient would benefit from long term hospitalization at [facility] however given patients compensation unsure if pt will be accepted. Application remains underway. Patient denies SI HI and AVH. Patient in good behavioral control at this time. Patient compliant with treatment and unit rules. Patient declines referral for Health Home. Education and support provided.       Mental Status Exam:    Mood – Low  Sleep - Fair  Appetite - Good  ADLs - Fair  Thought Process – Delusional  Observation – [id number]    No barriers to discharge identified at this time.     At this time patient is not psychiatrically stable for discharge.

## 2020-05-11 NOTE — PROGRESS NOTE BEHAVIORAL HEALTH - NSBHFUPINTERVALHXFT_PSY_A_CORE
Pt seen and evaluated, chart reviewed. As per nursing report, no acute issues over the weekend, medication compliant and no behavioral issues (TOO started 4/21). On evaluation, pt states mood "I'm fine", endorses good sleep and appetite. Pt denies prior delusions on Friday, states "I lost my temper back then, I'm sorry", denies living in [facility], states is single, states her SSI check is waiting for her at [facility], states plans to interview at [employer] and is currently unemployed. Pt continues to have poor insight on her behavioral health diagnosis, however states abilify "clears my mind" and agrees to continue to take medication after discharge. Denies AVH, paranoia. Denies SI/HI, intent and plan.

## 2020-05-11 NOTE — PROGRESS NOTE BEHAVIORAL HEALTH - NSBHCHARTREVIEWVS_PSY_A_CORE FT
Vital Signs Last 24 Hrs  T(C): 35.6 (11 May 2020 08:16), Max: 36.6 (10 May 2020 17:24)  T(F): 96 (11 May 2020 08:16), Max: 97.9 (10 May 2020 17:24)  HR: 56 (11 May 2020 06:03) (56 - 79)  BP: 106/65 (11 May 2020 08:16) (106/65 - 145/63)  BP(mean): --  RR: 18 (11 May 2020 08:16) (16 - 18)  SpO2: --

## 2020-05-11 NOTE — PROGRESS NOTE BEHAVIORAL HEALTH - SUMMARY
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 Pt denies any psychiatric diagnoses, states does not take any psychiatric medications, denies all psychiatric complaints. Pt presents with several delusions including living in a private apartment in [place], living with her  Abebe and having "too many children to count". Collateral from only daughter shows otherwise - pt diagnosed with schizophrenia in 2008, has had multiple episodes of medication noncompliance resulting in multiple [facility] hospitalizations (most recent known [facility] 2/2020). TOO approved, started 4/21/2020, haldol started with medical clearance d/t borderline QTc, which had to be switched to abilify d/t prolonged QTc. Pt compensated, agreed to abilify maintena on 5/6, with plan for discharge on 5/8. However, on day of discharge, discharge held d/t pt decompensating with return of prior delusions and agitation. On evaluation today, pt presents pleasant and in good behavioral control, denies prior delusions.     #Schizophrenia  -d/c haldol 5 mg PO qhs d/t QTc 511 ms (5/1/2020), pt c/o chronic angina  -cardiology consulted  -ECG QTc 484 ms (5/3)  -c/w abilify 5 mg daily (5/8/2020)  -pt received abilify maintena 300 mg IM once (5/6/2020)  -patient has been refusing treatment, TOO approved, started 4/21/2020  -as per TOO, if pt refuses abilify 5 mg PO, then will give haldol 2 mg IM with f/u ECG    #COPD  -c/w Symbicort 160mcg 2 puffs BID  -c/w Spiriva 1 cap inhal daily      #Hypertension  -c/w Lisinopril 10mg PO Daily    #CAD  -c/w ASA 81mg PO daily  -c/w Atorvastatin 40mg PO QHS    #Chronic angina  -PA consult, c/w home medication ranexa 500 mg bid ([facility] last fill 3/2020)  -cardiology consulted  -pt denies cardiac sx

## 2020-05-12 PROCEDURE — 99231 SBSQ HOSP IP/OBS SF/LOW 25: CPT

## 2020-05-12 RX ORDER — IBUPROFEN 200 MG
400 TABLET ORAL EVERY 12 HOURS
Refills: 0 | Status: COMPLETED | OUTPATIENT
Start: 2020-05-12 | End: 2020-05-14

## 2020-05-12 RX ORDER — IBUPROFEN 200 MG
400 TABLET ORAL ONCE
Refills: 0 | Status: COMPLETED | OUTPATIENT
Start: 2020-05-12 | End: 2020-05-12

## 2020-05-12 RX ADMIN — BUDESONIDE AND FORMOTEROL FUMARATE DIHYDRATE 2 PUFF(S): 160; 4.5 AEROSOL RESPIRATORY (INHALATION) at 22:22

## 2020-05-12 RX ADMIN — Medication 400 MILLIGRAM(S): at 08:13

## 2020-05-12 RX ADMIN — LISINOPRIL 10 MILLIGRAM(S): 2.5 TABLET ORAL at 08:14

## 2020-05-12 RX ADMIN — Medication 81 MILLIGRAM(S): at 08:14

## 2020-05-12 RX ADMIN — LORATADINE 10 MILLIGRAM(S): 10 TABLET ORAL at 08:14

## 2020-05-12 RX ADMIN — Medication 400 MILLIGRAM(S): at 22:23

## 2020-05-12 RX ADMIN — Medication 400 MILLIGRAM(S): at 13:40

## 2020-05-12 RX ADMIN — Medication 1 TABLET(S): at 08:14

## 2020-05-12 RX ADMIN — RANOLAZINE 500 MILLIGRAM(S): 500 TABLET, FILM COATED, EXTENDED RELEASE ORAL at 08:14

## 2020-05-12 RX ADMIN — Medication 1 PATCH: at 08:19

## 2020-05-12 RX ADMIN — Medication 1 PATCH: at 08:14

## 2020-05-12 RX ADMIN — ARIPIPRAZOLE 5 MILLIGRAM(S): 15 TABLET ORAL at 08:14

## 2020-05-12 RX ADMIN — RANOLAZINE 500 MILLIGRAM(S): 500 TABLET, FILM COATED, EXTENDED RELEASE ORAL at 22:23

## 2020-05-12 RX ADMIN — ATORVASTATIN CALCIUM 40 MILLIGRAM(S): 80 TABLET, FILM COATED ORAL at 22:23

## 2020-05-12 NOTE — PROGRESS NOTE ADULT - SUBJECTIVE AND OBJECTIVE BOX
pt stable alert in NAD  no new complaints    DEPRESSION  ^DEPRESSION  Handoff  Schizophrenia  Asthma  Hypertension  Coronary artery disease  Schizophrenia  COPD (chronic obstructive pulmonary disease)  Paranoid schizophrenia  Coronary artery disease involving coronary bypass graft of native heart without angina pectoris  Essential hypertension  Schizophrenia  S/P CABG (coronary artery bypass graft)    HEALTH ISSUES - PROBLEM Dx:  COPD (chronic obstructive pulmonary disease)  Paranoid schizophrenia  Coronary artery disease involving coronary bypass graft of native heart without angina pectoris: Coronary artery disease involving coronary bypass graft of native heart without angina pectoris  Essential hypertension: Essential hypertension  Schizophrenia: Schizophrenia        PAST MEDICAL & SURGICAL HISTORY:  Schizophrenia  Asthma  Hypertension  Coronary artery disease  S/P CABG (coronary artery bypass graft)    Bananas (Unknown)  sulfa drugs (Unknown)      FAMILY HISTORY:      acetaminophen   Tablet .. 650 milliGRAM(s) Oral every 6 hours PRN  aluminum hydroxide/magnesium hydroxide/simethicone Suspension 30 milliLiter(s) Oral every 4 hours PRN  ARIPiprazole 5 milliGRAM(s) Oral daily  aspirin  chewable 81 milliGRAM(s) Oral daily  atorvastatin 40 milliGRAM(s) Oral at bedtime  budesonide 160 MICROgram(s)/formoterol 4.5 MICROgram(s) Inhaler 2 Puff(s) Inhalation two times a day  haloperidol    Injectable 2 milliGRAM(s) IntraMuscular at bedtime PRN  hydrOXYzine hydrochloride 25 milliGRAM(s) Oral every 6 hours PRN  ibuprofen  Tablet. 400 milliGRAM(s) Oral every 12 hours PRN  lactobacillus acidophilus 1 Tablet(s) Oral daily  lisinopril 10 milliGRAM(s) Oral daily  loratadine 10 milliGRAM(s) Oral daily  nicotine - 21 mG/24Hr(s) Patch 1 patch Transdermal daily  ranolazine 500 milliGRAM(s) Oral two times a day  tiotropium 18 MICROgram(s) Capsule 1 Capsule(s) Inhalation daily      T(C): 35.9 (05-12-20 @ 05:57), Max: 36.6 (05-11-20 @ 18:26)  HR: 68 (05-12-20 @ 05:57) (68 - 75)  BP: 158/65 (05-12-20 @ 05:57) (106/65 - 158/65)  RR: 16 (05-12-20 @ 05:57) (16 - 18)  SpO2: --    PE;  general:  no acu te cahnges stable in nad    Lungs:    Heart:    EXT:    Neuro:  no deficits alert                          CAPILLARY BLOOD GLUCOSE

## 2020-05-12 NOTE — CHART NOTE - NSCHARTNOTEFT_GEN_A_CORE
Was called by RN to evaluate patient for chronic abdominal pain. Patient reports diagnosed gallstone >5 years ago with associated RUQ abdominal pain everyday x a few years. Says today's episode is similar to the past. Denies worsening or alleviating factors. Reports soft BM today. No constipation or diarrhea.   Reports eating fatty meal for breakfast this morning, however pain is not worse than other days.   Reports prior surgical hx: Intestinal surgery with reversal of colostomy about 10 years ago. Also reports following up with Gastroenterologist Dr. Alejandre at [facility]. No intervention given.   Denies CP, SOB, fever/chills, worsening symptoms, constipation, diarrhea, palpitations    Physical Exam:  ICU Vital Signs Last 24 Hrs  T(F): 96.1 (12 May 2020 09:13), Max: 97.9 (11 May 2020 18:26)  HR: 72 (12 May 2020 09:13) (68 - 75)  BP: 169/74 (12 May 2020 09:13) (121/75 - 169/74)  RR: 18 (12 May 2020 09:13) (16 - 18)    Constitutional: NAD, A&O x3. Non-diaphoretic, non-toxic appearing  Eyes: PERRLA. EOMI. Sclera white.   Respiratory: +air entry, no rales, no rhonchi, no wheezes  Cardiovascular: +S1 and S2, regular rate and rhythm. No murmurs, rubs, gallops. No JVD  Gastrointestinal: +BS, soft, not distended  -RUQ pain to palpation. Lau's point tenderness.   -No rebound tenderness. No guarding or rigidity   -Midline scar noted from umbilicus down- about 6 inches. Laparascopic port scar noted- all well healed.   -No jaundice  Extremities:  no edema, no calf tenderness  Vascular: +dorsal pedis and radial pulses, no extremity cyanosis    A/P: 65 yo F with known hx of cholelithasis with biliary colic   -Symptoms not worse than usual  -No fever or jaundice. VSS. No evidence for acute cholecystitis   -Will treat symptoms with Ibuprofen 400 mg once (already given 400 mg x1 this AM)   -Follow up with her outpatient GI- Dr. Alejandre at [facility]

## 2020-05-12 NOTE — PROGRESS NOTE BEHAVIORAL HEALTH - NSBHFUPINTERVALHXFT_PSY_A_CORE
Pt seen and evaluated during treatment team, chart reviewed. As per nursing report, pt had no acute events overnight, medication compliant and no behavioral issues. On evaluation, pt presents grandiose and delusional, states plan at discharge includes going to  her SSI check and then look for a job "I have many credentials, I am a ADHD specialist, I work for [employer], I can do many things", similar delusions that were present in the beginning of admission. Pt states mood "I'm good" endorses good sleep and appetite, however c/o abdominal pain "I have gallbladder stones", pending PA consult. Denies AVH. Denies SI/HI, intent and plan.

## 2020-05-12 NOTE — PROGRESS NOTE BEHAVIORAL HEALTH - NSBHCHARTREVIEWVS_PSY_A_CORE FT
Vital Signs Last 24 Hrs  T(C): 35.6 (12 May 2020 09:13), Max: 36.6 (11 May 2020 18:26)  T(F): 96.1 (12 May 2020 09:13), Max: 97.9 (11 May 2020 18:26)  HR: 72 (12 May 2020 09:13) (68 - 75)  BP: 169/74 (12 May 2020 09:13) (121/75 - 169/74)  BP(mean): --  RR: 18 (12 May 2020 09:13) (16 - 18)  SpO2: --

## 2020-05-12 NOTE — PROGRESS NOTE BEHAVIORAL HEALTH - SUMMARY
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 Pt denies any psychiatric diagnoses, states does not take any psychiatric medications, denies all psychiatric complaints. Pt presents with several delusions including living in a private apartment in [place], living with her  Abebe and having "too many children to count". Collateral from only daughter shows otherwise - pt diagnosed with schizophrenia in 2008, has had multiple episodes of medication noncompliance resulting in multiple [facility] hospitalizations (most recent known [facility] 2/2020). TOO approved, started 4/21/2020, haldol started with medical clearance d/t borderline QTc, which had to be switched to abilify d/t prolonged QTc. Pt compensated, agreed to abilify maintena on 5/6, with plan for discharge on 5/8. However, on day of discharge, discharge held d/t pt decompensating with return of prior delusions and agitation. On evaluation today, pt presents pleasant and in good behavioral control, denies prior delusions.     #Schizophrenia  -d/c haldol 5 mg PO qhs d/t QTc 511 ms (5/1/2020), pt c/o chronic angina  -cardiology consulted  -ECG QTc 484 ms (5/3)  -c/w abilify 5 mg daily (5/8/2020)  -pt received abilify maintena 300 mg IM once (5/6/2020)  -patient has been refusing treatment, TOO approved, started 4/21/2020  -as per TOO, if pt refuses abilify 5 mg PO, then will give haldol 2 mg IM with f/u ECG    #COPD  -c/w Symbicort 160mcg 2 puffs BID  -c/w Spiriva 1 cap inhal daily      #Hypertension  -c/w Lisinopril 10mg PO Daily    #CAD  -c/w ASA 81mg PO daily  -c/w Atorvastatin 40mg PO QHS    #Chronic angina  -PA consult, c/w home medication ranexa 500 mg bid ("[employer] [facility] last fill 3/2020)  -cardiology consulted  -pt denies cardiac sx

## 2020-05-13 LAB
ANION GAP SERPL CALC-SCNC: 11 MMOL/L — SIGNIFICANT CHANGE UP (ref 7–14)
BUN SERPL-MCNC: 16 MG/DL — SIGNIFICANT CHANGE UP (ref 10–20)
CALCIUM SERPL-MCNC: 9.6 MG/DL — SIGNIFICANT CHANGE UP (ref 8.5–10.1)
CHLORIDE SERPL-SCNC: 104 MMOL/L — SIGNIFICANT CHANGE UP (ref 98–110)
CO2 SERPL-SCNC: 25 MMOL/L — SIGNIFICANT CHANGE UP (ref 17–32)
CREAT SERPL-MCNC: 0.7 MG/DL — SIGNIFICANT CHANGE UP (ref 0.7–1.5)
GLUCOSE SERPL-MCNC: 101 MG/DL — HIGH (ref 70–99)
MAGNESIUM SERPL-MCNC: 2.1 MG/DL — SIGNIFICANT CHANGE UP (ref 1.8–2.4)
PHOSPHATE SERPL-MCNC: 4 MG/DL — SIGNIFICANT CHANGE UP (ref 2.1–4.9)
POTASSIUM SERPL-MCNC: 4.4 MMOL/L — SIGNIFICANT CHANGE UP (ref 3.5–5)
POTASSIUM SERPL-SCNC: 4.4 MMOL/L — SIGNIFICANT CHANGE UP (ref 3.5–5)
SODIUM SERPL-SCNC: 140 MMOL/L — SIGNIFICANT CHANGE UP (ref 135–146)

## 2020-05-13 PROCEDURE — 99231 SBSQ HOSP IP/OBS SF/LOW 25: CPT

## 2020-05-13 RX ORDER — LISINOPRIL 2.5 MG/1
10 TABLET ORAL ONCE
Refills: 0 | Status: COMPLETED | OUTPATIENT
Start: 2020-05-13 | End: 2020-05-13

## 2020-05-13 RX ORDER — ARIPIPRAZOLE 15 MG/1
2 TABLET ORAL AT BEDTIME
Refills: 0 | Status: DISCONTINUED | OUTPATIENT
Start: 2020-05-13 | End: 2020-05-15

## 2020-05-13 RX ORDER — LISINOPRIL 2.5 MG/1
20 TABLET ORAL DAILY
Refills: 0 | Status: DISCONTINUED | OUTPATIENT
Start: 2020-05-13 | End: 2020-06-25

## 2020-05-13 RX ADMIN — LISINOPRIL 10 MILLIGRAM(S): 2.5 TABLET ORAL at 09:11

## 2020-05-13 RX ADMIN — Medication 1 PATCH: at 19:15

## 2020-05-13 RX ADMIN — ATORVASTATIN CALCIUM 40 MILLIGRAM(S): 80 TABLET, FILM COATED ORAL at 19:51

## 2020-05-13 RX ADMIN — LISINOPRIL 20 MILLIGRAM(S): 2.5 TABLET ORAL at 19:52

## 2020-05-13 RX ADMIN — BUDESONIDE AND FORMOTEROL FUMARATE DIHYDRATE 2 PUFF(S): 160; 4.5 AEROSOL RESPIRATORY (INHALATION) at 19:52

## 2020-05-13 RX ADMIN — Medication 1 TABLET(S): at 09:11

## 2020-05-13 RX ADMIN — Medication 81 MILLIGRAM(S): at 09:11

## 2020-05-13 RX ADMIN — Medication 400 MILLIGRAM(S): at 09:29

## 2020-05-13 RX ADMIN — Medication 1 PATCH: at 09:21

## 2020-05-13 RX ADMIN — ARIPIPRAZOLE 5 MILLIGRAM(S): 15 TABLET ORAL at 09:11

## 2020-05-13 RX ADMIN — TIOTROPIUM BROMIDE 1 CAPSULE(S): 18 CAPSULE ORAL; RESPIRATORY (INHALATION) at 09:20

## 2020-05-13 RX ADMIN — RANOLAZINE 500 MILLIGRAM(S): 500 TABLET, FILM COATED, EXTENDED RELEASE ORAL at 09:11

## 2020-05-13 RX ADMIN — BUDESONIDE AND FORMOTEROL FUMARATE DIHYDRATE 2 PUFF(S): 160; 4.5 AEROSOL RESPIRATORY (INHALATION) at 09:20

## 2020-05-13 RX ADMIN — RANOLAZINE 500 MILLIGRAM(S): 500 TABLET, FILM COATED, EXTENDED RELEASE ORAL at 19:51

## 2020-05-13 RX ADMIN — LORATADINE 10 MILLIGRAM(S): 10 TABLET ORAL at 09:11

## 2020-05-13 RX ADMIN — Medication 1 PATCH: at 09:11

## 2020-05-13 RX ADMIN — Medication 650 MILLIGRAM(S): at 19:51

## 2020-05-13 RX ADMIN — LISINOPRIL 10 MILLIGRAM(S): 2.5 TABLET ORAL at 12:12

## 2020-05-13 NOTE — PROGRESS NOTE BEHAVIORAL HEALTH - GROOMING
6      Goals  (Total # of Visits to Date: 4)   Short Term Goals - Time Frame for Short term goals: 3 weeks     Short term goal 1: Patient will be initiated with a HEP- MET                                        []Met   []Partially met  []Not met   Short term goal 2: Patient will report a 25% reduction in pain levels - MET  []Met   []Partially met  []Not met      []Met   []Partially met  []Not met      []Met   []Partially met  []Not met     Long Term Goals - Time Frame for Long term goals : 6 weeks  Long term goal 1: Patient will improve right shoulder strength to 5/5 for ADLs.    []Met  []Partially met  []Not met   Long term goal 2: Patient will improve right shoulder external rotation ROM to 90 degrees for ADL performance []Met  []Partially met  []Not met   Long term goal 3: Patient will report decreased pain to <4/10 at worst. []Met  []Partially met  []Not met     []Met  []Partially met  []Not met     []Met  []Partially met  []Not met       Minutes Tracking:  Time In: 1500  Time Out: 1603  Minutes: 63    Jose Baca Date: 2/19/2018 [place]

## 2020-05-13 NOTE — PROGRESS NOTE BEHAVIORAL HEALTH - NSBHCHARTREVIEWIMAGING_PSY_A_CORE FT
< from: CT Tibia/Fibia No Cont, Left (03.22.20 @ 15:17) >    IMPRESSION:    Diffuse subcutaneous edema of the left leg without evidence of osteomyelitis, necrotizing fasciitis, or abscess.    No acutely displaced fracture    < end of copied text >  < from: US Abdomen Limited (03.22.20 @ 14:44) >    IMPRESSION:    Cholelithiasis without evidence of cholecystitis.    < end of copied text >< from: VA Duplex Lower Ext Vein Scan, Left (03.22.20 @ 08:43) >    Impression:    No evidence of deep venous thrombosis or superficial thrombophlebitis in left lower extremity.    < end of copied text >  < from: CT Head No Cont (03.22.20 @ 06:48) >    IMPRESSION:     No acute intracranial hemorrhage or mass effect.    < end of copied text >  < from: Xray Chest 1 View AP/PA (03.22.20 @ 03:47) >    Impression:      No radiographic evidence of acute cardiopulmonary disease.    < end of copied text >  < from: Xray Tibia + Fibula 2 Views, Left (03.22.20 @ 02:49) >    Findings/  impression: Soft tissue swelling without radiographic evidence of subcutaneous emphysema or osteomyelitis. Tricompartmental knee degenerative changes present.    < end of copied text >

## 2020-05-13 NOTE — PROGRESS NOTE BEHAVIORAL HEALTH - SUMMARY
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 Pt denies any psychiatric diagnoses, states does not take any psychiatric medications, denies all psychiatric complaints. Pt presents with several delusions including living in a private apartment in [place], living with her  Abebe and having "too many children to count". Collateral from only daughter shows otherwise - pt diagnosed with schizophrenia in 2008, has had multiple episodes of medication noncompliance resulting in multiple [facility] hospitalizations ([facility] TOO approved, started 4/21/2020, haldol started with medical clearance d/t borderline QTc, which had to be switched to abilify d/t prolonged QTc. Pt compensated, agreed to abilify maintena on 5/6, with plan for discharge on 5/8. However, on day of discharge, discharge held d/t pt decompensating with return of prior delusions and agitation. On evaluation today, pt presents pleasant and in good behavioral control, denies prior delusions.     #Schizophrenia  -d/c haldol 5 mg PO qhs d/t QTc 511 ms (5/1), pt c/o chronic angina  -cardiology consulted, will re-consult d/t recent  ms (5/13)  -daily ECG d/t prolonged QTc  -titrate abilify 5 mg daily (5/8), abilify 2 mg qhs (5/13)  -pt received abilify maintena 300 mg IM once (5/6/2020)  -patient has been refusing treatment, TOO approved, started 4/21/2020  -as per TOO, if pt refuses abilify 5 mg PO, then will give haldol 2 mg IM with f/u ECG    #COPD  -c/w Symbicort 160mcg 2 puffs BID  -c/w Spiriva 1 cap inhal daily      #Hypertension  -c/w Lisinopril 10mg PO Daily    #CAD  -c/w ASA 81mg PO daily  -c/w Atorvastatin 40mg PO QHS    #Chronic angina  -PA consult, c/w home medication ranexa 500 mg bid ([facility] last fill 3/2020)  -cardiology consulted  -pt denies cardiac sx 63 y/o, female, white, single, one adult child, undomiciled, 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 Pt denies any psychiatric diagnoses, states does not take any psychiatric medications, denies all psychiatric complaints. Pt presents with several delusions including living in a private apartment in [place], living with her  Abebe and having "too many children to count". Collateral from only daughter shows otherwise - pt diagnosed with schizophrenia in 2008, has had multiple episodes of medication noncompliance resulting in [facility] TOO approved, started 4/21/2020, haldol started with medical clearance d/t borderline QTc, which had to be switched to abilify d/t prolonged QTc. Pt compensated, agreed to abilify maintena on 5/6, with plan for discharge on 5/8. However, on day of discharge, discharge held d/t pt decompensating with return of prior delusions and agitation. On evaluation today, pt presents pleasant and in good behavioral control, denies prior delusions.     #Schizophrenia  -d/c haldol 5 mg PO qhs d/t QTc 511 ms (5/1), pt c/o chronic angina, cardiology consulted  -titrate abilify 5 mg daily (5/8), abilify 2 mg qhs (5/13)  -pt received abilify maintena 300 mg IM once (5/6/2020)  -patient has been refusing treatment, TOO approved, started 4/21/2020  -as per TOO, if pt refuses abilify 5 mg PO, then will give haldol 2 mg IM with f/u ECG    #Prolonged QTc  -ECG QTc 509 ms (5/13), PA consulted  -daily ECG d/t prolonged QTc 509 ms (5/13)     #Chronic angina  -PA consult, c/w home medication ranexa 500 mg bid ([facility] states last fill 3/2020)  -cardiology consulted  -pt denies cardiac sx    #COPD  -c/w Symbicort 160mcg 2 puffs BID  -c/w Spiriva 1 cap inhal daily      #Hypertension  -elevated BP, PA consulted, titrate lisinopril 20 mg daily    #CAD  -c/w ASA 81mg PO daily  -c/w Atorvastatin 40mg PO QHS 65 y/o, female, white, single, one adult child, undomiciled, 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 Pt denies any psychiatric diagnoses, states does not take any psychiatric medications, denies all psychiatric complaints. Pt presents with several delusions including living in a private apartment in [place], living with her  Abebe and having "too many children to count". Collateral from only daughter shows otherwise - pt diagnosed with schizophrenia in 2008, has had multiple episodes of medication noncompliance resulting in multiple [facility] hospitalizations (most recent known [facility] 2/2020). TOO approved, started 4/21/2020, haldol started with medical clearance d/t borderline QTc, which had to be switched to abilify d/t prolonged QTc. Pt compensated, agreed to abilify maintena on 5/6, with plan for discharge on 5/8. However, on day of discharge, discharge held d/t pt decompensating with return of prior delusions and agitation. 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    #Schizophrenia  -d/c haldol 5 mg PO qhs d/t QTc 511 ms (5/1), pt c/o chronic angina, cardiology consulted  -titrate abilify 5 mg daily (5/8), abilify 2 mg qhs (5/13)  -pt received abilify maintena 300 mg IM once (5/6/2020)  -patient has been refusing treatment, TOO approved, started 4/21/2020  -as per TOO, if pt refuses abilify 5 mg PO, then will give haldol 2 mg IM with f/u ECG    #Prolonged QTc  -ECG QTc 509 ms (5/13), PA consulted  -daily ECG d/t prolonged QTc 509 ms (5/13)     #Abdominal pain  -PA consulted    #Chronic angina  -PA consult, c/w home medication ranexa 500 mg bid ([facility] last fill 3/2020)  -cardiology consulted    #COPD  -c/w Symbicort 160mcg 2 puffs BID  -c/w Spiriva 1 cap inhal daily      #Hypertension  -elevated BP, PA consulted, titrate lisinopril 20 mg daily    #CAD  -c/w ASA 81mg PO daily  -c/w Atorvastatin 40mg PO QHS 65 y/o, female, white, single, one adult child, undomiciled, 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 Pt denies any psychiatric diagnoses, states does not take any psychiatric medications, denies all psychiatric complaints. Pt presents with several delusions including living in a private apartment in [place], living with her  Abebe and having "too many children to count". Collateral from only daughter shows otherwise - pt diagnosed with schizophrenia in 2008, has had multiple episodes of medication noncompliance resulting in multiple [facility] hospitalizations (most recent known [facility] 2/2020). TOO approved, started 4/21/2020, haldol started with medical clearance d/t borderline QTc, which had to be switched to abilify d/t prolonged QTc. Pt compensated, agreed to abilify maintena on 5/6, with plan for discharge on 5/8. However, on day of discharge, discharge held d/t pt decompensating with return of prior delusions and agitation. 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    #Schizophrenia  -d/c haldol 5 mg PO qhs d/t QTc 511 ms (5/1), pt c/o chronic angina, cardiology consulted  -titrate abilify 5 mg daily (5/8), abilify 2 mg qhs (5/13)  -pt received abilify maintena 300 mg IM once (5/6/2020)  -patient has been refusing treatment, TOO approved, started 4/21/2020  -as per TOO, if pt refuses abilify 5 mg PO, then will give haldol 2 mg IM with f/u ECG    #Prolonged QTc  -ECG QTc 509 ms (5/13), PA consulted  -daily ECG d/t prolonged QTc 509 ms (5/13)     #Abdominal pain  -PA consulted    #Chronic angina  -PA consulted, c/w home medication ranexa 500 mg bid ([facility] last fill 3/2020)  -cardiology consulted    #COPD  -c/w Symbicort 160mcg 2 puffs BID  -c/w Spiriva 1 cap inhal daily      #Hypertension  -elevated BP, PA consulted, titrate lisinopril 20 mg daily    #CAD  -c/w ASA 81mg PO daily  -c/w Atorvastatin 40mg PO QHS

## 2020-05-13 NOTE — PROGRESS NOTE BEHAVIORAL HEALTH - NSBHCHARTREVIEWVS_PSY_A_CORE FT
Vital Signs Last 24 Hrs  T(C): 35.7 (13 May 2020 09:00), Max: 36.2 (12 May 2020 15:46)  T(F): 96.3 (13 May 2020 09:00), Max: 97.1 (12 May 2020 15:46)  HR: 60 (13 May 2020 09:00) (60 - 71)  BP: 180/77 (13 May 2020 09:00) (180/77 - 186/88)  BP(mean): --  RR: 18 (13 May 2020 09:00) (18 - 19)  SpO2: --

## 2020-05-13 NOTE — PROGRESS NOTE BEHAVIORAL HEALTH - THOUGHT CONTENT
Pre-Op Summary    Pt arrived via car with family/friend and is oriented to time, place, person and situation. Patient with steady gait with none assistive devices. Visit Vitals  BP (!) 130/94 (BP 1 Location: Left arm, BP Patient Position: At rest)   Pulse 75   Temp 98.4 °F (36.9 °C)   Resp 16   Ht 5' 3\" (1.6 m)   Wt 71.9 kg (158 lb 8 oz)   SpO2 100%   BMI 28.08 kg/m²       Peripheral IV located on Right hand . Patients belongings are located sister. Patient's point of contact is sister. They will be in the waiting room. They are able to receive medication information. They will be admitted. Delusions/Other

## 2020-05-13 NOTE — CHART NOTE - NSCHARTNOTEFT_GEN_A_CORE
CAlled by psych team, qtc is 509 today and BP high    Vital Signs Last 24 Hrs  T(F): 96.3 (13 May 2020 09:00), Max: 97.1 (12 May 2020 15:46)  HR: 60 (13 May 2020 09:00) (60 - 71)  BP: 180/77 (13 May 2020 09:00) (180/77 - 186/88)  RR: 18 (13 May 2020 09:00) (18 - 19)      BP trend reviewed running in 160-180 range systolic  Increase lisinopril to 20mg daily for uncontrolled htn  monitor qtc--repeat ekg tomorrow  d/w DR Urbina  will check labs today

## 2020-05-13 NOTE — PROGRESS NOTE BEHAVIORAL HEALTH - NSBHFUPINTERVALHXFT_PSY_A_CORE
Pt seen and evaluated, chart reviewed. As per nursing report, pt had no acute events overnight, medication compliant and no behavioral issues. 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 Pt states mood "I'm good" endorses good sleep and appetite, c/o continued abdominal pain states 2/2 gallbladder stones, PA consulted, see note. Denies AVH. Denies SI/HI, intent and plan. [place] referral started. Pt seen and evaluated, chart reviewed. As per nursing report, pt had no acute events overnight, medication compliant and no behavioral issues. 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 Pt states mood "I'm good" endorses good sleep and appetite. ECG QTc 59 ms, pt c/o continued abdominal pain states 2/2 gallbladder stones, PA consulted, see notes. Denies AVH. Denies SI/HI, intent and plan. [place] referral re-started. Pt seen and evaluated, chart reviewed. As per nursing report, pt had no acute events overnight, medication compliant and no behavioral issues. 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 Pt states mood "I'm good" endorses good sleep and appetite. ECG QTc 509 ms, pt c/o continued abdominal pain states 2/2 gallbladder stones, PA consulted, see notes. Denies AVH. Denies SI/HI, intent and plan. [place] referral re-started.

## 2020-05-14 PROCEDURE — 99231 SBSQ HOSP IP/OBS SF/LOW 25: CPT

## 2020-05-14 RX ORDER — AMLODIPINE BESYLATE 2.5 MG/1
5 TABLET ORAL AT BEDTIME
Refills: 0 | Status: DISCONTINUED | OUTPATIENT
Start: 2020-05-14 | End: 2020-06-25

## 2020-05-14 RX ADMIN — ARIPIPRAZOLE 2 MILLIGRAM(S): 15 TABLET ORAL at 20:58

## 2020-05-14 RX ADMIN — LORATADINE 10 MILLIGRAM(S): 10 TABLET ORAL at 08:19

## 2020-05-14 RX ADMIN — ATORVASTATIN CALCIUM 40 MILLIGRAM(S): 80 TABLET, FILM COATED ORAL at 20:59

## 2020-05-14 RX ADMIN — RANOLAZINE 500 MILLIGRAM(S): 500 TABLET, FILM COATED, EXTENDED RELEASE ORAL at 08:19

## 2020-05-14 RX ADMIN — ARIPIPRAZOLE 5 MILLIGRAM(S): 15 TABLET ORAL at 08:19

## 2020-05-14 RX ADMIN — Medication 1 PATCH: at 08:29

## 2020-05-14 RX ADMIN — TIOTROPIUM BROMIDE 1 CAPSULE(S): 18 CAPSULE ORAL; RESPIRATORY (INHALATION) at 08:18

## 2020-05-14 RX ADMIN — Medication 81 MILLIGRAM(S): at 08:19

## 2020-05-14 RX ADMIN — Medication 1 PATCH: at 21:36

## 2020-05-14 RX ADMIN — BUDESONIDE AND FORMOTEROL FUMARATE DIHYDRATE 2 PUFF(S): 160; 4.5 AEROSOL RESPIRATORY (INHALATION) at 20:58

## 2020-05-14 RX ADMIN — Medication 650 MILLIGRAM(S): at 11:17

## 2020-05-14 RX ADMIN — Medication 1 TABLET(S): at 08:19

## 2020-05-14 RX ADMIN — RANOLAZINE 500 MILLIGRAM(S): 500 TABLET, FILM COATED, EXTENDED RELEASE ORAL at 20:59

## 2020-05-14 RX ADMIN — BUDESONIDE AND FORMOTEROL FUMARATE DIHYDRATE 2 PUFF(S): 160; 4.5 AEROSOL RESPIRATORY (INHALATION) at 08:18

## 2020-05-14 RX ADMIN — Medication 400 MILLIGRAM(S): at 06:15

## 2020-05-14 RX ADMIN — Medication 400 MILLIGRAM(S): at 21:06

## 2020-05-14 NOTE — PROGRESS NOTE BEHAVIORAL HEALTH - SUMMARY
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 Pt denies any psychiatric diagnoses, states does not take any psychiatric medications, denies all psychiatric complaints. Pt presents with several delusions including living in a private apartment in [place], living with her  Abebe and having "too many children to count". Collateral from only daughter shows otherwise - pt diagnosed with schizophrenia in 2008, has had multiple episodes of medication noncompliance resulting in multiple [facility] hospitalizations (most recent known [facility] 2/2020). TOO approved, started 4/21/2020, haldol started with medical clearance d/t borderline QTc, which had to be switched to abilify d/t prolonged QTc. Pt compensated, agreed to abilify maintena on 5/6, with plan for discharge on 5/8. However, on day of discharge, discharge held d/t pt decompensating with return of prior delusions and agitation. 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    #Schizophrenia  -d/c haldol 5 mg PO qhs d/t QTc 511 ms (5/1), pt c/o chronic angina, cardiology consulted  -c/w abilify 5 mg daily (5/8), abilify 2 mg qhs (5/13)  -pt received abilify maintena 300 mg IM once (5/6/2020)  -patient has been refusing treatment, TOO approved, started 4/21/2020  -as per TOO, if pt refuses abilify 5 mg PO, then will give haldol 2 mg IM with f/u ECG    #Prolonged QTc  -ECG QTc 509 ms (5/13), PA consulted  -daily ECG  -5/14 QTc 498 ms    #Abdominal pain  -PA consulted    #Chronic angina  -PA consulted, c/w home medication ranexa 500 mg bid (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 Pt denies any psychiatric diagnoses, states does not take any psychiatric medications, denies all psychiatric complaints. Pt presents with several delusions including living in a private apartment in [place], living with her  Abebe and having "too many children to count". Collateral from only daughter shows otherwise - pt diagnosed with schizophrenia in 2008, has had multiple episodes of medication noncompliance resulting in multiple [facility] hospitalizations (most recent known [facility] 2/2020). TOO approved, started 4/21/2020, haldol started with medical clearance d/t borderline QTc, which had to be switched to abilify d/t prolonged QTc. Pt compensated, agreed to abilify maintena on 5/6, with plan for discharge on 5/8. However, on day of discharge, discharge held d/t pt decompensating with return of prior delusions and agitation. 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    #Schizophrenia  -d/c haldol 5 mg PO qhs d/t QTc 511 ms (5/1), pt c/o chronic angina, cardiology consulted  -c/w abilify 5 mg daily (5/8), abilify 2 mg qhs (5/13)  -pt received abilify maintena 300 mg IM once (5/6/2020)  -patient has been refusing treatment, TOO approved, started 4/21/2020  -as per TOO, if pt refuses abilify PO, then will give haldol 2 mg IM with f/u ECG    #Prolonged QTc  -ECG QTc 509 ms (5/13), PA consulted  -daily ECG  -5/14 QTc 498 ms    #Abdominal pain  -PA consulted    #Chronic angina  -PA consulted, c/[facility]  -cardiology consulted    #COPD  -c/w Symbicort 160mcg 2 puffs BID  -c/w Spiriva 1 cap inhal daily      #Hypertension  -elevated BP, PA consulted, c/w lisinopril 20 mg daily    #CAD  -c/w ASA 81mg PO daily  -c/w Atorvastatin 40mg PO QHS

## 2020-05-14 NOTE — PROGRESS NOTE ADULT - SUBJECTIVE AND OBJECTIVE BOX
pt stable alert in NAD  no new complaints  no cardiac sxs    DEPRESSION  ^DEPRESSION  Handoff  Schizophrenia  Asthma  Hypertension  Coronary artery disease  Schizophrenia  COPD (chronic obstructive pulmonary disease)  Paranoid schizophrenia  Coronary artery disease involving coronary bypass graft of native heart without angina pectoris  Essential hypertension  Schizophrenia  S/P CABG (coronary artery bypass graft)    HEALTH ISSUES - PROBLEM Dx:  COPD (chronic obstructive pulmonary disease)  Paranoid schizophrenia  Coronary artery disease involving coronary bypass graft of native heart without angina pectoris: Coronary artery disease involving coronary bypass graft of native heart without angina pectoris  Essential hypertension: Essential hypertension  Schizophrenia: Schizophrenia        PAST MEDICAL & SURGICAL HISTORY:  Schizophrenia  Asthma  Hypertension  Coronary artery disease  S/P CABG (coronary artery bypass graft)    Bananas (Unknown)  sulfa drugs (Unknown)      FAMILY HISTORY:      acetaminophen   Tablet .. 650 milliGRAM(s) Oral every 6 hours PRN  aluminum hydroxide/magnesium hydroxide/simethicone Suspension 30 milliLiter(s) Oral every 4 hours PRN  ARIPiprazole 5 milliGRAM(s) Oral daily  ARIPiprazole 2 milliGRAM(s) Oral at bedtime  aspirin  chewable 81 milliGRAM(s) Oral daily  atorvastatin 40 milliGRAM(s) Oral at bedtime  budesonide 160 MICROgram(s)/formoterol 4.5 MICROgram(s) Inhaler 2 Puff(s) Inhalation two times a day  haloperidol    Injectable 2 milliGRAM(s) IntraMuscular at bedtime PRN  hydrOXYzine hydrochloride 25 milliGRAM(s) Oral every 6 hours PRN  ibuprofen  Tablet. 400 milliGRAM(s) Oral every 12 hours PRN  lactobacillus acidophilus 1 Tablet(s) Oral daily  lisinopril 20 milliGRAM(s) Oral daily  loratadine 10 milliGRAM(s) Oral daily  nicotine - 21 mG/24Hr(s) Patch 1 patch Transdermal daily  ranolazine 500 milliGRAM(s) Oral two times a day  tiotropium 18 MICROgram(s) Capsule 1 Capsule(s) Inhalation daily      T(C): 35.9 (05-14-20 @ 06:13), Max: 35.9 (05-13-20 @ 15:44)  HR: 69 (05-14-20 @ 06:13) (60 - 72)  BP: 176/72 (05-13-20 @ 15:44) (176/72 - 180/77)  RR: 18 (05-13-20 @ 15:44) (18 - 18)  SpO2: --    PE;  general:  stable in nad    Lungs:    Heart:    EXT:    Neuro:  no deficits      --  --  --  16  0.7  4.4  101      05-13    140  |  104  |  16  ----------------------------<  101<H>  4.4   |  25  |  0.7    Ca    9.6      13 May 2020 15:25  Phos  4.0     05-13  Mg     2.1     05-13                CAPILLARY BLOOD GLUCOSE

## 2020-05-14 NOTE — PROGRESS NOTE ADULT - PROBLEM SELECTOR PLAN 2
continue present treatment as per psych plan as reviewed  Medically stable with add med for bp  will continue to monitor medical status while being treated on psych

## 2020-05-14 NOTE — PROGRESS NOTE ADULT - ASSESSMENT
medically stable with no acute issues  bp has been elavetrd   pt stes she had been on nitor as outpt  ekgf changes reviewed with pa yesteerday no futher w/u will tasha

## 2020-05-14 NOTE — PROGRESS NOTE BEHAVIORAL HEALTH - NSBHCHARTREVIEWVS_PSY_A_CORE FT
Vital Signs Last 24 Hrs  T(C): 35.5 (14 May 2020 08:15), Max: 35.9 (13 May 2020 15:44)  T(F): 95.9 (14 May 2020 08:15), Max: 96.7 (13 May 2020 15:44)  HR: 76 (14 May 2020 08:15) (69 - 76)  BP: 196/79 (14 May 2020 08:15) (176/72 - 196/79)  BP(mean): --  RR: 18 (14 May 2020 08:15) (18 - 18)  SpO2: -- Vital Signs Last 24 Hrs  T(C): 35.5 (14 May 2020 08:15), Max: 35.9 (13 May 2020 15:44)  T(F): 95.9 (14 May 2020 08:15), Max: 96.7 (13 May 2020 15:44)  HR: 68 (14 May 2020 11:13) (68 - 76)  BP: 161/73 (14 May 2020 11:13) (161/73 - 196/79)  BP(mean): --  RR: 18 (14 May 2020 08:15) (18 - 18)  SpO2: --

## 2020-05-14 NOTE — PROGRESS NOTE BEHAVIORAL HEALTH - NSBHFUPINTERVALHXFT_PSY_A_CORE
Pt seen and evaluated, chart reviewed. As per nursing report, pt refused night dose of abilify, otherwise no behavioral issues. 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 Pt states mood "I'm good" endorses good sleep and appetite. Pt c/o continued abdominal pain states 2/2 gallbladder stones, PA consulted, see notes. Denies AVH. Denies SI/HI, intent and plan. ECG QTc 509 ms (5/13), repeat QTc 498 ms (5/14). [place] referral re-started. Pt seen and evaluated, chart reviewed. As per nursing report, pt refused night dose of abilify, otherwise no behavioral issues. 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 Pt states she did not need the night dose of abilify, states she wants to go back to her regular doctors. Pt states mood "I'm good" endorses good sleep and appetite. Pt c/o continued abdominal pain states 2/2 gallbladder stones, PA consulted, see notes. Denies AVH. Denies SI/HI, intent and plan. ECG QTc 509 ms (5/13), repeat QTc 498 ms (5/14). [place] referral re-started.

## 2020-05-14 NOTE — CHART NOTE - NSCHARTNOTEFT_GEN_A_CORE
Ms. Silva remains on the unit for continued treatment, safety and observation. She met with treatment team to discuss her treatment plan and medications. She refused some of her medications and addressed this concern with NP. She engaged in interview with staff and reports "ok" mood, with good sleep and appetite. She denies and shows no evidence of suicidal or homicidal ideation or plan. She remains grandiose, delusional and irritable at times as she told treatment team she has to leave "to go to work". She is in good behavioral control and there have been no issues or recent behavioral outbursts on the unit. Ms. Silva attends groups sporadically and continues to be encouraged to do so by  and treatment team. [facility] referral is currently in progress.     Mental Status Exam:    Mood – Neutral/ Good/ Irritable at times  Sleep - Good  Appetite - Good  ADLs - Fair  Thought Process –Illogical  Observation – [id number]    Ms. Silva will continue to meet with  one on one and with treatment team daily. [facility] application has been initiated/ restarted. At this time patient is not psychiatrically stable for discharge.

## 2020-05-14 NOTE — PROGRESS NOTE BEHAVIORAL HEALTH - NSBHCHARTREVIEWINVESTIGATE_PSY_A_CORE FT
< from: 12 Lead ECG (05.13.20 @ 08:01) >      Ventricular Rate 65 BPM    Atrial Rate 65 BPM    P-R Interval 176 ms    QRS Duration 158 ms    Q-T Interval 490 ms    QTC Calculation(Bezet) 509 ms    P Axis 52 degrees    R Axis 35 degrees    T Axis 69 degrees    Diagnosis Line Normal sinus rhythm  Possible Left atrial enlargement  Right bundle branch block  Abnormal ECG    < end of copied text >

## 2020-05-15 PROCEDURE — 99231 SBSQ HOSP IP/OBS SF/LOW 25: CPT

## 2020-05-15 RX ORDER — HALOPERIDOL DECANOATE 100 MG/ML
2 INJECTION INTRAMUSCULAR DAILY
Refills: 0 | Status: DISCONTINUED | OUTPATIENT
Start: 2020-05-15 | End: 2020-05-27

## 2020-05-15 RX ORDER — HALOPERIDOL DECANOATE 100 MG/ML
2 INJECTION INTRAMUSCULAR
Refills: 0 | Status: DISCONTINUED | OUTPATIENT
Start: 2020-05-15 | End: 2020-05-15

## 2020-05-15 RX ORDER — ARIPIPRAZOLE 15 MG/1
10 TABLET ORAL DAILY
Refills: 0 | Status: DISCONTINUED | OUTPATIENT
Start: 2020-05-16 | End: 2020-05-27

## 2020-05-15 RX ADMIN — LISINOPRIL 20 MILLIGRAM(S): 2.5 TABLET ORAL at 08:05

## 2020-05-15 RX ADMIN — RANOLAZINE 500 MILLIGRAM(S): 500 TABLET, FILM COATED, EXTENDED RELEASE ORAL at 08:05

## 2020-05-15 RX ADMIN — HALOPERIDOL DECANOATE 2 MILLIGRAM(S): 100 INJECTION INTRAMUSCULAR at 09:34

## 2020-05-15 RX ADMIN — TIOTROPIUM BROMIDE 1 CAPSULE(S): 18 CAPSULE ORAL; RESPIRATORY (INHALATION) at 08:04

## 2020-05-15 RX ADMIN — Medication 1 PATCH: at 07:45

## 2020-05-15 RX ADMIN — Medication 81 MILLIGRAM(S): at 08:05

## 2020-05-15 RX ADMIN — Medication 1 MILLIGRAM(S): at 16:00

## 2020-05-15 RX ADMIN — BUDESONIDE AND FORMOTEROL FUMARATE DIHYDRATE 2 PUFF(S): 160; 4.5 AEROSOL RESPIRATORY (INHALATION) at 08:05

## 2020-05-15 RX ADMIN — LORATADINE 10 MILLIGRAM(S): 10 TABLET ORAL at 08:05

## 2020-05-15 RX ADMIN — Medication 1 PATCH: at 19:00

## 2020-05-15 RX ADMIN — Medication 1 PATCH: at 08:06

## 2020-05-15 RX ADMIN — Medication 1 TABLET(S): at 08:05

## 2020-05-15 RX ADMIN — Medication 1 PATCH: at 13:42

## 2020-05-15 NOTE — PROGRESS NOTE ADULT - SUBJECTIVE AND OBJECTIVE BOX
pt stable alert in NAD  no new complaints    DEPRESSION  ^DEPRESSION  Handoff  Schizophrenia  Asthma  Hypertension  Coronary artery disease  Schizophrenia  COPD (chronic obstructive pulmonary disease)  Paranoid schizophrenia  Coronary artery disease involving coronary bypass graft of native heart without angina pectoris  Essential hypertension  Schizophrenia  S/P CABG (coronary artery bypass graft)    HEALTH ISSUES - PROBLEM Dx:  COPD (chronic obstructive pulmonary disease)  Paranoid schizophrenia  Coronary artery disease involving coronary bypass graft of native heart without angina pectoris: Coronary artery disease involving coronary bypass graft of native heart without angina pectoris  Essential hypertension: Essential hypertension  Schizophrenia: Schizophrenia        PAST MEDICAL & SURGICAL HISTORY:  Schizophrenia  Asthma  Hypertension  Coronary artery disease  S/P CABG (coronary artery bypass graft)    Bananas (Unknown)  sulfa drugs (Unknown)      FAMILY HISTORY:      acetaminophen   Tablet .. 650 milliGRAM(s) Oral every 6 hours PRN  aluminum hydroxide/magnesium hydroxide/simethicone Suspension 30 milliLiter(s) Oral every 4 hours PRN  amLODIPine   Tablet 5 milliGRAM(s) Oral at bedtime  ARIPiprazole 2 milliGRAM(s) Oral at bedtime  ARIPiprazole 5 milliGRAM(s) Oral daily  aspirin  chewable 81 milliGRAM(s) Oral daily  atorvastatin 40 milliGRAM(s) Oral at bedtime  budesonide 160 MICROgram(s)/formoterol 4.5 MICROgram(s) Inhaler 2 Puff(s) Inhalation two times a day  haloperidol    Injectable 2 milliGRAM(s) IntraMuscular at bedtime PRN  hydrOXYzine hydrochloride 25 milliGRAM(s) Oral every 6 hours PRN  lactobacillus acidophilus 1 Tablet(s) Oral daily  lisinopril 20 milliGRAM(s) Oral daily  loratadine 10 milliGRAM(s) Oral daily  nicotine - 21 mG/24Hr(s) Patch 1 patch Transdermal daily  ranolazine 500 milliGRAM(s) Oral two times a day  tiotropium 18 MICROgram(s) Capsule 1 Capsule(s) Inhalation daily      T(C): 36.1 (05-14-20 @ 17:40), Max: 36.1 (05-14-20 @ 17:40)  HR: 75 (05-14-20 @ 17:40) (68 - 76)  BP: 148/68 (05-14-20 @ 17:40) (148/68 - 196/79)  RR: 18 (05-14-20 @ 17:40) (18 - 18)  SpO2: --    PE;  general: no changes noted no compliants sitting up innad    Lungs:    Heart:    EXT:    Neuro:  alert no defciits      --  --  --  16  0.7  4.4  101      05-13    140  |  104  |  16  ----------------------------<  101<H>  4.4   |  25  |  0.7    Ca    9.6      13 May 2020 15:25  Phos  4.0     05-13  Mg     2.1     05-13                CAPILLARY BLOOD GLUCOSE

## 2020-05-15 NOTE — PROGRESS NOTE BEHAVIORAL HEALTH - SUMMARY
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 Pt denies any psychiatric diagnoses, states does not take any psychiatric medications, denies all psychiatric complaints. Pt presents with several delusions including living in a private apartment in [place], living with her  Abebe and having "too many children to count". Collateral from only daughter shows otherwise - pt diagnosed with schizophrenia in 2008, has had multiple episodes of medication noncompliance resulting in multiple [facility] hospitalizations (most recent known [facility] 2/2020). TOO approved, started 4/21/2020, haldol started with medical clearance d/t borderline QTc, which had to be switched to abilify d/t prolonged QTc. Pt compensated, agreed to abilify maintena on 5/6, with plan for discharge on 5/8. However, on day of discharge, discharge held d/t pt decompensating with return of prior delusions and agitation. 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    #Schizophrenia  -d/c haldol 5 mg PO qhs d/t QTc 511 ms (5/1), pt c/o chronic angina, cardiology consulted  -c/w abilify 5 mg daily (5/8), abilify 2 mg qhs (5/13)  -pt received abilify maintena 300 mg IM once (5/6/2020)  -patient has been refusing treatment, TOO approved, started 4/21/2020  -as per TOO, if pt refuses abilify PO, then will give haldol 2 mg IM with f/u ECG    #Prolonged QTc  -ECG QTc 509 ms (5/13), PA consulted  -daily ECG  -5/14 QTc 498 ms    #Abdominal pain  -PA consulted    #Chronic angina  -PA consulted, c/w home medication ranexa 500 mg bid ([facility] last fill 3/2020)  -cardiology consulted    #COPD  -c/w Symbicort 160mcg 2 puffs BID  -c/w Spiriva 1 cap inhal daily      #Hypertension  -elevated BP, PA consulted  -c/w lisinopril 20 mg daily  -c/w norvasc 5 mg qhs    #CAD  -c/w ASA 81mg PO daily  -c/w Atorvastatin 40mg PO QHS 63 y/o, female, white, single, one adult child, undomiciled, 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 for lower left extremity cellulitis, transferred to [facility] d/t bizarre bx, psychiatric medication non-compliance and questionable ability to care for self. Pt denies any psychiatric diagnoses, states does not take any psychiatric medications, denies all psychiatric complaints. Pt presents with several delusions including living in a private apartment in [place], living with her  Abebe and having "too many children to count". Collateral from only daughter shows otherwise - pt diagnosed with schizophrenia in 2008, has had multiple episodes of medication noncompliance resulting in multiple [facility] hospitalizations (most recent known [facility] 2/2020). TOO approved, started 4/21/2020, haldol started with medical clearance d/t borderline QTc, which had to be switched to abilify d/t prolonged QTc. Pt compensated, agreed to abilify maintena on 5/6, with plan for discharge on 5/8. However, on day of discharge, discharge held d/t pt decompensating with return of prior delusions and agitation. 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    #Schizophrenia  -d/c haldol 5 mg PO qhs d/t QTc 511 ms (5/1), pt c/o chronic angina, cardiology consulted  -c/w abilify 5 mg daily (5/8), abilify 2 mg qhs (5/13)  -pt received abilify maintena 300 mg IM once (5/6/2020)  -patient has been refusing treatment, TOO approved, started 4/21/2020  -as per TOO, if pt refuses abilify PO, then will give haldol 2 mg IM with f/u ECG    #Prolonged QTc  -ECG QTc 509 ms (5/13), PA consulted  -daily ECG  -5/14 QTc 498 ms, 5/15 484 ms    #Abdominal pain  -PA consulted    #Chronic angina  -PA consulted, c/w home medication ranexa 500 mg bid ([facility] last fill 3/2020)  -cardiology consulted    #COPD  -c/w Symbicort 160mcg 2 puffs BID  -c/w Spiriva 1 cap inhal daily      #Hypertension  -elevated BP, PA consulted  -c/w lisinopril 20 mg daily  -c/w norvasc 5 mg qhs    #CAD  -c/w ASA 81mg PO daily  -c/w Atorvastatin 40mg PO QHS 63 y/o, female, white, single, one adult child, undomiciled, 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 for lower left extremity cellulitis, transferred to [facility] d/t bizarre bx, psychiatric medication non-compliance and questionable ability to care for self. Pt denies any psychiatric diagnoses, states does not take any psychiatric medications, denies all psychiatric complaints. Pt presents with several delusions including living in a private apartment in [place], living with her  Abebe and having "too many children to count". Collateral from only daughter shows otherwise - pt diagnosed with schizophrenia in 2008, has had multiple episodes of medication noncompliance resulting in multiple [facility] hospitalizations (most recent known [facility] 2/2020). TOO approved, started 4/21/2020, haldol started with medical clearance d/t borderline QTc, which had to be switched to abilify d/t prolonged QTc. Pt compensated, agreed to abilify maintena on 5/6, with plan for discharge on 5/8. However, on day of discharge, discharge held d/t pt decompensating with return of prior delusions and agitation. 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    #Schizophrenia  -d/c haldol 5 mg PO qhs d/t QTc 511 ms (5/1), pt c/o chronic angina, cardiology consulted  -start abilify 5 mg daily (5/8), abilify 2 mg qhs (5/13) --> titrate abilify 10 mg daily (5/16)  -pt received abilify maintena 300 mg IM once (5/6/2020)  -patient has been refusing treatment, TOO approved, started 4/21/2020  -as per TOO, if pt refuses abilify PO, then will give haldol 2 mg IM with f/u ECG    #Prolonged QTc  -ECG QTc 509 ms (5/13), PA consulted  -daily ECG  -5/14 QTc 498 ms, 5/15 484 ms    #Abdominal pain  -PA consulted    #Chronic angina  -PA consulted, c/w home medication ranexa 500 mg bid ([facility] states last fill 3/2020)  -cardiology consulted    #COPD  -c/w Symbicort 160mcg 2 puffs BID  -c/w Spiriva 1 cap inhal daily      #Hypertension  -elevated BP, PA consulted  -c/w lisinopril 20 mg daily  -c/w norvasc 5 mg qhs    #CAD  -c/w ASA 81mg PO daily  -c/w Atorvastatin 40mg PO QHS

## 2020-05-15 NOTE — PROGRESS NOTE BEHAVIORAL HEALTH - NSBHFUPINTERVALHXFT_PSY_A_CORE
Pt seen and evaluated, chart reviewed. As per nursing report, pt selective of medications, took night dose of abilify but refused antihypertensive medications. 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 Pt states she does not need abilify, states she wants to go back to her regular doctors, demands to be released becoming increasingly agitated at this writer with aggressive posturing and cursing. D/t agitation, limited interview. ECG QTc 509 ms (5/13), repeat QTc 498 ms (5/14), repeat QTc 484 ms (5/15). [place] referral re-started.

## 2020-05-15 NOTE — PROGRESS NOTE BEHAVIORAL HEALTH - NSBHCHARTREVIEWVS_PSY_A_CORE FT
Vital Signs Last 24 Hrs  T(C): 36 (15 May 2020 08:06), Max: 36.1 (14 May 2020 17:40)  T(F): 96.8 (15 May 2020 08:06), Max: 96.9 (14 May 2020 17:40)  HR: 86 (15 May 2020 08:06) (68 - 86)  BP: 170/83 (15 May 2020 08:06) (148/68 - 170/83)  BP(mean): --  RR: 18 (15 May 2020 08:06) (18 - 18)  SpO2: --

## 2020-05-16 RX ADMIN — BUDESONIDE AND FORMOTEROL FUMARATE DIHYDRATE 2 PUFF(S): 160; 4.5 AEROSOL RESPIRATORY (INHALATION) at 08:39

## 2020-05-16 RX ADMIN — ATORVASTATIN CALCIUM 40 MILLIGRAM(S): 80 TABLET, FILM COATED ORAL at 20:25

## 2020-05-16 RX ADMIN — LORATADINE 10 MILLIGRAM(S): 10 TABLET ORAL at 08:39

## 2020-05-16 RX ADMIN — Medication 1 PATCH: at 08:38

## 2020-05-16 RX ADMIN — Medication 1 PATCH: at 17:32

## 2020-05-16 RX ADMIN — TIOTROPIUM BROMIDE 1 CAPSULE(S): 18 CAPSULE ORAL; RESPIRATORY (INHALATION) at 08:39

## 2020-05-16 RX ADMIN — Medication 1 TABLET(S): at 08:39

## 2020-05-16 RX ADMIN — RANOLAZINE 500 MILLIGRAM(S): 500 TABLET, FILM COATED, EXTENDED RELEASE ORAL at 08:39

## 2020-05-16 RX ADMIN — BUDESONIDE AND FORMOTEROL FUMARATE DIHYDRATE 2 PUFF(S): 160; 4.5 AEROSOL RESPIRATORY (INHALATION) at 20:32

## 2020-05-16 RX ADMIN — RANOLAZINE 500 MILLIGRAM(S): 500 TABLET, FILM COATED, EXTENDED RELEASE ORAL at 20:26

## 2020-05-16 RX ADMIN — ARIPIPRAZOLE 10 MILLIGRAM(S): 15 TABLET ORAL at 08:39

## 2020-05-16 RX ADMIN — Medication 1 PATCH: at 15:34

## 2020-05-16 RX ADMIN — LISINOPRIL 20 MILLIGRAM(S): 2.5 TABLET ORAL at 08:39

## 2020-05-16 RX ADMIN — AMLODIPINE BESYLATE 5 MILLIGRAM(S): 2.5 TABLET ORAL at 20:25

## 2020-05-16 RX ADMIN — Medication 1 PATCH: at 07:17

## 2020-05-16 RX ADMIN — Medication 81 MILLIGRAM(S): at 08:39

## 2020-05-17 RX ORDER — IBUPROFEN 200 MG
400 TABLET ORAL ONCE
Refills: 0 | Status: COMPLETED | OUTPATIENT
Start: 2020-05-17 | End: 2020-05-17

## 2020-05-17 RX ADMIN — LISINOPRIL 20 MILLIGRAM(S): 2.5 TABLET ORAL at 08:12

## 2020-05-17 RX ADMIN — LORATADINE 10 MILLIGRAM(S): 10 TABLET ORAL at 08:12

## 2020-05-17 RX ADMIN — AMLODIPINE BESYLATE 5 MILLIGRAM(S): 2.5 TABLET ORAL at 21:15

## 2020-05-17 RX ADMIN — Medication 1 PATCH: at 11:30

## 2020-05-17 RX ADMIN — ATORVASTATIN CALCIUM 40 MILLIGRAM(S): 80 TABLET, FILM COATED ORAL at 21:15

## 2020-05-17 RX ADMIN — BUDESONIDE AND FORMOTEROL FUMARATE DIHYDRATE 2 PUFF(S): 160; 4.5 AEROSOL RESPIRATORY (INHALATION) at 08:11

## 2020-05-17 RX ADMIN — Medication 81 MILLIGRAM(S): at 08:12

## 2020-05-17 RX ADMIN — Medication 400 MILLIGRAM(S): at 13:02

## 2020-05-17 RX ADMIN — TIOTROPIUM BROMIDE 1 CAPSULE(S): 18 CAPSULE ORAL; RESPIRATORY (INHALATION) at 08:11

## 2020-05-17 RX ADMIN — Medication 1 TABLET(S): at 08:12

## 2020-05-17 RX ADMIN — RANOLAZINE 500 MILLIGRAM(S): 500 TABLET, FILM COATED, EXTENDED RELEASE ORAL at 21:15

## 2020-05-17 RX ADMIN — RANOLAZINE 500 MILLIGRAM(S): 500 TABLET, FILM COATED, EXTENDED RELEASE ORAL at 08:12

## 2020-05-17 RX ADMIN — Medication 650 MILLIGRAM(S): at 15:21

## 2020-05-18 PROCEDURE — 99231 SBSQ HOSP IP/OBS SF/LOW 25: CPT

## 2020-05-18 RX ORDER — IBUPROFEN 200 MG
400 TABLET ORAL ONCE
Refills: 0 | Status: COMPLETED | OUTPATIENT
Start: 2020-05-18 | End: 2020-05-18

## 2020-05-18 RX ADMIN — Medication 1 PATCH: at 07:09

## 2020-05-18 RX ADMIN — Medication 400 MILLIGRAM(S): at 14:50

## 2020-05-18 RX ADMIN — ARIPIPRAZOLE 10 MILLIGRAM(S): 15 TABLET ORAL at 09:49

## 2020-05-18 RX ADMIN — Medication 81 MILLIGRAM(S): at 08:34

## 2020-05-18 RX ADMIN — TIOTROPIUM BROMIDE 1 CAPSULE(S): 18 CAPSULE ORAL; RESPIRATORY (INHALATION) at 08:34

## 2020-05-18 RX ADMIN — Medication 400 MILLIGRAM(S): at 20:31

## 2020-05-18 RX ADMIN — Medication 1 TABLET(S): at 08:34

## 2020-05-18 RX ADMIN — Medication 1 PATCH: at 08:34

## 2020-05-18 RX ADMIN — Medication 1 PATCH: at 19:37

## 2020-05-18 RX ADMIN — RANOLAZINE 500 MILLIGRAM(S): 500 TABLET, FILM COATED, EXTENDED RELEASE ORAL at 08:34

## 2020-05-18 RX ADMIN — ATORVASTATIN CALCIUM 40 MILLIGRAM(S): 80 TABLET, FILM COATED ORAL at 20:09

## 2020-05-18 RX ADMIN — RANOLAZINE 500 MILLIGRAM(S): 500 TABLET, FILM COATED, EXTENDED RELEASE ORAL at 20:09

## 2020-05-18 RX ADMIN — Medication 650 MILLIGRAM(S): at 19:26

## 2020-05-18 RX ADMIN — BUDESONIDE AND FORMOTEROL FUMARATE DIHYDRATE 2 PUFF(S): 160; 4.5 AEROSOL RESPIRATORY (INHALATION) at 20:11

## 2020-05-18 RX ADMIN — BUDESONIDE AND FORMOTEROL FUMARATE DIHYDRATE 2 PUFF(S): 160; 4.5 AEROSOL RESPIRATORY (INHALATION) at 08:34

## 2020-05-18 RX ADMIN — Medication 650 MILLIGRAM(S): at 09:54

## 2020-05-18 RX ADMIN — AMLODIPINE BESYLATE 5 MILLIGRAM(S): 2.5 TABLET ORAL at 20:09

## 2020-05-18 RX ADMIN — LORATADINE 10 MILLIGRAM(S): 10 TABLET ORAL at 08:34

## 2020-05-18 RX ADMIN — LISINOPRIL 20 MILLIGRAM(S): 2.5 TABLET ORAL at 08:34

## 2020-05-18 NOTE — PROGRESS NOTE BEHAVIORAL HEALTH - NSBHFUPINTERVALHXFT_PSY_A_CORE
Pt seen and evaluated, chart reviewed. As per nursing report, pt had no acute events over the weekend, refused abilify on Sunday d/t pt confusion on increased dose. On evaluation, pt presents with improved behavioral control, states mood "I'm ok", endorses good sleep and appetite. Pt continues to have poor insight on her behavioral health diagnosis, states she has never had a psychiatric illness despite her multiple psychiatric hospitalizations. Reeducated pt on benefits of abilify, pt verbalized understanding and compliant with medication. Denies AVH, paranoia. Denies SI/HI, intent and plan. ECG QTc 509 ms (5/13), repeat QTc 498 ms (5/14), repeat QTc 484 ms (5/15), repeat QTc 471 ms (5/16). [place] referral re-started.    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 Pt states she does not need abilify, states she wants to go back to her regular doctors, demands to be released becoming increasingly agitated at this writer with aggressive posturing and cursing. D/t agitation, limited interview. ECG QTc 509 ms (5/13), repeat QTc 498 ms (5/14), repeat QTc 484 ms (5/15). [place] referral re-started. Pt seen and evaluated, chart reviewed. As per nursing report, pt had no acute events over the weekend, refused abilify on Sunday d/t pt confusion on increased dose. On evaluation, pt presents with improved behavioral control, states mood "I'm ok", endorses good sleep and appetite. Pt continues to have poor insight on her behavioral health diagnosis, states she has never had a psychiatric illness despite her multiple psychiatric hospitalizations. Reeducated pt on benefits of abilify, pt verbalized understanding and compliant with medication. Denies prior delusions. Denies AVH, paranoia. Denies SI/HI, intent and plan. ECG QTc 509 ms (5/13), repeat QTc 498 ms (5/14), repeat QTc 484 ms (5/15), repeat QTc 471 ms (5/16). [place] referral re-started.

## 2020-05-18 NOTE — PROGRESS NOTE BEHAVIORAL HEALTH - SUMMARY
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 Pt denies any psychiatric diagnoses, states does not take any psychiatric medications, denies all psychiatric complaints. Pt presents with several delusions including living in a private apartment in [place], living with her  Abebe and having "too many children to count". Collateral from only daughter shows otherwise - pt diagnosed with schizophrenia in 2008, has had multiple episodes of medication noncompliance resulting in multiple [facility] hospitalizations (most recent known [facility] 2/2020). TOO approved, started 4/21/2020, haldol started with medical clearance d/t borderline QTc, which had to be switched to abilify d/t prolonged QTc. Pt compensated, agreed to abilify maintena on 5/6, with plan for discharge on 5/8. However, on day of discharge, discharge held d/t pt decompensating with return of prior delusions and agitation. 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    #Schizophrenia  -d/c haldol 5 mg PO qhs d/t QTc 511 ms (5/1), pt c/o chronic angina, cardiology consulted  -start abilify 5 mg daily (5/8), abilify 2 mg qhs (5/13) --> titrate abilify 10 mg daily (5/16)  -pt received abilify maintena 300 mg IM once (5/6/2020)  -patient has been refusing treatment, TOO approved, started 4/21/2020  -as per TOO, if pt refuses abilify PO, then will give haldol 2 mg IM with f/u ECG    #Prolonged QTc  -ECG QTc 509 ms (5/13), PA consulted  -daily ECG  -5/14 QTc 498 ms, 5/15 484 ms, 5/16 471 ms    #Abdominal pain  -PA consulted    #Chronic angina  -PA consulted, c/w home medication ranexa 500 mg bid ([facility] states last fill 3/2020)  -cardiology consulted    #COPD  -c/w Symbicort 160mcg 2 puffs BID  -c/w Spiriva 1 cap inhal daily      #Hypertension  -elevated BP, PA consulted  -c/w lisinopril 20 mg daily  -c/w norvasc 5 mg qhs    #CAD  -c/w ASA 81mg PO daily  -c/w Atorvastatin 40mg PO QHS

## 2020-05-18 NOTE — PROGRESS NOTE ADULT - SUBJECTIVE AND OBJECTIVE BOX
pt stable alert in NAD  no new complaints    DEPRESSION  ^DEPRESSION  Handoff  Schizophrenia  Asthma  Hypertension  Coronary artery disease  Schizophrenia  COPD (chronic obstructive pulmonary disease)  Paranoid schizophrenia  Coronary artery disease involving coronary bypass graft of native heart without angina pectoris  Essential hypertension  Schizophrenia  S/P CABG (coronary artery bypass graft)    HEALTH ISSUES - PROBLEM Dx:  COPD (chronic obstructive pulmonary disease)  Paranoid schizophrenia  Coronary artery disease involving coronary bypass graft of native heart without angina pectoris: Coronary artery disease involving coronary bypass graft of native heart without angina pectoris  Essential hypertension: Essential hypertension  Schizophrenia: Schizophrenia        PAST MEDICAL & SURGICAL HISTORY:  Schizophrenia  Asthma  Hypertension  Coronary artery disease  S/P CABG (coronary artery bypass graft)    Bananas (Unknown)  sulfa drugs (Unknown)      FAMILY HISTORY:      acetaminophen   Tablet .. 650 milliGRAM(s) Oral every 6 hours PRN  aluminum hydroxide/magnesium hydroxide/simethicone Suspension 30 milliLiter(s) Oral every 4 hours PRN  amLODIPine   Tablet 5 milliGRAM(s) Oral at bedtime  ARIPiprazole 10 milliGRAM(s) Oral daily  aspirin  chewable 81 milliGRAM(s) Oral daily  atorvastatin 40 milliGRAM(s) Oral at bedtime  budesonide 160 MICROgram(s)/formoterol 4.5 MICROgram(s) Inhaler 2 Puff(s) Inhalation two times a day  haloperidol    Injectable 2 milliGRAM(s) IntraMuscular daily PRN  hydrOXYzine hydrochloride 25 milliGRAM(s) Oral every 6 hours PRN  lactobacillus acidophilus 1 Tablet(s) Oral daily  lisinopril 20 milliGRAM(s) Oral daily  loratadine 10 milliGRAM(s) Oral daily  nicotine - 21 mG/24Hr(s) Patch 1 patch Transdermal daily  ranolazine 500 milliGRAM(s) Oral two times a day  tiotropium 18 MICROgram(s) Capsule 1 Capsule(s) Inhalation daily      T(C): 36.2 (05-17-20 @ 16:00), Max: 36.8 (05-17-20 @ 08:15)  HR: 70 (05-17-20 @ 16:00) (64 - 70)  BP: 126/66 (05-17-20 @ 16:00) (126/66 - 139/69)  RR: 16 (05-17-20 @ 16:00) (16 - 18)  SpO2: --    PE;  general: no acute chagnaes noted in nad    Lungs:    Heart:    EXT:    Neuro: no deficits  alert                          CAPILLARY BLOOD GLUCOSE

## 2020-05-18 NOTE — PROGRESS NOTE BEHAVIORAL HEALTH - NSBHCHARTREVIEWINVESTIGATE_PSY_A_CORE FT
< from: 12 Lead ECG (05.16.20 @ 08:44) >      Ventricular Rate 53 BPM    Atrial Rate 53 BPM    P-R Interval 170 ms    QRS Duration 150 ms    Q-T Interval 502 ms    QTC Calculation(Bezet) 471 ms    P Axis 1 degrees    R Axis 56 degrees    T Axis 64 degrees    Diagnosis Line Sinus bradycardia  Right bundle branch block  Abnormal ECG    < end of copied text >

## 2020-05-18 NOTE — CHART NOTE - NSCHARTNOTEFT_GEN_A_CORE
Ms. Silva remains on the unit for continued treatment, safety and observation. She met with treatment team to discuss her treatment plan and medications. She refused some of her medications and addressed this concern with NP. She engaged in interview with staff and reports "ok" mood, with good sleep and appetite. She denies and shows no evidence of suicidal or homicidal ideation or plan. She remains grandiose and  delusional at times. She is in good behavioral control and there have been no issues or recent behavioral outbursts on the unit.  Ms. Silva attends groups sporadically and continues to be encouraged to do so by  and treatment team.     Mental Status Exam:    Mood – Neutral  Sleep - Good  Appetite - Good  ADLs - Fair  Thought Process –Illogical  Observation – [id number]    Ms. Silva will continue to meet with  one on one and with treatment team daily. [facility] application has been initiated/ restarted. At this time patient is not psychiatrically stable for discharge.

## 2020-05-18 NOTE — PROGRESS NOTE BEHAVIORAL HEALTH - NSBHCHARTREVIEWVS_PSY_A_CORE FT
Vital Signs Last 24 Hrs  T(C): 36.7 (18 May 2020 09:49), Max: 36.7 (18 May 2020 09:49)  T(F): 98.1 (18 May 2020 09:49), Max: 98.1 (18 May 2020 09:49)  HR: 76 (18 May 2020 09:49) (70 - 76)  BP: 135/74 (18 May 2020 09:49) (126/66 - 135/74)  BP(mean): --  RR: 18 (18 May 2020 09:49) (16 - 18)  SpO2: --

## 2020-05-19 PROCEDURE — 99231 SBSQ HOSP IP/OBS SF/LOW 25: CPT

## 2020-05-19 RX ADMIN — LORATADINE 10 MILLIGRAM(S): 10 TABLET ORAL at 08:26

## 2020-05-19 RX ADMIN — AMLODIPINE BESYLATE 5 MILLIGRAM(S): 2.5 TABLET ORAL at 20:38

## 2020-05-19 RX ADMIN — Medication 650 MILLIGRAM(S): at 16:53

## 2020-05-19 RX ADMIN — Medication 1 TABLET(S): at 08:26

## 2020-05-19 RX ADMIN — LISINOPRIL 20 MILLIGRAM(S): 2.5 TABLET ORAL at 08:26

## 2020-05-19 RX ADMIN — ATORVASTATIN CALCIUM 40 MILLIGRAM(S): 80 TABLET, FILM COATED ORAL at 20:38

## 2020-05-19 RX ADMIN — ARIPIPRAZOLE 10 MILLIGRAM(S): 15 TABLET ORAL at 08:26

## 2020-05-19 RX ADMIN — Medication 1 PATCH: at 08:26

## 2020-05-19 RX ADMIN — RANOLAZINE 500 MILLIGRAM(S): 500 TABLET, FILM COATED, EXTENDED RELEASE ORAL at 20:38

## 2020-05-19 RX ADMIN — Medication 81 MILLIGRAM(S): at 08:26

## 2020-05-19 RX ADMIN — Medication 650 MILLIGRAM(S): at 09:34

## 2020-05-19 RX ADMIN — BUDESONIDE AND FORMOTEROL FUMARATE DIHYDRATE 2 PUFF(S): 160; 4.5 AEROSOL RESPIRATORY (INHALATION) at 09:21

## 2020-05-19 RX ADMIN — RANOLAZINE 500 MILLIGRAM(S): 500 TABLET, FILM COATED, EXTENDED RELEASE ORAL at 08:26

## 2020-05-19 RX ADMIN — Medication 650 MILLIGRAM(S): at 01:48

## 2020-05-19 RX ADMIN — TIOTROPIUM BROMIDE 1 CAPSULE(S): 18 CAPSULE ORAL; RESPIRATORY (INHALATION) at 09:22

## 2020-05-19 NOTE — PROGRESS NOTE BEHAVIORAL HEALTH - NSBHCHARTREVIEWVS_PSY_A_CORE FT
Vital Signs Last 24 Hrs  T(C): 35.8 (19 May 2020 08:24), Max: 35.9 (18 May 2020 15:42)  T(F): 96.4 (19 May 2020 08:24), Max: 96.6 (18 May 2020 15:42)  HR: 73 (19 May 2020 08:24) (65 - 73)  BP: 170/88 (19 May 2020 10:44) (147/93 - 195/84)  BP(mean): --  RR: 18 (19 May 2020 08:24) (16 - 18)  SpO2: --

## 2020-05-19 NOTE — PROGRESS NOTE BEHAVIORAL HEALTH - NSBHFUPINTERVALHXFT_PSY_A_CORE
Pt seen and evaluated during treatment team, chart reviewed. As per nursing report, pt appeared elated overnight, medication compliant and no behavioral issues. On evaluation, pt endorses good mood, sleep and appetite. Endorses somatic complaints of back pain and gallbladder stones. Pt states she spent all morning talking to "[place] and the state", states she is working to get her SSI check fixed because someone stole her identity. 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 Most recent ECG QTc 471 ms (5/16). [place] referral sent.

## 2020-05-19 NOTE — CHART NOTE - NSCHARTNOTEFT_GEN_A_CORE
RN called on a pt with /84. Pt was given Lisinopril 20 mg PO an hour before vitals were taken.  - repeat /88 half an hour later;  - will monitor BP and give medication if pt remains hypertensive.

## 2020-05-19 NOTE — PROGRESS NOTE BEHAVIORAL HEALTH - SUMMARY
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 Pt denies any psychiatric diagnoses, states does not take any psychiatric medications, denies all psychiatric complaints. Pt presents with several delusions including living in a private apartment in [place], living with her  Abebe and having "too many children to count". Collateral from only daughter shows otherwise - pt diagnosed with schizophrenia in 2008, has had multiple episodes of medication noncompliance resulting in multiple [facility] hospitalizations (most recent known [facility] 2/2020). TOO approved, started 4/21/2020, haldol started with medical clearance d/t borderline QTc, which had to be switched to abilify d/t prolonged QTc. Pt compensated, agreed to abilify maintena on 5/6, with plan for discharge on 5/8. However, on day of discharge, discharge held d/t pt decompensating with return of prior delusions and agitation. 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    #Schizophrenia  -d/c haldol 5 mg PO qhs d/t QTc 511 ms (5/1), pt c/o chronic angina, cardiology consulted  -start abilify 5 mg daily (5/8), abilify 2 mg qhs (5/13) --> c/w abilify 10 mg daily (5/16)  -pt received abilify maintena 300 mg IM once (5/6/2020)  -patient has been refusing treatment, TOO approved, started 4/21/2020  -as per TOO, if pt refuses abilify PO, then will give haldol 2 mg IM with f/u ECG    #Prolonged QTc  -ECG QTc 509 ms (5/13), PA consulted  -daily ECG  -5/14 QTc 498 ms, 5/15 484 ms, 5/16 471 ms    #Abdominal pain  -PA consulted    #Chronic angina  -PA consulted, c/w home medication ranexa 500 mg bid ([facility] states last fill 3/2020)  -cardiology consulted    #COPD  -c/w Symbicort 160mcg 2 puffs BID  -c/w Spiriva 1 cap inhal daily      #Hypertension  -elevated BP, PA consulted  -c/w lisinopril 20 mg daily  -c/w norvasc 5 mg qhs    #CAD  -c/w ASA 81mg PO daily  -c/w Atorvastatin 40mg PO QHS.     Dispo: [place]

## 2020-05-20 PROCEDURE — 99231 SBSQ HOSP IP/OBS SF/LOW 25: CPT

## 2020-05-20 RX ADMIN — Medication 1 PATCH: at 08:04

## 2020-05-20 RX ADMIN — Medication 1 PATCH: at 07:33

## 2020-05-20 RX ADMIN — Medication 1 PATCH: at 08:09

## 2020-05-20 RX ADMIN — BUDESONIDE AND FORMOTEROL FUMARATE DIHYDRATE 2 PUFF(S): 160; 4.5 AEROSOL RESPIRATORY (INHALATION) at 08:04

## 2020-05-20 RX ADMIN — ARIPIPRAZOLE 10 MILLIGRAM(S): 15 TABLET ORAL at 08:03

## 2020-05-20 RX ADMIN — AMLODIPINE BESYLATE 5 MILLIGRAM(S): 2.5 TABLET ORAL at 20:33

## 2020-05-20 RX ADMIN — TIOTROPIUM BROMIDE 1 CAPSULE(S): 18 CAPSULE ORAL; RESPIRATORY (INHALATION) at 08:04

## 2020-05-20 RX ADMIN — Medication 1 TABLET(S): at 08:03

## 2020-05-20 RX ADMIN — LORATADINE 10 MILLIGRAM(S): 10 TABLET ORAL at 08:03

## 2020-05-20 RX ADMIN — LISINOPRIL 20 MILLIGRAM(S): 2.5 TABLET ORAL at 08:03

## 2020-05-20 RX ADMIN — Medication 81 MILLIGRAM(S): at 08:03

## 2020-05-20 RX ADMIN — RANOLAZINE 500 MILLIGRAM(S): 500 TABLET, FILM COATED, EXTENDED RELEASE ORAL at 20:33

## 2020-05-20 RX ADMIN — RANOLAZINE 500 MILLIGRAM(S): 500 TABLET, FILM COATED, EXTENDED RELEASE ORAL at 08:03

## 2020-05-20 RX ADMIN — ATORVASTATIN CALCIUM 40 MILLIGRAM(S): 80 TABLET, FILM COATED ORAL at 20:33

## 2020-05-20 NOTE — PROGRESS NOTE BEHAVIORAL HEALTH - NSBHCHARTREVIEWVS_PSY_A_CORE FT
Vital Signs Last 24 Hrs  T(C): 35.2 (20 May 2020 08:18), Max: 35.9 (19 May 2020 15:29)  T(F): 95.4 (20 May 2020 08:18), Max: 96.7 (19 May 2020 15:29)  HR: 69 (20 May 2020 08:18) (63 - 70)  BP: 180/73 (20 May 2020 08:18) (148/67 - 180/73)  BP(mean): --  RR: 16 (20 May 2020 08:18) (16 - 18)  SpO2: --

## 2020-05-20 NOTE — PROGRESS NOTE BEHAVIORAL HEALTH - SUMMARY
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 Pt denies any psychiatric diagnoses, states does not take any psychiatric medications, denies all psychiatric complaints. Pt presents with several delusions including living in a private apartment in [place], living with her  Abebe and having "too many children to count". Collateral from only daughter shows otherwise - pt diagnosed with schizophrenia in 2008, has had multiple episodes of medication noncompliance resulting in multiple [facility] hospitalizations (most recent known [facility] 2/2020). TOO approved, started 4/21/2020, haldol started with medical clearance d/t borderline QTc, which had to be switched to abilify d/t prolonged QTc. Pt compensated, agreed to abilify maintena on 5/6, with plan for discharge on 5/8. However, on day of discharge, discharge held d/t pt decompensating with return of prior delusions and agitation. 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    #Schizophrenia  -d/c haldol 5 mg PO qhs d/t QTc 511 ms (5/1), pt c/o chronic angina, cardiology consulted  -start abilify 5 mg daily (5/8), abilify 2 mg qhs (5/13) --> c/w abilify 10 mg daily (5/16)  -pt received abilify maintena 300 mg IM once (5/6/2020)  -patient has been refusing treatment, TOO approved, started 4/21/2020  -as per TOO, if pt refuses abilify PO, then will give haldol 2 mg IM with f/u ECG    #Prolonged QTc  -ECG QTc 509 ms (5/13), PA consulted  -daily ECG  -5/14 QTc 498 ms, 5/15 484 ms, 5/16 471 ms    #Abdominal pain  -PA consulted    #Chronic angina  -PA consulted, c/w home medication ranexa 500 mg bid ([facility] states last fill 3/2020)  -cardiology consulted    #COPD  -c/w Symbicort 160mcg 2 puffs BID  -c/w Spiriva 1 cap inhal daily      #Hypertension  -elevated BP, PA consulted  -c/w lisinopril 20 mg daily  -c/w norvasc 5 mg qhs    #CAD  -c/w ASA 81mg PO daily  -c/w Atorvastatin 40mg PO QHS.     Dispo: [place]

## 2020-05-20 NOTE — CHART NOTE - NSCHARTNOTEFT_GEN_A_CORE
Ms. Silva remains on the unit for continued treatment, safety and observation. She met with treatment team to discuss her treatment plan and medications. During meeting pt was able to verbalize specific questions about medication and NP was able to address concerns. When discussing current discharge plan of [facility], pt stated that she cannot go there due to her current obligations at work. Ms. Silva shows poor insight and her thoughts at this time are illogical. Ms. Silva has been accepted to [facility] we are just waiting on a transfer date. SW will continue to follow.     Mood – Neutral  Sleep - Good  Appetite - Good  ADLs - Fair  Thought Process –Illogical  Observation – [id number]    Ms. Silva will continue to meet with  one on one and with treatment team daily. [facility] application has been submitted and pt has been accepted just pending transfer date. At this time patient is not psychiatrically stable for discharge.

## 2020-05-20 NOTE — PROGRESS NOTE BEHAVIORAL HEALTH - NSBHFUPINTERVALHXFT_PSY_A_CORE
Pt seen and evaluated, chart reviewed. As per nursing report, pt had no acute issues overnight, medication compliant and no behavioral issues. On evaluation, pt endorses good mood, sleep and appetite. Endorses somatic complaints of back pain and gallbladder stones, PA has been consulted. 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 Denies AVH, paranoia. Denies SI/HI, intent and plan. Most recent ECG QTc 471 ms (5/16). [place] referral sent.

## 2020-05-20 NOTE — PROGRESS NOTE BEHAVIORAL HEALTH - NSBHFUPDXUPDATE_PSY_A_CORE
no

## 2020-05-20 NOTE — PROGRESS NOTE ADULT - SUBJECTIVE AND OBJECTIVE BOX
pt stable alert in NAD  c/o groin rash/irrititaon    DEPRESSION  ^DEPRESSION  Handoff  Schizophrenia  Asthma  Hypertension  Coronary artery disease  Schizophrenia  COPD (chronic obstructive pulmonary disease)  Paranoid schizophrenia  Coronary artery disease involving coronary bypass graft of native heart without angina pectoris  Essential hypertension  Schizophrenia  S/P CABG (coronary artery bypass graft)    HEALTH ISSUES - PROBLEM Dx:  COPD (chronic obstructive pulmonary disease)  Paranoid schizophrenia  Coronary artery disease involving coronary bypass graft of native heart without angina pectoris: Coronary artery disease involving coronary bypass graft of native heart without angina pectoris  Essential hypertension: Essential hypertension  Schizophrenia: Schizophrenia        PAST MEDICAL & SURGICAL HISTORY:  Schizophrenia  Asthma  Hypertension  Coronary artery disease  S/P CABG (coronary artery bypass graft)    Bananas (Unknown)  sulfa drugs (Unknown)      FAMILY HISTORY:      acetaminophen   Tablet .. 650 milliGRAM(s) Oral every 6 hours PRN  aluminum hydroxide/magnesium hydroxide/simethicone Suspension 30 milliLiter(s) Oral every 4 hours PRN  amLODIPine   Tablet 5 milliGRAM(s) Oral at bedtime  ARIPiprazole 10 milliGRAM(s) Oral daily  aspirin  chewable 81 milliGRAM(s) Oral daily  atorvastatin 40 milliGRAM(s) Oral at bedtime  budesonide 160 MICROgram(s)/formoterol 4.5 MICROgram(s) Inhaler 2 Puff(s) Inhalation two times a day  haloperidol    Injectable 2 milliGRAM(s) IntraMuscular daily PRN  hydrOXYzine hydrochloride 25 milliGRAM(s) Oral every 6 hours PRN  lactobacillus acidophilus 1 Tablet(s) Oral daily  lisinopril 20 milliGRAM(s) Oral daily  loratadine 10 milliGRAM(s) Oral daily  nicotine - 21 mG/24Hr(s) Patch 1 patch Transdermal daily  ranolazine 500 milliGRAM(s) Oral two times a day  tiotropium 18 MICROgram(s) Capsule 1 Capsule(s) Inhalation daily      T(C): 35.9 (05-20-20 @ 05:52), Max: 35.9 (05-19-20 @ 15:29)  HR: 63 (05-20-20 @ 05:52) (63 - 73)  BP: 148/67 (05-20-20 @ 05:52) (148/67 - 195/84)  RR: 16 (05-20-20 @ 05:52) (16 - 18)  SpO2: --    PE;  general:  stable no acute change sin nad    Lungs:    Heart:    EXT:    Neuro:  no deficits                          CAPILLARY BLOOD GLUCOSE

## 2020-05-20 NOTE — PROGRESS NOTE ADULT - PROBLEM SELECTOR PLAN 1
continue present treatment as per psych plan as reviewed  Medically stable with no new changes in treatment  will continue to monitor medical status while being treated on psych  local care to [place] area if worsens renetta f/u

## 2020-05-21 PROCEDURE — 99231 SBSQ HOSP IP/OBS SF/LOW 25: CPT

## 2020-05-21 RX ADMIN — ARIPIPRAZOLE 10 MILLIGRAM(S): 15 TABLET ORAL at 08:44

## 2020-05-21 RX ADMIN — Medication 1 PATCH: at 08:44

## 2020-05-21 RX ADMIN — ATORVASTATIN CALCIUM 40 MILLIGRAM(S): 80 TABLET, FILM COATED ORAL at 20:11

## 2020-05-21 RX ADMIN — RANOLAZINE 500 MILLIGRAM(S): 500 TABLET, FILM COATED, EXTENDED RELEASE ORAL at 20:11

## 2020-05-21 RX ADMIN — Medication 650 MILLIGRAM(S): at 15:06

## 2020-05-21 RX ADMIN — Medication 1 TABLET(S): at 08:44

## 2020-05-21 RX ADMIN — LISINOPRIL 20 MILLIGRAM(S): 2.5 TABLET ORAL at 08:44

## 2020-05-21 RX ADMIN — BUDESONIDE AND FORMOTEROL FUMARATE DIHYDRATE 2 PUFF(S): 160; 4.5 AEROSOL RESPIRATORY (INHALATION) at 08:43

## 2020-05-21 RX ADMIN — Medication 650 MILLIGRAM(S): at 05:19

## 2020-05-21 RX ADMIN — AMLODIPINE BESYLATE 5 MILLIGRAM(S): 2.5 TABLET ORAL at 20:11

## 2020-05-21 RX ADMIN — TIOTROPIUM BROMIDE 1 CAPSULE(S): 18 CAPSULE ORAL; RESPIRATORY (INHALATION) at 08:43

## 2020-05-21 RX ADMIN — Medication 1 PATCH: at 08:52

## 2020-05-21 RX ADMIN — RANOLAZINE 500 MILLIGRAM(S): 500 TABLET, FILM COATED, EXTENDED RELEASE ORAL at 08:45

## 2020-05-21 RX ADMIN — Medication 81 MILLIGRAM(S): at 08:44

## 2020-05-21 RX ADMIN — LORATADINE 10 MILLIGRAM(S): 10 TABLET ORAL at 08:44

## 2020-05-21 NOTE — PROGRESS NOTE BEHAVIORAL HEALTH - NSBHCHARTREVIEWVS_PSY_A_CORE FT
Vital Signs Last 24 Hrs  T(C): 36.1 (21 May 2020 07:55), Max: 36.1 (20 May 2020 18:11)  T(F): 96.9 (21 May 2020 07:55), Max: 96.9 (20 May 2020 18:11)  HR: 63 (21 May 2020 07:55) (60 - 71)  BP: 158/73 (21 May 2020 07:55) (125/54 - 158/73)  BP(mean): --  RR: 16 (21 May 2020 07:55) (16 - 18)  SpO2: --

## 2020-05-21 NOTE — PROGRESS NOTE BEHAVIORAL HEALTH - SUMMARY
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 Pt denies any psychiatric diagnoses, states does not take any psychiatric medications, denies all psychiatric complaints. Pt presents with several delusions including living in a private apartment in [place], living with her  Abebe and having "too many children to count". Collateral from only daughter shows otherwise - pt diagnosed with schizophrenia in 2008, has had multiple episodes of medication noncompliance resulting in multiple [facility] hospitalizations (most recent known [facility] 2/2020). TOO approved, started 4/21/2020, haldol started with medical clearance d/t borderline QTc, which had to be switched to abilify d/t prolonged QTc. Pt compensated, agreed to abilify maintena on 5/6, with plan for discharge on 5/8. However, on day of discharge, discharge held d/t pt decompensating with return of prior delusions and agitation. 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    #Schizophrenia  -d/c haldol 5 mg PO qhs d/t QTc 511 ms (5/1), pt c/o chronic angina, cardiology consulted  -start abilify 5 mg daily (5/8), abilify 2 mg qhs (5/13) --> c/w abilify 10 mg daily (5/16)  -pt received abilify maintena 300 mg IM once (5/6/2020)  -patient has been refusing treatment, TOO approved, started 4/21/2020  -as per TOO, if pt refuses abilify PO, then will give haldol 2 mg IM with f/u ECG    #Prolonged QTc  -ECG QTc 509 ms (5/13), PA consulted  -daily ECG  -5/14 QTc 498 ms, 5/15 484 ms, 5/16 471 ms    #Abdominal pain  -PA consulted    #Chronic angina  -PA consulted, c/w home medication ranexa 500 mg bid ([facility] last fill 3/2020)  -cardiology consulted    #COPD  -c/w Symbicort 160mcg 2 puffs BID  -c/w Spiriva 1 cap inhal daily      #Hypertension  -elevated BP, PA consulted  -c/w lisinopril 20 mg daily  -c/w norvasc 5 mg qhs    #CAD  -c/w ASA 81mg PO daily  -c/w Atorvastatin 40mg PO QHS.     Dispo: [place]

## 2020-05-21 NOTE — PROGRESS NOTE BEHAVIORAL HEALTH - NSBHFUPINTERVALHXFT_PSY_A_CORE
Pt seen and evaluated, chart reviewed. As per nursing report, pt had no acute issues overnight, medication compliant and no behavioral issues. On evaluation, pt endorses good mood, sleep and appetite. Endorses somatic complaints of back pain and gallbladder stones, PA has been consulted. 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 Patient continues to have poor insight in behavioral health diagnosis and long-term treatment. Denies AVH, paranoia. Denies SI/HI, intent and plan. Most recent ECG QTc 471 ms (5/16). [place] referral sent, mood stabilizer recommended, however pt refuses additional medication, denies need despite multiple attempts at education.

## 2020-05-22 LAB
ALBUMIN SERPL ELPH-MCNC: 4.6 G/DL — SIGNIFICANT CHANGE UP (ref 3.5–5.2)
ALP SERPL-CCNC: 89 U/L — SIGNIFICANT CHANGE UP (ref 30–115)
ALT FLD-CCNC: 10 U/L — SIGNIFICANT CHANGE UP (ref 0–41)
ANION GAP SERPL CALC-SCNC: 10 MMOL/L — SIGNIFICANT CHANGE UP (ref 7–14)
AST SERPL-CCNC: 12 U/L — SIGNIFICANT CHANGE UP (ref 0–41)
BILIRUB SERPL-MCNC: 0.4 MG/DL — SIGNIFICANT CHANGE UP (ref 0.2–1.2)
BUN SERPL-MCNC: 16 MG/DL — SIGNIFICANT CHANGE UP (ref 10–20)
CALCIUM SERPL-MCNC: 9.7 MG/DL — SIGNIFICANT CHANGE UP (ref 8.5–10.1)
CHLORIDE SERPL-SCNC: 106 MMOL/L — SIGNIFICANT CHANGE UP (ref 98–110)
CHOLEST SERPL-MCNC: 198 MG/DL — SIGNIFICANT CHANGE UP (ref 100–200)
CO2 SERPL-SCNC: 25 MMOL/L — SIGNIFICANT CHANGE UP (ref 17–32)
CREAT SERPL-MCNC: 0.6 MG/DL — LOW (ref 0.7–1.5)
GLUCOSE SERPL-MCNC: 93 MG/DL — SIGNIFICANT CHANGE UP (ref 70–99)
HCT VFR BLD CALC: 45 % — SIGNIFICANT CHANGE UP (ref 37–47)
HDLC SERPL-MCNC: 65 MG/DL — SIGNIFICANT CHANGE UP
HGB BLD-MCNC: 15.2 G/DL — SIGNIFICANT CHANGE UP (ref 12–16)
LIPID PNL WITH DIRECT LDL SERPL: 129 MG/DL — SIGNIFICANT CHANGE UP (ref 4–129)
MCHC RBC-ENTMCNC: 30.7 PG — SIGNIFICANT CHANGE UP (ref 27–31)
MCHC RBC-ENTMCNC: 33.8 G/DL — SIGNIFICANT CHANGE UP (ref 32–37)
MCV RBC AUTO: 90.9 FL — SIGNIFICANT CHANGE UP (ref 81–99)
NRBC # BLD: 0 /100 WBCS — SIGNIFICANT CHANGE UP (ref 0–0)
PLATELET # BLD AUTO: 162 K/UL — SIGNIFICANT CHANGE UP (ref 130–400)
POTASSIUM SERPL-MCNC: 4.2 MMOL/L — SIGNIFICANT CHANGE UP (ref 3.5–5)
POTASSIUM SERPL-SCNC: 4.2 MMOL/L — SIGNIFICANT CHANGE UP (ref 3.5–5)
PROT SERPL-MCNC: 6.8 G/DL — SIGNIFICANT CHANGE UP (ref 6–8)
RBC # BLD: 4.95 M/UL — SIGNIFICANT CHANGE UP (ref 4.2–5.4)
RBC # FLD: 13.9 % — SIGNIFICANT CHANGE UP (ref 11.5–14.5)
SODIUM SERPL-SCNC: 141 MMOL/L — SIGNIFICANT CHANGE UP (ref 135–146)
TOTAL CHOLESTEROL/HDL RATIO MEASUREMENT: 3 RATIO — LOW (ref 4–5.5)
TRIGL SERPL-MCNC: 104 MG/DL — SIGNIFICANT CHANGE UP (ref 10–149)
WBC # BLD: 7.05 K/UL — SIGNIFICANT CHANGE UP (ref 4.8–10.8)
WBC # FLD AUTO: 7.05 K/UL — SIGNIFICANT CHANGE UP (ref 4.8–10.8)

## 2020-05-22 PROCEDURE — 99231 SBSQ HOSP IP/OBS SF/LOW 25: CPT

## 2020-05-22 RX ORDER — LIDOCAINE 4 G/100G
1 CREAM TOPICAL DAILY
Refills: 0 | Status: DISCONTINUED | OUTPATIENT
Start: 2020-05-22 | End: 2020-06-25

## 2020-05-22 RX ORDER — IBUPROFEN 200 MG
400 TABLET ORAL EVERY 6 HOURS
Refills: 0 | Status: DISCONTINUED | OUTPATIENT
Start: 2020-05-22 | End: 2020-06-25

## 2020-05-22 RX ADMIN — TIOTROPIUM BROMIDE 1 CAPSULE(S): 18 CAPSULE ORAL; RESPIRATORY (INHALATION) at 08:45

## 2020-05-22 RX ADMIN — RANOLAZINE 500 MILLIGRAM(S): 500 TABLET, FILM COATED, EXTENDED RELEASE ORAL at 20:24

## 2020-05-22 RX ADMIN — Medication 1 PATCH: at 12:52

## 2020-05-22 RX ADMIN — BUDESONIDE AND FORMOTEROL FUMARATE DIHYDRATE 2 PUFF(S): 160; 4.5 AEROSOL RESPIRATORY (INHALATION) at 08:44

## 2020-05-22 RX ADMIN — LIDOCAINE 1 PATCH: 4 CREAM TOPICAL at 15:29

## 2020-05-22 RX ADMIN — Medication 1 PATCH: at 08:44

## 2020-05-22 RX ADMIN — Medication 400 MILLIGRAM(S): at 11:30

## 2020-05-22 RX ADMIN — ATORVASTATIN CALCIUM 40 MILLIGRAM(S): 80 TABLET, FILM COATED ORAL at 20:24

## 2020-05-22 RX ADMIN — RANOLAZINE 500 MILLIGRAM(S): 500 TABLET, FILM COATED, EXTENDED RELEASE ORAL at 08:44

## 2020-05-22 RX ADMIN — LORATADINE 10 MILLIGRAM(S): 10 TABLET ORAL at 08:44

## 2020-05-22 RX ADMIN — ARIPIPRAZOLE 10 MILLIGRAM(S): 15 TABLET ORAL at 08:44

## 2020-05-22 RX ADMIN — Medication 81 MILLIGRAM(S): at 08:43

## 2020-05-22 RX ADMIN — LISINOPRIL 20 MILLIGRAM(S): 2.5 TABLET ORAL at 08:44

## 2020-05-22 RX ADMIN — Medication 1 TABLET(S): at 08:44

## 2020-05-22 RX ADMIN — AMLODIPINE BESYLATE 5 MILLIGRAM(S): 2.5 TABLET ORAL at 20:24

## 2020-05-22 NOTE — PROGRESS NOTE BEHAVIORAL HEALTH - SUMMARY
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 Pt denies any psychiatric diagnoses, states does not take any psychiatric medications, denies all psychiatric complaints. Pt presents with several delusions including living in a private apartment in [place], living with her  Abebe and having "too many children to count". Collateral from only daughter shows otherwise - pt diagnosed with schizophrenia in 2008, has had multiple episodes of medication noncompliance resulting in multiple [facility] hospitalizations (most recent known [facility] 2/2020). TOO approved, started 4/21/2020, haldol started with medical clearance d/t borderline QTc, which had to be switched to abilify d/t prolonged QTc. Pt compensated, agreed to abilify maintena on 5/6, with plan for discharge on 5/8. However, on day of discharge, discharge held d/t pt decompensating with return of prior delusions and agitation. On evaluation today, pt presents irritable with an episode of agitation and screaming the night prior. Pt appears less disorganized than yesterday, however remains with similar delusions that were present in the beginning of admission with poor insight in behavioral health diagnosis. Patient continues to have poor insight in behavioral health diagnosis and long-term treatment, refuses addition of mood stabilizer and increase in Abilify.    #Schizophrenia  -d/c haldol 5 mg PO qhs d/t QTc 511 ms (5/1), pt c/o chronic angina, cardiology consulted  -start abilify 5 mg daily (5/8), abilify 2 mg qhs (5/13) --> c/w abilify 10 mg daily (5/16) until 5/27  -pt received abilify maintena 300 mg IM once (5/6/2020), next dose due 6/1  -patient has been refusing treatment, TOO approved, started 4/21/2020  -as per TOO, if pt refuses abilify PO, then will give haldol 2 mg IM with f/u ECG    #Prolonged QTc  -ECG QTc 509 ms (5/13), PA consulted  -daily ECG  -5/14 QTc 498 ms, 5/15 484 ms, 5/16 471 ms  -5/22 QTc 497 ms    #Abdominal pain  -PA consulted    #Chronic angina  -PA consulted, c/w home medication ranexa 500 mg bid ([facility] states last fill 3/2020)  -cardiology consulted    #COPD  -c/w Symbicort 160mcg 2 puffs BID  -c/w Spiriva 1 cap inhal daily      #Hypertension  -elevated BP, PA consulted  -c/w lisinopril 20 mg daily  -c/w norvasc 5 mg qhs    #CAD  -c/w ASA 81mg PO daily  -c/w Atorvastatin 40mg PO QHS.     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 Pt denies any psychiatric diagnoses, states does not take any psychiatric medications, denies all psychiatric complaints. Pt presents with several delusions including living in a private apartment in [place], living with her  Abebe and having "too many children to count". Collateral from only daughter shows otherwise - pt diagnosed with schizophrenia in 2008, has had multiple episodes of medication noncompliance resulting in multiple [facility] hospitalizations (most recent known [facility] 2/2020). TOO approved, started 4/21/2020, haldol started with medical clearance d/t borderline QTc, which had to be switched to abilify d/t prolonged QTc. Pt compensated, agreed to abilify maintena on 5/6, with plan for discharge on 5/8. However, on day of discharge, discharge held d/t pt decompensating with return of prior delusions and agitation. On evaluation today, pt presents irritable with an episode of agitation and screaming the night prior. Pt appears less disorganized than yesterday, however remains with similar delusions that were present in the beginning of admission with poor insight in behavioral health diagnosis. Patient continues to have poor insight in behavioral health diagnosis and long-term treatment, refuses addition of mood stabilizer and increase in Abilify.    #Schizophrenia  -d/c haldol 5 mg PO qhs d/t QTc 511 ms (5/1), pt c/o chronic angina, cardiology consulted  -start abilify 5 mg daily (5/8), abilify 2 mg qhs (5/13) --> c/w abilify 10 mg daily (5/16) until 5/27  -pt received abilify maintena 300 mg IM once (5/6/2020), next dose due 6/3  -patient has been refusing treatment, TOO approved, started 4/21/2020  -as per TOO, if pt refuses abilify PO, then will give haldol 2 mg IM with f/u ECG    #Prolonged QTc  -ECG QTc 509 ms (5/13), PA consulted  -daily ECG  -5/14 QTc 498 ms, 5/15 484 ms, 5/16 471 ms  -5/22 QTc 497 ms    #Abdominal pain  -PA consulted    #Chronic angina  -PA consulted, c/w home medication ranexa 500 mg bid ([facility] states last fill 3/2020)  -cardiology consulted    #COPD  -c/w Symbicort 160mcg 2 puffs BID  -c/w Spiriva 1 cap inhal daily      #Hypertension  -elevated BP, PA consulted  -c/w lisinopril 20 mg daily  -c/w norvasc 5 mg qhs  -noted may be 2/2 pain, c/w ibuprofen 400 mg q6h prn for pain    #CAD  -c/w ASA 81mg PO daily  -c/w Atorvastatin 40mg PO QHS.     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 Pt denies any psychiatric diagnoses, states does not take any psychiatric medications, denies all psychiatric complaints. Pt presents with several delusions including living in a private apartment in [place], living with her  Abebe and having "too many children to count". Collateral from only daughter shows otherwise - pt diagnosed with schizophrenia in 2008, has had multiple episodes of medication noncompliance resulting in multiple [facility] hospitalizations (most recent known [facility] 2/2020). TOO approved, started 4/21/2020, haldol started with medical clearance d/t borderline QTc, which had to be switched to abilify d/t prolonged QTc. Pt compensated, agreed to abilify maintena on 5/6, with plan for discharge on 5/8. However, on day of discharge, discharge held d/t pt decompensating with return of prior delusions and agitation. On evaluation today, pt presents irritable with an episode of agitation and screaming the night prior. Pt appears less disorganized than yesterday, however remains with similar delusions that were present in the beginning of admission with poor insight in behavioral health diagnosis. Patient continues to have poor insight in behavioral health diagnosis and long-term treatment, refuses addition of mood stabilizer and increase in Abilify.    #Schizophrenia  -d/c haldol 5 mg PO qhs d/t QTc 511 ms (5/1), pt c/o chronic angina, cardiology consulted  -start abilify 5 mg daily (5/8), abilify 2 mg qhs (5/13) --> c/w abilify 10 mg daily (5/16) until 5/27  -pt received abilify maintena 300 mg IM once (5/6/2020), next dose due 6/3  -patient has been refusing treatment, TOO approved, started 4/21/2020  -as per TOO, if pt refuses abilify PO, then will give haldol 2 mg IM with f/u ECG    #Prolonged QTc  -PA consulted, daily ECG --> weekly ECG  -ECG QTc 509 ms (5/13) --> QTc 471 ms (5/16)  -5/22 QTc 497 ms  -repeat ECG 5/23    #Abdominal pain, Back pain  -PA consulted  -start lidocaine patch daily    #Chronic angina  -PA consulted, c/w home medication ranexa 500 mg bid ([facility] states last fill 3/2020)  -cardiology consulted    #COPD  -c/w Symbicort 160mcg 2 puffs BID  -c/w Spiriva 1 cap inhal daily      #Hypertension  -elevated BP, PA consulted  -c/w lisinopril 20 mg daily  -c/w norvasc 5 mg qhs  -noted may be 2/2 pain, c/w ibuprofen 400 mg q6h prn for pain    #CAD  -c/w ASA 81mg PO daily  -c/w Atorvastatin 40mg PO QHS.     Dispo: [place]

## 2020-05-22 NOTE — CHART NOTE - NSCHARTNOTEFT_GEN_A_CORE
Ms. Silva remains on the unit for continued treatment, safety and observation. She met with treatment team to discuss her treatment plan and medications. During meeting pt was able to verbalize specific questions about medication and NP was able to address concerns. Rubi was able to verbalize some pain she has been having which will be addressed medically. During AM rounds it was reported that Rubi became agitated but was able to be verbally redirected. Pt has been able to attend groups here on [facility] and has been able to contribute appropriately .Medication changes were offered to Rubi, but she has refused at this time.  Dispo remains [facility] and application has been accepted just pending transfer date at this time. SW will continue to follow.     Mood – Neutral  Sleep - Good  Appetite - Good  ADLs - Fair  Thought Process –Illogical  Observation – [id number]    Ms. Silva will continue to meet with  one on one and with treatment team daily. [facility] application has been submitted and pt has been accepted just pending transfer date. At this time patient is not psychiatrically stable for discharge.

## 2020-05-22 NOTE — PROGRESS NOTE ADULT - SUBJECTIVE AND OBJECTIVE BOX
pt stable alert in NAD  no new complaints    DEPRESSION  ^DEPRESSION  Handoff  Schizophrenia  Asthma  Hypertension  Coronary artery disease  Schizophrenia  COPD (chronic obstructive pulmonary disease)  Paranoid schizophrenia  Coronary artery disease involving coronary bypass graft of native heart without angina pectoris  Essential hypertension  Schizophrenia  S/P CABG (coronary artery bypass graft)    HEALTH ISSUES - PROBLEM Dx:  COPD (chronic obstructive pulmonary disease)  Paranoid schizophrenia  Coronary artery disease involving coronary bypass graft of native heart without angina pectoris: Coronary artery disease involving coronary bypass graft of native heart without angina pectoris  Essential hypertension: Essential hypertension  Schizophrenia: Schizophrenia        PAST MEDICAL & SURGICAL HISTORY:  Schizophrenia  Asthma  Hypertension  Coronary artery disease  S/P CABG (coronary artery bypass graft)    Bananas (Unknown)  sulfa drugs (Unknown)      FAMILY HISTORY:      acetaminophen   Tablet .. 650 milliGRAM(s) Oral every 6 hours PRN  aluminum hydroxide/magnesium hydroxide/simethicone Suspension 30 milliLiter(s) Oral every 4 hours PRN  amLODIPine   Tablet 5 milliGRAM(s) Oral at bedtime  ARIPiprazole 10 milliGRAM(s) Oral daily  aspirin  chewable 81 milliGRAM(s) Oral daily  atorvastatin 40 milliGRAM(s) Oral at bedtime  budesonide 160 MICROgram(s)/formoterol 4.5 MICROgram(s) Inhaler 2 Puff(s) Inhalation two times a day  haloperidol    Injectable 2 milliGRAM(s) IntraMuscular daily PRN  hydrOXYzine hydrochloride 25 milliGRAM(s) Oral every 6 hours PRN  ibuprofen  Tablet. 400 milliGRAM(s) Oral every 6 hours PRN  lactobacillus acidophilus 1 Tablet(s) Oral daily  lisinopril 20 milliGRAM(s) Oral daily  loratadine 10 milliGRAM(s) Oral daily  nicotine - 21 mG/24Hr(s) Patch 1 patch Transdermal daily  ranolazine 500 milliGRAM(s) Oral two times a day  tiotropium 18 MICROgram(s) Capsule 1 Capsule(s) Inhalation daily      T(C): 35.6 (05-21-20 @ 15:45), Max: 36.1 (05-21-20 @ 07:55)  HR: 81 (05-21-20 @ 15:45) (63 - 81)  BP: 151/79 (05-21-20 @ 15:45) (151/79 - 158/73)  RR: 20 (05-21-20 @ 15:45) (16 - 20)  SpO2: --    PE;  general:  no acute changes in nad    Lungs:    Heart:    EXT:    Neuro:  nod eficits      15.2  45.0  7.05  --  --  --  --                                        15.2   7.05  )-----------( 162      ( 22 May 2020 04:30 )             45.0       CAPILLARY BLOOD GLUCOSE

## 2020-05-22 NOTE — PROGRESS NOTE BEHAVIORAL HEALTH - NSBHFUPINTERVALHXFT_PSY_A_CORE
Pt seen and evaluated, chart reviewed. As per nursing report, pt had an episode of agitation and screaming but was verbally redirectable, medication compliant. On evaluation, pt endorses good mood, sleep and appetite. Endorses somatic complaints of back pain and gallbladder stones, PA has been consulted. Pt presents irritable, appears less disorganized than yesterday, however remains with delusion of being an ADHD specialist and having a job at [employer], similar delusions that were present in the beginning of admission. Patient continues to have poor insight in behavioral health diagnosis and long-term treatment. Refused addition of a mood stabilizer and increase in Abilify dose. Denies AVH, paranoia. Denies SI/HI, intent and plan. Most recent ECG QTc 497 ms (5/22), repeat tomorrow. [place] referral sent. Pt seen and evaluated, chart reviewed. As per nursing report, pt had an episode of agitation and screaming but was verbally redirectable, medication compliant. On evaluation, pt endorses good mood, sleep and appetite. Endorses somatic complaints of back pain and gallbladder stones, PA has been consulted. Pt presents irritable, appears less disorganized than yesterday, however remains with delusion of being an ADHD specialist and having a job at [employer], similar delusions that were present in the beginning of admission. Patient continues to have poor insight in behavioral health diagnosis and long-term treatment. Refused addition of a mood stabilizer and increase in Abilify dose. Denies AVH, paranoia. Denies SI/HI, intent and plan. **Most recent ECG QTc 497 ms (5/22), repeat tomorrow. [place] referral sent.

## 2020-05-22 NOTE — PROGRESS NOTE BEHAVIORAL HEALTH - NSBHCHARTREVIEWVS_PSY_A_CORE FT
Vital Signs Last 24 Hrs  T(C): 36.2 (22 May 2020 08:07), Max: 36.2 (22 May 2020 08:07)  T(F): 97.2 (22 May 2020 08:07), Max: 97.2 (22 May 2020 08:07)  HR: 82 (22 May 2020 08:07) (81 - 82)  BP: 182/80 (22 May 2020 08:07) (151/79 - 182/80)  BP(mean): --  RR: 18 (22 May 2020 08:07) (18 - 20)  SpO2: --

## 2020-05-23 PROCEDURE — 99231 SBSQ HOSP IP/OBS SF/LOW 25: CPT

## 2020-05-23 RX ADMIN — Medication 1 PATCH: at 07:21

## 2020-05-23 RX ADMIN — BUDESONIDE AND FORMOTEROL FUMARATE DIHYDRATE 2 PUFF(S): 160; 4.5 AEROSOL RESPIRATORY (INHALATION) at 08:19

## 2020-05-23 RX ADMIN — LISINOPRIL 20 MILLIGRAM(S): 2.5 TABLET ORAL at 08:19

## 2020-05-23 RX ADMIN — Medication 81 MILLIGRAM(S): at 08:20

## 2020-05-23 RX ADMIN — Medication 1 PATCH: at 08:20

## 2020-05-23 RX ADMIN — Medication 1 TABLET(S): at 08:20

## 2020-05-23 RX ADMIN — RANOLAZINE 500 MILLIGRAM(S): 500 TABLET, FILM COATED, EXTENDED RELEASE ORAL at 08:19

## 2020-05-23 RX ADMIN — Medication 1 PATCH: at 08:26

## 2020-05-23 RX ADMIN — ATORVASTATIN CALCIUM 40 MILLIGRAM(S): 80 TABLET, FILM COATED ORAL at 20:40

## 2020-05-23 RX ADMIN — RANOLAZINE 500 MILLIGRAM(S): 500 TABLET, FILM COATED, EXTENDED RELEASE ORAL at 20:40

## 2020-05-23 RX ADMIN — LORATADINE 10 MILLIGRAM(S): 10 TABLET ORAL at 08:19

## 2020-05-23 RX ADMIN — LIDOCAINE 1 PATCH: 4 CREAM TOPICAL at 08:25

## 2020-05-23 RX ADMIN — ARIPIPRAZOLE 10 MILLIGRAM(S): 15 TABLET ORAL at 08:20

## 2020-05-23 RX ADMIN — AMLODIPINE BESYLATE 5 MILLIGRAM(S): 2.5 TABLET ORAL at 20:40

## 2020-05-23 RX ADMIN — Medication 650 MILLIGRAM(S): at 12:45

## 2020-05-23 NOTE — PROGRESS NOTE BEHAVIORAL HEALTH - NSBHFUPINTERVALHXFT_PSY_A_CORE
Chart reviewed, nursing report discussed, pt seen and evaluated.    Pt has no overnight issue, per nursing. Pt was lying in bed, facing inside. She responded to questions, denies having any issues, and reports having a good sleep and breakfast.  Mood stable.    Continue current treatment. Individual support provided.

## 2020-05-23 NOTE — PROGRESS NOTE BEHAVIORAL HEALTH - NSBHADMITCOUNSEL_PSY_A_CORE
diagnostic results/impressions and/or recommended studies/instructions for management, treatment and follow up

## 2020-05-24 RX ADMIN — ATORVASTATIN CALCIUM 40 MILLIGRAM(S): 80 TABLET, FILM COATED ORAL at 20:18

## 2020-05-24 RX ADMIN — Medication 1 TABLET(S): at 08:05

## 2020-05-24 RX ADMIN — LIDOCAINE 1 PATCH: 4 CREAM TOPICAL at 08:05

## 2020-05-24 RX ADMIN — Medication 1 PATCH: at 07:15

## 2020-05-24 RX ADMIN — BUDESONIDE AND FORMOTEROL FUMARATE DIHYDRATE 2 PUFF(S): 160; 4.5 AEROSOL RESPIRATORY (INHALATION) at 20:19

## 2020-05-24 RX ADMIN — RANOLAZINE 500 MILLIGRAM(S): 500 TABLET, FILM COATED, EXTENDED RELEASE ORAL at 08:05

## 2020-05-24 RX ADMIN — LISINOPRIL 20 MILLIGRAM(S): 2.5 TABLET ORAL at 08:05

## 2020-05-24 RX ADMIN — Medication 1 PATCH: at 08:05

## 2020-05-24 RX ADMIN — TIOTROPIUM BROMIDE 1 CAPSULE(S): 18 CAPSULE ORAL; RESPIRATORY (INHALATION) at 08:05

## 2020-05-24 RX ADMIN — Medication 650 MILLIGRAM(S): at 07:13

## 2020-05-24 RX ADMIN — RANOLAZINE 500 MILLIGRAM(S): 500 TABLET, FILM COATED, EXTENDED RELEASE ORAL at 20:18

## 2020-05-24 RX ADMIN — AMLODIPINE BESYLATE 5 MILLIGRAM(S): 2.5 TABLET ORAL at 20:18

## 2020-05-24 RX ADMIN — Medication 1 PATCH: at 08:13

## 2020-05-24 RX ADMIN — LORATADINE 10 MILLIGRAM(S): 10 TABLET ORAL at 08:05

## 2020-05-24 RX ADMIN — BUDESONIDE AND FORMOTEROL FUMARATE DIHYDRATE 2 PUFF(S): 160; 4.5 AEROSOL RESPIRATORY (INHALATION) at 08:05

## 2020-05-24 RX ADMIN — Medication 1 PATCH: at 20:27

## 2020-05-24 RX ADMIN — ARIPIPRAZOLE 10 MILLIGRAM(S): 15 TABLET ORAL at 08:05

## 2020-05-24 RX ADMIN — LIDOCAINE 1 PATCH: 4 CREAM TOPICAL at 20:26

## 2020-05-24 RX ADMIN — Medication 81 MILLIGRAM(S): at 08:05

## 2020-05-24 NOTE — PROGRESS NOTE ADULT - SUBJECTIVE AND OBJECTIVE BOX
pt stable alert in NAD  no new complaints    DEPRESSION  ^DEPRESSION  Handoff  Schizophrenia  Asthma  Hypertension  Coronary artery disease  Schizophrenia  COPD (chronic obstructive pulmonary disease)  Paranoid schizophrenia  Coronary artery disease involving coronary bypass graft of native heart without angina pectoris  Essential hypertension  Schizophrenia  S/P CABG (coronary artery bypass graft)    HEALTH ISSUES - PROBLEM Dx:  COPD (chronic obstructive pulmonary disease)  Paranoid schizophrenia  Coronary artery disease involving coronary bypass graft of native heart without angina pectoris: Coronary artery disease involving coronary bypass graft of native heart without angina pectoris  Essential hypertension: Essential hypertension  Schizophrenia: Schizophrenia        PAST MEDICAL & SURGICAL HISTORY:  Schizophrenia  Asthma  Hypertension  Coronary artery disease  S/P CABG (coronary artery bypass graft)    Bananas (Unknown)  sulfa drugs (Unknown)      FAMILY HISTORY:      acetaminophen   Tablet .. 650 milliGRAM(s) Oral every 6 hours PRN  aluminum hydroxide/magnesium hydroxide/simethicone Suspension 30 milliLiter(s) Oral every 4 hours PRN  amLODIPine   Tablet 5 milliGRAM(s) Oral at bedtime  ARIPiprazole 10 milliGRAM(s) Oral daily  aspirin  chewable 81 milliGRAM(s) Oral daily  atorvastatin 40 milliGRAM(s) Oral at bedtime  budesonide 160 MICROgram(s)/formoterol 4.5 MICROgram(s) Inhaler 2 Puff(s) Inhalation two times a day  haloperidol    Injectable 2 milliGRAM(s) IntraMuscular daily PRN  hydrOXYzine hydrochloride 25 milliGRAM(s) Oral every 6 hours PRN  ibuprofen  Tablet. 400 milliGRAM(s) Oral every 6 hours PRN  lactobacillus acidophilus 1 Tablet(s) Oral daily  lidocaine   Patch 1 Patch Transdermal daily  lisinopril 20 milliGRAM(s) Oral daily  loratadine 10 milliGRAM(s) Oral daily  nicotine - 21 mG/24Hr(s) Patch 1 patch Transdermal daily  ranolazine 500 milliGRAM(s) Oral two times a day  tiotropium 18 MICROgram(s) Capsule 1 Capsule(s) Inhalation daily      T(C): 35.9 (05-24-20 @ 08:12), Max: 35.9 (05-23-20 @ 19:11)  HR: 77 (05-24-20 @ 08:12) (77 - 88)  BP: 167/81 (05-24-20 @ 08:12) (117/61 - 167/81)  RR: 16 (05-24-20 @ 08:12) (16 - 16)  SpO2: --    PE;  general:  no acute changes in nad    Lungs:    Heart:    EXT:    Neuro:  alert no deficits      15.2  45.0  7.05  16  0.6  4.2  93                      CAPILLARY BLOOD GLUCOSE

## 2020-05-25 PROCEDURE — 99232 SBSQ HOSP IP/OBS MODERATE 35: CPT

## 2020-05-25 RX ADMIN — RANOLAZINE 500 MILLIGRAM(S): 500 TABLET, FILM COATED, EXTENDED RELEASE ORAL at 08:36

## 2020-05-25 RX ADMIN — LIDOCAINE 1 PATCH: 4 CREAM TOPICAL at 18:19

## 2020-05-25 RX ADMIN — Medication 1 PATCH: at 07:30

## 2020-05-25 RX ADMIN — RANOLAZINE 500 MILLIGRAM(S): 500 TABLET, FILM COATED, EXTENDED RELEASE ORAL at 20:31

## 2020-05-25 RX ADMIN — HALOPERIDOL DECANOATE 2 MILLIGRAM(S): 100 INJECTION INTRAMUSCULAR at 08:54

## 2020-05-25 RX ADMIN — LIDOCAINE 1 PATCH: 4 CREAM TOPICAL at 08:35

## 2020-05-25 RX ADMIN — Medication 1 TABLET(S): at 08:36

## 2020-05-25 RX ADMIN — BUDESONIDE AND FORMOTEROL FUMARATE DIHYDRATE 2 PUFF(S): 160; 4.5 AEROSOL RESPIRATORY (INHALATION) at 08:36

## 2020-05-25 RX ADMIN — Medication 1 PATCH: at 18:20

## 2020-05-25 RX ADMIN — TIOTROPIUM BROMIDE 1 CAPSULE(S): 18 CAPSULE ORAL; RESPIRATORY (INHALATION) at 08:36

## 2020-05-25 RX ADMIN — Medication 81 MILLIGRAM(S): at 08:36

## 2020-05-25 RX ADMIN — LISINOPRIL 20 MILLIGRAM(S): 2.5 TABLET ORAL at 08:36

## 2020-05-25 RX ADMIN — Medication 1 PATCH: at 08:37

## 2020-05-25 RX ADMIN — LORATADINE 10 MILLIGRAM(S): 10 TABLET ORAL at 08:36

## 2020-05-25 RX ADMIN — Medication 1 PATCH: at 08:35

## 2020-05-25 RX ADMIN — ATORVASTATIN CALCIUM 40 MILLIGRAM(S): 80 TABLET, FILM COATED ORAL at 20:26

## 2020-05-25 NOTE — PROGRESS NOTE BEHAVIORAL HEALTH - NSBHFUPINTERVALHXFT_PSY_A_CORE
Patient seen and interviewed , has no fresh complaints. She reports good sleep and appetite. Asked for ice chips and then walked away from interview.   As per nurses , patient is in good behavioral control but refused her Abilify this morning.

## 2020-05-25 NOTE — PROGRESS NOTE BEHAVIORAL HEALTH - NSBHCHARTREVIEWVS_PSY_A_CORE FT
Vital Signs Last 24 Hrs  T(C): 36.6 (25 May 2020 08:14), Max: 36.6 (25 May 2020 08:14)  T(F): 97.9 (25 May 2020 08:14), Max: 97.9 (25 May 2020 08:14)  HR: 78 (25 May 2020 08:14) (58 - 78)  BP: 165/70 (25 May 2020 08:14) (130/59 - 165/70)  BP(mean): --  RR: 20 (25 May 2020 08:14) (16 - 20)  SpO2: --

## 2020-05-25 NOTE — PROGRESS NOTE BEHAVIORAL HEALTH - SUMMARY
Ms Silva is a 64 year old woman with a history of Schizophrenia who was admitted for worsening psychosis . Patient continues to be, paranoid, isolating and non complaint with her oral psychotropic medication although , as per chart review, she received Abilify maintaina on 5/6/2020.  We will continue current management .

## 2020-05-26 PROCEDURE — 99231 SBSQ HOSP IP/OBS SF/LOW 25: CPT

## 2020-05-26 RX ADMIN — Medication 1 PATCH: at 17:42

## 2020-05-26 RX ADMIN — LIDOCAINE 1 PATCH: 4 CREAM TOPICAL at 17:41

## 2020-05-26 RX ADMIN — LIDOCAINE 1 PATCH: 4 CREAM TOPICAL at 11:42

## 2020-05-26 RX ADMIN — LISINOPRIL 20 MILLIGRAM(S): 2.5 TABLET ORAL at 07:04

## 2020-05-26 RX ADMIN — Medication 1 PATCH: at 08:34

## 2020-05-26 RX ADMIN — RANOLAZINE 500 MILLIGRAM(S): 500 TABLET, FILM COATED, EXTENDED RELEASE ORAL at 08:33

## 2020-05-26 RX ADMIN — Medication 1 PATCH: at 08:35

## 2020-05-26 RX ADMIN — TIOTROPIUM BROMIDE 1 CAPSULE(S): 18 CAPSULE ORAL; RESPIRATORY (INHALATION) at 08:33

## 2020-05-26 RX ADMIN — ARIPIPRAZOLE 10 MILLIGRAM(S): 15 TABLET ORAL at 08:33

## 2020-05-26 RX ADMIN — Medication 81 MILLIGRAM(S): at 08:33

## 2020-05-26 RX ADMIN — BUDESONIDE AND FORMOTEROL FUMARATE DIHYDRATE 2 PUFF(S): 160; 4.5 AEROSOL RESPIRATORY (INHALATION) at 08:33

## 2020-05-26 RX ADMIN — ATORVASTATIN CALCIUM 40 MILLIGRAM(S): 80 TABLET, FILM COATED ORAL at 20:11

## 2020-05-26 RX ADMIN — Medication 1 PATCH: at 07:25

## 2020-05-26 RX ADMIN — Medication 1 TABLET(S): at 08:33

## 2020-05-26 RX ADMIN — LORATADINE 10 MILLIGRAM(S): 10 TABLET ORAL at 08:33

## 2020-05-26 RX ADMIN — RANOLAZINE 500 MILLIGRAM(S): 500 TABLET, FILM COATED, EXTENDED RELEASE ORAL at 20:12

## 2020-05-26 NOTE — PROGRESS NOTE ADULT - PROBLEM SELECTOR PLAN 2
continue present treatment as per psych plan as reviewed  Medically stable with no new changes in treatment  will continue to monitor medical status while being treated on psych  demanding states feesl cold sxs   no need for ab at this time

## 2020-05-26 NOTE — PROGRESS NOTE BEHAVIORAL HEALTH - NSBHCHARTREVIEWVS_PSY_A_CORE FT
Vital Signs Last 24 Hrs  T(C): 35.7 (26 May 2020 09:33), Max: 36 (26 May 2020 06:23)  T(F): 96.2 (26 May 2020 09:33), Max: 96.8 (26 May 2020 06:23)  HR: 80 (26 May 2020 10:46) (80 - 88)  BP: 132/60 (26 May 2020 10:46) (100/52 - 197/81)  BP(mean): --  RR: 18 (26 May 2020 09:33) (16 - 18)  SpO2: --

## 2020-05-26 NOTE — BEHAVIORAL HEALTH ASSESSMENT NOTE - FAMILY HISTORY OF MEDICAL ILLNESS
Called patient to schedule SIM appointment for Dr. Mejia.  No answer and no voicemail set up.  
None known

## 2020-05-26 NOTE — PROGRESS NOTE BEHAVIORAL HEALTH - SUMMARY
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 Pt denies any psychiatric diagnoses, states does not take any psychiatric medications, denies all psychiatric complaints. Pt presents with several delusions including living in a private apartment in [place], living with her  Abebe and having "too many children to count". Collateral from only daughter shows otherwise - pt diagnosed with schizophrenia in 2008, has had multiple episodes of medication noncompliance resulting in multiple [facility] hospitalizations (most recent known [facility] 2/2020). TOO approved, started 4/21/2020, haldol started with medical clearance d/t borderline QTc, which had to be switched to abilify d/t prolonged QTc. Pt compensated, agreed to abilify maintena on 5/6, with plan for discharge on 5/8. However, on day of discharge, discharge held d/t pt decompensating with return of prior delusions and agitation. On evaluation today, pt presents irritable with an episode of agitation and screaming the night prior. Pt appears less disorganized than yesterday, however remains with similar delusions that were present in the beginning of admission with poor insight in behavioral health diagnosis. Patient continues to have poor insight in behavioral health diagnosis and long-term treatment, refuses addition of mood stabilizer and increase in Abilify.    #Schizophrenia  -d/c haldol 5 mg PO qhs d/t QTc 511 ms (5/1), pt c/o chronic angina, cardiology consulted  -start abilify 5 mg daily (5/8), abilify 2 mg qhs (5/13) --> c/w abilify 10 mg daily (5/16) until 5/27  -pt received abilify maintena 300 mg IM once (5/6/2020), next dose due 6/3  -patient has been refusing treatment, TOO approved, started 4/21/2020  -as per TOO, if pt refuses abilify PO, then will give haldol 2 mg IM with f/u ECG    #Prolonged QTc  -PA consulted, daily ECG --> q3 days ECG  -ECG QTc 509 ms (5/13) --> 471 ms (5/16)  -ECG QTc 497 ms (5/22) --> 490 ms (5/26)    #Abdominal pain, Back pain  -PA consulted  -c/w lidocaine patch daily    #Chronic angina  -PA consulted, c/w home medication ranexa 500 mg bid ([facility] states last fill 3/2020)  -cardiology consulted    #COPD  -c/w Symbicort 160mcg 2 puffs BID  -c/w Spiriva 1 cap inhal daily      #Hypertension  -elevated BP, PA consulted  -c/w lisinopril 20 mg daily  -c/w norvasc 5 mg qhs  -noted may be 2/2 pain, c/w ibuprofen 400 mg q6h prn for pain    #CAD  -c/w ASA 81mg PO daily  -c/w Atorvastatin 40mg PO QHS.     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 Pt denies any psychiatric diagnoses, states does not take any psychiatric medications, denies all psychiatric complaints. Pt presents with several delusions including living in a private apartment in [place], living with her  Abebe and having "too many children to count". Collateral from only daughter shows otherwise - pt diagnosed with schizophrenia in 2008, has had multiple episodes of medication noncompliance resulting in multiple [facility] hospitalizations (most recent known [facility] 2/2020). TOO approved, started 4/21/2020, haldol started with medical clearance d/t borderline QTc, which had to be switched to abilify d/t prolonged QTc. Pt compensated, agreed to abilify maintena on 5/6, with plan for discharge on 5/8. However, on day of discharge, discharge held d/t pt decompensating with return of prior delusions and agitation. On evaluation today, pt presents irritable with an episode of agitation and screaming the night prior. Pt appears less disorganized than yesterday, however remains with similar delusions that were present in the beginning of admission with poor insight in behavioral health diagnosis. Patient continues to have poor insight in behavioral health diagnosis and long-term treatment, refuses addition of mood stabilizer and increase in Abilify.    #Schizophrenia  -d/c haldol 5 mg PO qhs d/t QTc 511 ms (5/1), pt c/o chronic angina, cardiology consulted  -start abilify 5 mg daily (5/8), abilify 2 mg qhs (5/13) --> c/w abilify 10 mg daily (5/16) until 5/27  -pt received abilify maintena 300 mg IM once (5/6/2020), next dose due 6/3  -patient has been refusing treatment, TOO approved, started 4/21/2020  -as per TOO, if pt refuses abilify PO, then will give haldol 2 mg IM with f/u ECG    #Prolonged QTc  -PA consulted, daily ECG --> q3 days ECG  -ECG QTc 509 ms (5/13) --> 471 ms (5/16)  -ECG QTc 497 ms (5/22) --> 490 ms (5/26)    #Abdominal pain, Back pain  -PA consulted  -c/w lidocaine patch daily  -c/w ibuprofen 400 mg q6h prn for pain    #Chronic angina  -PA consulted, c/w home medication ranexa 500 mg bid ([facility] states last fill 3/2020)  -cardiology consulted    #COPD  -c/w Symbicort 160mcg 2 puffs BID  -c/w Spiriva 1 cap inhal daily      #Hypertension  -elevated BP, PA consulted  -c/w lisinopril 20 mg daily  -c/w norvasc 5 mg qhs    #CAD  -c/w ASA 81mg PO daily  -c/w Atorvastatin 40mg PO QHS    Dispo: [place]

## 2020-05-26 NOTE — PROGRESS NOTE BEHAVIORAL HEALTH - NSBHCHARTREVIEWINVESTIGATE_PSY_A_CORE FT
< from: 12 Lead ECG (05.26.20 @ 08:56) >      Ventricular Rate 82 BPM    Atrial Rate 82 BPM    P-R Interval 154 ms    QRS Duration 132 ms    Q-T Interval 420 ms    QTC Calculation(Bezet) 490 ms    P Axis 66 degrees    R Axis 52 degrees    T Axis 71 degrees    Diagnosis Line Normal sinus rhythmwith sinus arrhythmia  Right bundle branch block  Abnormal ECG    < end of copied text >

## 2020-05-26 NOTE — CHART NOTE - NSCHARTNOTEFT_GEN_A_CORE
Ms. Silva remains on the unit for continued treatment, safety and observation. She met with treatment team to discuss her treatment plan and medications. Pt refused to attend treatment team meeting. Per morning report pt was agitated throughout the weekend requiring several PRNS.Ms. Silva will continue to work on the same goals set from last treatment team.  Dispo remains [facility] and application has been accepted just pending transfer date at this time. SW will continue to follow.     Mood – Agitated  Sleep - Good  Appetite - Good  ADLs - Fair  Thought Process –Illogical  Observation – [id number]    Ms. Silva will continue to meet with  one on one and with treatment team daily. [facility] application has been submitted and pt has been accepted just pending transfer date. At this time patient is not psychiatrically stable for discharge.

## 2020-05-26 NOTE — PROGRESS NOTE BEHAVIORAL HEALTH - NSBHFUPINTERVALHXFT_PSY_A_CORE
Pt seen and evaluated, pt refused treatment team, chart reviewed. As per nursing report, over the weekend pt refused abilify x1 with an episode of agitation, but verbally redirectable. On evaluation, pt presented in dayroom watching television, refused treatment team because she wanted to finish watching her show. Pt endorses a good weekend with good mood, sleep and appetite. Limited interview. ECG QTc 497 ms (5/22) --> 490 ms (5/23) --> 490 (5/26). [place] referral sent. Pt seen and evaluated, pt refused treatment team, chart reviewed. As per nursing report, over the weekend pt refused abilify x1 with an episode of agitation, but verbally redirectable. On evaluation, pt presented in dayroom watching television, refused treatment team because she wanted to finish watching her show. Pt endorses a good weekend with good mood, sleep and appetite. Limited interview. ECG QTc 497 ms (5/22) --> 490 ms (5/23) --> 490 (5/26). [facility] approved, pending transfer date.

## 2020-05-26 NOTE — PROGRESS NOTE ADULT - SUBJECTIVE AND OBJECTIVE BOX
pt stable alert in NAD  no new complaints    DEPRESSION  ^DEPRESSION  Handoff  Schizophrenia  Asthma  Hypertension  Coronary artery disease  Schizophrenia  COPD (chronic obstructive pulmonary disease)  Paranoid schizophrenia  Coronary artery disease involving coronary bypass graft of native heart without angina pectoris  Essential hypertension  Schizophrenia  S/P CABG (coronary artery bypass graft)    HEALTH ISSUES - PROBLEM Dx:  COPD (chronic obstructive pulmonary disease)  Paranoid schizophrenia  Coronary artery disease involving coronary bypass graft of native heart without angina pectoris: Coronary artery disease involving coronary bypass graft of native heart without angina pectoris  Essential hypertension: Essential hypertension  Schizophrenia: Schizophrenia        PAST MEDICAL & SURGICAL HISTORY:  Schizophrenia  Asthma  Hypertension  Coronary artery disease  S/P CABG (coronary artery bypass graft)    Bananas (Unknown)  sulfa drugs (Unknown)      FAMILY HISTORY:      acetaminophen   Tablet .. 650 milliGRAM(s) Oral every 6 hours PRN  aluminum hydroxide/magnesium hydroxide/simethicone Suspension 30 milliLiter(s) Oral every 4 hours PRN  amLODIPine   Tablet 5 milliGRAM(s) Oral at bedtime  ARIPiprazole 10 milliGRAM(s) Oral daily  aspirin  chewable 81 milliGRAM(s) Oral daily  atorvastatin 40 milliGRAM(s) Oral at bedtime  budesonide 160 MICROgram(s)/formoterol 4.5 MICROgram(s) Inhaler 2 Puff(s) Inhalation two times a day  haloperidol    Injectable 2 milliGRAM(s) IntraMuscular daily PRN  hydrOXYzine hydrochloride 25 milliGRAM(s) Oral every 6 hours PRN  ibuprofen  Tablet. 400 milliGRAM(s) Oral every 6 hours PRN  lactobacillus acidophilus 1 Tablet(s) Oral daily  lidocaine   Patch 1 Patch Transdermal daily  lisinopril 20 milliGRAM(s) Oral daily  loratadine 10 milliGRAM(s) Oral daily  nicotine - 21 mG/24Hr(s) Patch 1 patch Transdermal daily  ranolazine 500 milliGRAM(s) Oral two times a day  tiotropium 18 MICROgram(s) Capsule 1 Capsule(s) Inhalation daily      T(C): 36 (05-26-20 @ 06:23), Max: 36.6 (05-25-20 @ 08:14)  HR: 88 (05-26-20 @ 06:23) (78 - 88)  BP: 197/81 (05-26-20 @ 06:23) (100/52 - 197/81)  RR: 16 (05-26-20 @ 06:23) (16 - 20)  SpO2: --    PE;  general:  no acute changes in nad    Lungs:    Heart:    EXT:    Neuro:  no deficits                          CAPILLARY BLOOD GLUCOSE

## 2020-05-27 PROCEDURE — 99231 SBSQ HOSP IP/OBS SF/LOW 25: CPT

## 2020-05-27 RX ADMIN — RANOLAZINE 500 MILLIGRAM(S): 500 TABLET, FILM COATED, EXTENDED RELEASE ORAL at 20:26

## 2020-05-27 RX ADMIN — AMLODIPINE BESYLATE 5 MILLIGRAM(S): 2.5 TABLET ORAL at 20:26

## 2020-05-27 RX ADMIN — ATORVASTATIN CALCIUM 40 MILLIGRAM(S): 80 TABLET, FILM COATED ORAL at 20:25

## 2020-05-27 RX ADMIN — BUDESONIDE AND FORMOTEROL FUMARATE DIHYDRATE 2 PUFF(S): 160; 4.5 AEROSOL RESPIRATORY (INHALATION) at 20:26

## 2020-05-27 RX ADMIN — BUDESONIDE AND FORMOTEROL FUMARATE DIHYDRATE 2 PUFF(S): 160; 4.5 AEROSOL RESPIRATORY (INHALATION) at 08:07

## 2020-05-27 RX ADMIN — Medication 81 MILLIGRAM(S): at 08:05

## 2020-05-27 RX ADMIN — LIDOCAINE 1 PATCH: 4 CREAM TOPICAL at 08:07

## 2020-05-27 RX ADMIN — LISINOPRIL 20 MILLIGRAM(S): 2.5 TABLET ORAL at 08:05

## 2020-05-27 RX ADMIN — LORATADINE 10 MILLIGRAM(S): 10 TABLET ORAL at 08:05

## 2020-05-27 RX ADMIN — TIOTROPIUM BROMIDE 1 CAPSULE(S): 18 CAPSULE ORAL; RESPIRATORY (INHALATION) at 08:06

## 2020-05-27 RX ADMIN — RANOLAZINE 500 MILLIGRAM(S): 500 TABLET, FILM COATED, EXTENDED RELEASE ORAL at 08:05

## 2020-05-27 RX ADMIN — Medication 1 TABLET(S): at 08:05

## 2020-05-27 NOTE — PROGRESS NOTE BEHAVIORAL HEALTH - NSBHCHARTREVIEWVS_PSY_A_CORE FT
Vital Signs Last 24 Hrs  T(C): 36.2 (27 May 2020 08:25), Max: 36.2 (27 May 2020 08:25)  T(F): 97.1 (27 May 2020 08:25), Max: 97.1 (27 May 2020 08:25)  HR: 80 (27 May 2020 08:25) (74 - 80)  BP: 130/70 (27 May 2020 08:25) (127/68 - 139/69)  BP(mean): --  RR: 16 (27 May 2020 08:25) (16 - 18)  SpO2: --

## 2020-05-27 NOTE — PROGRESS NOTE BEHAVIORAL HEALTH - NSBHFUPINTERVALHXFT_PSY_A_CORE
Pt seen and evaluated, chart reviewed. As per nursing report, pt remains selective with medication and at times agitated, but verbally redirectable. On evaluation, pt presents elated and grandiose, states she knows neurobiology and has a neurobiological disease, denies need for PO abilify and only wants TELLEZ, becoming increasingly agitated when discussing need for medication compliance. Pt endorses good mood, sleep and appetite. Denies AVH, paranoia. Denies SI/HI, intent and plan. ECG QTc 497 ms (5/22) --> 490 ms (5/23) --> 490 (5/26). [facility] approved, pending transfer date.

## 2020-05-27 NOTE — PROGRESS NOTE BEHAVIORAL HEALTH - SUMMARY
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 d/t bizarre bx, psychiatric medication non-compliance and questionable ability to care for self. Pt denies any psychiatric diagnoses, states does not take any psychiatric medications, denies all psychiatric complaints. Pt presents with several delusions including living in a private apartment in [place], living with her  Abebe and having "too many children to count". Collateral from only daughter shows otherwise - pt diagnosed with schizophrenia in 2008, has had multiple episodes of medication noncompliance resulting in multiple [facility] hospitalizations (most recent known [facility] 2/2020). TOO approved, started 4/21/2020, haldol started with medical clearance d/t borderline QTc, which had to be switched to abilify d/t prolonged QTc. Pt compensated, agreed to abilify maintena on 5/6, with plan for discharge on 5/8. However, on day of discharge, discharge held d/t pt decompensating with return of prior delusions and agitation. On evaluation today, pt presents irritable with an episode of agitation and screaming the night prior. Pt appears less disorganized than yesterday, however remains with similar delusions that were present in the beginning of admission with poor insight in behavioral health diagnosis. Patient continues to have poor insight in behavioral health diagnosis and long-term treatment, refuses addition of mood stabilizer and increase in Abilify.    #Schizophrenia  -d/c haldol 5 mg PO qhs d/t QTc 511 ms (5/1), pt c/o chronic angina, cardiology consulted  -start abilify 5 mg daily (5/8), abilify 2 mg qhs (5/13) --> c/w abilify 10 mg daily (5/16) until 5/27  -pt received abilify maintena 300 mg IM once (5/6/2020), next dose due 6/1, 400 mg dose  -patient has been refusing treatment, TOO approved, started 4/21/2020  -as per TOO, if pt refuses abilify PO, then will give haldol 2 mg IM with f/u ECG    #Prolonged QTc  -PA consulted, daily ECG --> q3 days ECG  -ECG QTc 509 ms (5/13) --> 471 ms (5/16)  -ECG QTc 497 ms (5/22) --> 490 ms (5/26)    #Abdominal pain, Back pain  -PA consulted  -c/w lidocaine patch daily  -c/w ibuprofen 400 mg q6h prn for pain    #Chronic angina  -PA consulted, c/w home medication ranexa 500 mg bid ([place] states last fill 3/2020)  -cardiology consulted    #COPD  -c/w Symbicort 160mcg 2 puffs BID  -c/w Spiriva 1 cap inhal daily      #Hypertension  -elevated BP, PA consulted  -c/w lisinopril 20 mg daily  -c/w norvasc 5 mg qhs    #CAD  -c/w ASA 81mg PO daily  -c/w Atorvastatin 40mg PO QHS    Dispo: [place]

## 2020-05-27 NOTE — PROGRESS NOTE BEHAVIORAL HEALTH - AFFECT QUALITY
Fusiform Excision Additional Text (Leave Blank If You Do Not Want): The margin was drawn around the clinically apparent lesion.  A fusiform shape was then drawn on the skin incorporating the lesion and margins.  Incisions were then made along these lines to the appropriate tissue plane and the lesion was extirpated. Complex Repair And Split-Thickness Skin Graft Text: The defect edges were debeveled with a #15 scalpel blade.  The primary defect was closed partially with a complex linear closure.  Given the location of the defect, shape of the defect and the proximity to free margins a split thickness skin graft was deemed most appropriate to repair the remaining defect.  The graft was trimmed to fit the size of the remaining defect.  The graft was then placed in the primary defect, oriented appropriately, and sutured into place. Complex Repair And Tissue Cultured Epidermal Autograft Text: The defect edges were debeveled with a #15 scalpel blade.  The primary defect was closed partially with a complex linear closure.  Given the location of the defect, shape of the defect and the proximity to free margins an tissue cultured epidermal autograft was deemed most appropriate to repair the remaining defect.  The graft was trimmed to fit the size of the remaining defect.  The graft was then placed in the primary defect, oriented appropriately, and sutured into place. A-T Advancement Flap Text: The defect edges were debeveled with a #15 scalpel blade.  Given the location of the defect, shape of the defect and the proximity to free margins an A-T advancement flap was deemed most appropriate.  Using a sterile surgical marker, an appropriate advancement flap was drawn incorporating the defect and placing the expected incisions within the relaxed skin tension lines where possible.    The area thus outlined was incised deep to adipose tissue with a #15 scalpel blade.  The skin margins were undermined to an appropriate distance in all directions utilizing iris scissors. Complex Repair And Double M Plasty Text: The defect edges were debeveled with a #15 scalpel blade.  The primary defect was closed partially with a complex linear closure.  Given the location of the remaining defect, shape of the defect and the proximity to free margins a double M plasty was deemed most appropriate for complete closure of the defect.  Using a sterile surgical marker, an appropriate advancement flap was drawn incorporating the defect and placing the expected incisions within the relaxed skin tension lines where possible.    The area thus outlined was incised deep to adipose tissue with a #15 scalpel blade.  The skin margins were undermined to an appropriate distance in all directions utilizing iris scissors. Show Primary And Secondary Defect Sizes Variable (Do Not Hide If You Perform Flaps Or Graft Closures): Yes O-T Plasty Text: The defect edges were debeveled with a #15 scalpel blade.  Given the location of the defect, shape of the defect and the proximity to free margins an O-T plasty was deemed most appropriate.  Using a sterile surgical marker, an appropriate O-T plasty was drawn incorporating the defect and placing the expected incisions within the relaxed skin tension lines where possible.    The area thus outlined was incised deep to adipose tissue with a #15 scalpel blade.  The skin margins were undermined to an appropriate distance in all directions utilizing iris scissors. Consent was obtained from the patient. The risks and benefits to therapy were discussed in detail. Specifically, the risks of infection, scarring, bleeding, prolonged wound healing, incomplete removal, allergy to anesthesia, nerve injury and recurrence were addressed. Prior to the procedure, the treatment site was clearly identified and confirmed by the patient. All components of Universal Protocol/PAUSE Rule completed. Scalpel Size: 15 blade Ear Star Wedge Flap Text: The defect edges were debeveled with a #15 blade scalpel.  Given the location of the defect and the proximity to free margins (helical rim) an ear star wedge flap was deemed most appropriate.  Using a sterile surgical marker, the appropriate flap was drawn incorporating the defect and placing the expected incisions between the helical rim and antihelix where possible.  The area thus outlined was incised through and through with a #15 scalpel blade. Xenograft Text: The defect edges were debeveled with a #15 scalpel blade.  Given the location of the defect, shape of the defect and the proximity to free margins a xenograft was deemed most appropriate.  The graft was then trimmed to fit the size of the defect.  The graft was then placed in the primary defect and oriented appropriately. Dermal Autograft Text: The defect edges were debeveled with a #15 scalpel blade.  Given the location of the defect, shape of the defect and the proximity to free margins a dermal autograft was deemed most appropriate.  Using a sterile surgical marker, the primary defect shape was transferred to the donor site. The area thus outlined was incised deep to adipose tissue with a #15 scalpel blade.  The harvested graft was then trimmed of adipose and epidermal tissue until only dermis was left.  The skin graft was then placed in the primary defect and oriented appropriately. Anesthesia Volume In Cc: 3 Complex Repair And M Plasty Text: The defect edges were debeveled with a #15 scalpel blade.  The primary defect was closed partially with a complex linear closure.  Given the location of the remaining defect, shape of the defect and the proximity to free margins an M plasty was deemed most appropriate for complete closure of the defect.  Using a sterile surgical marker, an appropriate advancement flap was drawn incorporating the defect and placing the expected incisions within the relaxed skin tension lines where possible.    The area thus outlined was incised deep to adipose tissue with a #15 scalpel blade.  The skin margins were undermined to an appropriate distance in all directions utilizing iris scissors. Secondary Defect Length (In Cm): 0 Complex Repair And Modified Advancement Flap Text: The defect edges were debeveled with a #15 scalpel blade.  The primary defect was closed partially with a complex linear closure.  Given the location of the remaining defect, shape of the defect and the proximity to free margins a modified advancement flap was deemed most appropriate for complete closure of the defect.  Using a sterile surgical marker, an appropriate advancement flap was drawn incorporating the defect and placing the expected incisions within the relaxed skin tension lines where possible.    The area thus outlined was incised deep to adipose tissue with a #15 scalpel blade.  The skin margins were undermined to an appropriate distance in all directions utilizing iris scissors. Complex Repair And Dorsal Nasal Flap Text: The defect edges were debeveled with a #15 scalpel blade.  The primary defect was closed partially with a complex linear closure.  Given the location of the remaining defect, shape of the defect and the proximity to free margins a dorsal nasal flap was deemed most appropriate for complete closure of the defect.  Using a sterile surgical marker, an appropriate flap was drawn incorporating the defect and placing the expected incisions within the relaxed skin tension lines where possible.    The area thus outlined was incised deep to adipose tissue with a #15 scalpel blade.  The skin margins were undermined to an appropriate distance in all directions utilizing iris scissors. Island Pedicle Flap With Canthal Suspension Text: The defect edges were debeveled with a #15 scalpel blade.  Given the location of the defect, shape of the defect and the proximity to free margins an island pedicle advancement flap was deemed most appropriate.  Using a sterile surgical marker, an appropriate advancement flap was drawn incorporating the defect, outlining the appropriate donor tissue and placing the expected incisions within the relaxed skin tension lines where possible. The area thus outlined was incised deep to adipose tissue with a #15 scalpel blade.  The skin margins were undermined to an appropriate distance in all directions around the primary defect and laterally outward around the island pedicle utilizing iris scissors.  There was minimal undermining beneath the pedicle flap. A suspension suture was placed in the canthal tendon to prevent tension and prevent ectropion. Star Wedge Flap Text: The defect edges were debeveled with a #15 scalpel blade.  Given the location of the defect, shape of the defect and the proximity to free margins a star wedge flap was deemed most appropriate.  Using a sterile surgical marker, an appropriate rotation flap was drawn incorporating the defect and placing the expected incisions within the relaxed skin tension lines where possible. The area thus outlined was incised deep to adipose tissue with a #15 scalpel blade.  The skin margins were undermined to an appropriate distance in all directions utilizing iris scissors. Cheek-To-Nose Interpolation Flap Text: A decision was made to reconstruct the defect utilizing an interpolation axial flap and a staged reconstruction.  A telfa template was made of the defect.  This telfa template was then used to outline the Cheek-To-Nose Interpolation flap.  The donor area for the pedicle flap was then injected with anesthesia.  The flap was excised through the skin and subcutaneous tissue down to the layer of the underlying musculature.  The interpolation flap was carefully excised within this deep plane to maintain its blood supply.  The edges of the donor site were undermined.   The donor site was closed in a primary fashion.  The pedicle was then rotated into position and sutured.  Once the tube was sutured into place, adequate blood supply was confirmed with blanching and refill.  The pedicle was then wrapped with xeroform gauze and dressed appropriately with a telfa and gauze bandage to ensure continued blood supply and protect the attached pedicle. Complex Repair And Ftsg Text: The defect edges were debeveled with a #15 scalpel blade.  The primary defect was closed partially with a complex linear closure.  Given the location of the defect, shape of the defect and the proximity to free margins a full thickness skin graft was deemed most appropriate to repair the remaining defect.  The graft was trimmed to fit the size of the remaining defect.  The graft was then placed in the primary defect, oriented appropriately, and sutured into place. Crescentic Advancement Flap Text: The defect edges were debeveled with a #15 scalpel blade.  Given the location of the defect and the proximity to free margins a crescentic advancement flap was deemed most appropriate.  Using a sterile surgical marker, the appropriate advancement flap was drawn incorporating the defect and placing the expected incisions within the relaxed skin tension lines where possible.    The area thus outlined was incised deep to adipose tissue with a #15 scalpel blade.  The skin margins were undermined to an appropriate distance in all directions utilizing iris scissors. Rhombic Flap Text: The defect edges were debeveled with a #15 scalpel blade.  Given the location of the defect and the proximity to free margins a rhombic flap was deemed most appropriate.  Using a sterile surgical marker, an appropriate rhombic flap was drawn incorporating the defect.    The area thus outlined was incised deep to adipose tissue with a #15 scalpel blade.  The skin margins were undermined to an appropriate distance in all directions utilizing iris scissors. Include Z78.9 (Other Specified Conditions Influencing Health Status) As An Associated Diagnosis?: No Complex Repair And Double Advancement Flap Text: The defect edges were debeveled with a #15 scalpel blade.  The primary defect was closed partially with a complex linear closure.  Given the location of the remaining defect, shape of the defect and the proximity to free margins a double advancement flap was deemed most appropriate for complete closure of the defect.  Using a sterile surgical marker, an appropriate advancement flap was drawn incorporating the defect and placing the expected incisions within the relaxed skin tension lines where possible.    The area thus outlined was incised deep to adipose tissue with a #15 scalpel blade.  The skin margins were undermined to an appropriate distance in all directions utilizing iris scissors. Ftsg Text: The defect edges were debeveled with a #15 scalpel blade.  Given the location of the defect, shape of the defect and the proximity to free margins a full thickness skin graft was deemed most appropriate.  Using a sterile surgical marker, the primary defect shape was transferred to the donor site. The area thus outlined was incised deep to adipose tissue with a #15 scalpel blade.  The harvested graft was then trimmed of adipose tissue until only dermis and epidermis was left.  The skin margins of the secondary defect were undermined to an appropriate distance in all directions utilizing iris scissors.  The secondary defect was closed with interrupted buried subcutaneous sutures.  The skin edges were then re-apposed with running  sutures.  The skin graft was then placed in the primary defect and oriented appropriately. Irritable O-T Advancement Flap Text: The defect edges were debeveled with a #15 scalpel blade.  Given the location of the defect, shape of the defect and the proximity to free margins an O-T advancement flap was deemed most appropriate.  Using a sterile surgical marker, an appropriate advancement flap was drawn incorporating the defect and placing the expected incisions within the relaxed skin tension lines where possible.    The area thus outlined was incised deep to adipose tissue with a #15 scalpel blade.  The skin margins were undermined to an appropriate distance in all directions utilizing iris scissors. Hatchet Flap Text: The defect edges were debeveled with a #15 scalpel blade.  Given the location of the defect, shape of the defect and the proximity to free margins a hatchet flap was deemed most appropriate.  Using a sterile surgical marker, an appropriate hatchet flap was drawn incorporating the defect and placing the expected incisions within the relaxed skin tension lines where possible.    The area thus outlined was incised deep to adipose tissue with a #15 scalpel blade.  The skin margins were undermined to an appropriate distance in all directions utilizing iris scissors. W Plasty Text: The lesion was extirpated to the level of the fat with a #15 scalpel blade.  Given the location of the defect, shape of the defect and the proximity to free margins a W-plasty was deemed most appropriate for repair.  Using a sterile surgical marker, the appropriate transposition arms of the W-plasty were drawn incorporating the defect and placing the expected incisions within the relaxed skin tension lines where possible.    The area thus outlined was incised deep to adipose tissue with a #15 scalpel blade.  The skin margins were undermined to an appropriate distance in all directions utilizing iris scissors.  The opposing transposition arms were then transposed into place in opposite direction and anchored with interrupted buried subcutaneous sutures. Billing Type: Third-Party Bill Rotation Flap Text: The defect edges were debeveled with a #15 scalpel blade.  Given the location of the defect, shape of the defect and the proximity to free margins a rotation flap was deemed most appropriate.  Using a sterile surgical marker, an appropriate rotation flap was drawn incorporating the defect and placing the expected incisions within the relaxed skin tension lines where possible.    The area thus outlined was incised deep to adipose tissue with a #15 scalpel blade.  The skin margins were undermined to an appropriate distance in all directions utilizing iris scissors. Wound Care: Petrolatum Dressing: dry sterile dressing Epidermal Sutures: 3-0 Nylon Dorsal Nasal Flap Text: The defect edges were debeveled with a #15 scalpel blade.  Given the location of the defect and the proximity to free margins a dorsal nasal flap was deemed most appropriate.  Using a sterile surgical marker, an appropriate dorsal nasal flap was drawn around the defect.    The area thus outlined was incised deep to adipose tissue with a #15 scalpel blade.  The skin margins were undermined to an appropriate distance in all directions utilizing iris scissors. Excisional Biopsy Additional Text (Leave Blank If You Do Not Want): The margin was drawn around the clinically apparent lesion. An elliptical shape was then drawn on the skin incorporating the lesion and margins.  Incisions were then made along these lines to the appropriate tissue plane and the lesion was extirpated. Epidermal Closure: simple interrupted Advancement Flap (Single) Text: The defect edges were debeveled with a #15 scalpel blade.  Given the location of the defect and the proximity to free margins a single advancement flap was deemed most appropriate.  Using a sterile surgical marker, an appropriate advancement flap was drawn incorporating the defect and placing the expected incisions within the relaxed skin tension lines where possible.    The area thus outlined was incised deep to adipose tissue with a #15 scalpel blade.  The skin margins were undermined to an appropriate distance in all directions utilizing iris scissors. Repair Type: Complex Complex Repair And Melolabial Flap Text: The defect edges were debeveled with a #15 scalpel blade.  The primary defect was closed partially with a complex linear closure.  Given the location of the remaining defect, shape of the defect and the proximity to free margins a melolabial flap was deemed most appropriate for complete closure of the defect.  Using a sterile surgical marker, an appropriate advancement flap was drawn incorporating the defect and placing the expected incisions within the relaxed skin tension lines where possible.    The area thus outlined was incised deep to adipose tissue with a #15 scalpel blade.  The skin margins were undermined to an appropriate distance in all directions utilizing iris scissors. Melolabial Transposition Flap Text: The defect edges were debeveled with a #15 scalpel blade.  Given the location of the defect and the proximity to free margins a melolabial flap was deemed most appropriate.  Using a sterile surgical marker, an appropriate melolabial transposition flap was drawn incorporating the defect.    The area thus outlined was incised deep to adipose tissue with a #15 scalpel blade.  The skin margins were undermined to an appropriate distance in all directions utilizing iris scissors. Helical Rim Advancement Flap Text: The defect edges were debeveled with a #15 blade scalpel.  Given the location of the defect and the proximity to free margins (helical rim) a double helical rim advancement flap was deemed most appropriate.  Using a sterile surgical marker, the appropriate advancement flaps were drawn incorporating the defect and placing the expected incisions between the helical rim and antihelix where possible.  The area thus outlined was incised through and through with a #15 scalpel blade.  With a skin hook and iris scissors, the flaps were gently and sharply undermined and freed up. Complex Repair And Epidermal Autograft Text: The defect edges were debeveled with a #15 scalpel blade.  The primary defect was closed partially with a complex linear closure.  Given the location of the defect, shape of the defect and the proximity to free margins an epidermal autograft was deemed most appropriate to repair the remaining defect.  The graft was trimmed to fit the size of the remaining defect.  The graft was then placed in the primary defect, oriented appropriately, and sutured into place. Bilateral Helical Rim Advancement Flap Text: The defect edges were debeveled with a #15 blade scalpel.  Given the location of the defect and the proximity to free margins (helical rim) a bilateral helical rim advancement flap was deemed most appropriate.  Using a sterile surgical marker, the appropriate advancement flaps were drawn incorporating the defect and placing the expected incisions between the helical rim and antihelix where possible.  The area thus outlined was incised through and through with a #15 scalpel blade.  With a skin hook and iris scissors, the flaps were gently and sharply undermined and freed up. Interpolation Flap Text: A decision was made to reconstruct the defect utilizing an interpolation axial flap and a staged reconstruction.  A telfa template was made of the defect.  This telfa template was then used to outline the interpolation flap.  The donor area for the pedicle flap was then injected with anesthesia.  The flap was excised through the skin and subcutaneous tissue down to the layer of the underlying musculature.  The interpolation flap was carefully excised within this deep plane to maintain its blood supply.  The edges of the donor site were undermined.   The donor site was closed in a primary fashion.  The pedicle was then rotated into position and sutured.  Once the tube was sutured into place, adequate blood supply was confirmed with blanching and refill.  The pedicle was then wrapped with xeroform gauze and dressed appropriately with a telfa and gauze bandage to ensure continued blood supply and protect the attached pedicle. Melolabial Interpolation Flap Text: A decision was made to reconstruct the defect utilizing an interpolation axial flap and a staged reconstruction.  A telfa template was made of the defect.  This telfa template was then used to outline the melolabial interpolation flap.  The donor area for the pedicle flap was then injected with anesthesia.  The flap was excised through the skin and subcutaneous tissue down to the layer of the underlying musculature.  The pedicle flap was carefully excised within this deep plane to maintain its blood supply.  The edges of the donor site were undermined.   The donor site was closed in a primary fashion.  The pedicle was then rotated into position and sutured.  Once the tube was sutured into place, adequate blood supply was confirmed with blanching and refill.  The pedicle was then wrapped with xeroform gauze and dressed appropriately with a telfa and gauze bandage to ensure continued blood supply and protect the attached pedicle. Modified Advancement Flap Text: The defect edges were debeveled with a #15 scalpel blade.  Given the location of the defect, shape of the defect and the proximity to free margins a modified advancement flap was deemed most appropriate.  Using a sterile surgical marker, an appropriate advancement flap was drawn incorporating the defect and placing the expected incisions within the relaxed skin tension lines where possible.    The area thus outlined was incised deep to adipose tissue with a #15 scalpel blade.  The skin margins were undermined to an appropriate distance in all directions utilizing iris scissors. Transposition Flap Text: The defect edges were debeveled with a #15 scalpel blade.  Given the location of the defect and the proximity to free margins a transposition flap was deemed most appropriate.  Using a sterile surgical marker, an appropriate transposition flap was drawn incorporating the defect.    The area thus outlined was incised deep to adipose tissue with a #15 scalpel blade.  The skin margins were undermined to an appropriate distance in all directions utilizing iris scissors. Complex Repair And Z Plasty Text: The defect edges were debeveled with a #15 scalpel blade.  The primary defect was closed partially with a complex linear closure.  Given the location of the remaining defect, shape of the defect and the proximity to free margins a Z plasty was deemed most appropriate for complete closure of the defect.  Using a sterile surgical marker, an appropriate advancement flap was drawn incorporating the defect and placing the expected incisions within the relaxed skin tension lines where possible.    The area thus outlined was incised deep to adipose tissue with a #15 scalpel blade.  The skin margins were undermined to an appropriate distance in all directions utilizing iris scissors. Posterior Auricular Interpolation Flap Text: A decision was made to reconstruct the defect utilizing an interpolation axial flap and a staged reconstruction.  A telfa template was made of the defect.  This telfa template was then used to outline the posterior auricular interpolation flap.  The donor area for the pedicle flap was then injected with anesthesia.  The flap was excised through the skin and subcutaneous tissue down to the layer of the underlying musculature.  The pedicle flap was carefully excised within this deep plane to maintain its blood supply.  The edges of the donor site were undermined.   The donor site was closed in a primary fashion.  The pedicle was then rotated into position and sutured.  Once the tube was sutured into place, adequate blood supply was confirmed with blanching and refill.  The pedicle was then wrapped with xeroform gauze and dressed appropriately with a telfa and gauze bandage to ensure continued blood supply and protect the attached pedicle. Burow's Advancement Flap Text: The defect edges were debeveled with a #15 scalpel blade.  Given the location of the defect and the proximity to free margins a Burow's advancement flap was deemed most appropriate.  Using a sterile surgical marker, the appropriate advancement flap was drawn incorporating the defect and placing the expected incisions within the relaxed skin tension lines where possible.    The area thus outlined was incised deep to adipose tissue with a #15 scalpel blade.  The skin margins were undermined to an appropriate distance in all directions utilizing iris scissors. Medical Necessity Information: It is in your best interest to select a reason for this procedure from the list below. All of these items fulfill various CMS LCD requirements except lesion extends to a margin. Island Pedicle Flap-Requiring Vessel Identification Text: The defect edges were debeveled with a #15 scalpel blade.  Given the location of the defect, shape of the defect and the proximity to free margins an island pedicle advancement flap was deemed most appropriate.  Using a sterile surgical marker, an appropriate advancement flap was drawn, based on the axial vessel mentioned above, incorporating the defect, outlining the appropriate donor tissue and placing the expected incisions within the relaxed skin tension lines where possible.    The area thus outlined was incised deep to adipose tissue with a #15 scalpel blade.  The skin margins were undermined to an appropriate distance in all directions around the primary defect and laterally outward around the island pedicle utilizing iris scissors.  There was minimal undermining beneath the pedicle flap. Trilobed Flap Text: The defect edges were debeveled with a #15 scalpel blade.  Given the location of the defect and the proximity to free margins a trilobed flap was deemed most appropriate.  Using a sterile surgical marker, an appropriate trilobed flap drawn around the defect.    The area thus outlined was incised deep to adipose tissue with a #15 scalpel blade.  The skin margins were undermined to an appropriate distance in all directions utilizing iris scissors. Epidermal Autograft Text: The defect edges were debeveled with a #15 scalpel blade.  Given the location of the defect, shape of the defect and the proximity to free margins an epidermal autograft was deemed most appropriate.  Using a sterile surgical marker, the primary defect shape was transferred to the donor site. The epidermal graft was then harvested.  The skin graft was then placed in the primary defect and oriented appropriately. Lip Wedge Excision Repair Text: Given the location of the defect and the proximity to free margins a full thickness wedge repair was deemed most appropriate.  Using a sterile surgical marker, the appropriate repair was drawn incorporating the defect and placing the expected incisions perpendicular to the vermilion border.  The vermilion border was also meticulously outlined to ensure appropriate reapproximation during the repair.  The area thus outlined was incised through and through with a #15 scalpel blade.  The muscularis and dermis were reaproximated with deep sutures following hemostasis. Care was taken to realign the vermilion border before proceeding with the superficial closure.  Once the vermilion was realigned the superfical and mucosal closure was finished. Bilobed Flap Text: The defect edges were debeveled with a #15 scalpel blade.  Given the location of the defect and the proximity to free margins a bilobe flap was deemed most appropriate.  Using a sterile surgical marker, an appropriate bilobe flap drawn around the defect.    The area thus outlined was incised deep to adipose tissue with a #15 scalpel blade.  The skin margins were undermined to an appropriate distance in all directions utilizing iris scissors. O-Z Plasty Text: The defect edges were debeveled with a #15 scalpel blade.  Given the location of the defect, shape of the defect and the proximity to free margins an O-Z plasty (double transposition flap) was deemed most appropriate.  Using a sterile surgical marker, the appropriate transposition flaps were drawn incorporating the defect and placing the expected incisions within the relaxed skin tension lines where possible.    The area thus outlined was incised deep to adipose tissue with a #15 scalpel blade.  The skin margins were undermined to an appropriate distance in all directions utilizing iris scissors.  Hemostasis was achieved with electrocautery.  The flaps were then transposed into place, one clockwise and the other counterclockwise, and anchored with interrupted buried subcutaneous sutures. Complex Repair And W Plasty Text: The defect edges were debeveled with a #15 scalpel blade.  The primary defect was closed partially with a complex linear closure.  Given the location of the remaining defect, shape of the defect and the proximity to free margins a W plasty was deemed most appropriate for complete closure of the defect.  Using a sterile surgical marker, an appropriate advancement flap was drawn incorporating the defect and placing the expected incisions within the relaxed skin tension lines where possible.    The area thus outlined was incised deep to adipose tissue with a #15 scalpel blade.  The skin margins were undermined to an appropriate distance in all directions utilizing iris scissors. Split-Thickness Skin Graft Text: The defect edges were debeveled with a #15 scalpel blade.  Given the location of the defect, shape of the defect and the proximity to free margins a split thickness skin graft was deemed most appropriate.  Using a sterile surgical marker, the primary defect shape was transferred to the donor site. The split thickness graft was then harvested.  The skin graft was then placed in the primary defect and oriented appropriately. Double Island Pedicle Flap Text: The defect edges were debeveled with a #15 scalpel blade.  Given the location of the defect, shape of the defect and the proximity to free margins a double island pedicle advancement flap was deemed most appropriate.  Using a sterile surgical marker, an appropriate advancement flap was drawn incorporating the defect, outlining the appropriate donor tissue and placing the expected incisions within the relaxed skin tension lines where possible.    The area thus outlined was incised deep to adipose tissue with a #15 scalpel blade.  The skin margins were undermined to an appropriate distance in all directions around the primary defect and laterally outward around the island pedicle utilizing iris scissors.  There was minimal undermining beneath the pedicle flap. Mucosal Advancement Flap Text: Given the location of the defect, shape of the defect and the proximity to free margins a mucosal advancement flap was deemed most appropriate. Incisions were made with a 15 blade scalpel in the appropriate fashion along the cutaneous vermillion border and the mucosal lip. The remaining actinically damaged mucosal tissue was excised.  The mucosal advancement flap was then elevated to the gingival sulcus with care taken to preserve the neurovascular structures and advanced into the primary defect. Care was taken to ensure that precise realignment of the vermilion border was achieved. Purse String (Intermediate) Text: Given the location of the defect and the characteristics of the surrounding skin a purse string intermediate closure was deemed most appropriate.  Undermining was performed circumferentially around the surgical defect.  A purse string suture was then placed and tightened. Z Plasty Text: The lesion was extirpated to the level of the fat with a #15 scalpel blade.  Given the location of the defect, shape of the defect and the proximity to free margins a Z-plasty was deemed most appropriate for repair.  Using a sterile surgical marker, the appropriate transposition arms of the Z-plasty were drawn incorporating the defect and placing the expected incisions within the relaxed skin tension lines where possible.    The area thus outlined was incised deep to adipose tissue with a #15 scalpel blade.  The skin margins were undermined to an appropriate distance in all directions utilizing iris scissors.  The opposing transposition arms were then transposed into place in opposite direction and anchored with interrupted buried subcutaneous sutures. Excision Depth: adipose tissue Intermediate Repair Preamble Text (Leave Blank If You Do Not Want): Undermining was performed with blunt dissection. Slit Excision Additional Text (Leave Blank If You Do Not Want): A linear line was drawn on the skin overlying the lesion. An incision was made slowly until the lesion was visualized.  Once visualized, the lesion was removed with blunt dissection. Post-Care Instructions: I reviewed with the patient in detail post-care instructions. Patient is not to engage in any heavy lifting, exercise, or swimming for the next 14 days. Should the patient develop any fevers, chills, bleeding, severe pain patient will contact the office immediately. Eliptical Excision Additional Text (Leave Blank If You Do Not Want): The margin was drawn around the clinically apparent lesion.  An elliptical shape was then drawn on the skin incorporating the lesion and margins.  Incisions were then made along these lines to the appropriate tissue plane and the lesion was extirpated. Excision Method: Elliptical Complex Repair And Rotation Flap Text: The defect edges were debeveled with a #15 scalpel blade.  The primary defect was closed partially with a complex linear closure.  Given the location of the remaining defect, shape of the defect and the proximity to free margins a rotation flap was deemed most appropriate for complete closure of the defect.  Using a sterile surgical marker, an appropriate advancement flap was drawn incorporating the defect and placing the expected incisions within the relaxed skin tension lines where possible.    The area thus outlined was incised deep to adipose tissue with a #15 scalpel blade.  The skin margins were undermined to an appropriate distance in all directions utilizing iris scissors. Complex Repair And V-Y Plasty Text: The defect edges were debeveled with a #15 scalpel blade.  The primary defect was closed partially with a complex linear closure.  Given the location of the remaining defect, shape of the defect and the proximity to free margins a V-Y plasty was deemed most appropriate for complete closure of the defect.  Using a sterile surgical marker, an appropriate advancement flap was drawn incorporating the defect and placing the expected incisions within the relaxed skin tension lines where possible.    The area thus outlined was incised deep to adipose tissue with a #15 scalpel blade.  The skin margins were undermined to an appropriate distance in all directions utilizing iris scissors. Skin Substitute Text: The defect edges were debeveled with a #15 scalpel blade.  Given the location of the defect, shape of the defect and the proximity to free margins a skin substitute graft was deemed most appropriate.  The graft material was trimmed to fit the size of the defect. The graft was then placed in the primary defect and oriented appropriately. Advancement Flap (Double) Text: The defect edges were debeveled with a #15 scalpel blade.  Given the location of the defect and the proximity to free margins a double advancement flap was deemed most appropriate.  Using a sterile surgical marker, the appropriate advancement flaps were drawn incorporating the defect and placing the expected incisions within the relaxed skin tension lines where possible.    The area thus outlined was incised deep to adipose tissue with a #15 scalpel blade.  The skin margins were undermined to an appropriate distance in all directions utilizing iris scissors. Complex Repair And A-T Advancement Flap Text: The defect edges were debeveled with a #15 scalpel blade.  The primary defect was closed partially with a complex linear closure.  Given the location of the remaining defect, shape of the defect and the proximity to free margins an A-T advancement flap was deemed most appropriate for complete closure of the defect.  Using a sterile surgical marker, an appropriate advancement flap was drawn incorporating the defect and placing the expected incisions within the relaxed skin tension lines where possible.    The area thus outlined was incised deep to adipose tissue with a #15 scalpel blade.  The skin margins were undermined to an appropriate distance in all directions utilizing iris scissors. Complex Repair And O-T Advancement Flap Text: The defect edges were debeveled with a #15 scalpel blade.  The primary defect was closed partially with a complex linear closure.  Given the location of the remaining defect, shape of the defect and the proximity to free margins an O-T advancement flap was deemed most appropriate for complete closure of the defect.  Using a sterile surgical marker, an appropriate advancement flap was drawn incorporating the defect and placing the expected incisions within the relaxed skin tension lines where possible.    The area thus outlined was incised deep to adipose tissue with a #15 scalpel blade.  The skin margins were undermined to an appropriate distance in all directions utilizing iris scissors. Bilobed Transposition Flap Text: The defect edges were debeveled with a #15 scalpel blade.  Given the location of the defect and the proximity to free margins a bilobed transposition flap was deemed most appropriate.  Using a sterile surgical marker, an appropriate bilobe flap drawn around the defect.    The area thus outlined was incised deep to adipose tissue with a #15 scalpel blade.  The skin margins were undermined to an appropriate distance in all directions utilizing iris scissors. Repair Performed By Another Provider Text (Leave Blank If You Do Not Want): After the tissue was excised the defect was repaired by another provider. Medical Necessity Clause: This procedure was medically necessary because the lesion that was treated was: Home Suture Removal Text: Patient was provided a home suture removal kit and will remove their sutures at home.  If they have any questions or difficulties they will call the office. Graft Donor Site Bandage (Optional-Leave Blank If You Don't Want In Note): Steri-strips and a pressure bandage were applied to the donor site. Complex Repair And Transposition Flap Text: The defect edges were debeveled with a #15 scalpel blade.  The primary defect was closed partially with a complex linear closure.  Given the location of the remaining defect, shape of the defect and the proximity to free margins a transposition flap was deemed most appropriate for complete closure of the defect.  Using a sterile surgical marker, an appropriate advancement flap was drawn incorporating the defect and placing the expected incisions within the relaxed skin tension lines where possible.    The area thus outlined was incised deep to adipose tissue with a #15 scalpel blade.  The skin margins were undermined to an appropriate distance in all directions utilizing iris scissors. Spiral Flap Text: The defect edges were debeveled with a #15 scalpel blade.  Given the location of the defect, shape of the defect and the proximity to free margins a spiral flap was deemed most appropriate.  Using a sterile surgical marker, an appropriate rotation flap was drawn incorporating the defect and placing the expected incisions within the relaxed skin tension lines where possible. The area thus outlined was incised deep to adipose tissue with a #15 scalpel blade.  The skin margins were undermined to an appropriate distance in all directions utilizing iris scissors. Bi-Rhombic Flap Text: The defect edges were debeveled with a #15 scalpel blade.  Given the location of the defect and the proximity to free margins a bi-rhombic flap was deemed most appropriate.  Using a sterile surgical marker, an appropriate rhombic flap was drawn incorporating the defect. The area thus outlined was incised deep to adipose tissue with a #15 scalpel blade.  The skin margins were undermined to an appropriate distance in all directions utilizing iris scissors. Estimated Blood Loss (Cc): minimal Mastoid Interpolation Flap Text: A decision was made to reconstruct the defect utilizing an interpolation axial flap and a staged reconstruction.  A telfa template was made of the defect.  This telfa template was then used to outline the mastoid interpolation flap.  The donor area for the pedicle flap was then injected with anesthesia.  The flap was excised through the skin and subcutaneous tissue down to the layer of the underlying musculature.  The pedicle flap was carefully excised within this deep plane to maintain its blood supply.  The edges of the donor site were undermined.   The donor site was closed in a primary fashion.  The pedicle was then rotated into position and sutured.  Once the tube was sutured into place, adequate blood supply was confirmed with blanching and refill.  The pedicle was then wrapped with xeroform gauze and dressed appropriately with a telfa and gauze bandage to ensure continued blood supply and protect the attached pedicle. Anesthesia Type: 1% lidocaine with epinephrine Complex Repair And Dermal Autograft Text: The defect edges were debeveled with a #15 scalpel blade.  The primary defect was closed partially with a complex linear closure.  Given the location of the defect, shape of the defect and the proximity to free margins an dermal autograft was deemed most appropriate to repair the remaining defect.  The graft was trimmed to fit the size of the remaining defect.  The graft was then placed in the primary defect, oriented appropriately, and sutured into place. O-L Flap Text: The defect edges were debeveled with a #15 scalpel blade.  Given the location of the defect, shape of the defect and the proximity to free margins an O-L flap was deemed most appropriate.  Using a sterile surgical marker, an appropriate advancement flap was drawn incorporating the defect and placing the expected incisions within the relaxed skin tension lines where possible.    The area thus outlined was incised deep to adipose tissue with a #15 scalpel blade.  The skin margins were undermined to an appropriate distance in all directions utilizing iris scissors. Size Of Lesion In Cm: 2.5 Tissue Cultured Epidermal Autograft Text: The defect edges were debeveled with a #15 scalpel blade.  Given the location of the defect, shape of the defect and the proximity to free margins a tissue cultured epidermal autograft was deemed most appropriate.  The graft was then trimmed to fit the size of the defect.  The graft was then placed in the primary defect and oriented appropriately. Paramedian Forehead Flap Text: A decision was made to reconstruct the defect utilizing an interpolation axial flap and a staged reconstruction.  A telfa template was made of the defect.  This telfa template was then used to outline the paramedian forehead pedicle flap.  The donor area for the pedicle flap was then injected with anesthesia.  The flap was excised through the skin and subcutaneous tissue down to the layer of the underlying musculature.  The pedicle flap was carefully excised within this deep plane to maintain its blood supply.  The edges of the donor site were undermined.   The donor site was closed in a primary fashion.  The pedicle was then rotated into position and sutured.  Once the tube was sutured into place, adequate blood supply was confirmed with blanching and refill.  The pedicle was then wrapped with xeroform gauze and dressed appropriately with a telfa and gauze bandage to ensure continued blood supply and protect the attached pedicle. Cartilage Graft Text: The defect edges were debeveled with a #15 scalpel blade.  Given the location of the defect, shape of the defect, the fact the defect involved a full thickness cartilage defect a cartilage graft was deemed most appropriate.  An appropriate donor site was identified, cleansed, and anesthetized. The cartilage graft was then harvested and transferred to the recipient site, oriented appropriately and then sutured into place.  The secondary defect was then repaired using a primary closure. Keystone Flap Text: The defect edges were debeveled with a #15 scalpel blade.  Given the location of the defect, shape of the defect a keystone flap was deemed most appropriate.  Using a sterile surgical marker, an appropriate keystone flap was drawn incorporating the defect, outlining the appropriate donor tissue and placing the expected incisions within the relaxed skin tension lines where possible. The area thus outlined was incised deep to adipose tissue with a #15 scalpel blade.  The skin margins were undermined to an appropriate distance in all directions around the primary defect and laterally outward around the flap utilizing iris scissors. Hemostasis: Electrocautery Path Notes (To The Dermatopathologist): Please check margins. Cheek Interpolation Flap Text: A decision was made to reconstruct the defect utilizing an interpolation axial flap and a staged reconstruction.  A telfa template was made of the defect.  This telfa template was then used to outline the Cheek Interpolation flap.  The donor area for the pedicle flap was then injected with anesthesia.  The flap was excised through the skin and subcutaneous tissue down to the layer of the underlying musculature.  The interpolation flap was carefully excised within this deep plane to maintain its blood supply.  The edges of the donor site were undermined.   The donor site was closed in a primary fashion.  The pedicle was then rotated into position and sutured.  Once the tube was sutured into place, adequate blood supply was confirmed with blanching and refill.  The pedicle was then wrapped with xeroform gauze and dressed appropriately with a telfa and gauze bandage to ensure continued blood supply and protect the attached pedicle. Intermediate / Complex Repair - Final Wound Length In Cm: 3.5 Detail Level: Detailed Composite Graft Text: The defect edges were debeveled with a #15 scalpel blade.  Given the location of the defect, shape of the defect, the proximity to free margins and the fact the defect was full thickness a composite graft was deemed most appropriate.  The defect was outline and then transferred to the donor site.  A full thickness graft was then excised from the donor site. The graft was then placed in the primary defect, oriented appropriately and then sutured into place.  The secondary defect was then repaired using a primary closure. Complex Repair And O-L Flap Text: The defect edges were debeveled with a #15 scalpel blade.  The primary defect was closed partially with a complex linear closure.  Given the location of the remaining defect, shape of the defect and the proximity to free margins an O-L flap was deemed most appropriate for complete closure of the defect.  Using a sterile surgical marker, an appropriate flap was drawn incorporating the defect and placing the expected incisions within the relaxed skin tension lines where possible.    The area thus outlined was incised deep to adipose tissue with a #15 scalpel blade.  The skin margins were undermined to an appropriate distance in all directions utilizing iris scissors. S Plasty Text: Given the location and shape of the defect, and the orientation of relaxed skin tension lines, an S-plasty was deemed most appropriate for repair.  Using a sterile surgical marker, the appropriate outline of the S-plasty was drawn, incorporating the defect and placing the expected incisions within the relaxed skin tension lines where possible.  The area thus outlined was incised deep to adipose tissue with a #15 scalpel blade.  The skin margins were undermined to an appropriate distance in all directions utilizing iris scissors. The skin flaps were advanced over the defect.  The opposing margins were then approximated with interrupted buried subcutaneous sutures. No Repair - Repaired With Adjacent Surgical Defect Text (Leave Blank If You Do Not Want): After the excision the defect was repaired concurrently with another surgical defect which was in close approximation. Complex Repair And Rhombic Flap Text: The defect edges were debeveled with a #15 scalpel blade.  The primary defect was closed partially with a complex linear closure.  Given the location of the remaining defect, shape of the defect and the proximity to free margins a rhombic flap was deemed most appropriate for complete closure of the defect.  Using a sterile surgical marker, an appropriate advancement flap was drawn incorporating the defect and placing the expected incisions within the relaxed skin tension lines where possible.    The area thus outlined was incised deep to adipose tissue with a #15 scalpel blade.  The skin margins were undermined to an appropriate distance in all directions utilizing iris scissors. H Plasty Text: Given the location of the defect, shape of the defect and the proximity to free margins a H-plasty was deemed most appropriate for repair.  Using a sterile surgical marker, the appropriate advancement arms of the H-plasty were drawn incorporating the defect and placing the expected incisions within the relaxed skin tension lines where possible. The area thus outlined was incised deep to adipose tissue with a #15 scalpel blade. The skin margins were undermined to an appropriate distance in all directions utilizing iris scissors.  The opposing advancement arms were then advanced into place in opposite direction and anchored with interrupted buried subcutaneous sutures. Perilesional Excision Additional Text (Leave Blank If You Do Not Want): The margin was drawn around the clinically apparent lesion. Incisions were then made along these lines to the appropriate tissue plane and the lesion was extirpated. Island Pedicle Flap Text: The defect edges were debeveled with a #15 scalpel blade.  Given the location of the defect, shape of the defect and the proximity to free margins an island pedicle advancement flap was deemed most appropriate.  Using a sterile surgical marker, an appropriate advancement flap was drawn incorporating the defect, outlining the appropriate donor tissue and placing the expected incisions within the relaxed skin tension lines where possible.    The area thus outlined was incised deep to adipose tissue with a #15 scalpel blade.  The skin margins were undermined to an appropriate distance in all directions around the primary defect and laterally outward around the island pedicle utilizing iris scissors.  There was minimal undermining beneath the pedicle flap. Complex Repair And Skin Substitute Graft Text: The defect edges were debeveled with a #15 scalpel blade.  The primary defect was closed partially with a complex linear closure.  Given the location of the remaining defect, shape of the defect and the proximity to free margins a skin substitute graft was deemed most appropriate to repair the remaining defect.  The graft was trimmed to fit the size of the remaining defect.  The graft was then placed in the primary defect, oriented appropriately, and sutured into place. Alar Island Pedicle Flap Text: The defect edges were debeveled with a #15 scalpel blade.  Given the location of the defect, shape of the defect and the proximity to the alar rim an island pedicle advancement flap was deemed most appropriate.  Using a sterile surgical marker, an appropriate advancement flap was drawn incorporating the defect, outlining the appropriate donor tissue and placing the expected incisions within the nasal ala running parallel to the alar rim. The area thus outlined was incised with a #15 scalpel blade.  The skin margins were undermined minimally to an appropriate distance in all directions around the primary defect and laterally outward around the island pedicle utilizing iris scissors.  There was minimal undermining beneath the pedicle flap. Size Of Margin In Cm: 0.2 Complex Repair And Bilobe Flap Text: The defect edges were debeveled with a #15 scalpel blade.  The primary defect was closed partially with a complex linear closure.  Given the location of the remaining defect, shape of the defect and the proximity to free margins a bilobe flap was deemed most appropriate for complete closure of the defect.  Using a sterile surgical marker, an appropriate advancement flap was drawn incorporating the defect and placing the expected incisions within the relaxed skin tension lines where possible.    The area thus outlined was incised deep to adipose tissue with a #15 scalpel blade.  The skin margins were undermined to an appropriate distance in all directions utilizing iris scissors. Purse String (Simple) Text: Given the location of the defect and the characteristics of the surrounding skin a purse string simple closure was deemed most appropriate.  Undermining was performed circumferentially around the surgical defect.  A purse string suture was then placed and tightened. Complex Repair And Single Advancement Flap Text: The defect edges were debeveled with a #15 scalpel blade.  The primary defect was closed partially with a complex linear closure.  Given the location of the remaining defect, shape of the defect and the proximity to free margins a single advancement flap was deemed most appropriate for complete closure of the defect.  Using a sterile surgical marker, an appropriate advancement flap was drawn incorporating the defect and placing the expected incisions within the relaxed skin tension lines where possible.    The area thus outlined was incised deep to adipose tissue with a #15 scalpel blade.  The skin margins were undermined to an appropriate distance in all directions utilizing iris scissors. Muscle Hinge Flap Text: The defect edges were debeveled with a #15 scalpel blade.  Given the size, depth and location of the defect and the proximity to free margins a muscle hinge flap was deemed most appropriate.  Using a sterile surgical marker, an appropriate hinge flap was drawn incorporating the defect. The area thus outlined was incised with a #15 scalpel blade.  The skin margins were undermined to an appropriate distance in all directions utilizing iris scissors. Complex Repair Preamble Text (Leave Blank If You Do Not Want): Extensive wide undermining was performed. Anesthesia Type: 2% lidocaine without epinephrine Deep Sutures: 3-0 Vicryl Suture Removal: 14 days V-Y Flap Text: The defect edges were debeveled with a #15 scalpel blade.  Given the location of the defect, shape of the defect and the proximity to free margins a V-Y flap was deemed most appropriate.  Using a sterile surgical marker, an appropriate advancement flap was drawn incorporating the defect and placing the expected incisions within the relaxed skin tension lines where possible.    The area thus outlined was incised deep to adipose tissue with a #15 scalpel blade.  The skin margins were undermined to an appropriate distance in all directions utilizing iris scissors. Saucerization Excision Additional Text (Leave Blank If You Do Not Want): The margin was drawn around the clinically apparent lesion.  Incisions were then made along these lines, in a tangential fashion, to the appropriate tissue plane and the lesion was extirpated. V-Y Plasty Text: The defect edges were debeveled with a #15 scalpel blade.  Given the location of the defect, shape of the defect and the proximity to free margins an V-Y advancement flap was deemed most appropriate.  Using a sterile surgical marker, an appropriate advancement flap was drawn incorporating the defect and placing the expected incisions within the relaxed skin tension lines where possible.    The area thus outlined was incised deep to adipose tissue with a #15 scalpel blade.  The skin margins were undermined to an appropriate distance in all directions utilizing iris scissors. Complex Repair And Xenograft Text: The defect edges were debeveled with a #15 scalpel blade.  The primary defect was closed partially with a complex linear closure.  Given the location of the defect, shape of the defect and the proximity to free margins a xenograft was deemed most appropriate to repair the remaining defect.  The graft was trimmed to fit the size of the remaining defect.  The graft was then placed in the primary defect, oriented appropriately, and sutured into place.

## 2020-05-28 PROCEDURE — 99231 SBSQ HOSP IP/OBS SF/LOW 25: CPT

## 2020-05-28 RX ORDER — ARIPIPRAZOLE 15 MG/1
400 TABLET ORAL ONCE
Refills: 0 | Status: COMPLETED | OUTPATIENT
Start: 2020-06-01 | End: 2020-06-01

## 2020-05-28 RX ADMIN — ATORVASTATIN CALCIUM 40 MILLIGRAM(S): 80 TABLET, FILM COATED ORAL at 21:32

## 2020-05-28 RX ADMIN — LISINOPRIL 20 MILLIGRAM(S): 2.5 TABLET ORAL at 08:31

## 2020-05-28 RX ADMIN — TIOTROPIUM BROMIDE 1 CAPSULE(S): 18 CAPSULE ORAL; RESPIRATORY (INHALATION) at 08:30

## 2020-05-28 RX ADMIN — Medication 1 PATCH: at 21:36

## 2020-05-28 RX ADMIN — Medication 1 TABLET(S): at 08:31

## 2020-05-28 RX ADMIN — AMLODIPINE BESYLATE 5 MILLIGRAM(S): 2.5 TABLET ORAL at 21:32

## 2020-05-28 RX ADMIN — Medication 1 PATCH: at 08:30

## 2020-05-28 RX ADMIN — Medication 81 MILLIGRAM(S): at 08:31

## 2020-05-28 RX ADMIN — RANOLAZINE 500 MILLIGRAM(S): 500 TABLET, FILM COATED, EXTENDED RELEASE ORAL at 21:32

## 2020-05-28 RX ADMIN — LIDOCAINE 1 PATCH: 4 CREAM TOPICAL at 08:30

## 2020-05-28 RX ADMIN — LORATADINE 10 MILLIGRAM(S): 10 TABLET ORAL at 08:31

## 2020-05-28 RX ADMIN — LIDOCAINE 1 PATCH: 4 CREAM TOPICAL at 19:08

## 2020-05-28 RX ADMIN — BUDESONIDE AND FORMOTEROL FUMARATE DIHYDRATE 2 PUFF(S): 160; 4.5 AEROSOL RESPIRATORY (INHALATION) at 08:29

## 2020-05-28 RX ADMIN — Medication 650 MILLIGRAM(S): at 16:36

## 2020-05-28 RX ADMIN — RANOLAZINE 500 MILLIGRAM(S): 500 TABLET, FILM COATED, EXTENDED RELEASE ORAL at 08:31

## 2020-05-28 RX ADMIN — BUDESONIDE AND FORMOTEROL FUMARATE DIHYDRATE 2 PUFF(S): 160; 4.5 AEROSOL RESPIRATORY (INHALATION) at 21:36

## 2020-05-28 NOTE — PROGRESS NOTE BEHAVIORAL HEALTH - SUMMARY
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 Pt denies any psychiatric diagnoses, states does not take any psychiatric medications, denies all psychiatric complaints. Pt presents with several delusions including living in a private apartment in [place], living with her  Abebe and having "too many children to count". Collateral from only daughter shows otherwise - pt diagnosed with schizophrenia in 2008, has had multiple episodes of medication noncompliance resulting in multiple [facility] hospitalizations (most recent known [facility] 2/2020). TOO approved, started 4/21/2020, haldol started with medical clearance d/t borderline QTc, which had to be switched to abilify d/t prolonged QTc. Pt compensated, agreed to abilify maintena on 5/6, with plan for discharge on 5/8. However, on day of discharge, discharge held d/t pt decompensating with return of prior delusions and agitation. On evaluation today, pt presents irritable with an episode of agitation and screaming the night prior. Pt appears less disorganized than yesterday, however remains with similar delusions that were present in the beginning of admission with poor insight in behavioral health diagnosis. Patient continues to have poor insight in behavioral health diagnosis and long-term treatment, refuses addition of mood stabilizer and increase in Abilify.    #Schizophrenia  -d/c haldol 5 mg PO qhs d/t QTc 511 ms (5/1), pt c/o chronic angina, cardiology consulted  -start abilify 5 mg daily (5/8), abilify 2 mg qhs (5/13) --> c/w abilify 10 mg daily (5/16) until 5/27  -pt received abilify maintena 300 mg IM once (5/6/2020), next dose due 6/1, 400 mg dose  -patient has been refusing treatment, TOO approved, started 4/21/2020  -as per TOO, if pt refuses abilify PO, then will give haldol 2 mg IM with f/u ECG    #Prolonged QTc  -PA consulted, daily ECG --> q3 days ECG  -ECG QTc 509 ms (5/13) --> 471 ms (5/16)  -ECG QTc 497 ms (5/22) --> 490 ms (5/26)    #Abdominal pain, Back pain  -PA consulted  -c/w lidocaine patch daily  -c/w ibuprofen 400 mg q6h prn for pain    #Chronic angina  -PA consulted, c/w home medication ranexa 500 mg bid ([facility] states last fill 3/2020)  -cardiology consulted    #COPD  -c/w Symbicort 160mcg 2 puffs BID  -c/w Spiriva 1 cap inhal daily      #Hypertension  -elevated BP, PA consulted  -c/w lisinopril 20 mg daily  -c/w norvasc 5 mg qhs    #CAD  -c/w ASA 81mg PO daily  -c/w Atorvastatin 40mg PO QHS    Dispo: [place]

## 2020-05-28 NOTE — PROGRESS NOTE ADULT - SUBJECTIVE AND OBJECTIVE BOX
pt stable alert in NAD  no new complaints    DEPRESSION  ^DEPRESSION  Handoff  Schizophrenia  Asthma  Hypertension  Coronary artery disease  Schizophrenia  COPD (chronic obstructive pulmonary disease)  Paranoid schizophrenia  Coronary artery disease involving coronary bypass graft of native heart without angina pectoris  Essential hypertension  Schizophrenia  S/P CABG (coronary artery bypass graft)    HEALTH ISSUES - PROBLEM Dx:  COPD (chronic obstructive pulmonary disease)  Paranoid schizophrenia  Coronary artery disease involving coronary bypass graft of native heart without angina pectoris: Coronary artery disease involving coronary bypass graft of native heart without angina pectoris  Essential hypertension: Essential hypertension  Schizophrenia: Schizophrenia        PAST MEDICAL & SURGICAL HISTORY:  Schizophrenia  Asthma  Hypertension  Coronary artery disease  S/P CABG (coronary artery bypass graft)    Bananas (Unknown)  sulfa drugs (Unknown)      FAMILY HISTORY:      acetaminophen   Tablet .. 650 milliGRAM(s) Oral every 6 hours PRN  aluminum hydroxide/magnesium hydroxide/simethicone Suspension 30 milliLiter(s) Oral every 4 hours PRN  amLODIPine   Tablet 5 milliGRAM(s) Oral at bedtime  aspirin  chewable 81 milliGRAM(s) Oral daily  atorvastatin 40 milliGRAM(s) Oral at bedtime  budesonide 160 MICROgram(s)/formoterol 4.5 MICROgram(s) Inhaler 2 Puff(s) Inhalation two times a day  hydrOXYzine hydrochloride 25 milliGRAM(s) Oral every 6 hours PRN  ibuprofen  Tablet. 400 milliGRAM(s) Oral every 6 hours PRN  lactobacillus acidophilus 1 Tablet(s) Oral daily  lidocaine   Patch 1 Patch Transdermal daily  lisinopril 20 milliGRAM(s) Oral daily  loratadine 10 milliGRAM(s) Oral daily  nicotine - 21 mG/24Hr(s) Patch 1 patch Transdermal daily  ranolazine 500 milliGRAM(s) Oral two times a day  tiotropium 18 MICROgram(s) Capsule 1 Capsule(s) Inhalation daily      T(C): 36.2 (05-27-20 @ 16:51), Max: 36.2 (05-27-20 @ 08:25)  HR: 77 (05-28-20 @ 05:57) (77 - 85)  BP: 134/75 (05-28-20 @ 05:57) (130/70 - 141/79)  RR: 17 (05-28-20 @ 05:57) (16 - 18)  SpO2: --    PE;  general:  stable in chair no acute cahnges nteod  labile behavior according to staff    Lungs:    Heart:    EXT:    Neuro:  alert no defciits currently calm                          CAPILLARY BLOOD GLUCOSE

## 2020-05-28 NOTE — CHART NOTE - NSCHARTNOTEFT_GEN_A_CORE
PA MEDICINE    Notified by RN for patient  DAVE ORTIZ  64y  Female    Complaint of lower extremity pain. RN concerned because of patients poor vascular history.  Pt was seen at bedside and had complaints of right foot pain.  Pt states " I broke my foot everywhere here and by the   way I broke my knee and my other foot and I want an MRI and every test"  Pt is not A&O and has fleeting thoughts; dx schizophrenia.    T(C): 36.1 (05-28-20 @ 08:21), Max: 36.2 (05-27-20 @ 16:51)  HR: 88 (05-28-20 @ 09:50) (74 - 88)  BP: 129/72 (05-28-20 @ 09:50) (129/72 - 181/74)  RR: 18 (05-28-20 @ 08:21) (17 - 18)      PHYSICAL EXAM:    NERVOUS SYSTEM:  Alert & Oriented X2; Pt ambulating in hallway greater than 40 feet multiple times; no gait abnormality no discomfort,  EXTREMITIES:  2+ Peripheral Pulses b/l  distal lower extremities,   No clubbing, cyanosis, or edema; no crepitus no palpable tenderness b/l l/e  SKIN: warm and dry    Assesment/Plan:    Pt can follow outpatient Vascular at [facility] and Podiatry at [facility]  RN to monitor patient  Behavioral concerns no fx suspicions.  PRN motrin/tylenol for arthritic pain  Recommend have Psych NP re-evaluate behavioral issue

## 2020-05-28 NOTE — PROGRESS NOTE BEHAVIORAL HEALTH - NSBHFUPINTERVALHXFT_PSY_A_CORE
Pt seen and evaluated, chart reviewed. As per nursing report, pt remains selective with medication and at times agitated, but verbally redirectable. 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 Pt endorses good mood, sleep and appetite. Denies AVH, paranoia. Denies SI/HI, intent and plan. ECG QTc 497 ms (5/22) --> 490 ms (5/23) --> 490 (5/26). Pt c/o feet pain worsened when walking, pending PA consult. [place] referral approved, pending transfer date. Next Abilify Maintena dose due on 6/1.

## 2020-05-28 NOTE — PROGRESS NOTE BEHAVIORAL HEALTH - NSBHCHARTREVIEWVS_PSY_A_CORE FT
Vital Signs Last 24 Hrs  T(C): 36.1 (28 May 2020 08:21), Max: 36.2 (27 May 2020 16:51)  T(F): 96.9 (28 May 2020 08:21), Max: 97.2 (27 May 2020 16:51)  HR: 74 (28 May 2020 08:21) (74 - 85)  BP: 181/74 (28 May 2020 08:21) (134/75 - 181/74)  BP(mean): --  RR: 18 (28 May 2020 08:21) (17 - 18)  SpO2: -- Vital Signs Last 24 Hrs  T(C): 36.1 (28 May 2020 08:21), Max: 36.2 (27 May 2020 16:51)  T(F): 96.9 (28 May 2020 08:21), Max: 97.2 (27 May 2020 16:51)  HR: 88 (28 May 2020 09:50) (74 - 88)  BP: 129/72 (28 May 2020 09:50) (129/72 - 181/74)  BP(mean): --  RR: 18 (28 May 2020 08:21) (17 - 18)  SpO2: --

## 2020-05-28 NOTE — PROGRESS NOTE BEHAVIORAL HEALTH - AFFECT RANGE
Labile Z Plasty Text: The lesion was extirpated to the level of the fat with a #15 scalpel blade.  Given the location of the defect, shape of the defect and the proximity to free margins a Z-plasty was deemed most appropriate for repair.  Using a sterile surgical marker, the appropriate transposition arms of the Z-plasty were drawn incorporating the defect and placing the expected incisions within the relaxed skin tension lines where possible.    The area thus outlined was incised deep to adipose tissue with a #15 scalpel blade.  The skin margins were undermined to an appropriate distance in all directions utilizing iris scissors.  The opposing transposition arms were then transposed into place in opposite direction and anchored with interrupted buried subcutaneous sutures.

## 2020-05-29 PROCEDURE — 99231 SBSQ HOSP IP/OBS SF/LOW 25: CPT

## 2020-05-29 RX ADMIN — Medication 1 PATCH: at 08:06

## 2020-05-29 RX ADMIN — BUDESONIDE AND FORMOTEROL FUMARATE DIHYDRATE 2 PUFF(S): 160; 4.5 AEROSOL RESPIRATORY (INHALATION) at 08:04

## 2020-05-29 RX ADMIN — TIOTROPIUM BROMIDE 1 CAPSULE(S): 18 CAPSULE ORAL; RESPIRATORY (INHALATION) at 09:54

## 2020-05-29 RX ADMIN — AMLODIPINE BESYLATE 5 MILLIGRAM(S): 2.5 TABLET ORAL at 20:31

## 2020-05-29 RX ADMIN — RANOLAZINE 500 MILLIGRAM(S): 500 TABLET, FILM COATED, EXTENDED RELEASE ORAL at 20:31

## 2020-05-29 RX ADMIN — BUDESONIDE AND FORMOTEROL FUMARATE DIHYDRATE 2 PUFF(S): 160; 4.5 AEROSOL RESPIRATORY (INHALATION) at 20:31

## 2020-05-29 RX ADMIN — Medication 650 MILLIGRAM(S): at 04:26

## 2020-05-29 RX ADMIN — ATORVASTATIN CALCIUM 40 MILLIGRAM(S): 80 TABLET, FILM COATED ORAL at 20:31

## 2020-05-29 RX ADMIN — RANOLAZINE 500 MILLIGRAM(S): 500 TABLET, FILM COATED, EXTENDED RELEASE ORAL at 08:06

## 2020-05-29 RX ADMIN — Medication 1 PATCH: at 08:07

## 2020-05-29 RX ADMIN — Medication 81 MILLIGRAM(S): at 08:06

## 2020-05-29 RX ADMIN — LORATADINE 10 MILLIGRAM(S): 10 TABLET ORAL at 08:06

## 2020-05-29 RX ADMIN — Medication 1 TABLET(S): at 08:06

## 2020-05-29 RX ADMIN — LISINOPRIL 20 MILLIGRAM(S): 2.5 TABLET ORAL at 08:06

## 2020-05-29 NOTE — CHART NOTE - NSCHARTNOTEFT_GEN_A_CORE
YULIANA spoke with Izabela from [facility], who requested additional information for pt’s admission. YULIANA spoke with NP who submitted requested information to [facility]. Izabela confirmed receipt and awaiting review. YULIANA will continue to follow. YULIANA spoke with Izabela from [facility], who requested additional information for pt’s admission. YULIANA spoke with NP who submitted requested information to [facility]. Izabela confirmed receipt and awaiting review. YULIANA will continue to follow.    [facility] will be going to court on 6/8/2020 for Retention.

## 2020-05-29 NOTE — PROGRESS NOTE BEHAVIORAL HEALTH - SUMMARY
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 Pt denies any psychiatric diagnoses, states does not take any psychiatric medications, denies all psychiatric complaints. Pt presents with several delusions including living in a private apartment in [place], living with her  Abebe and having "too many children to count". Collateral from only daughter shows otherwise - pt diagnosed with schizophrenia in 2008, has had multiple episodes of medication noncompliance resulting in multiple [facility] hospitalizations (most recent known [facility] 2/2020). TOO approved, started 4/21/2020, haldol started with medical clearance d/t borderline QTc, which had to be switched to abilify d/t prolonged QTc. Pt compensated, agreed to abilify maintena on 5/6, with plan for discharge on 5/8. However, on day of discharge, discharge held d/t pt decompensating with return of prior delusions and agitation. On evaluation today, pt presents irritable with an episode of agitation and screaming the night prior. Pt appears less disorganized than yesterday, however remains with similar delusions that were present in the beginning of admission with poor insight in behavioral health diagnosis. Patient continues to have poor insight in behavioral health diagnosis and long-term treatment, refuses addition of mood stabilizer and increase in Abilify PO, only accepting next TELLEZ dose.    #Schizophrenia  -d/c haldol 5 mg PO qhs d/t QTc 511 ms (5/1), pt c/o chronic angina, cardiology consulted  -start abilify 5 mg daily (5/8), abilify 2 mg qhs (5/13) --> c/w abilify 10 mg daily (5/16) until 5/27  -pt received abilify maintena 300 mg IM once (5/6/2020), next dose due 6/1, 400 mg dose  -patient has been refusing treatment, TOO approved, started 4/21/2020  -as per TOO, if pt refuses abilify PO, then will give haldol 2 mg IM with f/u ECG    #Prolonged QTc  -PA consulted, daily ECG --> q3 days ECG  -ECG QTc 509 ms (5/13) --> 471 ms (5/16)  -ECG QTc 497 ms (5/22) --> 490 ms (5/26)  -ECG QTc 488 ms (4/29)    #Abdominal/Back Pain  -PA consulted  -c/w lidocaine patch daily  -c/w ibuprofen 400 mg q6h prn for pain    #Feet pain, PAD?  -PA consulted, f/u OP  -R>L with +1/+2 pedal pulses    #Chronic angina  -PA consulted, c/w home medication ranexa 500 mg bid ([facility] states last fill 3/2020)  -cardiology consulted    #COPD  -c/w Symbicort 160mcg 2 puffs BID  -c/w Spiriva 1 cap inhal daily      #Hypertension  -elevated BP, PA consulted  -c/w lisinopril 20 mg daily  -c/w norvasc 5 mg qhs    #CAD  -c/w ASA 81mg PO daily  -c/w Atorvastatin 40mg PO QHS    Dispo: [place]

## 2020-05-29 NOTE — PROGRESS NOTE BEHAVIORAL HEALTH - NSBHFUPINTERVALHXFT_PSY_A_CORE
Pt seen and evaluated, chart reviewed. As per nursing report, pt labile overnight, laughing inappropriately at times and then agitated, but verbally redirectable. 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 Pt endorses good mood, sleep and appetite. Denies AVH, paranoia. Denies SI/HI, intent and plan. ECG QTc 497 ms (5/22) --> 490 ms (5/23) --> 490 (5/26). Pt c/o feet pain worsened when walking, pending PA consult. [place] referral approved, pending transfer date. Next Abilify Maintena dose due on 6/1. Pt seen and evaluated, chart reviewed. As per nursing report, pt labile overnight, laughing inappropriately at times and then agitated, but verbally redirectable. On evaluation, pt presents in dayroom in good behavioral control. Minimally cooperative with interview answering with only yes or no responses. Pt endorses good mood, sleep and appetite. Denies AVH, paranoia. Denies medication side effects. Endorses improvement in somatic sx of back/abdominal/feet pain. Denies SI/HI, intent and plan. Most recent ECG QTc 488 ms (5/29). Next Abilify Maintena dose due on 6/1. [place] referral approved, pending transfer date.

## 2020-05-29 NOTE — PROGRESS NOTE BEHAVIORAL HEALTH - NSBHCHARTREVIEWVS_PSY_A_CORE FT
Vital Signs Last 24 Hrs  T(C): 35.9 (29 May 2020 08:14), Max: 35.9 (29 May 2020 08:14)  T(F): 96.7 (29 May 2020 08:14), Max: 96.7 (29 May 2020 08:14)  HR: 73 (29 May 2020 08:14) (68 - 101)  BP: 152/66 (29 May 2020 08:14) (147/75 - 160/81)  BP(mean): --  RR: 17 (29 May 2020 08:14) (17 - 20)  SpO2: --

## 2020-05-30 RX ADMIN — RANOLAZINE 500 MILLIGRAM(S): 500 TABLET, FILM COATED, EXTENDED RELEASE ORAL at 08:42

## 2020-05-30 RX ADMIN — BUDESONIDE AND FORMOTEROL FUMARATE DIHYDRATE 2 PUFF(S): 160; 4.5 AEROSOL RESPIRATORY (INHALATION) at 08:45

## 2020-05-30 RX ADMIN — Medication 1 PATCH: at 20:52

## 2020-05-30 RX ADMIN — AMLODIPINE BESYLATE 5 MILLIGRAM(S): 2.5 TABLET ORAL at 20:47

## 2020-05-30 RX ADMIN — LIDOCAINE 1 PATCH: 4 CREAM TOPICAL at 20:52

## 2020-05-30 RX ADMIN — BUDESONIDE AND FORMOTEROL FUMARATE DIHYDRATE 2 PUFF(S): 160; 4.5 AEROSOL RESPIRATORY (INHALATION) at 20:47

## 2020-05-30 RX ADMIN — Medication 1 TABLET(S): at 08:42

## 2020-05-30 RX ADMIN — Medication 1 PATCH: at 08:42

## 2020-05-30 RX ADMIN — LIDOCAINE 1 PATCH: 4 CREAM TOPICAL at 20:51

## 2020-05-30 RX ADMIN — LISINOPRIL 20 MILLIGRAM(S): 2.5 TABLET ORAL at 08:42

## 2020-05-30 RX ADMIN — RANOLAZINE 500 MILLIGRAM(S): 500 TABLET, FILM COATED, EXTENDED RELEASE ORAL at 20:47

## 2020-05-30 RX ADMIN — Medication 81 MILLIGRAM(S): at 08:42

## 2020-05-30 RX ADMIN — LORATADINE 10 MILLIGRAM(S): 10 TABLET ORAL at 08:42

## 2020-05-30 RX ADMIN — ATORVASTATIN CALCIUM 40 MILLIGRAM(S): 80 TABLET, FILM COATED ORAL at 20:47

## 2020-05-30 RX ADMIN — LIDOCAINE 1 PATCH: 4 CREAM TOPICAL at 08:44

## 2020-05-30 RX ADMIN — TIOTROPIUM BROMIDE 1 CAPSULE(S): 18 CAPSULE ORAL; RESPIRATORY (INHALATION) at 08:46

## 2020-05-30 NOTE — PROGRESS NOTE ADULT - SUBJECTIVE AND OBJECTIVE BOX
pt stable alert in NAD  no new complaints  pa note reviewed    DEPRESSION  ^DEPRESSION  Handoff  Schizophrenia  Asthma  Hypertension  Coronary artery disease  Schizophrenia  COPD (chronic obstructive pulmonary disease)  Paranoid schizophrenia  Coronary artery disease involving coronary bypass graft of native heart without angina pectoris  Essential hypertension  Schizophrenia  S/P CABG (coronary artery bypass graft)    HEALTH ISSUES - PROBLEM Dx:  COPD (chronic obstructive pulmonary disease)  Paranoid schizophrenia  Coronary artery disease involving coronary bypass graft of native heart without angina pectoris: Coronary artery disease involving coronary bypass graft of native heart without angina pectoris  Essential hypertension: Essential hypertension  Schizophrenia: Schizophrenia        PAST MEDICAL & SURGICAL HISTORY:  Schizophrenia  Asthma  Hypertension  Coronary artery disease  S/P CABG (coronary artery bypass graft)    Bananas (Unknown)  sulfa drugs (Unknown)      FAMILY HISTORY:      acetaminophen   Tablet .. 650 milliGRAM(s) Oral every 6 hours PRN  aluminum hydroxide/magnesium hydroxide/simethicone Suspension 30 milliLiter(s) Oral every 4 hours PRN  amLODIPine   Tablet 5 milliGRAM(s) Oral at bedtime  aspirin  chewable 81 milliGRAM(s) Oral daily  atorvastatin 40 milliGRAM(s) Oral at bedtime  budesonide 160 MICROgram(s)/formoterol 4.5 MICROgram(s) Inhaler 2 Puff(s) Inhalation two times a day  hydrOXYzine hydrochloride 25 milliGRAM(s) Oral every 6 hours PRN  ibuprofen  Tablet. 400 milliGRAM(s) Oral every 6 hours PRN  lactobacillus acidophilus 1 Tablet(s) Oral daily  lidocaine   Patch 1 Patch Transdermal daily  lisinopril 20 milliGRAM(s) Oral daily  loratadine 10 milliGRAM(s) Oral daily  nicotine - 21 mG/24Hr(s) Patch 1 patch Transdermal daily  ranolazine 500 milliGRAM(s) Oral two times a day  tiotropium 18 MICROgram(s) Capsule 1 Capsule(s) Inhalation daily      T(C): 35.9 (05-30-20 @ 08:56), Max: 36.2 (05-29-20 @ 15:34)  HR: 91 (05-30-20 @ 08:56) (79 - 91)  BP: 129/71 (05-30-20 @ 08:56) (105/61 - 145/73)  RR: 18 (05-30-20 @ 08:56) (18 - 20)  SpO2: --    PE;  general:  stable with no changes has labiel behavior    Lungs:    Heart:    EXT:    Neuro:  no deficits                          CAPILLARY BLOOD GLUCOSE

## 2020-05-30 NOTE — PROGRESS NOTE ADULT - PROBLEM SELECTOR PLAN 1
continue present treatment as per psych plan as reviewed  Medically stable with no new changes in treatment  will continue to monitor medical status while being treated on psych  no futher medical w/u or tx cahnges needed at this time

## 2020-05-31 RX ADMIN — BUDESONIDE AND FORMOTEROL FUMARATE DIHYDRATE 2 PUFF(S): 160; 4.5 AEROSOL RESPIRATORY (INHALATION) at 08:30

## 2020-05-31 RX ADMIN — RANOLAZINE 500 MILLIGRAM(S): 500 TABLET, FILM COATED, EXTENDED RELEASE ORAL at 08:31

## 2020-05-31 RX ADMIN — RANOLAZINE 500 MILLIGRAM(S): 500 TABLET, FILM COATED, EXTENDED RELEASE ORAL at 21:01

## 2020-05-31 RX ADMIN — LIDOCAINE 1 PATCH: 4 CREAM TOPICAL at 08:30

## 2020-05-31 RX ADMIN — Medication 81 MILLIGRAM(S): at 08:31

## 2020-05-31 RX ADMIN — LISINOPRIL 20 MILLIGRAM(S): 2.5 TABLET ORAL at 08:31

## 2020-05-31 RX ADMIN — LORATADINE 10 MILLIGRAM(S): 10 TABLET ORAL at 08:31

## 2020-05-31 RX ADMIN — AMLODIPINE BESYLATE 5 MILLIGRAM(S): 2.5 TABLET ORAL at 21:01

## 2020-05-31 RX ADMIN — Medication 1 PATCH: at 08:32

## 2020-05-31 RX ADMIN — ATORVASTATIN CALCIUM 40 MILLIGRAM(S): 80 TABLET, FILM COATED ORAL at 21:01

## 2020-05-31 RX ADMIN — Medication 1 TABLET(S): at 08:32

## 2020-05-31 RX ADMIN — TIOTROPIUM BROMIDE 1 CAPSULE(S): 18 CAPSULE ORAL; RESPIRATORY (INHALATION) at 08:31

## 2020-06-01 PROCEDURE — 99231 SBSQ HOSP IP/OBS SF/LOW 25: CPT

## 2020-06-01 RX ORDER — ARIPIPRAZOLE 15 MG/1
400 TABLET ORAL ONCE
Refills: 0 | Status: COMPLETED | OUTPATIENT
Start: 2020-06-01 | End: 2020-06-01

## 2020-06-01 RX ADMIN — BUDESONIDE AND FORMOTEROL FUMARATE DIHYDRATE 2 PUFF(S): 160; 4.5 AEROSOL RESPIRATORY (INHALATION) at 21:13

## 2020-06-01 RX ADMIN — RANOLAZINE 500 MILLIGRAM(S): 500 TABLET, FILM COATED, EXTENDED RELEASE ORAL at 21:13

## 2020-06-01 RX ADMIN — RANOLAZINE 500 MILLIGRAM(S): 500 TABLET, FILM COATED, EXTENDED RELEASE ORAL at 09:11

## 2020-06-01 RX ADMIN — Medication 1 PATCH: at 09:14

## 2020-06-01 RX ADMIN — Medication 81 MILLIGRAM(S): at 09:11

## 2020-06-01 RX ADMIN — Medication 30 MILLILITER(S): at 17:42

## 2020-06-01 RX ADMIN — Medication 1 PATCH: at 09:12

## 2020-06-01 RX ADMIN — BUDESONIDE AND FORMOTEROL FUMARATE DIHYDRATE 2 PUFF(S): 160; 4.5 AEROSOL RESPIRATORY (INHALATION) at 09:13

## 2020-06-01 RX ADMIN — Medication 1 PATCH: at 19:30

## 2020-06-01 RX ADMIN — TIOTROPIUM BROMIDE 1 CAPSULE(S): 18 CAPSULE ORAL; RESPIRATORY (INHALATION) at 09:12

## 2020-06-01 RX ADMIN — AMLODIPINE BESYLATE 5 MILLIGRAM(S): 2.5 TABLET ORAL at 21:13

## 2020-06-01 RX ADMIN — Medication 650 MILLIGRAM(S): at 21:22

## 2020-06-01 RX ADMIN — LORATADINE 10 MILLIGRAM(S): 10 TABLET ORAL at 10:20

## 2020-06-01 RX ADMIN — LIDOCAINE 1 PATCH: 4 CREAM TOPICAL at 19:30

## 2020-06-01 RX ADMIN — ARIPIPRAZOLE 400 MILLIGRAM(S): 15 TABLET ORAL at 14:47

## 2020-06-01 RX ADMIN — ATORVASTATIN CALCIUM 40 MILLIGRAM(S): 80 TABLET, FILM COATED ORAL at 21:13

## 2020-06-01 RX ADMIN — LIDOCAINE 1 PATCH: 4 CREAM TOPICAL at 10:20

## 2020-06-01 RX ADMIN — Medication 1 TABLET(S): at 09:11

## 2020-06-01 RX ADMIN — LISINOPRIL 20 MILLIGRAM(S): 2.5 TABLET ORAL at 09:11

## 2020-06-01 NOTE — PROGRESS NOTE BEHAVIORAL HEALTH - NSBHFUPINTERVALHXFT_PSY_A_CORE
Pt seen and evaluated, chart reviewed. As per nursing report, pt had no acute behavioral issues over the weekend, although observed by staff to remain unpredictable however verbally redirectable. On evaluation, pt presents in room in good behavioral control and cooperative. Pt endorses good mood, sleep and appetite. Denies AVH, paranoia. Denies medication side effects. Endorses improvement in somatic sx of back/abdominal/feet pain. Denies SI/HI, intent and plan. Most recent ECG QTc 488 ms (5/29). Received Abilify Maintena 400 mg this AM. [place] referral approved, pending transfer date.

## 2020-06-01 NOTE — PROGRESS NOTE BEHAVIORAL HEALTH - NSBHCHARTREVIEWVS_PSY_A_CORE FT
Vital Signs Last 24 Hrs  T(C): 36.4 (31 May 2020 17:07), Max: 36.4 (31 May 2020 17:07)  T(F): 97.5 (31 May 2020 17:07), Max: 97.5 (31 May 2020 17:07)  HR: 76 (31 May 2020 17:07) (76 - 76)  BP: 111/55 (31 May 2020 17:07) (111/55 - 111/55)  BP(mean): --  RR: 16 (31 May 2020 17:07) (16 - 16)  SpO2: --

## 2020-06-01 NOTE — PROGRESS NOTE BEHAVIORAL HEALTH - SUMMARY
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 Pt denies any psychiatric diagnoses, states does not take any psychiatric medications, denies all psychiatric complaints. Pt presents with several delusions including living in a private apartment in [place], living with her  Abebe and having "too many children to count". Collateral from only daughter shows otherwise - pt diagnosed with schizophrenia in 2008, has had multiple episodes of medication noncompliance resulting in multiple [facility] hospitalizations (most recent known [facility] 2/2020). TOO approved, started 4/21/2020, haldol started with medical clearance d/t borderline QTc, which had to be switched to abilify d/t prolonged QTc. Pt compensated, agreed to abilify maintena on 5/6, with plan for discharge on 5/8. However, on day of discharge, discharge held d/t pt decompensating with return of prior delusions and agitation. On evaluation today, pt presents irritable with an episode of agitation and screaming the night prior. Pt appears less disorganized than yesterday, however remains with similar delusions that were present in the beginning of admission with poor insight in behavioral health diagnosis. Patient continues to have poor insight in behavioral health diagnosis and long-term treatment, refuses addition of mood stabilizer and increase in Abilify PO, only accepting increased dose of next Abilify Maintena.    #Schizophrenia  -d/c haldol 5 mg PO qhs d/t QTc 511 ms (5/1), pt c/o chronic angina, cardiology consulted  -start abilify 5 mg daily (5/8), abilify 2 mg qhs (5/13) --> c/w abilify 10 mg daily (5/16) until 5/27  -start abilify maintena 300 mg IM once (5/6/2020) --> titrate abilify maintena 400 mg (6/1)  -patient has been refusing treatment, TOO approved, started 4/21/2020  -as per TOO, if pt refuses abilify PO, then will give haldol 2 mg IM with f/u ECG    #Prolonged QTc  -PA consulted, daily ECG --> q3 days ECG  -ECG QTc 509 ms (5/13) --> 471 ms (5/16)  -ECG QTc 497 ms (5/22) --> 490 ms (5/26)  -ECG QTc 488 ms (4/29)    #Abdominal/Back Pain  -PA consulted  -c/w lidocaine patch daily  -c/w ibuprofen 400 mg q6h prn for pain    #Feet pain, PAD?  -PA consulted, f/u OP  -R>L with +1/+2 pedal pulses    #Chronic angina  -PA consulted, c/w home medication ranexa 500 mg bid ([facility] states last fill 3/2020)  -cardiology consulted    #COPD  -c/w Symbicort 160mcg 2 puffs BID  -c/w Spiriva 1 cap inhal daily      #Hypertension  -elevated BP, PA consulted  -c/w lisinopril 20 mg daily  -c/w norvasc 5 mg qhs    #CAD  -c/w ASA 81mg PO daily  -c/w Atorvastatin 40mg PO QHS    Dispo: [place]

## 2020-06-01 NOTE — PROGRESS NOTE ADULT - SUBJECTIVE AND OBJECTIVE BOX
pt stable alert in NAD  no new complaints    DEPRESSION  ^DEPRESSION  Handoff  Schizophrenia  Asthma  Hypertension  Coronary artery disease  Schizophrenia  COPD (chronic obstructive pulmonary disease)  Paranoid schizophrenia  Coronary artery disease involving coronary bypass graft of native heart without angina pectoris  Essential hypertension  Schizophrenia  S/P CABG (coronary artery bypass graft)    HEALTH ISSUES - PROBLEM Dx:  COPD (chronic obstructive pulmonary disease)  Paranoid schizophrenia  Coronary artery disease involving coronary bypass graft of native heart without angina pectoris: Coronary artery disease involving coronary bypass graft of native heart without angina pectoris  Essential hypertension: Essential hypertension  Schizophrenia: Schizophrenia        PAST MEDICAL & SURGICAL HISTORY:  Schizophrenia  Asthma  Hypertension  Coronary artery disease  S/P CABG (coronary artery bypass graft)    Bananas (Unknown)  sulfa drugs (Unknown)      FAMILY HISTORY:      acetaminophen   Tablet .. 650 milliGRAM(s) Oral every 6 hours PRN  aluminum hydroxide/magnesium hydroxide/simethicone Suspension 30 milliLiter(s) Oral every 4 hours PRN  amLODIPine   Tablet 5 milliGRAM(s) Oral at bedtime  ARIPiprazole Injectable, Long Acting. (ABILIFY MAINTENA) 400 milliGRAM(s) IntraMuscular once  aspirin  chewable 81 milliGRAM(s) Oral daily  atorvastatin 40 milliGRAM(s) Oral at bedtime  budesonide 160 MICROgram(s)/formoterol 4.5 MICROgram(s) Inhaler 2 Puff(s) Inhalation two times a day  hydrOXYzine hydrochloride 25 milliGRAM(s) Oral every 6 hours PRN  ibuprofen  Tablet. 400 milliGRAM(s) Oral every 6 hours PRN  lactobacillus acidophilus 1 Tablet(s) Oral daily  lidocaine   Patch 1 Patch Transdermal daily  lisinopril 20 milliGRAM(s) Oral daily  loratadine 10 milliGRAM(s) Oral daily  nicotine - 21 mG/24Hr(s) Patch 1 patch Transdermal daily  ranolazine 500 milliGRAM(s) Oral two times a day  tiotropium 18 MICROgram(s) Capsule 1 Capsule(s) Inhalation daily      T(C): 36.4 (05-31-20 @ 17:07), Max: 36.4 (05-31-20 @ 17:07)  HR: 76 (05-31-20 @ 17:07) (76 - 76)  BP: 111/55 (05-31-20 @ 17:07) (111/55 - 111/55)  RR: 16 (05-31-20 @ 17:07) (16 - 16)  SpO2: --    PE;  general:  no changes in status in nad    Lungs:    Heart:    EXT:    Neuro:  aelrt no deficits                          CAPILLARY BLOOD GLUCOSE

## 2020-06-01 NOTE — CHART NOTE - NSCHARTNOTEFT_GEN_A_CORE
Ms. Silva remains on the unit for continued treatment, safety and observation. She met with treatment team to discuss her treatment plan and medications. Per report pt had no issues over the weekend and no needs for PRN’s at this time. Upon meeting with Rubi, pt is visible on the unit, attending groups, and has denied SI/HI/AVH. Ms. Silva continues to report somatic pain that as per pt, require surgery and she will follow up with her doctor’s when she leaves. Pt still has delusional thoughts and appears disorganized. Retention [address] Date to be held on 6/8/2020 and D/C plan remains [facility] once transfer date is provided.     Mood – Agitated  Sleep - Good  Appetite - Good  ADLs - Fair  Thought Process –Illogical  Observation – [id number]    Ms. Silva will continue to meet with  one on one and with treatment team daily. [facility] application has been submitted and pt has been accepted just pending transfer date. At this time patient is not psychiatrically stable for discharge.

## 2020-06-01 NOTE — PROGRESS NOTE BEHAVIORAL HEALTH - NSBHCHARTREVIEWINVESTIGATE_PSY_A_CORE FT
< from: 12 Lead ECG (05.26.20 @ 08:56) >      Ventricular Rate 82 BPM    Atrial Rate 82 BPM    P-R Interval 154 ms    QRS Duration 132 ms    Q-T Interval 420 ms    QTC Calculation(Bezet) 490 ms    P Axis 66 degrees    R Axis 52 degrees    T Axis 71 degrees    Diagnosis Line Normal sinus rhythmwith sinus arrhythmia  Right bundle branch block  Abnormal ECG    < end of copied text > < from: 12 Lead ECG (05.29.20 @ 09:46) >    Ventricular Rate 72 BPM    Atrial Rate 72 BPM    P-R Interval 156 ms    QRS Duration 152 ms    Q-T Interval 446 ms    QTC Calculation(Bezet) 488 ms    P Axis 66 degrees    R Axis 48 degrees    T Axis 61 degrees    Diagnosis Line Normal sinus rhythm  Possible Left atrial enlargement  Right bundle branch block  Abnormal ECG    < end of copied text >

## 2020-06-02 PROCEDURE — 99231 SBSQ HOSP IP/OBS SF/LOW 25: CPT

## 2020-06-02 RX ORDER — HALOPERIDOL DECANOATE 100 MG/ML
0.5 INJECTION INTRAMUSCULAR AT BEDTIME
Refills: 0 | Status: DISCONTINUED | OUTPATIENT
Start: 2020-06-02 | End: 2020-06-03

## 2020-06-02 RX ADMIN — RANOLAZINE 500 MILLIGRAM(S): 500 TABLET, FILM COATED, EXTENDED RELEASE ORAL at 08:00

## 2020-06-02 RX ADMIN — BUDESONIDE AND FORMOTEROL FUMARATE DIHYDRATE 2 PUFF(S): 160; 4.5 AEROSOL RESPIRATORY (INHALATION) at 08:01

## 2020-06-02 RX ADMIN — RANOLAZINE 500 MILLIGRAM(S): 500 TABLET, FILM COATED, EXTENDED RELEASE ORAL at 20:10

## 2020-06-02 RX ADMIN — LISINOPRIL 20 MILLIGRAM(S): 2.5 TABLET ORAL at 08:00

## 2020-06-02 RX ADMIN — TIOTROPIUM BROMIDE 1 CAPSULE(S): 18 CAPSULE ORAL; RESPIRATORY (INHALATION) at 08:01

## 2020-06-02 RX ADMIN — LORATADINE 10 MILLIGRAM(S): 10 TABLET ORAL at 08:00

## 2020-06-02 RX ADMIN — LIDOCAINE 1 PATCH: 4 CREAM TOPICAL at 08:01

## 2020-06-02 RX ADMIN — ATORVASTATIN CALCIUM 40 MILLIGRAM(S): 80 TABLET, FILM COATED ORAL at 20:10

## 2020-06-02 RX ADMIN — Medication 1 PATCH: at 08:03

## 2020-06-02 RX ADMIN — Medication 81 MILLIGRAM(S): at 08:00

## 2020-06-02 RX ADMIN — HALOPERIDOL DECANOATE 0.5 MILLIGRAM(S): 100 INJECTION INTRAMUSCULAR at 20:10

## 2020-06-02 RX ADMIN — Medication 650 MILLIGRAM(S): at 08:00

## 2020-06-02 RX ADMIN — Medication 1 TABLET(S): at 08:00

## 2020-06-02 NOTE — PROGRESS NOTE BEHAVIORAL HEALTH - NSBHCHARTREVIEWVS_PSY_A_CORE FT
Vital Signs Last 24 Hrs  T(C): 36.4 (31 May 2020 17:07), Max: 36.4 (31 May 2020 17:07)  T(F): 97.5 (31 May 2020 17:07), Max: 97.5 (31 May 2020 17:07)  HR: 76 (31 May 2020 17:07) (76 - 76)  BP: 111/55 (31 May 2020 17:07) (111/55 - 111/55)  BP(mean): --  RR: 16 (31 May 2020 17:07) (16 - 16)  SpO2: -- Vital Signs Last 24 Hrs  T(C): 36 (02 Jun 2020 06:20), Max: 36.2 (01 Jun 2020 16:18)  T(F): 96.8 (02 Jun 2020 06:20), Max: 97.1 (01 Jun 2020 16:18)  HR: 86 (02 Jun 2020 06:20) (83 - 86)  BP: 129/60 (02 Jun 2020 06:20) (129/60 - 130/73)  BP(mean): --  RR: 18 (02 Jun 2020 06:20) (16 - 18)  SpO2: --

## 2020-06-02 NOTE — PROGRESS NOTE BEHAVIORAL HEALTH - NSBHFUPINTERVALHXFT_PSY_A_CORE
Pt seen and evaluated during treatment team, chart reviewed. As per nursing report, pt had no acute behavioral issues overnight although with moments of agitation that have been verbally redirectable. On evaluation, pt presents pacing the hallway, irritable and disorganized with pressured speech and elevated energy. 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 Pt denies all psychiatric sx, states her mood and sleep are good, denies elevated energy, racing thoughts, AVH, delusions, paranoia. Denies SI/HI, intent and plan. Most recent ECG QTc 493 ms (6/2). [place] referral approved, pending transfer date.

## 2020-06-02 NOTE — PROGRESS NOTE BEHAVIORAL HEALTH - SUMMARY
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 Pt denies any psychiatric diagnoses, states does not take any psychiatric medications, denies all psychiatric complaints. Pt presents with several delusions including living in a private apartment in [place], living with her  Abebe and having "too many children to count". Collateral from only daughter shows otherwise - pt diagnosed with schizophrenia in 2008, has had multiple episodes of medication noncompliance resulting in multiple [facility] hospitalizations (most recent known [facility] 2/2020). TOO approved, started 4/21/2020, haldol started with medical clearance d/t borderline QTc, which had to be switched to abilify d/t prolonged QTc. Pt compensated, agreed to abilify maintena on 5/6, with plan for discharge on 5/8. However, on day of discharge, discharge held d/t pt decompensating with return of prior delusions and agitation. 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 Patient continues to have poor insight in behavioral health diagnosis and long-term treatment, refuses addition of mood stabilizer, only accepting Abilify Maintena. As per TOO, pt will be restarted on haldol 0.5 qhs with f/u ECG.    #Schizophrenia  -d/c haldol 5 mg PO qhs d/t QTc 511 ms (5/1), pt c/o chronic angina, cardiology consulted  -start abilify 5 mg daily (5/8), abilify 2 mg qhs (5/13) --> c/w abilify 10 mg daily (5/16) until 5/27  -start abilify maintena 300 mg IM once (5/6/2020) --> titrate abilify maintena 400 mg (6/1)  -patient has been refusing treatment, TOO approved, started 4/21/2020  -start haldol 0.5 mg qhs (6/2)  -as for TOO, if pt refuses haldol PO, will give haldol IM with f/u ECG    #Prolonged QTc  -PA consulted, daily ECG --> q3 days ECG  -ECG QTc 509 ms (5/13) --> 471 ms (5/16)  -ECG QTc 497 ms (5/22) --> 490 ms (5/26)  -ECG QTc 488 ms (4/29) --> 493 ms (6/2)    #Abdominal/Back Pain  -PA consulted  -c/w lidocaine patch daily  -c/w ibuprofen 400 mg q6h prn for pain    #Feet pain, PAD?  -PA consulted, f/u OP  -R>L with +1/+2 pedal pulses    #Chronic angina  -PA consulted, c/w home medication ranexa 500 mg bid ([facility] states last fill 3/2020)  -cardiology consulted    #COPD  -c/w Symbicort 160mcg 2 puffs BID  -c/w Spiriva 1 cap inhal daily      #Hypertension  -elevated BP, PA consulted  -c/w lisinopril 20 mg daily  -c/w norvasc 5 mg qhs    #CAD  -c/w ASA 81mg PO daily  -c/w Atorvastatin 40mg PO QHS    Dispo: [place]

## 2020-06-02 NOTE — PROGRESS NOTE BEHAVIORAL HEALTH - NSBHCHARTREVIEWINVESTIGATE_PSY_A_CORE FT
< from: 12 Lead ECG (05.29.20 @ 09:46) >    Ventricular Rate 72 BPM    Atrial Rate 72 BPM    P-R Interval 156 ms    QRS Duration 152 ms    Q-T Interval 446 ms    QTC Calculation(Bezet) 488 ms    P Axis 66 degrees    R Axis 48 degrees    T Axis 61 degrees    Diagnosis Line Normal sinus rhythm  Possible Left atrial enlargement  Right bundle branch block  Abnormal ECG    < end of copied text > < from: 12 Lead ECG (06.02.20 @ 08:13) >      Ventricular Rate 82 BPM    Atrial Rate 82 BPM    P-R Interval 156 ms    QRS Duration 146 ms    Q-T Interval 422 ms    QTC Calculation(Bezet) 493 ms    P Axis 71 degrees    R Axis 58 degrees    T Axis 74 degrees    Diagnosis Line Normal sinus rhythm  Right bundle branch block  Abnormal ECG    < end of copied text >

## 2020-06-03 PROCEDURE — 99231 SBSQ HOSP IP/OBS SF/LOW 25: CPT

## 2020-06-03 RX ADMIN — BUDESONIDE AND FORMOTEROL FUMARATE DIHYDRATE 2 PUFF(S): 160; 4.5 AEROSOL RESPIRATORY (INHALATION) at 08:49

## 2020-06-03 RX ADMIN — Medication 1 PATCH: at 21:27

## 2020-06-03 RX ADMIN — LIDOCAINE 1 PATCH: 4 CREAM TOPICAL at 21:27

## 2020-06-03 RX ADMIN — ATORVASTATIN CALCIUM 40 MILLIGRAM(S): 80 TABLET, FILM COATED ORAL at 21:30

## 2020-06-03 RX ADMIN — LIDOCAINE 1 PATCH: 4 CREAM TOPICAL at 21:28

## 2020-06-03 RX ADMIN — BUDESONIDE AND FORMOTEROL FUMARATE DIHYDRATE 2 PUFF(S): 160; 4.5 AEROSOL RESPIRATORY (INHALATION) at 21:32

## 2020-06-03 RX ADMIN — Medication 1 PATCH: at 08:48

## 2020-06-03 RX ADMIN — LIDOCAINE 1 PATCH: 4 CREAM TOPICAL at 08:50

## 2020-06-03 RX ADMIN — LISINOPRIL 20 MILLIGRAM(S): 2.5 TABLET ORAL at 08:47

## 2020-06-03 RX ADMIN — Medication 1 TABLET(S): at 08:47

## 2020-06-03 RX ADMIN — AMLODIPINE BESYLATE 5 MILLIGRAM(S): 2.5 TABLET ORAL at 08:48

## 2020-06-03 RX ADMIN — LORATADINE 10 MILLIGRAM(S): 10 TABLET ORAL at 08:48

## 2020-06-03 RX ADMIN — RANOLAZINE 500 MILLIGRAM(S): 500 TABLET, FILM COATED, EXTENDED RELEASE ORAL at 21:30

## 2020-06-03 RX ADMIN — Medication 81 MILLIGRAM(S): at 08:47

## 2020-06-03 RX ADMIN — AMLODIPINE BESYLATE 5 MILLIGRAM(S): 2.5 TABLET ORAL at 21:30

## 2020-06-03 RX ADMIN — TIOTROPIUM BROMIDE 1 CAPSULE(S): 18 CAPSULE ORAL; RESPIRATORY (INHALATION) at 08:49

## 2020-06-03 RX ADMIN — RANOLAZINE 500 MILLIGRAM(S): 500 TABLET, FILM COATED, EXTENDED RELEASE ORAL at 08:48

## 2020-06-03 NOTE — CHART NOTE - NSCHARTNOTEFT_GEN_A_CORE
Ms. Silva remains on the unit for continued treatment, safety and observation. She met with treatment team to discuss her treatment plan and medications. Pt continues to report somatic pain and the need for surgery. Pt continues to have aggressive and or manic outbursts while on the unit. Pt has been compliant with medication. Treatment team has noticed that pt has declined on abilify and would benefit from resumption of Haldol. Upon assessment this morning on 6/3/2020 pt appeared in better behavioral control. [facility] will be going to court for retention on 6/8/2020. If obtained paperwork will be submitted to [facility] for review and transfer date.     Mood – Agitated  Sleep - Good  Appetite - Good  ADLs - Fair  Thought Process –Illogical  Observation – [id number]    Ms. Silva will continue to meet with  one on one and with treatment team daily. [facility] application has been submitted. Pending retention and transfer date. SW will continue to follow.

## 2020-06-03 NOTE — PROGRESS NOTE BEHAVIORAL HEALTH - NSBHCHARTREVIEWVS_PSY_A_CORE FT
Vital Signs Last 24 Hrs  T(C): 35.9 (03 Jun 2020 06:12), Max: 36.1 (02 Jun 2020 15:42)  T(F): 96.7 (03 Jun 2020 06:12), Max: 97 (02 Jun 2020 15:42)  HR: 78 (03 Jun 2020 06:12) (78 - 92)  BP: 149/70 (03 Jun 2020 06:12) (142/76 - 149/70)  BP(mean): --  RR: 16 (03 Jun 2020 06:12) (16 - 16)  SpO2: --

## 2020-06-03 NOTE — PROGRESS NOTE BEHAVIORAL HEALTH - SUMMARY
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 Pt denies any psychiatric diagnoses, states does not take any psychiatric medications, denies all psychiatric complaints. Pt presents with several delusions including living in a private apartment in [place], living with her  Abebe and having "too many children to count". Collateral from only daughter shows otherwise - pt diagnosed with schizophrenia in 2008, has had multiple episodes of medication noncompliance resulting in multiple [facility] hospitalizations (most recent known [facility] 2/2020). TOO approved, started 4/21/2020, haldol started with medical clearance d/t borderline QTc, which had to be switched to abilify d/t prolonged QTc. Pt compensated, agreed to abilify maintena on 5/6, with plan for discharge on 5/8. However, on day of discharge, discharge held d/t pt decompensating with return of prior delusions and agitation. 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 Patient continues to have poor insight in behavioral health diagnosis and long-term treatment, refuses addition of mood stabilizer, only accepting Abilify Maintena. As per TOO, pt will be restarted on haldol 0.5 qhs with f/u ECG.    #Schizophrenia  -d/c haldol 5 mg PO qhs d/t QTc 511 ms (5/1), pt c/o chronic angina, cardiology consulted  -start abilify 5 mg daily (5/8), abilify 2 mg qhs (5/13) --> c/w abilify 10 mg daily (5/16) until 5/27  -start abilify maintena 300 mg IM once (5/6/2020) --> titrate abilify maintena 400 mg (6/1)  -patient has been refusing treatment, TOO approved, started 4/21/2020  -start haldol 0.5 mg qhs (6/2) with daily f/u ECG  -as for TOO, if pt refuses haldol PO, will give haldol IM with f/u ECG    #Prolonged QTc  -PA consulted, daily ECG  -ECG QTc 509 ms (5/13)  -QTc 493 ms (6/2)  -QTc 500 (6/3)    #Abdominal/Back Pain  -PA consulted  -c/w lidocaine patch daily  -c/w ibuprofen 400 mg q6h prn for pain    #Feet pain, PAD?  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 for lower left extremity cellulitis, transferred to [facility] d/t bizarre bx, psychiatric medication non-compliance and questionable ability to care for self. Pt denies any psychiatric diagnoses, states does not take any psychiatric medications, denies all psychiatric complaints. Pt presents with several delusions including living in a private apartment in [place], living with her  Abebe and having "too many children to count". Collateral from only daughter shows otherwise - pt diagnosed with schizophrenia in 2008, has had multiple episodes of medication noncompliance resulting in multiple [facility] hospitalizations (most recent known [facility] 2/2020). TOO approved, started 4/21/2020, haldol started with medical clearance d/t borderline QTc, which had to be switched to abilify d/t prolonged QTc. Pt compensated, agreed to abilify maintena on 5/6, with plan for discharge on 5/8. However, on day of discharge, discharge held d/t pt decompensating with return of prior delusions and agitation. 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 Patient continues to have poor insight in behavioral health diagnosis and long-term treatment, refuses addition of mood stabilizer, only accepting Abilify Maintena. As per TOO, pt will be restarted on haldol 0.5 qhs with f/u ECG.    #Schizophrenia  -d/c haldol 5 mg PO qhs d/t QTc 511 ms (5/1), pt c/o chronic angina, cardiology consulted  -start abilify 5 mg daily (5/8), abilify 2 mg qhs (5/13) --> c/w abilify 10 mg daily (5/16) until 5/27  -start abilify maintena 300 mg IM once (5/6/2020) --> titrate abilify maintena 400 mg (6/1)  -patient has been refusing treatment, TOO approved, started 4/21/2020  -start haldol 0.5 mg qhs (6/2) with daily f/u ECG --> QTc 500 ms (6/3), haldol held, repeat ECG in AM  -as for TOO, if pt refuses haldol PO, will give haldol IM with f/u ECG    #Prolonged QTc  -PA consulted, daily ECG  -ECG QTc 509 ms (5/13)  -QTc 493 ms (6/2)  -QTc 500 (6/3)    #Abdominal/Back Pain  -PA consulted  -c/w lidocaine patch daily  -c/w ibuprofen 400 mg q6h prn for pain    #Feet pain, PAD?  -PA consulted, f/u OP  -R>L with +1/+2 pedal pulses    #Chronic angina  -PA consulted, c/w home medication ranexa 500 mg bid ([facility] states last fill 3/2020)  -cardiology consulted    #COPD  -c/w Symbicort 160mcg 2 puffs BID  -c/w Spiriva 1 cap inhal daily      #Hypertension  -elevated BP, PA consulted  -c/w lisinopril 20 mg daily  -c/w norvasc 5 mg qhs    #CAD  -c/w ASA 81mg PO daily  -c/w Atorvastatin 40mg PO QHS    Dispo: [place]

## 2020-06-03 NOTE — PROGRESS NOTE ADULT - SUBJECTIVE AND OBJECTIVE BOX
pt stable alert in NAD  no new complaints    DEPRESSION  ^DEPRESSION  Handoff  Schizophrenia  Asthma  Hypertension  Coronary artery disease  Schizophrenia  COPD (chronic obstructive pulmonary disease)  Paranoid schizophrenia  Coronary artery disease involving coronary bypass graft of native heart without angina pectoris  Essential hypertension  Schizophrenia  S/P CABG (coronary artery bypass graft)    HEALTH ISSUES - PROBLEM Dx:  COPD (chronic obstructive pulmonary disease)  Paranoid schizophrenia  Coronary artery disease involving coronary bypass graft of native heart without angina pectoris: Coronary artery disease involving coronary bypass graft of native heart without angina pectoris  Essential hypertension: Essential hypertension  Schizophrenia: Schizophrenia        PAST MEDICAL & SURGICAL HISTORY:  Schizophrenia  Asthma  Hypertension  Coronary artery disease  S/P CABG (coronary artery bypass graft)    Bananas (Unknown)  sulfa drugs (Unknown)      FAMILY HISTORY:      acetaminophen   Tablet .. 650 milliGRAM(s) Oral every 6 hours PRN  aluminum hydroxide/magnesium hydroxide/simethicone Suspension 30 milliLiter(s) Oral every 4 hours PRN  amLODIPine   Tablet 5 milliGRAM(s) Oral at bedtime  aspirin  chewable 81 milliGRAM(s) Oral daily  atorvastatin 40 milliGRAM(s) Oral at bedtime  budesonide 160 MICROgram(s)/formoterol 4.5 MICROgram(s) Inhaler 2 Puff(s) Inhalation two times a day  haloperidol     Tablet 0.5 milliGRAM(s) Oral at bedtime  haloperidol    Injectable 0.5 milliGRAM(s) IntraMuscular at bedtime PRN  hydrOXYzine hydrochloride 25 milliGRAM(s) Oral every 6 hours PRN  ibuprofen  Tablet. 400 milliGRAM(s) Oral every 6 hours PRN  lactobacillus acidophilus 1 Tablet(s) Oral daily  lidocaine   Patch 1 Patch Transdermal daily  lisinopril 20 milliGRAM(s) Oral daily  loratadine 10 milliGRAM(s) Oral daily  LORazepam     Tablet 1 milliGRAM(s) Oral every 6 hours PRN  nicotine - 21 mG/24Hr(s) Patch 1 patch Transdermal daily  ranolazine 500 milliGRAM(s) Oral two times a day  tiotropium 18 MICROgram(s) Capsule 1 Capsule(s) Inhalation daily      T(C): 35.9 (06-03-20 @ 06:12), Max: 36.1 (06-02-20 @ 15:42)  HR: 78 (06-03-20 @ 06:12) (78 - 92)  BP: 149/70 (06-03-20 @ 06:12) (142/76 - 149/70)  RR: 16 (06-03-20 @ 06:12) (16 - 16)  SpO2: --    PE;  general:  no changes pt lying in bed in nad    Lungs:    Heart:    EXT:    Neuro:  aelrt  no deficits                          CAPILLARY BLOOD GLUCOSE

## 2020-06-03 NOTE — PROGRESS NOTE BEHAVIORAL HEALTH - NSBHFUPINTERVALHXFT_PSY_A_CORE
Pt seen and evaluated, chart reviewed. As per nursing report, pt had episodes of agitation that have been verbally redirectable. On evaluation, pt presents irritable and pacing the hallway with less pressured speech and energy than yesterday. Pt states there is chlorox in the air and requesting her medication. Pt denies all psychiatric sx, states her mood and sleep are good, denies AVH, delusions, paranoia. Denies SI/HI, intent and plan. Pt c/o somatic pains of back/abdomen/feet, same as before, states not worsened and helped with lidocaine patch and ibuprofen prn. Most recent ECG QTc 500 ms (6/3). [place] referral approved, pending transfer date.

## 2020-06-03 NOTE — PROGRESS NOTE BEHAVIORAL HEALTH - NSBHCHARTREVIEWINVESTIGATE_PSY_A_CORE FT
< from: 12 Lead ECG (06.02.20 @ 08:13) >      Ventricular Rate 82 BPM    Atrial Rate 82 BPM    P-R Interval 156 ms    QRS Duration 146 ms    Q-T Interval 422 ms    QTC Calculation(Bezet) 493 ms    P Axis 71 degrees    R Axis 58 degrees    T Axis 74 degrees    Diagnosis Line Normal sinus rhythm  Right bundle branch block  Abnormal ECG    < end of copied text >

## 2020-06-04 PROCEDURE — 99231 SBSQ HOSP IP/OBS SF/LOW 25: CPT

## 2020-06-04 RX ORDER — HALOPERIDOL DECANOATE 100 MG/ML
0.5 INJECTION INTRAMUSCULAR AT BEDTIME
Refills: 0 | Status: DISCONTINUED | OUTPATIENT
Start: 2020-06-04 | End: 2020-06-05

## 2020-06-04 RX ADMIN — RANOLAZINE 500 MILLIGRAM(S): 500 TABLET, FILM COATED, EXTENDED RELEASE ORAL at 09:01

## 2020-06-04 RX ADMIN — LORATADINE 10 MILLIGRAM(S): 10 TABLET ORAL at 08:59

## 2020-06-04 RX ADMIN — Medication 1 TABLET(S): at 08:58

## 2020-06-04 RX ADMIN — ATORVASTATIN CALCIUM 40 MILLIGRAM(S): 80 TABLET, FILM COATED ORAL at 21:24

## 2020-06-04 RX ADMIN — LIDOCAINE 1 PATCH: 4 CREAM TOPICAL at 20:10

## 2020-06-04 RX ADMIN — BUDESONIDE AND FORMOTEROL FUMARATE DIHYDRATE 2 PUFF(S): 160; 4.5 AEROSOL RESPIRATORY (INHALATION) at 08:57

## 2020-06-04 RX ADMIN — Medication 81 MILLIGRAM(S): at 08:58

## 2020-06-04 RX ADMIN — LIDOCAINE 1 PATCH: 4 CREAM TOPICAL at 09:11

## 2020-06-04 RX ADMIN — LISINOPRIL 20 MILLIGRAM(S): 2.5 TABLET ORAL at 08:59

## 2020-06-04 RX ADMIN — RANOLAZINE 500 MILLIGRAM(S): 500 TABLET, FILM COATED, EXTENDED RELEASE ORAL at 21:24

## 2020-06-04 RX ADMIN — Medication 650 MILLIGRAM(S): at 18:03

## 2020-06-04 RX ADMIN — AMLODIPINE BESYLATE 5 MILLIGRAM(S): 2.5 TABLET ORAL at 21:24

## 2020-06-04 RX ADMIN — BUDESONIDE AND FORMOTEROL FUMARATE DIHYDRATE 2 PUFF(S): 160; 4.5 AEROSOL RESPIRATORY (INHALATION) at 21:24

## 2020-06-04 RX ADMIN — Medication 1 PATCH: at 09:01

## 2020-06-04 RX ADMIN — Medication 1 PATCH: at 20:12

## 2020-06-04 RX ADMIN — LIDOCAINE 1 PATCH: 4 CREAM TOPICAL at 20:11

## 2020-06-04 NOTE — PROGRESS NOTE BEHAVIORAL HEALTH - NSBHFUPINTERVALHXFT_PSY_A_CORE
Pt seen and evaluated, chart reviewed. As per nursing report, pt had episodes of agitation that have been verbally redirectable. On evaluation, pt presents minimally more cooperative than yesterday, answering in only yes/no questions. Pt denies all psychiatric sx, endorses good mood, sleep and appetite. Denies AVH, delusions, paranoia. Denies SI/HI, intent and plan. As per staff, pt irritable at times and has been pacing the hallway. Pt c/o somatic pains of back/abdomen/feet, same as before, states not worsened and helped with lidocaine patch and ibuprofen prn. Most recent ECG QTc 484 ms (6/3). [facility] approved, pending transfer date. Pt seen and evaluated, chart reviewed. As per nursing report, pt had episodes of agitation that have been verbally redirectable. On evaluation, pt presents minimally more cooperative than yesterday, answering in only yes/no questions. Pt denies all psychiatric sx, endorses good mood, sleep and appetite. Denies AVH, delusions, paranoia. Denies SI/HI, intent and plan. As per staff, pt irritable at times and has been pacing the hallway. Pt c/o somatic pains of back/abdomen/feet, same as before, states not worsened and helped with lidocaine patch and ibuprofen prn. Most recent ECG QTc 486 ms (6/3). [place] referral approved, pending transfer date.

## 2020-06-04 NOTE — PROGRESS NOTE BEHAVIORAL HEALTH - NSBHCHARTREVIEWINVESTIGATE_PSY_A_CORE FT
< from: 12 Lead ECG (06.04.20 @ 08:53) >      Ventricular Rate 73 BPM    Atrial Rate 73 BPM    P-R Interval 180 ms    QRS Duration 152 ms    Q-T Interval 442 ms    QTC Calculation(Bezet) 486 ms    P Axis 55 degrees    R Axis 61 degrees    T Axis 71 degrees    Diagnosis Line Normal sinus rhythm  Right bundle branch block  Abnormal ECG    < end of copied text >

## 2020-06-04 NOTE — PROGRESS NOTE BEHAVIORAL HEALTH - SUMMARY
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 Pt denies any psychiatric diagnoses, states does not take any psychiatric medications, denies all psychiatric complaints. Pt presents with several delusions including living in a private apartment in [place], living with her  Abebe and having "too many children to count". Collateral from only daughter shows otherwise - pt diagnosed with schizophrenia in 2008, has had multiple episodes of medication noncompliance resulting in multiple [facility] hospitalizations (most recent known [facility] 2/2020). TOO approved, started 4/21/2020, haldol started with medical clearance d/t borderline QTc, which had to be switched to abilify d/t prolonged QTc. Pt compensated, agreed to abilify maintena on 5/6, with plan for discharge on 5/8. However, on day of discharge, discharge held d/t pt decompensating with return of prior delusions and agitation. 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 Patient continues to have poor insight in behavioral health diagnosis and long-term treatment, refuses addition of mood stabilizer, only accepting Abilify Maintena. As per TOO, pt will be restarted on haldol 0.5 qhs (with parameter QTc <500 ms).    #Schizophrenia  -d/c haldol 5 mg PO qhs d/t QTc 511 ms (5/1), pt c/o chronic angina, cardiology consulted  -start abilify 5 mg daily (5/8), abilify 2 mg qhs (5/13) --> c/w abilify 10 mg daily (5/16) until 5/27  -start abilify maintena 300 mg IM once (5/6/2020) --> titrate abilify maintena 400 mg (6/1)  -patient has been refusing treatment, TOO approved, started 4/21/2020  -start haldol 0.5 mg qhs (6/2) with daily f/u ECG --> QTc 500 ms (6/3), haldol held --> QTc 484 ms (6/4), restart haldol 0.5 mg qhs   -as for TOO, if pt refuses haldol PO, will give haldol IM with f/u ECG    #Prolonged QTc  -PA consulted, daily ECG  -ECG QTc 509 ms (5/13)  -QTc 493 ms (6/2) --> QTc 500 (6/3) --> QTc 484 (6/4)    #Abdominal/Back Pain  -PA consulted  -c/w lidocaine patch daily  -c/w ibuprofen 400 mg q6h prn for pain    #Feet pain, PAD?  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 Pt denies any psychiatric diagnoses, states does not take any psychiatric medications, denies all psychiatric complaints. Pt presents with several delusions including living in a private apartment in [place], living with her  Abebe and having "too many children to count". Collateral from only daughter shows otherwise - pt diagnosed with schizophrenia in 2008, has had multiple episodes of medication noncompliance resulting in multiple [facility] hospitalizations (most recent known [facility] 2/2020). TOO approved, started 4/21/2020, haldol started with medical clearance d/t borderline QTc, which had to be switched to abilify d/t prolonged QTc. Pt compensated, agreed to abilify maintena on 5/6, with plan for discharge on 5/8. However, on day of discharge, discharge held d/t pt decompensating with return of prior delusions and agitation. 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 Patient continues to have poor insight in behavioral health diagnosis and long-term treatment, refuses addition of mood stabilizer, only accepting Abilify Maintena. As per TOO, pt will be restarted on haldol 0.5 qhs (with parameter QTc <500 ms).    #Schizophrenia  -d/c haldol 5 mg PO qhs d/t QTc 511 ms (5/1), pt c/o chronic angina, cardiology consulted  -start abilify 5 mg daily (5/8), abilify 2 mg qhs (5/13) --> c/w abilify 10 mg daily (5/16) until 5/27  -start abilify maintena 300 mg IM once (5/6/2020) --> titrate abilify maintena 400 mg (6/1)  -patient has been refusing treatment, TOO approved, started 4/21/2020  -start haldol 0.5 mg qhs (6/2) with daily f/u ECG --> QTc 500 ms (6/3), haldol held --> QTc 486 ms (6/4), restart haldol 0.5 mg qhs   -as for TOO, if pt refuses haldol PO, will give haldol IM with f/u ECG    #Prolonged QTc  -PA consulted, daily ECG  -ECG QTc 509 ms (5/13)  -QTc 493 ms (6/2) --> QTc 500 (6/3) --> QTc 484 (6/4)    #Abdominal/Back Pain  -PA consulted  -c/w lidocaine patch daily  -c/w ibuprofen 400 mg q6h prn for pain    #Feet pain, PAD?  -PA consulted, f/u OP  -R>L with +1/+2 pedal pulses    #Chronic angina  -PA consulted, c/w home medication ranexa 500 mg bid ([facility] states last fill 3/2020)  -cardiology consulted    #COPD  -c/w Symbicort 160mcg 2 puffs BID  -c/w Spiriva 1 cap inhal daily      #Hypertension  -elevated BP, PA consulted  -c/w lisinopril 20 mg daily  -c/w norvasc 5 mg qhs    #CAD  -c/w ASA 81mg PO daily  -c/w Atorvastatin 40mg PO QHS    Dispo: [place]

## 2020-06-04 NOTE — PROGRESS NOTE BEHAVIORAL HEALTH - NSBHCHARTREVIEWVS_PSY_A_CORE FT
Vital Signs Last 24 Hrs  T(C): 36.5 (04 Jun 2020 09:10), Max: 36.5 (04 Jun 2020 09:10)  T(F): 97.7 (04 Jun 2020 09:10), Max: 97.7 (04 Jun 2020 09:10)  HR: 87 (04 Jun 2020 09:10) (82 - 87)  BP: 128/77 (04 Jun 2020 09:10) (128/77 - 137/72)  BP(mean): --  RR: 18 (04 Jun 2020 09:10) (16 - 18)  SpO2: --

## 2020-06-05 PROCEDURE — 99231 SBSQ HOSP IP/OBS SF/LOW 25: CPT

## 2020-06-05 RX ADMIN — Medication 81 MILLIGRAM(S): at 08:08

## 2020-06-05 RX ADMIN — Medication 1 PATCH: at 07:07

## 2020-06-05 RX ADMIN — AMLODIPINE BESYLATE 5 MILLIGRAM(S): 2.5 TABLET ORAL at 20:15

## 2020-06-05 RX ADMIN — TIOTROPIUM BROMIDE 1 CAPSULE(S): 18 CAPSULE ORAL; RESPIRATORY (INHALATION) at 08:08

## 2020-06-05 RX ADMIN — LISINOPRIL 20 MILLIGRAM(S): 2.5 TABLET ORAL at 08:08

## 2020-06-05 RX ADMIN — LIDOCAINE 1 PATCH: 4 CREAM TOPICAL at 20:20

## 2020-06-05 RX ADMIN — Medication 650 MILLIGRAM(S): at 08:16

## 2020-06-05 RX ADMIN — Medication 1 PATCH: at 08:15

## 2020-06-05 RX ADMIN — BUDESONIDE AND FORMOTEROL FUMARATE DIHYDRATE 2 PUFF(S): 160; 4.5 AEROSOL RESPIRATORY (INHALATION) at 08:08

## 2020-06-05 RX ADMIN — Medication 1 PATCH: at 08:08

## 2020-06-05 RX ADMIN — ATORVASTATIN CALCIUM 40 MILLIGRAM(S): 80 TABLET, FILM COATED ORAL at 20:15

## 2020-06-05 RX ADMIN — Medication 1 PATCH: at 20:21

## 2020-06-05 RX ADMIN — Medication 1 TABLET(S): at 08:08

## 2020-06-05 RX ADMIN — RANOLAZINE 500 MILLIGRAM(S): 500 TABLET, FILM COATED, EXTENDED RELEASE ORAL at 20:15

## 2020-06-05 RX ADMIN — BUDESONIDE AND FORMOTEROL FUMARATE DIHYDRATE 2 PUFF(S): 160; 4.5 AEROSOL RESPIRATORY (INHALATION) at 20:15

## 2020-06-05 RX ADMIN — RANOLAZINE 500 MILLIGRAM(S): 500 TABLET, FILM COATED, EXTENDED RELEASE ORAL at 08:08

## 2020-06-05 RX ADMIN — LORATADINE 10 MILLIGRAM(S): 10 TABLET ORAL at 08:08

## 2020-06-05 RX ADMIN — LIDOCAINE 1 PATCH: 4 CREAM TOPICAL at 08:08

## 2020-06-05 NOTE — PROGRESS NOTE BEHAVIORAL HEALTH - SUMMARY
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 Pt denies any psychiatric diagnoses, states does not take any psychiatric medications, denies all psychiatric complaints. Pt presents with several delusions including living in a private apartment in [place], living with her  Abebe and having "too many children to count". Collateral from only daughter shows otherwise - pt diagnosed with schizophrenia in 2008, has had multiple episodes of medication noncompliance resulting in multiple [facility] hospitalizations (most recent known [facility] 2/2020). TOO approved, started 4/21/2020, haldol started with medical clearance d/t borderline QTc, which had to be switched to abilify d/t prolonged QTc. Pt compensated, agreed to abilify maintena on 5/6, with plan for discharge on 5/8. However, on day of discharge, discharge held d/t pt decompensating with return of prior delusions and agitation. 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 Patient continues to have poor insight in behavioral health diagnosis and long-term treatment, refuses addition of mood stabilizer, only accepting Abilify Maintena. As per TOO, pt will be restarted on haldol 0.5 qhs (with parameter QTc <500 ms).    #Schizophrenia  -d/c haldol 5 mg PO qhs d/t QTc 511 ms (5/1), pt c/o chronic angina, cardiology consulted  -start abilify 5 mg daily (5/8), abilify 2 mg qhs (5/13) --> c/w abilify 10 mg daily (5/16) until 5/27  -start abilify maintena 300 mg IM once (5/6/2020) --> titrate abilify maintena 400 mg (6/1)  -patient has been refusing treatment, TOO approved, started 4/21/2020  -start haldol 0.5 mg qhs (6/2) with daily f/u ECG with parameter QTc <500  -as for TOO, if pt refuses haldol PO, will give haldol IM with f/u ECG    #Prolonged QTc  -PA consulted, daily ECG  -ECG QTc 509 ms (5/13)  -QTc 493 ms (6/2) --> QTc 500 (6/3) --> QTc 484 (6/4) --> QTc 501 ms (6/5)    #Abdominal/Back Pain  -PA consulted  -c/w lidocaine patch daily  -c/w ibuprofen 400 mg q6h prn for pain    #Feet pain, PAD?  -PA consulted, f/u OP  -R>L with +1/+2 pedal pulses    #Chronic angina  -PA consulted, c/w home medication ranexa 500 mg bid ([facility] states last fill 3/2020)  -cardiology consulted    #COPD  -c/w Symbicort 160mcg 2 puffs BID  -c/w Spiriva 1 cap inhal daily      #Hypertension  -elevated BP, PA consulted  -c/w lisinopril 20 mg daily  -c/w norvasc 5 mg qhs    #CAD  -c/w ASA 81mg PO daily  -c/w Atorvastatin 40mg PO QHS    Dispo: [place]

## 2020-06-05 NOTE — PROGRESS NOTE ADULT - SUBJECTIVE AND OBJECTIVE BOX
pt stable alert in NAD  no new complaints    DEPRESSION  ^DEPRESSION  Handoff  Schizophrenia  Asthma  Hypertension  Coronary artery disease  Schizophrenia  COPD (chronic obstructive pulmonary disease)  Paranoid schizophrenia  Coronary artery disease involving coronary bypass graft of native heart without angina pectoris  Essential hypertension  Schizophrenia  S/P CABG (coronary artery bypass graft)    HEALTH ISSUES - PROBLEM Dx:  COPD (chronic obstructive pulmonary disease)  Paranoid schizophrenia  Coronary artery disease involving coronary bypass graft of native heart without angina pectoris: Coronary artery disease involving coronary bypass graft of native heart without angina pectoris  Essential hypertension: Essential hypertension  Schizophrenia: Schizophrenia        PAST MEDICAL & SURGICAL HISTORY:  Schizophrenia  Asthma  Hypertension  Coronary artery disease  S/P CABG (coronary artery bypass graft)    Bananas (Unknown)  sulfa drugs (Unknown)      FAMILY HISTORY:      acetaminophen   Tablet .. 650 milliGRAM(s) Oral every 6 hours PRN  aluminum hydroxide/magnesium hydroxide/simethicone Suspension 30 milliLiter(s) Oral every 4 hours PRN  amLODIPine   Tablet 5 milliGRAM(s) Oral at bedtime  aspirin  chewable 81 milliGRAM(s) Oral daily  atorvastatin 40 milliGRAM(s) Oral at bedtime  budesonide 160 MICROgram(s)/formoterol 4.5 MICROgram(s) Inhaler 2 Puff(s) Inhalation two times a day  haloperidol     Tablet 0.5 milliGRAM(s) Oral at bedtime  haloperidol    Injectable 0.5 milliGRAM(s) IntraMuscular at bedtime PRN  hydrOXYzine hydrochloride 25 milliGRAM(s) Oral every 6 hours PRN  ibuprofen  Tablet. 400 milliGRAM(s) Oral every 6 hours PRN  lactobacillus acidophilus 1 Tablet(s) Oral daily  lidocaine   Patch 1 Patch Transdermal daily  lisinopril 20 milliGRAM(s) Oral daily  loratadine 10 milliGRAM(s) Oral daily  LORazepam     Tablet 1 milliGRAM(s) Oral every 6 hours PRN  nicotine - 21 mG/24Hr(s) Patch 1 patch Transdermal daily  ranolazine 500 milliGRAM(s) Oral two times a day  tiotropium 18 MICROgram(s) Capsule 1 Capsule(s) Inhalation daily      T(C): 36.4 (06-05-20 @ 06:06), Max: 36.5 (06-04-20 @ 09:10)  HR: 74 (06-05-20 @ 06:06) (74 - 87)  BP: 137/66 (06-05-20 @ 06:06) (121/71 - 137/66)  RR: 16 (06-05-20 @ 06:06) (16 - 18)  SpO2: --    PE;  general:  no changes stable in nad    Lungs:    Heart:    EXT:    Neuro:  alert no deficits                          CAPILLARY BLOOD GLUCOSE

## 2020-06-05 NOTE — PROGRESS NOTE BEHAVIORAL HEALTH - NSBHCHARTREVIEWVS_PSY_A_CORE FT
Vital Signs Last 24 Hrs  T(C): 36.4 (05 Jun 2020 06:06), Max: 36.4 (04 Jun 2020 16:10)  T(F): 97.5 (05 Jun 2020 06:06), Max: 97.5 (04 Jun 2020 16:10)  HR: 74 (05 Jun 2020 06:06) (74 - 85)  BP: 137/66 (05 Jun 2020 06:06) (121/71 - 137/66)  BP(mean): --  RR: 16 (05 Jun 2020 06:06) (16 - 18)  SpO2: --

## 2020-06-05 NOTE — CHART NOTE - NSCHARTNOTEFT_GEN_A_CORE
Ms. Silva remains on the unit for continued treatment, safety and observation. She met with treatment team to discuss her treatment plan and medications. Per report was very aggressive this morning needing to be removed from the day room due to vulgar language. Pt was able to be redirected to her room. Upon meeting with Rubi, pt. appears agitated, continues to state somatic pain, and requested for NP and SW to leave. Pt still has delusional thoughts and appears disorganized. Retention Court Date to be held on 6/8/2020 and D/C plan remains [facility] once transfer date is provided.     Mood – Agitated  Sleep - Good  Appetite - Good  ADLs - Fair  Thought Process –Illogical  Observation – [id number]    Ms. Silva will continue to meet with  one on one and with treatment team daily. [facility] application has been submitted and pt has been accepted just pending retention hearing. At this time patient is not psychiatrically stable for discharge

## 2020-06-05 NOTE — PROGRESS NOTE BEHAVIORAL HEALTH - NSBHFUPINTERVALHXFT_PSY_A_CORE
Pt seen and evaluated, chart reviewed. As per nursing report, pt had episodes of agitation that have been verbally redirectable. On evaluation, pt presents minimally more cooperative than yesterday. Pt denies all psychiatric sx, endorses good mood, sleep and appetite. Denies AVH, delusions, paranoia. Denies SI/HI, intent and plan. As per staff, pt observed labile and irritable at times, seen pacing the hallway. Pt c/o somatic pains of back/abdomen/feet, same as before, states not worsened and helped with lidocaine patch and ibuprofen prn. Most recent ECG QTc 501 ms (6/5). [facility] approved, pending transfer date.

## 2020-06-06 RX ORDER — ONDANSETRON 8 MG/1
4 TABLET, FILM COATED ORAL EVERY 8 HOURS
Refills: 0 | Status: DISCONTINUED | OUTPATIENT
Start: 2020-06-06 | End: 2020-06-12

## 2020-06-06 RX ADMIN — Medication 1 PATCH: at 20:36

## 2020-06-06 RX ADMIN — BUDESONIDE AND FORMOTEROL FUMARATE DIHYDRATE 2 PUFF(S): 160; 4.5 AEROSOL RESPIRATORY (INHALATION) at 20:31

## 2020-06-06 RX ADMIN — BUDESONIDE AND FORMOTEROL FUMARATE DIHYDRATE 2 PUFF(S): 160; 4.5 AEROSOL RESPIRATORY (INHALATION) at 08:13

## 2020-06-06 RX ADMIN — LISINOPRIL 20 MILLIGRAM(S): 2.5 TABLET ORAL at 08:13

## 2020-06-06 RX ADMIN — LIDOCAINE 1 PATCH: 4 CREAM TOPICAL at 08:12

## 2020-06-06 RX ADMIN — Medication 1 PATCH: at 08:13

## 2020-06-06 RX ADMIN — RANOLAZINE 500 MILLIGRAM(S): 500 TABLET, FILM COATED, EXTENDED RELEASE ORAL at 08:12

## 2020-06-06 RX ADMIN — TIOTROPIUM BROMIDE 1 CAPSULE(S): 18 CAPSULE ORAL; RESPIRATORY (INHALATION) at 08:13

## 2020-06-06 RX ADMIN — RANOLAZINE 500 MILLIGRAM(S): 500 TABLET, FILM COATED, EXTENDED RELEASE ORAL at 20:31

## 2020-06-06 RX ADMIN — LIDOCAINE 1 PATCH: 4 CREAM TOPICAL at 20:36

## 2020-06-06 RX ADMIN — LORATADINE 10 MILLIGRAM(S): 10 TABLET ORAL at 08:12

## 2020-06-06 RX ADMIN — ATORVASTATIN CALCIUM 40 MILLIGRAM(S): 80 TABLET, FILM COATED ORAL at 20:31

## 2020-06-06 RX ADMIN — Medication 1 PATCH: at 08:12

## 2020-06-06 RX ADMIN — AMLODIPINE BESYLATE 5 MILLIGRAM(S): 2.5 TABLET ORAL at 20:31

## 2020-06-06 RX ADMIN — Medication 1 PATCH: at 07:21

## 2020-06-06 RX ADMIN — Medication 81 MILLIGRAM(S): at 08:13

## 2020-06-06 RX ADMIN — Medication 1 TABLET(S): at 08:13

## 2020-06-07 RX ADMIN — TIOTROPIUM BROMIDE 1 CAPSULE(S): 18 CAPSULE ORAL; RESPIRATORY (INHALATION) at 09:20

## 2020-06-07 RX ADMIN — Medication 1 TABLET(S): at 09:17

## 2020-06-07 RX ADMIN — Medication 1 PATCH: at 20:44

## 2020-06-07 RX ADMIN — LIDOCAINE 1 PATCH: 4 CREAM TOPICAL at 20:44

## 2020-06-07 RX ADMIN — BUDESONIDE AND FORMOTEROL FUMARATE DIHYDRATE 2 PUFF(S): 160; 4.5 AEROSOL RESPIRATORY (INHALATION) at 20:45

## 2020-06-07 RX ADMIN — LISINOPRIL 20 MILLIGRAM(S): 2.5 TABLET ORAL at 09:18

## 2020-06-07 RX ADMIN — Medication 1 PATCH: at 09:18

## 2020-06-07 RX ADMIN — LIDOCAINE 1 PATCH: 4 CREAM TOPICAL at 09:19

## 2020-06-07 RX ADMIN — ATORVASTATIN CALCIUM 40 MILLIGRAM(S): 80 TABLET, FILM COATED ORAL at 20:45

## 2020-06-07 RX ADMIN — AMLODIPINE BESYLATE 5 MILLIGRAM(S): 2.5 TABLET ORAL at 20:45

## 2020-06-07 RX ADMIN — RANOLAZINE 500 MILLIGRAM(S): 500 TABLET, FILM COATED, EXTENDED RELEASE ORAL at 20:45

## 2020-06-07 RX ADMIN — Medication 81 MILLIGRAM(S): at 09:22

## 2020-06-07 RX ADMIN — LORATADINE 10 MILLIGRAM(S): 10 TABLET ORAL at 09:18

## 2020-06-07 RX ADMIN — BUDESONIDE AND FORMOTEROL FUMARATE DIHYDRATE 2 PUFF(S): 160; 4.5 AEROSOL RESPIRATORY (INHALATION) at 09:20

## 2020-06-07 RX ADMIN — RANOLAZINE 500 MILLIGRAM(S): 500 TABLET, FILM COATED, EXTENDED RELEASE ORAL at 09:18

## 2020-06-08 PROCEDURE — 99231 SBSQ HOSP IP/OBS SF/LOW 25: CPT

## 2020-06-08 RX ADMIN — BUDESONIDE AND FORMOTEROL FUMARATE DIHYDRATE 2 PUFF(S): 160; 4.5 AEROSOL RESPIRATORY (INHALATION) at 21:55

## 2020-06-08 RX ADMIN — TIOTROPIUM BROMIDE 1 CAPSULE(S): 18 CAPSULE ORAL; RESPIRATORY (INHALATION) at 09:00

## 2020-06-08 RX ADMIN — Medication 1 PATCH: at 07:20

## 2020-06-08 RX ADMIN — AMLODIPINE BESYLATE 5 MILLIGRAM(S): 2.5 TABLET ORAL at 21:54

## 2020-06-08 RX ADMIN — LIDOCAINE 1 PATCH: 4 CREAM TOPICAL at 09:02

## 2020-06-08 RX ADMIN — LIDOCAINE 1 PATCH: 4 CREAM TOPICAL at 17:56

## 2020-06-08 RX ADMIN — Medication 81 MILLIGRAM(S): at 10:04

## 2020-06-08 RX ADMIN — Medication 1 TABLET(S): at 10:04

## 2020-06-08 RX ADMIN — BUDESONIDE AND FORMOTEROL FUMARATE DIHYDRATE 2 PUFF(S): 160; 4.5 AEROSOL RESPIRATORY (INHALATION) at 09:00

## 2020-06-08 RX ADMIN — RANOLAZINE 500 MILLIGRAM(S): 500 TABLET, FILM COATED, EXTENDED RELEASE ORAL at 21:54

## 2020-06-08 RX ADMIN — RANOLAZINE 500 MILLIGRAM(S): 500 TABLET, FILM COATED, EXTENDED RELEASE ORAL at 10:04

## 2020-06-08 RX ADMIN — LORATADINE 10 MILLIGRAM(S): 10 TABLET ORAL at 10:04

## 2020-06-08 RX ADMIN — Medication 1 PATCH: at 09:04

## 2020-06-08 RX ADMIN — Medication 1 PATCH: at 17:56

## 2020-06-08 RX ADMIN — LIDOCAINE 1 PATCH: 4 CREAM TOPICAL at 21:58

## 2020-06-08 RX ADMIN — LISINOPRIL 20 MILLIGRAM(S): 2.5 TABLET ORAL at 10:04

## 2020-06-08 RX ADMIN — ATORVASTATIN CALCIUM 40 MILLIGRAM(S): 80 TABLET, FILM COATED ORAL at 21:54

## 2020-06-08 RX ADMIN — Medication 1 PATCH: at 09:01

## 2020-06-08 NOTE — PROGRESS NOTE BEHAVIORAL HEALTH - NSBHFUPINTERVALHXFT_PSY_A_CORE
Pt seen and evaluated, chart reviewed. As per nursing report, pt had several episodes of agitation over the weekend and confrontation with other patients, verbally redirectable. 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 Pt denies all psychiatric sx, endorses good mood, sleep and appetite. Denies AVH, delusions, paranoia. Denies SI/HI, intent and plan. As per staff, pt observed labile and irritable, seen pacing the hallways. Pt c/o somatic pains of back/abdomen/feet, same as before, states not worsened and helped with lidocaine patch and ibuprofen prn. Most recent ECG QTc 509 ms (6/8). [place] referral approved, pending transfer date. Pt seen and evaluated, chart reviewed. As per nursing report, pt had several episodes of agitation over the weekend and confrontation with other patients, verbally redirectable. 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 Pt denies all psychiatric sx, endorses good mood, sleep and appetite. Denies AVH, delusions, paranoia. Denies SI/HI, intent and plan. As per staff, pt observed labile and irritable, seen pacing the hallways. Pt c/o somatic pains of back/abdomen/feet, same as before, states not worsened and helped with lidocaine patch and ibuprofen prn. Most recent ECG QTc 509 ms (6/8), repeat in AM. [place] referral approved, pending transfer date.

## 2020-06-08 NOTE — CHART NOTE - NSCHARTNOTEFT_GEN_A_CORE
Ms. Silva remains on the unit for continued treatment, safety and observation. She met with treatment team to discuss her treatment plan and medications. Per report was very aggressive over the weekend. Pt was attempting to start fights with other pt’s, curing at staff, and using racial slurs. Upon meeting with Rubi, she was laying down and asked us to come back and speak to her later. Court set for today for retention. SW will continue to follow. D/C Plan remains the same pt still pending [facility] placement.     Pt reports no SI/HI/AVH at this time.     Mood – Agitated  Sleep - Good  Appetite - Good  ADLs - Fair  Thought Process –Illogical  Observation – [id number]    Ms. Silva will continue to meet with  one on one and with treatment team daily. [facility] application has been submitted and pt has been accepted just pending retention hearing and transfer date. At this time patient is not psychiatrically stable for discharge.

## 2020-06-08 NOTE — PROGRESS NOTE BEHAVIORAL HEALTH - SUMMARY
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 for lower left extremity cellulitis, transferred to [facility] d/t bizarre bx, psychiatric medication non-compliance and questionable ability to care for self. Pt denies any psychiatric diagnoses, states does not take any psychiatric medications, denies all psychiatric complaints. Pt presents with several delusions including living in a private apartment in [place], living with her  Abebe and having "too many children to count". Collateral from only daughter shows otherwise - pt diagnosed with schizophrenia in 2008, has had multiple episodes of medication noncompliance resulting in multiple [facility] hospitalizations (most recent known [facility] 2/2020). TOO approved, started 4/21/2020, haldol started with medical clearance d/t borderline QTc, which had to be switched to abilify d/t prolonged QTc. Pt compensated, agreed to abilify maintena on 5/6, with plan for discharge on 5/8. However, on day of discharge, discharge held d/t pt decompensating with return of prior delusions and agitation. 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 Patient continues to have poor insight in behavioral health diagnosis and long-term treatment, refuses addition of mood stabilizer, only accepting Abilify Maintena. As per TOO, pt will be restarted on haldol 0.5 qhs (with parameter QTc <500 ms).    #Schizophrenia  -d/c haldol 5 mg PO qhs d/t QTc 511 ms (5/1), pt c/o chronic angina, cardiology consulted  -start abilify 5 mg daily (5/8), abilify 2 mg qhs (5/13) --> c/w abilify 10 mg daily (5/16) until 5/27  -start abilify maintena 300 mg IM once (5/6/2020) --> titrate abilify maintena 400 mg (6/1)  -patient has been refusing treatment, TOO approved, started 4/21/2020  -start haldol 0.5 mg qhs (6/2) with daily f/u ECG with parameter QTc <500  -as for TOO, if pt refuses haldol PO, will give haldol IM with f/u ECG    #Prolonged QTc  -PA consulted, daily ECG  -ECG QTc 509 ms (5/13)  -QTc 493 ms (6/2) --> QTc 500 (6/3) --> QTc 484 (6/4) --> QTc 501 ms (6/5) --> QTc 509 ms (6/8)    #Abdominal/Back Pain  -PA consulted  -c/w lidocaine patch daily  -c/w ibuprofen 400 mg q6h prn for pain    #Feet pain, PAD?  -PA consulted, f/u OP  -R>L with +1/+2 pedal pulses    #Chronic angina  -PA consulted, c/w home medication ranexa 500 mg bid ([facility] states last fill 3/2020)  -cardiology consulted    #COPD  -c/w Symbicort 160mcg 2 puffs BID  -c/w Spiriva 1 cap inhal daily      #Hypertension  -elevated BP, PA consulted  -c/w lisinopril 20 mg daily  -c/w norvasc 5 mg qhs    #CAD  -c/w ASA 81mg PO daily  -c/w Atorvastatin 40mg PO QHS    Dispo: [place]

## 2020-06-08 NOTE — PROGRESS NOTE BEHAVIORAL HEALTH - NSBHCHARTREVIEWVS_PSY_A_CORE FT
Vital Signs Last 24 Hrs  T(C): 36.5 (08 Jun 2020 08:45), Max: 36.5 (08 Jun 2020 08:45)  T(F): 97.7 (08 Jun 2020 08:45), Max: 97.7 (08 Jun 2020 08:45)  HR: 86 (08 Jun 2020 08:45) (75 - 89)  BP: 136/67 (08 Jun 2020 08:45) (119/63 - 151/75)  BP(mean): --  RR: 20 (08 Jun 2020 08:45) (16 - 20)  SpO2: --

## 2020-06-08 NOTE — PROGRESS NOTE ADULT - SUBJECTIVE AND OBJECTIVE BOX
pt stable alert in NAD  no new complaints    DEPRESSION  ^DEPRESSION  Handoff  Schizophrenia  Asthma  Hypertension  Coronary artery disease  Schizophrenia  COPD (chronic obstructive pulmonary disease)  Paranoid schizophrenia  Coronary artery disease involving coronary bypass graft of native heart without angina pectoris  Essential hypertension  Schizophrenia  S/P CABG (coronary artery bypass graft)    HEALTH ISSUES - PROBLEM Dx:  COPD (chronic obstructive pulmonary disease)  Paranoid schizophrenia  Coronary artery disease involving coronary bypass graft of native heart without angina pectoris: Coronary artery disease involving coronary bypass graft of native heart without angina pectoris  Essential hypertension: Essential hypertension  Schizophrenia: Schizophrenia        PAST MEDICAL & SURGICAL HISTORY:  Schizophrenia  Asthma  Hypertension  Coronary artery disease  S/P CABG (coronary artery bypass graft)    Bananas (Unknown)  sulfa drugs (Unknown)      FAMILY HISTORY:      acetaminophen   Tablet .. 650 milliGRAM(s) Oral every 6 hours PRN  aluminum hydroxide/magnesium hydroxide/simethicone Suspension 30 milliLiter(s) Oral every 4 hours PRN  amLODIPine   Tablet 5 milliGRAM(s) Oral at bedtime  aspirin  chewable 81 milliGRAM(s) Oral daily  atorvastatin 40 milliGRAM(s) Oral at bedtime  budesonide 160 MICROgram(s)/formoterol 4.5 MICROgram(s) Inhaler 2 Puff(s) Inhalation two times a day  hydrOXYzine hydrochloride 25 milliGRAM(s) Oral every 6 hours PRN  ibuprofen  Tablet. 400 milliGRAM(s) Oral every 6 hours PRN  lactobacillus acidophilus 1 Tablet(s) Oral daily  lidocaine   Patch 1 Patch Transdermal daily  lisinopril 20 milliGRAM(s) Oral daily  loratadine 10 milliGRAM(s) Oral daily  LORazepam     Tablet 1 milliGRAM(s) Oral every 6 hours PRN  nicotine - 21 mG/24Hr(s) Patch 1 patch Transdermal daily  ondansetron    Tablet 4 milliGRAM(s) Oral every 8 hours PRN  ranolazine 500 milliGRAM(s) Oral two times a day  tiotropium 18 MICROgram(s) Capsule 1 Capsule(s) Inhalation daily      T(C): 35.8 (06-08-20 @ 06:21), Max: 36.5 (06-07-20 @ 09:52)  HR: 75 (06-08-20 @ 06:21) (75 - 89)  BP: 119/63 (06-08-20 @ 06:21) (119/63 - 151/75)  RR: 16 (06-08-20 @ 06:21) (16 - 18)  SpO2: --    PE;  general: stable in room alert in nad    Lungs:    Heart:    EXT:    Neuro:  alert nod eficits                          CAPILLARY BLOOD GLUCOSE

## 2020-06-08 NOTE — PROGRESS NOTE BEHAVIORAL HEALTH - NSBHCHARTREVIEWINVESTIGATE_PSY_A_CORE FT
< from: 12 Lead ECG (06.08.20 @ 08:52) >      Ventricular Rate 83 BPM    Atrial Rate 83 BPM    P-R Interval 170 ms    QRS Duration 156 ms    Q-T Interval 434 ms    QTC Calculation(Bezet) 509 ms    P Axis 54 degrees    R Axis 62 degrees    T Axis 70 degrees    Diagnosis Line Normal sinus rhythm  Right bundle branch block  Abnormal ECG    < end of copied text >

## 2020-06-09 PROCEDURE — 99231 SBSQ HOSP IP/OBS SF/LOW 25: CPT

## 2020-06-09 RX ORDER — HALOPERIDOL DECANOATE 100 MG/ML
0.5 INJECTION INTRAMUSCULAR ONCE
Refills: 0 | Status: DISCONTINUED | OUTPATIENT
Start: 2020-06-09 | End: 2020-06-09

## 2020-06-09 RX ADMIN — Medication 81 MILLIGRAM(S): at 08:30

## 2020-06-09 RX ADMIN — BUDESONIDE AND FORMOTEROL FUMARATE DIHYDRATE 2 PUFF(S): 160; 4.5 AEROSOL RESPIRATORY (INHALATION) at 08:32

## 2020-06-09 RX ADMIN — Medication 1 MILLIGRAM(S): at 16:41

## 2020-06-09 RX ADMIN — Medication 1 PATCH: at 08:32

## 2020-06-09 RX ADMIN — TIOTROPIUM BROMIDE 1 CAPSULE(S): 18 CAPSULE ORAL; RESPIRATORY (INHALATION) at 08:32

## 2020-06-09 RX ADMIN — LORATADINE 10 MILLIGRAM(S): 10 TABLET ORAL at 08:31

## 2020-06-09 RX ADMIN — RANOLAZINE 500 MILLIGRAM(S): 500 TABLET, FILM COATED, EXTENDED RELEASE ORAL at 08:35

## 2020-06-09 RX ADMIN — LIDOCAINE 1 PATCH: 4 CREAM TOPICAL at 08:31

## 2020-06-09 RX ADMIN — BUDESONIDE AND FORMOTEROL FUMARATE DIHYDRATE 2 PUFF(S): 160; 4.5 AEROSOL RESPIRATORY (INHALATION) at 21:17

## 2020-06-09 RX ADMIN — Medication 1 PATCH: at 08:35

## 2020-06-09 RX ADMIN — LISINOPRIL 20 MILLIGRAM(S): 2.5 TABLET ORAL at 08:30

## 2020-06-09 RX ADMIN — Medication 1 TABLET(S): at 08:31

## 2020-06-09 NOTE — PROGRESS NOTE BEHAVIORAL HEALTH - NSBHCHARTREVIEWVS_PSY_A_CORE FT
Vital Signs Last 24 Hrs  T(C): 36.4 (09 Jun 2020 09:57), Max: 36.4 (09 Jun 2020 09:57)  T(F): 97.6 (09 Jun 2020 09:57), Max: 97.6 (09 Jun 2020 09:57)  HR: 79 (09 Jun 2020 09:57) (79 - 89)  BP: 150/83 (09 Jun 2020 09:57) (118/70 - 150/83)  BP(mean): --  RR: 20 (09 Jun 2020 09:57) (18 - 20)  SpO2: --

## 2020-06-09 NOTE — PROGRESS NOTE BEHAVIORAL HEALTH - SUMMARY
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 Pt denies any psychiatric diagnoses, states does not take any psychiatric medications, denies all psychiatric complaints. Pt presents with several delusions including living in a private apartment in [place], living with her  Abebe and having "too many children to count". Collateral from only daughter shows otherwise - pt diagnosed with schizophrenia in 2008, has had multiple episodes of medication noncompliance resulting in multiple [facility] hospitalizations (most recent known [facility] 2/2020). TOO approved, started 4/21/2020, haldol started with medical clearance d/t borderline QTc, which had to be switched to abilify d/t prolonged QTc. Pt compensated, agreed to abilify maintena on 5/6, with plan for discharge on 5/8. However, on day of discharge, discharge held d/t pt decompensating with return of prior delusions and agitation. 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 Patient continues to have poor insight in behavioral health diagnosis and long-term treatment, refuses addition of mood stabilizer, only accepting Abilify Maintena. As per TOO, pt will be restarted on haldol 0.5 qhs (with parameter QTc <500 ms).    #Schizophrenia  -d/c haldol 5 mg PO qhs d/t QTc 511 ms (5/1), pt c/o chronic angina, cardiology consulted  -start abilify 5 mg daily (5/8), abilify 2 mg qhs (5/13) --> c/w abilify 10 mg daily (5/16) until 5/27  -start abilify maintena 300 mg IM once (5/6/2020) --> titrate abilify maintena 400 mg (6/1)  -patient has been refusing treatment, TOO approved, started 4/21/2020  -start haldol 0.5 mg qhs (6/2) with daily f/u ECG with parameter QTc <500  -as for TOO, if pt refuses haldol PO, will give haldol IM with f/u ECG  -c/w ativan 1 mg q6h prn for agitation with escalation to IM if pt not verbally redirectable    #Prolonged QTc  -PA consulted, daily ECG  -ECG QTc 509 ms (5/13)  -QTc 493 ms (6/2) --> QTc 500 (6/3) --> QTc 484 (6/4) --> QTc 501 ms (6/5) --> QTc 509 ms (6/8) --> QTc 495 ms (6/9)    #Abdominal/Back Pain  -PA consulted  -c/w lidocaine patch daily  -c/w ibuprofen 400 mg q6h prn for pain    #Feet pain, PAD?  -PA consulted, f/u OP  -R>L with +1/+2 pedal pulses    #Chronic angina  -PA consulted, c/w home medication ranexa 500 mg bid ([facility] states last fill 3/2020)  -cardiology consulted    #COPD  -c/w Symbicort 160mcg 2 puffs BID  -c/w Spiriva 1 cap inhal daily      #Hypertension  -elevated BP, PA consulted  -c/w lisinopril 20 mg daily  -c/w norvasc 5 mg qhs    #CAD  -c/w ASA 81mg PO daily  -c/w Atorvastatin 40mg PO QHS    Dispo: [place]

## 2020-06-09 NOTE — PROGRESS NOTE BEHAVIORAL HEALTH - NSBHFUPINTERVALHXFT_PSY_A_CORE
Pt seen and evaluated, refused treatment team, chart reviewed. As per nursing report, pt had several episodes of agitation over the weekend and confrontation with other patients, verbally redirectable. On evaluation, pt presents agitated and uncooperative, slamming room door and yelling at staff. Pt verbally redirected to her room with continued aggressive posturing, refusing to answer questions. Limited interview. As per staff, pt observed labile and irritable, seen pacing the hallways. Most recent ECG QTc 495 ms (6/8), repeat in AM. [place] referral approved, pending transfer date. Pt seen and evaluated, refused treatment team, chart reviewed. As per nursing report, pt had several episodes of agitation overnight and confrontation with other patients, verbally redirectable. On evaluation, pt presents agitated and uncooperative, slamming room door and yelling at staff. Pt verbally redirected to her room with continued aggressive posturing, refusing to answer questions. Limited interview. As per staff, pt observed labile and irritable, seen pacing the hallways. Most recent ECG QTc 495 ms (6/8), repeat in AM. [place] referral approved, pending transfer date.

## 2020-06-10 PROCEDURE — 99231 SBSQ HOSP IP/OBS SF/LOW 25: CPT

## 2020-06-10 RX ADMIN — RANOLAZINE 500 MILLIGRAM(S): 500 TABLET, FILM COATED, EXTENDED RELEASE ORAL at 21:06

## 2020-06-10 RX ADMIN — ATORVASTATIN CALCIUM 40 MILLIGRAM(S): 80 TABLET, FILM COATED ORAL at 21:06

## 2020-06-10 RX ADMIN — BUDESONIDE AND FORMOTEROL FUMARATE DIHYDRATE 2 PUFF(S): 160; 4.5 AEROSOL RESPIRATORY (INHALATION) at 21:19

## 2020-06-10 RX ADMIN — Medication 81 MILLIGRAM(S): at 08:15

## 2020-06-10 RX ADMIN — LIDOCAINE 1 PATCH: 4 CREAM TOPICAL at 08:15

## 2020-06-10 RX ADMIN — LISINOPRIL 20 MILLIGRAM(S): 2.5 TABLET ORAL at 08:15

## 2020-06-10 RX ADMIN — TIOTROPIUM BROMIDE 1 CAPSULE(S): 18 CAPSULE ORAL; RESPIRATORY (INHALATION) at 08:15

## 2020-06-10 RX ADMIN — Medication 1 PATCH: at 07:21

## 2020-06-10 RX ADMIN — BUDESONIDE AND FORMOTEROL FUMARATE DIHYDRATE 2 PUFF(S): 160; 4.5 AEROSOL RESPIRATORY (INHALATION) at 08:15

## 2020-06-10 RX ADMIN — RANOLAZINE 500 MILLIGRAM(S): 500 TABLET, FILM COATED, EXTENDED RELEASE ORAL at 08:15

## 2020-06-10 RX ADMIN — AMLODIPINE BESYLATE 5 MILLIGRAM(S): 2.5 TABLET ORAL at 21:07

## 2020-06-10 RX ADMIN — Medication 1 TABLET(S): at 08:15

## 2020-06-10 RX ADMIN — LORATADINE 10 MILLIGRAM(S): 10 TABLET ORAL at 08:15

## 2020-06-10 RX ADMIN — Medication 1 PATCH: at 08:15

## 2020-06-10 NOTE — PROGRESS NOTE ADULT - SUBJECTIVE AND OBJECTIVE BOX
pt stable alert in NAD  no new complaints    DEPRESSION  ^DEPRESSION  Handoff  Schizophrenia  Asthma  Hypertension  Coronary artery disease  Schizophrenia  COPD (chronic obstructive pulmonary disease)  Paranoid schizophrenia  Coronary artery disease involving coronary bypass graft of native heart without angina pectoris  Essential hypertension  Schizophrenia  S/P CABG (coronary artery bypass graft)    HEALTH ISSUES - PROBLEM Dx:  COPD (chronic obstructive pulmonary disease)  Paranoid schizophrenia  Coronary artery disease involving coronary bypass graft of native heart without angina pectoris: Coronary artery disease involving coronary bypass graft of native heart without angina pectoris  Essential hypertension: Essential hypertension  Schizophrenia: Schizophrenia        PAST MEDICAL & SURGICAL HISTORY:  Schizophrenia  Asthma  Hypertension  Coronary artery disease  S/P CABG (coronary artery bypass graft)    Bananas (Unknown)  sulfa drugs (Unknown)      FAMILY HISTORY:      acetaminophen   Tablet .. 650 milliGRAM(s) Oral every 6 hours PRN  aluminum hydroxide/magnesium hydroxide/simethicone Suspension 30 milliLiter(s) Oral every 4 hours PRN  amLODIPine   Tablet 5 milliGRAM(s) Oral at bedtime  aspirin  chewable 81 milliGRAM(s) Oral daily  atorvastatin 40 milliGRAM(s) Oral at bedtime  budesonide 160 MICROgram(s)/formoterol 4.5 MICROgram(s) Inhaler 2 Puff(s) Inhalation two times a day  hydrOXYzine hydrochloride 25 milliGRAM(s) Oral every 6 hours PRN  ibuprofen  Tablet. 400 milliGRAM(s) Oral every 6 hours PRN  lactobacillus acidophilus 1 Tablet(s) Oral daily  lidocaine   Patch 1 Patch Transdermal daily  lisinopril 20 milliGRAM(s) Oral daily  loratadine 10 milliGRAM(s) Oral daily  LORazepam     Tablet 1 milliGRAM(s) Oral every 6 hours PRN  nicotine - 21 mG/24Hr(s) Patch 1 patch Transdermal daily  ondansetron    Tablet 4 milliGRAM(s) Oral every 8 hours PRN  ranolazine 500 milliGRAM(s) Oral two times a day  tiotropium 18 MICROgram(s) Capsule 1 Capsule(s) Inhalation daily      T(C): 36.1 (06-09-20 @ 15:59), Max: 36.4 (06-09-20 @ 09:57)  HR: 65 (06-10-20 @ 05:57) (65 - 87)  BP: 99/53 (06-10-20 @ 05:57) (99/53 - 150/83)  RR: 16 (06-10-20 @ 05:57) (16 - 20)  SpO2: --    PE;  general:  no cahngesa ambulating on unit in nad    Lungs:    Heart:    EXT:    Neuro: no deficits                          CAPILLARY BLOOD GLUCOSE

## 2020-06-10 NOTE — PROGRESS NOTE BEHAVIORAL HEALTH - NSBHFUPINTERVALHXFT_PSY_A_CORE
Pt seen and evaluated, chart reviewed. As per nursing report, pt had several episodes of agitation overnight and confrontation with other patients, not verbally redirectable, ativan 1 mg IM x1 given. On evaluation, pt presents in bed, superficially cooperative with poor eye contact and one-worded answers yes/no. Pt denies all psychiatric complaints, endorse good mood, sleep and appetite; denies AVH, SI/HI. Limited interview. As per staff, pt observed labile and irritable, seen pacing the hallways. Most recent ECG QTc 504 ms (6/10), repeat in AM. [place] referral approved, pending transfer date.

## 2020-06-10 NOTE — PROGRESS NOTE BEHAVIORAL HEALTH - NSBHCHARTREVIEWVS_PSY_A_CORE FT
Vital Signs Last 24 Hrs  T(C): 36.1 (09 Jun 2020 15:59), Max: 36.1 (09 Jun 2020 15:59)  T(F): 97 (09 Jun 2020 15:59), Max: 97 (09 Jun 2020 15:59)  HR: 65 (10 [place] 2020 05:57) (65 - 87)  BP: 99/53 (10 [place] 2020 05:57) (99/53 - 125/60)  BP(mean): --  RR: 16 (10 [place] 2020 05:57) (16 - 18)  SpO2: --

## 2020-06-10 NOTE — PROGRESS NOTE BEHAVIORAL HEALTH - SUMMARY
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 Pt denies any psychiatric diagnoses, states does not take any psychiatric medications, denies all psychiatric complaints. Pt presents with several delusions including living in a private apartment in [place], living with her  Abebe and having "too many children to count". Collateral from only daughter shows otherwise - pt diagnosed with schizophrenia in 2008, has had multiple episodes of medication noncompliance resulting in multiple [facility] hospitalizations (most recent known [facility] 2/2020). TOO approved, started 4/21/2020, haldol started with medical clearance d/t borderline QTc, which had to be switched to abilify d/t prolonged QTc. Pt compensated, agreed to abilify maintena on 5/6, with plan for discharge on 5/8. However, on day of discharge, discharge held d/t pt decompensating with return of prior delusions and agitation. 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 Patient continues to have poor insight in behavioral health diagnosis and long-term treatment, refuses addition of mood stabilizer, only accepting Abilify Maintena. As per TOO, pt will be restarted on haldol 0.5 qhs (with parameter QTc <500 ms).    #Schizophrenia  -d/c haldol 5 mg PO qhs d/t QTc 511 ms (5/1), pt c/o chronic angina, cardiology consulted  -start abilify 5 mg daily (5/8), abilify 2 mg qhs (5/13) --> c/w abilify 10 mg daily (5/16) until 5/27  -start abilify maintena 300 mg IM once (5/6/2020) --> titrate abilify maintena 400 mg (6/1)  -patient has been refusing treatment, TOO approved, started 4/21/2020  -start haldol 0.5 mg qhs (6/2) with daily f/u ECG with parameter QTc <500  -as for TOO, if pt refuses haldol PO, will give haldol IM with f/u ECG  -c/w ativan 1 mg q6h prn for agitation with escalation to IM if pt not verbally redirectable    #Prolonged QTc  -PA consulted, daily ECG  -ECG QTc 509 ms (5/13)  -QTc 493 ms (6/2) --> QTc 500 (6/3) --> QTc 484 (6/4) --> QTc 501 ms (6/5) --> QTc 509 ms (6/8) --> QTc 495 ms (6/9) --> QTc 504 ms (6/10)    #Abdominal/Back Pain  -PA consulted  -c/w lidocaine patch daily  -c/w ibuprofen 400 mg q6h prn for pain    #Feet pain, PAD?  -PA consulted, f/u OP  -R>L with +1/+2 pedal pulses    #Chronic angina  -PA consulted, c/w home medication ranexa 500 mg bid ([facility] states last fill 3/2020)  -cardiology consulted    #COPD  -c/w Symbicort 160mcg 2 puffs BID  -c/w Spiriva 1 cap inhal daily      #Hypertension  -elevated BP, PA consulted  -c/w lisinopril 20 mg daily  -c/w norvasc 5 mg qhs    #CAD  -c/w ASA 81mg PO daily  -c/w Atorvastatin 40mg PO QHS    Dispo: [place]

## 2020-06-10 NOTE — CHART NOTE - NSCHARTNOTEFT_GEN_A_CORE
Ms. Silva remains on the unit for continued treatment, safety and observation. Per AM rounds pt was very verbally aggressive towards peers and staff during the night. Pt needed IM and PRN’s because pt was unable to be redirected. Upon meeting with the pt she was in bed and very docile due to medication provided. Pt reports no SI/HI/AVH at this time. Pt continues to report somatic pain and was redirected to speak to the RN in regards to any comfort medications prescribed. Ms. Silva refused to meet with RN and asked us to leave the room. SW will continue to follow.    Court for retention was pushed back to 6/15/2020. SBPC updated. SW will continue to follow.     .     Mood – Agitated  Sleep - Good  Appetite - Good  ADLs - Fair  Thought Process –Illogical  Observation – [id number]    Ms. Silva will continue to meet with  one on one and with treatment team daily. Court for retention was pushed back to 6/15/2020. SBPC updated. SW will continue to follow.

## 2020-06-11 PROCEDURE — 99231 SBSQ HOSP IP/OBS SF/LOW 25: CPT

## 2020-06-11 RX ADMIN — Medication 1 PATCH: at 07:25

## 2020-06-11 RX ADMIN — RANOLAZINE 500 MILLIGRAM(S): 500 TABLET, FILM COATED, EXTENDED RELEASE ORAL at 08:23

## 2020-06-11 RX ADMIN — LISINOPRIL 20 MILLIGRAM(S): 2.5 TABLET ORAL at 08:23

## 2020-06-11 RX ADMIN — BUDESONIDE AND FORMOTEROL FUMARATE DIHYDRATE 2 PUFF(S): 160; 4.5 AEROSOL RESPIRATORY (INHALATION) at 08:23

## 2020-06-11 RX ADMIN — AMLODIPINE BESYLATE 5 MILLIGRAM(S): 2.5 TABLET ORAL at 20:15

## 2020-06-11 RX ADMIN — ATORVASTATIN CALCIUM 40 MILLIGRAM(S): 80 TABLET, FILM COATED ORAL at 20:15

## 2020-06-11 RX ADMIN — LIDOCAINE 1 PATCH: 4 CREAM TOPICAL at 08:23

## 2020-06-11 RX ADMIN — LORATADINE 10 MILLIGRAM(S): 10 TABLET ORAL at 08:23

## 2020-06-11 RX ADMIN — Medication 650 MILLIGRAM(S): at 18:25

## 2020-06-11 RX ADMIN — Medication 1 PATCH: at 08:23

## 2020-06-11 RX ADMIN — Medication 81 MILLIGRAM(S): at 08:23

## 2020-06-11 RX ADMIN — RANOLAZINE 500 MILLIGRAM(S): 500 TABLET, FILM COATED, EXTENDED RELEASE ORAL at 20:15

## 2020-06-11 RX ADMIN — TIOTROPIUM BROMIDE 1 CAPSULE(S): 18 CAPSULE ORAL; RESPIRATORY (INHALATION) at 08:22

## 2020-06-11 RX ADMIN — Medication 1 TABLET(S): at 08:23

## 2020-06-11 RX ADMIN — BUDESONIDE AND FORMOTEROL FUMARATE DIHYDRATE 2 PUFF(S): 160; 4.5 AEROSOL RESPIRATORY (INHALATION) at 20:15

## 2020-06-11 NOTE — PROGRESS NOTE BEHAVIORAL HEALTH - NSBHFUPINTERVALHXFT_PSY_A_CORE
Pt seen and evaluated, chart reviewed. As per nursing report, pt had several episodes of agitation overnight and confrontation with other patients, verbally redirectable, hoarding paper cups in room. On evaluation, pt presents in bed, superficially cooperative with poor eye contact and one-worded answers yes/no. Pt denies all psychiatric complaints, endorses good mood, sleep and appetite; denies AVH, SI/HI. Limited interview. As per staff, pt observed labile and irritable, seen pacing the hallways. ECG QTc 504 ms (6/10) --> QTc 486 ms (6/11), repeat in AM. [facility] referral approved, pending transfer date.

## 2020-06-11 NOTE — PROGRESS NOTE BEHAVIORAL HEALTH - SUMMARY
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 [place] for lower left extremity cellulitis, transferred to [facility] d/t bizarre bx, psychiatric medication non-compliance and questionable ability to care for self. Pt denies any psychiatric diagnoses, states does not take any psychiatric medications, denies all psychiatric complaints. Pt presents with several delusions including living in a private apartment in [place], living with her  Abebe and having "too many children to count". Collateral from only daughter shows otherwise - pt diagnosed with schizophrenia in 2008, has had multiple episodes of medication noncompliance resulting in multiple [facility] hospitalizations (most recent known [facility] 2/2020). TOO approved, started 4/21/2020, haldol started with medical clearance d/t borderline QTc, which had to be switched to abilify d/t prolonged QTc. Pt compensated, agreed to abilify maintena on 5/6, with plan for discharge on 5/8. However, on day of discharge, discharge held d/t pt decompensating with return of prior delusions and agitation. 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 Patient continues to have poor insight in behavioral health diagnosis and long-term treatment, refuses addition of mood stabilizer, only accepting Abilify Maintena. As per TOO, pt will be restarted on haldol 0.5 qhs (with parameter QTc <500 ms).    #Schizophrenia  -d/c haldol 5 mg PO qhs d/t QTc 511 ms (5/1), pt c/o chronic angina, cardiology consulted  -start abilify 5 mg daily (5/8), abilify 2 mg qhs (5/13) --> c/w abilify 10 mg daily (5/16) until 5/27  -start abilify maintena 300 mg IM once (5/6/2020) --> titrate abilify maintena 400 mg (6/1)  -patient has been refusing treatment, TOO approved, started 4/21/2020  -start haldol 0.5 mg qhs (6/2) with daily f/u ECG with parameter QTc <500  -as for TOO, if pt refuses haldol PO, will give haldol IM with f/u ECG  -c/w ativan 1 mg q6h prn for agitation with escalation to IM if pt not verbally redirectable    #Prolonged QTc  -PA consulted, daily ECG  -ECG QTc 509 ms (5/13)  -QTc 493 ms (6/2) --> QTc 500 (6/3) --> QTc 484 (6/4) --> QTc 501 ms (6/5) --> QTc 509 ms (6/8) --> QTc 495 ms (6/9) --> QTc 504 ms (6/10) --> QTc 486 ms (6/11)    #Abdominal/Back Pain  -PA consulted  -c/w lidocaine patch daily  -c/w ibuprofen 400 mg q6h prn for pain    #Feet pain, PAD?  -PA consulted, f/u OP  -R>L with +1/+2 pedal pulses    #Chronic angina  -PA consulted, c/w home medication ranexa 500 mg bid ([facility] states last fill 3/2020)  -cardiology consulted    #COPD  -c/w Symbicort 160mcg 2 puffs BID  -c/w Spiriva 1 cap inhal daily      #Hypertension  -elevated BP, PA consulted  -c/w lisinopril 20 mg daily  -c/w norvasc 5 mg qhs    #CAD  -c/w ASA 81mg PO daily  -c/w Atorvastatin 40mg PO QHS    Dispo: [place]

## 2020-06-11 NOTE — PROGRESS NOTE BEHAVIORAL HEALTH - NSBHCHARTREVIEWINVESTIGATE_PSY_A_CORE FT
< from: 12 Lead ECG (06.11.20 @ 08:32) >      Ventricular Rate 84 BPM    Atrial Rate 84 BPM    P-R Interval 176 ms    QRS Duration 156 ms    Q-T Interval 412 ms    QTC Calculation(Bezet) 486 ms    P Axis 63 degrees    R Axis 52 degrees    T Axis 70 degrees    Diagnosis Line Normal sinus rhythm  Possible Left atrial enlargement  Right bundle branch block  Abnormal ECG    < end of copied text >

## 2020-06-11 NOTE — PROGRESS NOTE BEHAVIORAL HEALTH - NSBHCHARTREVIEWVS_PSY_A_CORE FT
Vital Signs Last 24 Hrs  T(C): 35.6 (11 Jun 2020 08:46), Max: 35.8 (11 Jun 2020 06:05)  T(F): 96.1 (11 Jun 2020 08:46), Max: 96.5 (11 Jun 2020 06:05)  HR: 82 (11 Jun 2020 08:46) (82 - 88)  BP: 128/82 (11 Jun 2020 08:46) (110/79 - 134/84)  BP(mean): --  RR: 16 (11 Jun 2020 08:46) (16 - 17)  SpO2: --

## 2020-06-12 PROCEDURE — 99231 SBSQ HOSP IP/OBS SF/LOW 25: CPT

## 2020-06-12 RX ADMIN — BUDESONIDE AND FORMOTEROL FUMARATE DIHYDRATE 2 PUFF(S): 160; 4.5 AEROSOL RESPIRATORY (INHALATION) at 09:17

## 2020-06-12 RX ADMIN — BUDESONIDE AND FORMOTEROL FUMARATE DIHYDRATE 2 PUFF(S): 160; 4.5 AEROSOL RESPIRATORY (INHALATION) at 20:33

## 2020-06-12 RX ADMIN — ATORVASTATIN CALCIUM 40 MILLIGRAM(S): 80 TABLET, FILM COATED ORAL at 20:32

## 2020-06-12 RX ADMIN — RANOLAZINE 500 MILLIGRAM(S): 500 TABLET, FILM COATED, EXTENDED RELEASE ORAL at 20:32

## 2020-06-12 RX ADMIN — LIDOCAINE 1 PATCH: 4 CREAM TOPICAL at 20:33

## 2020-06-12 RX ADMIN — LIDOCAINE 1 PATCH: 4 CREAM TOPICAL at 09:16

## 2020-06-12 RX ADMIN — LISINOPRIL 20 MILLIGRAM(S): 2.5 TABLET ORAL at 09:15

## 2020-06-12 RX ADMIN — RANOLAZINE 500 MILLIGRAM(S): 500 TABLET, FILM COATED, EXTENDED RELEASE ORAL at 09:14

## 2020-06-12 RX ADMIN — Medication 650 MILLIGRAM(S): at 18:15

## 2020-06-12 RX ADMIN — Medication 1 TABLET(S): at 09:15

## 2020-06-12 RX ADMIN — Medication 1 PATCH: at 20:33

## 2020-06-12 RX ADMIN — LORATADINE 10 MILLIGRAM(S): 10 TABLET ORAL at 09:15

## 2020-06-12 RX ADMIN — AMLODIPINE BESYLATE 5 MILLIGRAM(S): 2.5 TABLET ORAL at 20:32

## 2020-06-12 RX ADMIN — TIOTROPIUM BROMIDE 1 CAPSULE(S): 18 CAPSULE ORAL; RESPIRATORY (INHALATION) at 09:17

## 2020-06-12 RX ADMIN — Medication 81 MILLIGRAM(S): at 09:15

## 2020-06-12 RX ADMIN — Medication 1 PATCH: at 09:15

## 2020-06-12 NOTE — PROGRESS NOTE BEHAVIORAL HEALTH - NSBHFUPINTERVALHXFT_PSY_A_CORE
Pt seen and evaluated, chart reviewed. As per nursing report, pt had several episodes of agitation overnight and confrontation with staff and patients, verbally redirectable. On evaluation, pt presents in room superficially cooperative with poor eye contact and one-worded answers yes/no. Pt denies all psychiatric complaints, endorses good mood, sleep and appetite; denies AVH, SI/HI. C/o somatic complaints, same as prior evaluations, unchanged. Limited interview. As per staff, pt observed labile and irritable, seen pacing the hallways. ECG QTc 504 ms (6/10) --> QTc 486 ms (6/11). [facility] approved, pending transfer date.

## 2020-06-12 NOTE — PROGRESS NOTE ADULT - SUBJECTIVE AND OBJECTIVE BOX
pt stable alert in NAD  no new complaints    DEPRESSION  ^DEPRESSION  Handoff  Schizophrenia  Asthma  Hypertension  Coronary artery disease  Schizophrenia  COPD (chronic obstructive pulmonary disease)  Paranoid schizophrenia  Coronary artery disease involving coronary bypass graft of native heart without angina pectoris  Essential hypertension  Schizophrenia  S/P CABG (coronary artery bypass graft)    HEALTH ISSUES - PROBLEM Dx:  COPD (chronic obstructive pulmonary disease)  Paranoid schizophrenia  Coronary artery disease involving coronary bypass graft of native heart without angina pectoris: Coronary artery disease involving coronary bypass graft of native heart without angina pectoris  Essential hypertension: Essential hypertension  Schizophrenia: Schizophrenia        PAST MEDICAL & SURGICAL HISTORY:  Schizophrenia  Asthma  Hypertension  Coronary artery disease  S/P CABG (coronary artery bypass graft)    Bananas (Unknown)  sulfa drugs (Unknown)      FAMILY HISTORY:      acetaminophen   Tablet .. 650 milliGRAM(s) Oral every 6 hours PRN  aluminum hydroxide/magnesium hydroxide/simethicone Suspension 30 milliLiter(s) Oral every 4 hours PRN  amLODIPine   Tablet 5 milliGRAM(s) Oral at bedtime  aspirin  chewable 81 milliGRAM(s) Oral daily  atorvastatin 40 milliGRAM(s) Oral at bedtime  budesonide 160 MICROgram(s)/formoterol 4.5 MICROgram(s) Inhaler 2 Puff(s) Inhalation two times a day  hydrOXYzine hydrochloride 25 milliGRAM(s) Oral every 6 hours PRN  ibuprofen  Tablet. 400 milliGRAM(s) Oral every 6 hours PRN  lactobacillus acidophilus 1 Tablet(s) Oral daily  lidocaine   Patch 1 Patch Transdermal daily  lisinopril 20 milliGRAM(s) Oral daily  loratadine 10 milliGRAM(s) Oral daily  LORazepam     Tablet 1 milliGRAM(s) Oral every 6 hours PRN  nicotine - 21 mG/24Hr(s) Patch 1 patch Transdermal daily  ondansetron    Tablet 4 milliGRAM(s) Oral every 8 hours PRN  ranolazine 500 milliGRAM(s) Oral two times a day  tiotropium 18 MICROgram(s) Capsule 1 Capsule(s) Inhalation daily      T(C): 36 (06-12-20 @ 06:02), Max: 36 (06-12-20 @ 06:02)  HR: 76 (06-12-20 @ 06:02) (76 - 86)  BP: 133/68 (06-12-20 @ 06:02) (122/79 - 133/68)  RR: 16 (06-12-20 @ 06:02) (16 - 16)  SpO2: --    PE;  general:  no cerda es ntoed remaisn stable    Lungs:    Heart:    EXT:    Neuro:  alert no defciits                          CAPILLARY BLOOD GLUCOSE

## 2020-06-12 NOTE — PROGRESS NOTE BEHAVIORAL HEALTH - SUMMARY
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 Pt denies any psychiatric diagnoses, states does not take any psychiatric medications, denies all psychiatric complaints. Pt presents with several delusions including living in a private apartment in [place], living with her  Abebe and having "too many children to count". Collateral from only daughter shows otherwise - pt diagnosed with schizophrenia in 2008, has had multiple episodes of medication noncompliance resulting in multiple [facility] hospitalizations (most recent known [facility] 2/2020). TOO approved, started 4/21/2020, haldol started with medical clearance d/t borderline QTc, which had to be switched to abilify d/t prolonged QTc. Pt compensated, agreed to abilify maintena on 5/6, with plan for discharge on 5/8. However, on day of discharge, discharge held d/t pt decompensating with return of prior delusions and agitation. 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 Patient continues to have poor insight in behavioral health diagnosis and long-term treatment, refuses addition of mood stabilizer, only accepting Abilify Maintena. As per TOO, pt will be restarted on haldol 0.5 qhs (with parameter QTc <500 ms).    #Schizophrenia  -d/c haldol 5 mg PO qhs d/t QTc 511 ms (5/1), pt c/o chronic angina, cardiology consulted  -start abilify 5 mg daily (5/8), abilify 2 mg qhs (5/13) --> c/w abilify 10 mg daily (5/16) until 5/27  -start abilify maintena 300 mg IM once (5/6/2020) --> titrate abilify maintena 400 mg (6/1)  -patient has been refusing treatment, TOO approved, started 4/21/2020  -start haldol 0.5 mg qhs (6/2) with daily f/u ECG with parameter QTc <500  -as for TOO, if pt refuses haldol PO, will give haldol IM with f/u ECG  -c/w ativan 1 mg q6h prn for agitation with escalation to IM if pt not verbally redirectable    #Prolonged QTc  -PA consulted, daily ECG  -ECG QTc 509 ms (5/13)  -QTc 493 ms (6/2) --> QTc 500 (6/3) --> QTc 484 (6/4) --> QTc 501 ms (6/5) --> QTc 509 ms (6/8) --> QTc 495 ms (6/9) --> QTc 504 ms (6/10) --> QTc 486 ms (6/11)    #Abdominal/Back Pain  -PA consulted  -c/w lidocaine patch daily  -c/w ibuprofen 400 mg q6h prn for pain    #Feet pain, PAD?  -PA consulted, f/u OP  -R>L with +1/+2 pedal pulses    #Chronic angina  -PA consulted, c/w home medication ranexa 500 mg bid ([facility] states last fill 3/2020)  -cardiology consulted    #COPD  -c/w Symbicort 160mcg 2 puffs BID  -c/w Spiriva 1 cap inhal daily      #Hypertension  -elevated BP, PA consulted  -c/w lisinopril 20 mg daily  -c/w norvasc 5 mg qhs    #CAD  -c/w ASA 81mg PO daily  -c/w Atorvastatin 40mg PO QHS    Dispo: [place]

## 2020-06-12 NOTE — CHART NOTE - NSCHARTNOTEFT_GEN_A_CORE
Ms. Silva remains on the unit for continued treatment, safety and observation. Pt remains verbally aggressive to all staff and instigates arguments with other peers as well. Upon speaking with pt she still reports somatic pains and all require surgery. At this time pt reports that she needs to leave in order to pay bills. Pt that continues to report “Just send me to the shelter”. SW explained that we have to ensure a safe D/C with a safe plan in place. Pt is still visible on the units, is attending groups, and needs to be redirected to use coping skills taught in those groups.     Court for retention was pushed back to 6/15/2020. SBPC updated. SW will continue to follow.     .     Mood – Agitated  Sleep - Good  Appetite - Good  ADLs - Fair  Thought Process –Illogical  Observation – [id number]    Ms. Silva will continue to meet with  one on one and with treatment team daily. Court for retention was pushed back to 6/15/2020. SBPC updated. SW will continue to follow.

## 2020-06-12 NOTE — PROGRESS NOTE BEHAVIORAL HEALTH - NSBHCHARTREVIEWVS_PSY_A_CORE FT
Vital Signs Last 24 Hrs  T(C): 36 (12 Jun 2020 06:02), Max: 36 (12 Jun 2020 06:02)  T(F): 96.8 (12 Jun 2020 06:02), Max: 96.8 (12 Jun 2020 06:02)  HR: 76 (12 Jun 2020 06:02) (76 - 86)  BP: 133/68 (12 Jun 2020 06:02) (122/79 - 133/68)  BP(mean): --  RR: 16 (12 Jun 2020 06:02) (16 - 16)  SpO2: --

## 2020-06-13 RX ADMIN — AMLODIPINE BESYLATE 5 MILLIGRAM(S): 2.5 TABLET ORAL at 21:06

## 2020-06-13 RX ADMIN — BUDESONIDE AND FORMOTEROL FUMARATE DIHYDRATE 2 PUFF(S): 160; 4.5 AEROSOL RESPIRATORY (INHALATION) at 21:06

## 2020-06-13 RX ADMIN — ATORVASTATIN CALCIUM 40 MILLIGRAM(S): 80 TABLET, FILM COATED ORAL at 21:06

## 2020-06-13 RX ADMIN — RANOLAZINE 500 MILLIGRAM(S): 500 TABLET, FILM COATED, EXTENDED RELEASE ORAL at 08:33

## 2020-06-13 RX ADMIN — RANOLAZINE 500 MILLIGRAM(S): 500 TABLET, FILM COATED, EXTENDED RELEASE ORAL at 21:06

## 2020-06-14 RX ADMIN — Medication 1 PATCH: at 08:38

## 2020-06-14 RX ADMIN — Medication 1 PATCH: at 19:43

## 2020-06-14 RX ADMIN — TIOTROPIUM BROMIDE 1 CAPSULE(S): 18 CAPSULE ORAL; RESPIRATORY (INHALATION) at 08:37

## 2020-06-14 RX ADMIN — BUDESONIDE AND FORMOTEROL FUMARATE DIHYDRATE 2 PUFF(S): 160; 4.5 AEROSOL RESPIRATORY (INHALATION) at 08:36

## 2020-06-14 RX ADMIN — LIDOCAINE 1 PATCH: 4 CREAM TOPICAL at 19:42

## 2020-06-14 RX ADMIN — LIDOCAINE 1 PATCH: 4 CREAM TOPICAL at 08:38

## 2020-06-14 NOTE — PROGRESS NOTE ADULT - SUBJECTIVE AND OBJECTIVE BOX
pt stable alert in NAD  no new complaints    DEPRESSION  ^DEPRESSION  Handoff  Schizophrenia  Asthma  Hypertension  Coronary artery disease  Schizophrenia  COPD (chronic obstructive pulmonary disease)  Paranoid schizophrenia  Coronary artery disease involving coronary bypass graft of native heart without angina pectoris  Essential hypertension  Schizophrenia  S/P CABG (coronary artery bypass graft)    HEALTH ISSUES - PROBLEM Dx:  COPD (chronic obstructive pulmonary disease)  Paranoid schizophrenia  Coronary artery disease involving coronary bypass graft of native heart without angina pectoris: Coronary artery disease involving coronary bypass graft of native heart without angina pectoris  Essential hypertension: Essential hypertension  Schizophrenia: Schizophrenia        PAST MEDICAL & SURGICAL HISTORY:  Schizophrenia  Asthma  Hypertension  Coronary artery disease  S/P CABG (coronary artery bypass graft)    Bananas (Unknown)  sulfa drugs (Unknown)      FAMILY HISTORY:      acetaminophen   Tablet .. 650 milliGRAM(s) Oral every 6 hours PRN  aluminum hydroxide/magnesium hydroxide/simethicone Suspension 30 milliLiter(s) Oral every 4 hours PRN  amLODIPine   Tablet 5 milliGRAM(s) Oral at bedtime  aspirin  chewable 81 milliGRAM(s) Oral daily  atorvastatin 40 milliGRAM(s) Oral at bedtime  budesonide 160 MICROgram(s)/formoterol 4.5 MICROgram(s) Inhaler 2 Puff(s) Inhalation two times a day  hydrOXYzine hydrochloride 25 milliGRAM(s) Oral every 6 hours PRN  ibuprofen  Tablet. 400 milliGRAM(s) Oral every 6 hours PRN  lactobacillus acidophilus 1 Tablet(s) Oral daily  lidocaine   Patch 1 Patch Transdermal daily  lisinopril 20 milliGRAM(s) Oral daily  loratadine 10 milliGRAM(s) Oral daily  LORazepam     Tablet 1 milliGRAM(s) Oral every 6 hours PRN  nicotine - 21 mG/24Hr(s) Patch 1 patch Transdermal daily  ranolazine 500 milliGRAM(s) Oral two times a day  tiotropium 18 MICROgram(s) Capsule 1 Capsule(s) Inhalation daily      T(C): 36.1 (06-14-20 @ 08:35), Max: 36.7 (06-13-20 @ 16:30)  HR: 86 (06-14-20 @ 08:35) (67 - 88)  BP: 149/63 (06-14-20 @ 08:35) (124/71 - 149/63)  RR: 16 (06-14-20 @ 08:35) (16 - 16)  SpO2: --    PE;  general:  no cahngeas noted stasble in nad    Lungs:    Heart:    EXT:    Neuro:    no deficits                        CAPILLARY BLOOD GLUCOSE

## 2020-06-15 PROCEDURE — 99231 SBSQ HOSP IP/OBS SF/LOW 25: CPT

## 2020-06-15 RX ORDER — HALOPERIDOL DECANOATE 100 MG/ML
0.5 INJECTION INTRAMUSCULAR ONCE
Refills: 0 | Status: COMPLETED | OUTPATIENT
Start: 2020-06-15 | End: 2020-06-15

## 2020-06-15 RX ORDER — HALOPERIDOL DECANOATE 100 MG/ML
0.5 INJECTION INTRAMUSCULAR DAILY
Refills: 0 | Status: DISCONTINUED | OUTPATIENT
Start: 2020-06-15 | End: 2020-06-15

## 2020-06-15 RX ADMIN — ATORVASTATIN CALCIUM 40 MILLIGRAM(S): 80 TABLET, FILM COATED ORAL at 20:31

## 2020-06-15 RX ADMIN — BUDESONIDE AND FORMOTEROL FUMARATE DIHYDRATE 2 PUFF(S): 160; 4.5 AEROSOL RESPIRATORY (INHALATION) at 20:31

## 2020-06-15 RX ADMIN — Medication 1 MILLIGRAM(S): at 11:21

## 2020-06-15 RX ADMIN — RANOLAZINE 500 MILLIGRAM(S): 500 TABLET, FILM COATED, EXTENDED RELEASE ORAL at 20:31

## 2020-06-15 RX ADMIN — AMLODIPINE BESYLATE 5 MILLIGRAM(S): 2.5 TABLET ORAL at 20:31

## 2020-06-15 NOTE — PROGRESS NOTE BEHAVIORAL HEALTH - NSBHCHARTREVIEWVS_PSY_A_CORE FT
Vital Signs Last 24 Hrs  T(C): 35.1 (15 [place] 2020 10:04), Max: 36.5 (14 Jun 2020 15:58)  T(F): 95.2 (15 [place] 2020 10:04), Max: 97.7 (14 Jun 2020 15:58)  HR: 80 (15 [place] 2020 10:04) (73 - 91)  BP: 137/68 (15 [place] 2020 10:04) (125/60 - 145/67)  BP(mean): --  RR: 18 (15 [place] 2020 10:04) (16 - 18)  SpO2: --

## 2020-06-15 NOTE — PROGRESS NOTE BEHAVIORAL HEALTH - NSBHCHARTREVIEWINVESTIGATE_PSY_A_CORE FT
< from: 12 Lead ECG (06.15.20 @ 08:34) >      Ventricular Rate 73 BPM    Atrial Rate 73 BPM    P-R Interval 164 ms    QRS Duration 158 ms    Q-T Interval 420 ms    QTC Calculation(Bezet) 462 ms    P Axis 51 degrees    R Axis 62 degrees    T Axis 75 degrees    Diagnosis Line Sinus rhythm with Premature atrial complexes in a pattern of bigeminy  Right bundle branch block  Abnormal ECG    < end of copied text >

## 2020-06-15 NOTE — PROGRESS NOTE BEHAVIORAL HEALTH - SUMMARY
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 for lower left extremity cellulitis, transferred to [facility] d/t bizarre bx, psychiatric medication non-compliance and questionable ability to care for self. Pt denies any psychiatric diagnoses, states does not take any psychiatric medications, denies all psychiatric complaints. Pt presents with several delusions including living in a private apartment in [place], living with her  Abebe and having "too many children to count". Collateral from only daughter shows otherwise - pt diagnosed with schizophrenia in 2008, has had multiple episodes of medication noncompliance resulting in multiple [facility] hospitalizations (most recent known [facility] 2/2020). TOO approved, started 4/21/2020, haldol started with medical clearance d/t borderline QTc, which had to be switched to abilify d/t prolonged QTc. Pt compensated, agreed to abilify maintena on 5/6, with plan for discharge on 5/8. However, on day of discharge, discharge held d/t pt decompensating with return of prior delusions and agitation. 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 Patient continues to have poor insight in behavioral health diagnosis and long-term treatment, refuses addition of mood stabilizer, only accepting Abilify Maintena. As per TOO, pt will be restarted on haldol 0.5 qhs (with parameter QTc <500 ms).    #Schizophrenia  -d/c haldol 5 mg PO qhs d/t QTc 511 ms (5/1), pt c/o chronic angina, cardiology consulted  -start abilify 5 mg daily (5/8), abilify 2 mg qhs (5/13) --> c/w abilify 10 mg daily (5/16) until 5/27  -start abilify maintena 300 mg IM once (5/6/2020) --> titrate abilify maintena 400 mg (6/1)  -patient has been refusing treatment, TOO approved, started 4/21/2020  -start haldol 0.5 mg qhs (6/2) with daily f/u ECG with parameter QTc <500  -as for TOO, if pt refuses haldol PO, will give haldol IM with f/u ECG  -c/w ativan 1 mg q6h prn for agitation with escalation to IM if pt not verbally redirectable    #Prolonged QTc  -PA consulted, daily ECG  -ECG QTc 509 ms (5/13)  -QTc 493 ms (6/2) --> QTc 500 (6/3) --> QTc 484 (6/4) --> QTc 501 ms (6/5) --> QTc 509 ms (6/8) --> QTc 495 ms (6/9) --> QTc 504 ms (6/10) --> QTc 486 ms (6/11) --> QTc 464 ms (6/15)    #Abdominal/Back Pain  -PA consulted  -c/w lidocaine patch daily  -c/w ibuprofen 400 mg q6h prn for pain    #Feet pain, PAD?  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 for lower left extremity cellulitis, transferred to [facility] d/t bizarre bx, psychiatric medication non-compliance and questionable ability to care for self. Pt denies any psychiatric diagnoses, states does not take any psychiatric medications, denies all psychiatric complaints. Pt presents with several delusions including living in a private apartment in [place], living with her  Abebe and having "too many children to count". Collateral from only daughter shows otherwise - pt diagnosed with schizophrenia in 2008, has had multiple episodes of medication noncompliance resulting in multiple [facility] hospitalizations (most recent known [facility] 2/2020). TOO approved, started 4/21/2020, haldol started with medical clearance d/t borderline QTc, which had to be switched to abilify d/t prolonged QTc. Pt compensated, agreed to abilify maintena on 5/6, with plan for discharge on 5/8. However, on day of discharge, discharge held d/t pt decompensating with return of prior delusions and agitation. 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 Patient continues to have poor insight in behavioral health diagnosis and long-term treatment, refuses addition of mood stabilizer, only accepting Abilify Maintena. As per TOO, pt will be restarted on haldol 0.5 qhs (with parameter QTc <500 ms).    #Schizophrenia  -d/c haldol 5 mg PO qhs d/t QTc 511 ms (5/1), pt c/o chronic angina, cardiology consulted  -start abilify 5 mg daily (5/8), abilify 2 mg qhs (5/13) --> c/w abilify 10 mg daily (5/16) until 5/27  -start abilify maintena 300 mg IM once (5/6/2020) --> titrate abilify maintena 400 mg (6/1)  -patient has been refusing treatment, TOO approved, started 4/21/2020  -start haldol 0.5 mg qhs (6/2) with daily f/u ECG with parameter QTc <500  -c/w ativan 1 mg q6h prn for agitation with escalation to IM if pt not verbally redirectable    #Prolonged QTc  -PA consulted, daily ECG  -ECG QTc 509 ms (5/13)  -QTc 493 ms (6/2) --> QTc 500 (6/3) --> QTc 484 (6/4) --> QTc 501 ms (6/5) --> QTc 509 ms (6/8) --> QTc 495 ms (6/9) --> QTc 504 ms (6/10) --> QTc 486 ms (6/11) --> QTc 462 ms (6/15)    #Abdominal/Back Pain  -PA consulted  -c/w lidocaine patch daily  -c/w ibuprofen 400 mg q6h prn for pain    #Feet pain, PAD?  -PA consulted, f/u OP  -R>L with +1/+2 pedal pulses    #Chronic angina  -PA consulted, c/w home medication ranexa 500 mg bid ([facility] states last fill 3/2020)  -cardiology consulted    #COPD  -c/w Symbicort 160mcg 2 puffs BID  -c/w Spiriva 1 cap inhal daily      #Hypertension  -elevated BP, PA consulted  -c/w lisinopril 20 mg daily  -c/w norvasc 5 mg qhs    #CAD  -c/w ASA 81mg PO daily  -c/w Atorvastatin 40mg PO QHS    Dispo: [place]

## 2020-06-15 NOTE — PROGRESS NOTE BEHAVIORAL HEALTH - NSBHFUPINTERVALHXFT_PSY_A_CORE
Pt seen and evaluated, chart reviewed. As per nursing report, pt had several episodes of agitation over the weekend, verbally redirectable. On evaluation, pt presents in room superficially cooperative with poor eye contact and one-worded answers yes/no. Pt denies all psychiatric complaints, endorses good mood, sleep and appetite; denies AVH, SI/HI. C/o somatic complaints, same as prior evaluations, unchanged. Limited interview. As per staff, pt observed labile and irritable, isolative to room. ECG QTc 462 ms (6/15). [facility] approved, pending transfer date. Pt seen and evaluated, chart reviewed. As per nursing report, pt had several episodes of agitation over the weekend, verbally redirectable. On evaluation, pt presents in room superficially cooperative with poor eye contact and one-worded answers yes/no. Pt denies all psychiatric complaints, endorses good mood, sleep and appetite; denies AVH, SI/HI. C/o somatic complaints, same as prior evaluations, unchanged. Limited interview. As per staff, pt observed labile and irritable, isolative to room. ECG QTc 462 ms (6/15). Pt became increasingly agitated during the day resulting in aggressive posturing and throwing room belongings on the floor, requiring manual hold and ativan 1 mg IM for safety. Haldol 0.5 mg PO restarted with repeat ECG in AM. [facility] approved, pending transfer date.

## 2020-06-16 PROCEDURE — 99231 SBSQ HOSP IP/OBS SF/LOW 25: CPT

## 2020-06-16 RX ADMIN — AMLODIPINE BESYLATE 5 MILLIGRAM(S): 2.5 TABLET ORAL at 20:44

## 2020-06-16 RX ADMIN — BUDESONIDE AND FORMOTEROL FUMARATE DIHYDRATE 2 PUFF(S): 160; 4.5 AEROSOL RESPIRATORY (INHALATION) at 20:44

## 2020-06-16 RX ADMIN — ATORVASTATIN CALCIUM 40 MILLIGRAM(S): 80 TABLET, FILM COATED ORAL at 20:44

## 2020-06-16 RX ADMIN — RANOLAZINE 500 MILLIGRAM(S): 500 TABLET, FILM COATED, EXTENDED RELEASE ORAL at 20:44

## 2020-06-16 NOTE — PROGRESS NOTE ADULT - SUBJECTIVE AND OBJECTIVE BOX
pt stable alert in NAD  no new complaints    DEPRESSION  ^DEPRESSION  Handoff  Schizophrenia  Asthma  Hypertension  Coronary artery disease  Schizophrenia  COPD (chronic obstructive pulmonary disease)  Paranoid schizophrenia  Coronary artery disease involving coronary bypass graft of native heart without angina pectoris  Essential hypertension  Schizophrenia  S/P CABG (coronary artery bypass graft)    HEALTH ISSUES - PROBLEM Dx:  COPD (chronic obstructive pulmonary disease)  Paranoid schizophrenia  Coronary artery disease involving coronary bypass graft of native heart without angina pectoris: Coronary artery disease involving coronary bypass graft of native heart without angina pectoris  Essential hypertension: Essential hypertension  Schizophrenia: Schizophrenia        PAST MEDICAL & SURGICAL HISTORY:  Schizophrenia  Asthma  Hypertension  Coronary artery disease  S/P CABG (coronary artery bypass graft)    Bananas (Unknown)  sulfa drugs (Unknown)      FAMILY HISTORY:      acetaminophen   Tablet .. 650 milliGRAM(s) Oral every 6 hours PRN  aluminum hydroxide/magnesium hydroxide/simethicone Suspension 30 milliLiter(s) Oral every 4 hours PRN  amLODIPine   Tablet 5 milliGRAM(s) Oral at bedtime  aspirin  chewable 81 milliGRAM(s) Oral daily  atorvastatin 40 milliGRAM(s) Oral at bedtime  budesonide 160 MICROgram(s)/formoterol 4.5 MICROgram(s) Inhaler 2 Puff(s) Inhalation two times a day  hydrOXYzine hydrochloride 25 milliGRAM(s) Oral every 6 hours PRN  ibuprofen  Tablet. 400 milliGRAM(s) Oral every 6 hours PRN  lactobacillus acidophilus 1 Tablet(s) Oral daily  lidocaine   Patch 1 Patch Transdermal daily  lisinopril 20 milliGRAM(s) Oral daily  loratadine 10 milliGRAM(s) Oral daily  LORazepam     Tablet 1 milliGRAM(s) Oral every 6 hours PRN  nicotine - 21 mG/24Hr(s) Patch 1 patch Transdermal daily  ranolazine 500 milliGRAM(s) Oral two times a day  tiotropium 18 MICROgram(s) Capsule 1 Capsule(s) Inhalation daily      T(C): 35.9 (06-16-20 @ 06:19), Max: 35.9 (06-16-20 @ 06:19)  HR: 71 (06-16-20 @ 06:19) (65 - 80)  BP: 140/73 (06-16-20 @ 06:19) (136/61 - 140/73)  RR: 16 (06-16-20 @ 06:19) (16 - 18)  SpO2: --    PE;  general:  no cahnges ntoed in nad    Lungs:    Heart:    EXT:    Neuro:  no deficits                          CAPILLARY BLOOD GLUCOSE

## 2020-06-16 NOTE — PROGRESS NOTE BEHAVIORAL HEALTH - NSBHFUPINTERVALHXFT_PSY_A_CORE
Pt seen and evaluated, refused treatment team, chart reviewed. As per nursing report, pt had several episodes of agitation over the weekend, verbally redirectable. On evaluation, pt presents in room irritable and superficially cooperative with poor eye contact and one-worded answers yes/no. Pt denies all psychiatric complaints, endorses good mood, sleep and appetite; denies AVH, SI/HI. C/o somatic complaints, same as prior evaluations, unchanged. Limited interview. As per staff, pt observed labile and irritable, isolative to room. ECG QTc 497 ms (6/16). [facility] approved, pending transfer date.

## 2020-06-16 NOTE — PROGRESS NOTE BEHAVIORAL HEALTH - NSBHCHARTREVIEWVS_PSY_A_CORE FT
Vital Signs Last 24 Hrs  T(C): 36.1 (16 Jun 2020 08:23), Max: 36.1 (16 Jun 2020 08:23)  T(F): 96.9 (16 Jun 2020 08:23), Max: 96.9 (16 Jun 2020 08:23)  HR: 69 (16 Jun 2020 08:23) (65 - 71)  BP: 135/60 (16 Jun 2020 08:23) (135/60 - 140/73)  BP(mean): --  RR: 18 (16 Jun 2020 08:23) (16 - 18)  SpO2: --

## 2020-06-16 NOTE — PROGRESS NOTE BEHAVIORAL HEALTH - SUMMARY
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 Pt denies any psychiatric diagnoses, states does not take any psychiatric medications, denies all psychiatric complaints. Pt presents with several delusions including living in a private apartment in [place], living with her  Abebe and having "too many children to count". Collateral from only daughter shows otherwise - pt diagnosed with schizophrenia in 2008, has had multiple episodes of medication noncompliance resulting in multiple [facility] hospitalizations (most recent known [facility] 2/2020). TOO approved, started 4/21/2020, haldol started with medical clearance d/t borderline QTc, which had to be switched to abilify d/t prolonged QTc. Pt compensated, agreed to abilify maintena on 5/6, with plan for discharge on 5/8. However, on day of discharge, discharge held d/t pt decompensating with return of prior delusions and agitation. 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 Patient continues to have poor insight in behavioral health diagnosis and long-term treatment, refuses addition of mood stabilizer, only accepting Abilify Maintena. As per TOO, pt will be restarted on haldol 0.5 qhs (with parameter QTc <500 ms).    #Schizophrenia  -d/c haldol 5 mg PO qhs d/t QTc 511 ms (5/1), pt c/o chronic angina, cardiology consulted  -start abilify 5 mg daily (5/8), abilify 2 mg qhs (5/13) --> c/w abilify 10 mg daily (5/16) until 5/27  -start abilify maintena 300 mg IM once (5/6/2020) --> titrate abilify maintena 400 mg (6/1)  -patient has been refusing treatment, TOO approved, started 4/21/2020  -start haldol 0.5 mg qhs (6/2) with daily f/u ECG with parameter QTc <500  -c/w ativan 1 mg q6h prn for agitation with escalation to IM if pt not verbally redirectable    #Prolonged QTc  -PA consulted, daily ECG  -ECG QTc 509 ms (5/13)  -QTc 493 ms (6/2) --> QTc 500 (6/3) --> QTc 484 (6/4) --> QTc 501 ms (6/5) --> QTc 509 ms (6/8) --> QTc 495 ms (6/9) --> QTc 504 ms (6/10) --> QTc 486 ms (6/11) --> QTc 462 ms (6/15)    #Abdominal/Back Pain  -PA consulted  -c/w lidocaine patch daily  -c/w ibuprofen 400 mg q6h prn for pain    #Feet pain, PAD?  -PA consulted, f/u OP  -R>L with +1/+2 pedal pulses    #Chronic angina  -PA consulted, c/w home medication ranexa 500 mg bid ([facility] states last fill 3/2020)  -cardiology consulted    #COPD  -c/w Symbicort 160mcg 2 puffs BID  -c/w Spiriva 1 cap inhal daily      #Hypertension  -elevated BP, PA consulted  -c/w lisinopril 20 mg daily  -c/w norvasc 5 mg qhs    #CAD  -c/w ASA 81mg PO daily  -c/w Atorvastatin 40mg PO QHS    Dispo: [place]

## 2020-06-17 PROCEDURE — 99231 SBSQ HOSP IP/OBS SF/LOW 25: CPT

## 2020-06-17 RX ADMIN — RANOLAZINE 500 MILLIGRAM(S): 500 TABLET, FILM COATED, EXTENDED RELEASE ORAL at 09:14

## 2020-06-17 RX ADMIN — LORATADINE 10 MILLIGRAM(S): 10 TABLET ORAL at 09:14

## 2020-06-17 RX ADMIN — AMLODIPINE BESYLATE 5 MILLIGRAM(S): 2.5 TABLET ORAL at 20:14

## 2020-06-17 RX ADMIN — Medication 1 PATCH: at 09:14

## 2020-06-17 RX ADMIN — BUDESONIDE AND FORMOTEROL FUMARATE DIHYDRATE 2 PUFF(S): 160; 4.5 AEROSOL RESPIRATORY (INHALATION) at 09:16

## 2020-06-17 RX ADMIN — TIOTROPIUM BROMIDE 1 CAPSULE(S): 18 CAPSULE ORAL; RESPIRATORY (INHALATION) at 09:16

## 2020-06-17 RX ADMIN — LISINOPRIL 20 MILLIGRAM(S): 2.5 TABLET ORAL at 09:14

## 2020-06-17 RX ADMIN — RANOLAZINE 500 MILLIGRAM(S): 500 TABLET, FILM COATED, EXTENDED RELEASE ORAL at 20:14

## 2020-06-17 RX ADMIN — Medication 81 MILLIGRAM(S): at 09:14

## 2020-06-17 RX ADMIN — ATORVASTATIN CALCIUM 40 MILLIGRAM(S): 80 TABLET, FILM COATED ORAL at 20:14

## 2020-06-17 RX ADMIN — BUDESONIDE AND FORMOTEROL FUMARATE DIHYDRATE 2 PUFF(S): 160; 4.5 AEROSOL RESPIRATORY (INHALATION) at 20:14

## 2020-06-17 RX ADMIN — Medication 1 TABLET(S): at 09:14

## 2020-06-17 NOTE — PROGRESS NOTE BEHAVIORAL HEALTH - SUMMARY
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 for lower left extremity cellulitis, transferred to [facility] d/t bizarre bx, psychiatric medication non-compliance and questionable ability to care for self. Pt denies any psychiatric diagnoses, states does not take any psychiatric medications, denies all psychiatric complaints. Pt presents with several delusions including living in a private apartment in [place], living with her  Abebe and having "too many children to count". Collateral from only daughter shows otherwise - pt diagnosed with schizophrenia in 2008, has had multiple episodes of medication noncompliance resulting in multiple [facility] hospitalizations (most recent known [facility] 2/2020). TOO approved, started 4/21/2020, haldol started with medical clearance d/t borderline QTc, which had to be switched to abilify d/t prolonged QTc. Pt compensated, agreed to abilify maintena on 5/6, with plan for discharge on 5/8. However, on day of discharge, discharge held d/t pt decompensating with return of prior delusions and agitation. 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 Patient continues to have poor insight in behavioral health diagnosis and long-term treatment, refuses addition of mood stabilizer, only accepting Abilify Maintena. As per TOO, pt will be restarted on haldol 0.5 qhs (with parameter QTc <500 ms).    #Schizophrenia  -d/c haldol 5 mg PO qhs d/t QTc 511 ms (5/1), pt c/o chronic angina, cardiology consulted  -start abilify 5 mg daily (5/8), abilify 2 mg qhs (5/13) --> c/w abilify 10 mg daily (5/16) until 5/27  -start abilify maintena 300 mg IM once (5/6/2020) --> titrate abilify maintena 400 mg (6/1)  -patient has been refusing treatment, TOO approved, started 4/21/2020  -start haldol 0.5 mg qhs (6/2) with daily f/u ECG with parameter QTc <500  -c/w ativan 1 mg q6h prn for agitation with escalation to IM if pt not verbally redirectable    #Prolonged QTc  -PA consulted, daily ECG  -ECG QTc 509 ms (5/13)  -QTc 493 ms (6/2) --> QTc 500 (6/3) --> QTc 484 (6/4) --> QTc 501 ms (6/5) --> QTc 509 ms (6/8) --> QTc 495 ms (6/9) --> QTc 504 ms (6/10) --> QTc 486 ms (6/11) --> QTc 462 ms (6/15)    #Abdominal/Back Pain  -PA consulted  -c/w lidocaine patch daily  -c/w ibuprofen 400 mg q6h prn for pain    #Feet pain, PAD?  -PA consulted, f/u OP  -R>L with +1/+2 pedal pulses    #Chronic angina  -PA consulted, c/w home medication ranexa 500 mg bid ([facility] states last fill 3/2020)  -cardiology consulted    #COPD  -c/w Symbicort 160mcg 2 puffs BID  -c/w Spiriva 1 cap inhal daily      #Hypertension  -elevated BP, PA consulted  -c/w lisinopril 20 mg daily  -c/w norvasc 5 mg qhs    #CAD  -c/w ASA 81mg PO daily  -c/w Atorvastatin 40mg PO QHS    Dispo: [place]

## 2020-06-17 NOTE — PROGRESS NOTE BEHAVIORAL HEALTH - NSBHFUPINTERVALHXFT_PSY_A_CORE
Pt seen and evaluated, chart reviewed. As per nursing report, pt had several episodes of agitation, verbally redirectable, selective with medications. On evaluation, pt presents in room uncooperative, refusing to engage with this writer after several attempts. Limited interview. As per staff, pt observed labile and irritable, isolative to room. ECG QTc 497 ms (6/16). [facility] approved, pending transfer date. Pt seen and evaluated, chart reviewed. As per nursing report, pt had several episodes of agitation, verbally redirectable, selective with medications. On evaluation, pt presents in room uncooperative, refusing to engage with this writer after several attempts. Limited interview. As per staff, pt observed labile and irritable, isolative to room. ECG QTc 497 ms (6/16). [facility] approved, transfer date 6/25.

## 2020-06-17 NOTE — PROGRESS NOTE BEHAVIORAL HEALTH - NSBHCHARTREVIEWVS_PSY_A_CORE FT
Vital Signs Last 24 Hrs  T(C): 36.9 (17 Jun 2020 08:30), Max: 36.9 (17 Jun 2020 08:30)  T(F): 98.4 (17 Jun 2020 08:30), Max: 98.4 (17 Jun 2020 08:30)  HR: 76 (17 Jun 2020 08:30) (76 - 80)  BP: 140/72 (17 Jun 2020 08:30) (140/67 - 159/75)  BP(mean): --  RR: 16 (17 Jun 2020 08:30) (16 - 18)  SpO2: --

## 2020-06-17 NOTE — CHART NOTE - NSCHARTNOTEFT_GEN_A_CORE
Social Work Note:    This worker has been in contact with [facility] earlier today.  Transfer date is for next week on Thursday June 26.      Treatment team made aware.  Documentation required prior to discharge will be sent to [facility] in support of patient transfer.

## 2020-06-17 NOTE — CHART NOTE - NSCHARTNOTEFT_GEN_A_CORE
Social Work Note:    Court hearing for retention held on Monday Lillie 15.  Yesterday this worker spoke with admissions coordinator of [facility].  Retention order sent via email to [facility] .      Transfer date to be determined and will be communicated with this worker.  As updates are received they will be communicated to treatment team and patient.

## 2020-06-18 PROCEDURE — 99231 SBSQ HOSP IP/OBS SF/LOW 25: CPT

## 2020-06-18 RX ADMIN — LISINOPRIL 20 MILLIGRAM(S): 2.5 TABLET ORAL at 08:36

## 2020-06-18 RX ADMIN — Medication 1 PATCH: at 08:45

## 2020-06-18 RX ADMIN — AMLODIPINE BESYLATE 5 MILLIGRAM(S): 2.5 TABLET ORAL at 20:38

## 2020-06-18 RX ADMIN — BUDESONIDE AND FORMOTEROL FUMARATE DIHYDRATE 2 PUFF(S): 160; 4.5 AEROSOL RESPIRATORY (INHALATION) at 20:38

## 2020-06-18 RX ADMIN — Medication 1 TABLET(S): at 08:36

## 2020-06-18 RX ADMIN — ATORVASTATIN CALCIUM 40 MILLIGRAM(S): 80 TABLET, FILM COATED ORAL at 20:38

## 2020-06-18 RX ADMIN — Medication 1 PATCH: at 08:36

## 2020-06-18 RX ADMIN — LORATADINE 10 MILLIGRAM(S): 10 TABLET ORAL at 08:36

## 2020-06-18 RX ADMIN — Medication 81 MILLIGRAM(S): at 08:36

## 2020-06-18 RX ADMIN — TIOTROPIUM BROMIDE 1 CAPSULE(S): 18 CAPSULE ORAL; RESPIRATORY (INHALATION) at 08:35

## 2020-06-18 RX ADMIN — RANOLAZINE 500 MILLIGRAM(S): 500 TABLET, FILM COATED, EXTENDED RELEASE ORAL at 20:38

## 2020-06-18 RX ADMIN — LIDOCAINE 1 PATCH: 4 CREAM TOPICAL at 08:36

## 2020-06-18 RX ADMIN — BUDESONIDE AND FORMOTEROL FUMARATE DIHYDRATE 2 PUFF(S): 160; 4.5 AEROSOL RESPIRATORY (INHALATION) at 08:37

## 2020-06-18 RX ADMIN — RANOLAZINE 500 MILLIGRAM(S): 500 TABLET, FILM COATED, EXTENDED RELEASE ORAL at 08:36

## 2020-06-18 NOTE — PROGRESS NOTE BEHAVIORAL HEALTH - NSBHCHARTREVIEWVS_PSY_A_CORE FT
Vital Signs Last 24 Hrs  T(C): 36.8 (18 Jun 2020 08:54), Max: 36.8 (18 Jun 2020 08:54)  T(F): 98.3 (18 Jun 2020 08:54), Max: 98.3 (18 Jun 2020 08:54)  HR: 92 (18 Jun 2020 08:54) (74 - 92)  BP: 189/86 (18 Jun 2020 08:54) (126/58 - 189/86)  BP(mean): --  RR: 16 (18 Jun 2020 08:54) (16 - 16)  SpO2: --

## 2020-06-18 NOTE — PROGRESS NOTE BEHAVIORAL HEALTH - NSBHFUPINTERVALHXFT_PSY_A_CORE
Pt seen and evaluated, chart reviewed. As per nursing report, pt had several episodes of agitation, verbally redirectable, selective with medications. On evaluation, pt presents in room superficially cooperative, states "I'm fine" and then refuses to engage further with this writer after several attempts. Limited interview. As per staff, pt observed labile and irritable, isolative to room. ECG QTc 497 ms (6/16), pt refused ECG this AM. [facility] approved, transfer date 6/25.

## 2020-06-18 NOTE — PROGRESS NOTE BEHAVIORAL HEALTH - SUMMARY
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 Pt denies any psychiatric diagnoses, states does not take any psychiatric medications, denies all psychiatric complaints. Pt presents with several delusions including living in a private apartment in [place], living with her  Abebe and having "too many children to count". Collateral from only daughter shows otherwise - pt diagnosed with schizophrenia in 2008, has had multiple episodes of medication noncompliance resulting in multiple [facility] hospitalizations (most recent known [facility] 2/2020). TOO approved, started 4/21/2020, haldol started with medical clearance d/t borderline QTc, which had to be switched to abilify d/t prolonged QTc. Pt compensated, agreed to abilify maintena on 5/6, with plan for discharge on 5/8. However, on day of discharge, discharge held d/t pt decompensating with return of prior delusions and agitation. 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 Patient continues to have poor insight in behavioral health diagnosis and long-term treatment, refuses addition of mood stabilizer, only accepting Abilify Maintena. As per TOO, pt will be restarted on haldol 0.5 qhs (with parameter QTc <500 ms).    #Schizophrenia  -d/c haldol 5 mg PO qhs d/t QTc 511 ms (5/1), pt c/o chronic angina, cardiology consulted  -start abilify 5 mg daily (5/8), abilify 2 mg qhs (5/13) --> c/w abilify 10 mg daily (5/16) until 5/27  -start abilify maintena 300 mg IM once (5/6/2020) --> titrate abilify maintena 400 mg (6/1)  -patient has been refusing treatment, TOO approved, started 4/21/2020  -start haldol 0.5 mg qhs (6/2) with daily f/u ECG with parameter QTc <500  -c/w ativan 1 mg q6h prn for agitation with escalation to IM if pt not verbally redirectable    #Prolonged QTc  -PA consulted, daily ECG  -ECG QTc 509 ms (5/13)  -QTc 493 ms (6/2) --> QTc 500 (6/3) --> QTc 484 (6/4) --> QTc 501 ms (6/5) --> QTc 509 ms (6/8) --> QTc 495 ms (6/9) --> QTc 504 ms (6/10) --> QTc 486 ms (6/11) --> QTc 462 ms (6/15)    #Abdominal/Back Pain  -PA consulted  -c/w lidocaine patch daily  -c/w ibuprofen 400 mg q6h prn for pain    #Feet pain, PAD?  -PA consulted, f/u OP  -R>L with +1/+2 pedal pulses    #Chronic angina  -PA consulted, c/w home medication ranexa 500 mg bid ([facility] states last fill 3/2020)  -cardiology consulted    #COPD  -c/w Symbicort 160mcg 2 puffs BID  -c/w Spiriva 1 cap inhal daily      #Hypertension  -elevated BP, PA consulted  -c/w lisinopril 20 mg daily  -c/w norvasc 5 mg qhs    #CAD  -c/w ASA 81mg PO daily  -c/w Atorvastatin 40mg PO QHS    Dispo: [place]

## 2020-06-18 NOTE — PROGRESS NOTE ADULT - SUBJECTIVE AND OBJECTIVE BOX
pt stable alert in NAD  no new complaints    DEPRESSION  ^DEPRESSION  Handoff  Schizophrenia  Asthma  Hypertension  Coronary artery disease  Schizophrenia  COPD (chronic obstructive pulmonary disease)  Paranoid schizophrenia  Coronary artery disease involving coronary bypass graft of native heart without angina pectoris  Essential hypertension  Schizophrenia  S/P CABG (coronary artery bypass graft)    HEALTH ISSUES - PROBLEM Dx:  COPD (chronic obstructive pulmonary disease)  Paranoid schizophrenia  Coronary artery disease involving coronary bypass graft of native heart without angina pectoris: Coronary artery disease involving coronary bypass graft of native heart without angina pectoris  Essential hypertension: Essential hypertension  Schizophrenia: Schizophrenia        PAST MEDICAL & SURGICAL HISTORY:  Schizophrenia  Asthma  Hypertension  Coronary artery disease  S/P CABG (coronary artery bypass graft)    Bananas (Unknown)  sulfa drugs (Unknown)      FAMILY HISTORY:      acetaminophen   Tablet .. 650 milliGRAM(s) Oral every 6 hours PRN  aluminum hydroxide/magnesium hydroxide/simethicone Suspension 30 milliLiter(s) Oral every 4 hours PRN  amLODIPine   Tablet 5 milliGRAM(s) Oral at bedtime  aspirin  chewable 81 milliGRAM(s) Oral daily  atorvastatin 40 milliGRAM(s) Oral at bedtime  budesonide 160 MICROgram(s)/formoterol 4.5 MICROgram(s) Inhaler 2 Puff(s) Inhalation two times a day  hydrOXYzine hydrochloride 25 milliGRAM(s) Oral every 6 hours PRN  ibuprofen  Tablet. 400 milliGRAM(s) Oral every 6 hours PRN  lactobacillus acidophilus 1 Tablet(s) Oral daily  lidocaine   Patch 1 Patch Transdermal daily  lisinopril 20 milliGRAM(s) Oral daily  loratadine 10 milliGRAM(s) Oral daily  nicotine - 21 mG/24Hr(s) Patch 1 patch Transdermal daily  ranolazine 500 milliGRAM(s) Oral two times a day  tiotropium 18 MICROgram(s) Capsule 1 Capsule(s) Inhalation daily      T(C): 36.2 (06-17-20 @ 17:07), Max: 36.9 (06-17-20 @ 08:30)  HR: 74 (06-17-20 @ 17:07) (74 - 76)  BP: 126/58 (06-17-20 @ 17:07) (126/58 - 140/72)  RR: 16 (06-17-20 @ 17:07) (16 - 16)  SpO2: --    PE;  general:  lyibng in bed stasbel in nad    Lungs:    Heart:    EXT:    Neuro:    aellrt no deficits                        CAPILLARY BLOOD GLUCOSE

## 2020-06-19 PROCEDURE — 99231 SBSQ HOSP IP/OBS SF/LOW 25: CPT

## 2020-06-19 RX ADMIN — ATORVASTATIN CALCIUM 40 MILLIGRAM(S): 80 TABLET, FILM COATED ORAL at 20:35

## 2020-06-19 RX ADMIN — Medication 1 PATCH: at 08:41

## 2020-06-19 RX ADMIN — LIDOCAINE 1 PATCH: 4 CREAM TOPICAL at 18:57

## 2020-06-19 RX ADMIN — RANOLAZINE 500 MILLIGRAM(S): 500 TABLET, FILM COATED, EXTENDED RELEASE ORAL at 20:35

## 2020-06-19 RX ADMIN — BUDESONIDE AND FORMOTEROL FUMARATE DIHYDRATE 2 PUFF(S): 160; 4.5 AEROSOL RESPIRATORY (INHALATION) at 20:35

## 2020-06-19 RX ADMIN — Medication 1 PATCH: at 18:57

## 2020-06-19 RX ADMIN — LISINOPRIL 20 MILLIGRAM(S): 2.5 TABLET ORAL at 08:40

## 2020-06-19 RX ADMIN — Medication 81 MILLIGRAM(S): at 08:41

## 2020-06-19 RX ADMIN — Medication 30 MILLILITER(S): at 08:48

## 2020-06-19 RX ADMIN — LIDOCAINE 1 PATCH: 4 CREAM TOPICAL at 08:40

## 2020-06-19 RX ADMIN — AMLODIPINE BESYLATE 5 MILLIGRAM(S): 2.5 TABLET ORAL at 20:35

## 2020-06-19 RX ADMIN — BUDESONIDE AND FORMOTEROL FUMARATE DIHYDRATE 2 PUFF(S): 160; 4.5 AEROSOL RESPIRATORY (INHALATION) at 08:40

## 2020-06-19 RX ADMIN — Medication 1 PATCH: at 07:57

## 2020-06-19 RX ADMIN — Medication 1 TABLET(S): at 08:41

## 2020-06-19 RX ADMIN — TIOTROPIUM BROMIDE 1 CAPSULE(S): 18 CAPSULE ORAL; RESPIRATORY (INHALATION) at 08:40

## 2020-06-19 RX ADMIN — LORATADINE 10 MILLIGRAM(S): 10 TABLET ORAL at 08:40

## 2020-06-19 RX ADMIN — Medication 1 PATCH: at 08:39

## 2020-06-19 RX ADMIN — RANOLAZINE 500 MILLIGRAM(S): 500 TABLET, FILM COATED, EXTENDED RELEASE ORAL at 08:40

## 2020-06-19 NOTE — PROGRESS NOTE BEHAVIORAL HEALTH - NSBHCHARTREVIEWVS_PSY_A_CORE FT
Vital Signs Last 24 Hrs  T(C): 36.3 (19 Jun 2020 08:19), Max: 36.3 (19 Jun 2020 08:19)  T(F): 97.4 (19 Jun 2020 08:19), Max: 97.4 (19 Jun 2020 08:19)  HR: 63 (19 Jun 2020 08:19) (63 - 73)  BP: 138/61 (19 Jun 2020 08:19) (107/55 - 138/61)  BP(mean): --  RR: 18 (19 Jun 2020 08:19) (18 - 18)  SpO2: --

## 2020-06-19 NOTE — PROGRESS NOTE BEHAVIORAL HEALTH - SUMMARY
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 Pt denies any psychiatric diagnoses, states does not take any psychiatric medications, denies all psychiatric complaints. Pt presents with several delusions including living in a private apartment in [place], living with her  Abebe and having "too many children to count". Collateral from only daughter shows otherwise - pt diagnosed with schizophrenia in 2008, has had multiple episodes of medication noncompliance resulting in multiple [facility] hospitalizations (most recent known [facility] 2/2020). TOO approved, started 4/21/2020, haldol started with medical clearance d/t borderline QTc, which had to be switched to abilify d/t prolonged QTc. Pt compensated, agreed to abilify maintena on 5/6, with plan for discharge on 5/8. However, on day of discharge, discharge held d/t pt decompensating with return of prior delusions and agitation. 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 Patient continues to have poor insight in behavioral health diagnosis and long-term treatment, refuses addition of mood stabilizer, only accepting Abilify Maintena. As per TOO, pt will be restarted on haldol 0.5 qhs (with parameter QTc <500 ms).    #Schizophrenia  -d/c haldol 5 mg PO qhs d/t QTc 511 ms (5/1), pt c/o chronic angina, cardiology consulted  -start abilify 5 mg daily (5/8), abilify 2 mg qhs (5/13) --> c/w abilify 10 mg daily (5/16) until 5/27  -start abilify maintena 300 mg IM once (5/6/2020) --> titrate abilify maintena 400 mg (6/1)  -patient has been refusing treatment, TOO approved, started 4/21/2020  -start haldol 0.5 mg qhs (6/2) with daily f/u ECG with parameter QTc <500  -c/w ativan 1 mg q6h prn for agitation with escalation to IM if pt not verbally redirectable    #Prolonged QTc  -PA consulted, daily ECG  -ECG QTc 509 ms (5/13)  -QTc 493 ms (6/2) --> QTc 500 (6/3) --> QTc 484 (6/4) --> QTc 501 ms (6/5) --> QTc 509 ms (6/8) --> QTc 495 ms (6/9) --> QTc 504 ms (6/10) --> QTc 486 ms (6/11) --> QTc 462 ms (6/15)    #Abdominal/Back Pain  -PA consulted  -c/w lidocaine patch daily  -c/w ibuprofen 400 mg q6h prn for pain    #Feet pain, PAD?  -PA consulted, f/u OP  -R>L with +1/+2 pedal pulses    #Chronic angina  -PA consulted, c/w home medication ranexa 500 mg bid ([facility] states last fill 3/2020)  -cardiology consulted    #COPD  -c/w Symbicort 160mcg 2 puffs BID  -c/w Spiriva 1 cap inhal daily      #Hypertension  -elevated BP, PA consulted  -c/w lisinopril 20 mg daily  -c/w norvasc 5 mg qhs    #CAD  -c/w ASA 81mg PO daily  -c/w Atorvastatin 40mg PO QHS    Dispo: [place]

## 2020-06-19 NOTE — PROGRESS NOTE BEHAVIORAL HEALTH - NSBHFUPINTERVALHXFT_PSY_A_CORE
Pt seen and evaluated, chart reviewed. As per nursing report, pt had no acute events over night, medication compliant. On evaluation, pt presents in room superficially cooperative, states "I'm fine" and then refuses to engage further with this writer after several attempts. Limited interview. As per staff, pt observed labile and irritable at times, verbally redirectable, isolative to room. ECG QTc 497 ms (6/16). [facility] approved, transfer date 6/25.

## 2020-06-20 RX ADMIN — Medication 1 TABLET(S): at 08:21

## 2020-06-20 RX ADMIN — TIOTROPIUM BROMIDE 1 CAPSULE(S): 18 CAPSULE ORAL; RESPIRATORY (INHALATION) at 08:22

## 2020-06-20 RX ADMIN — RANOLAZINE 500 MILLIGRAM(S): 500 TABLET, FILM COATED, EXTENDED RELEASE ORAL at 08:20

## 2020-06-20 RX ADMIN — LORATADINE 10 MILLIGRAM(S): 10 TABLET ORAL at 08:20

## 2020-06-20 RX ADMIN — Medication 1 PATCH: at 08:28

## 2020-06-20 RX ADMIN — AMLODIPINE BESYLATE 5 MILLIGRAM(S): 2.5 TABLET ORAL at 21:04

## 2020-06-20 RX ADMIN — Medication 30 MILLILITER(S): at 08:26

## 2020-06-20 RX ADMIN — BUDESONIDE AND FORMOTEROL FUMARATE DIHYDRATE 2 PUFF(S): 160; 4.5 AEROSOL RESPIRATORY (INHALATION) at 08:22

## 2020-06-20 RX ADMIN — LISINOPRIL 20 MILLIGRAM(S): 2.5 TABLET ORAL at 08:21

## 2020-06-20 RX ADMIN — LIDOCAINE 1 PATCH: 4 CREAM TOPICAL at 09:55

## 2020-06-20 RX ADMIN — Medication 1 PATCH: at 08:21

## 2020-06-20 RX ADMIN — ATORVASTATIN CALCIUM 40 MILLIGRAM(S): 80 TABLET, FILM COATED ORAL at 21:04

## 2020-06-20 RX ADMIN — RANOLAZINE 500 MILLIGRAM(S): 500 TABLET, FILM COATED, EXTENDED RELEASE ORAL at 21:04

## 2020-06-20 RX ADMIN — BUDESONIDE AND FORMOTEROL FUMARATE DIHYDRATE 2 PUFF(S): 160; 4.5 AEROSOL RESPIRATORY (INHALATION) at 21:04

## 2020-06-20 RX ADMIN — Medication 81 MILLIGRAM(S): at 08:21

## 2020-06-21 RX ADMIN — RANOLAZINE 500 MILLIGRAM(S): 500 TABLET, FILM COATED, EXTENDED RELEASE ORAL at 20:48

## 2020-06-21 RX ADMIN — Medication 1 PATCH: at 08:26

## 2020-06-21 RX ADMIN — Medication 81 MILLIGRAM(S): at 08:26

## 2020-06-21 RX ADMIN — Medication 30 MILLILITER(S): at 08:37

## 2020-06-21 RX ADMIN — LISINOPRIL 20 MILLIGRAM(S): 2.5 TABLET ORAL at 08:26

## 2020-06-21 RX ADMIN — RANOLAZINE 500 MILLIGRAM(S): 500 TABLET, FILM COATED, EXTENDED RELEASE ORAL at 08:26

## 2020-06-21 RX ADMIN — LIDOCAINE 1 PATCH: 4 CREAM TOPICAL at 08:28

## 2020-06-21 RX ADMIN — BUDESONIDE AND FORMOTEROL FUMARATE DIHYDRATE 2 PUFF(S): 160; 4.5 AEROSOL RESPIRATORY (INHALATION) at 20:48

## 2020-06-21 RX ADMIN — Medication 1 PATCH: at 08:30

## 2020-06-21 RX ADMIN — TIOTROPIUM BROMIDE 1 CAPSULE(S): 18 CAPSULE ORAL; RESPIRATORY (INHALATION) at 08:27

## 2020-06-21 RX ADMIN — Medication 1 TABLET(S): at 08:26

## 2020-06-21 RX ADMIN — LORATADINE 10 MILLIGRAM(S): 10 TABLET ORAL at 08:26

## 2020-06-21 RX ADMIN — AMLODIPINE BESYLATE 5 MILLIGRAM(S): 2.5 TABLET ORAL at 20:47

## 2020-06-21 RX ADMIN — BUDESONIDE AND FORMOTEROL FUMARATE DIHYDRATE 2 PUFF(S): 160; 4.5 AEROSOL RESPIRATORY (INHALATION) at 08:26

## 2020-06-21 RX ADMIN — ATORVASTATIN CALCIUM 40 MILLIGRAM(S): 80 TABLET, FILM COATED ORAL at 20:48

## 2020-06-22 PROCEDURE — 99231 SBSQ HOSP IP/OBS SF/LOW 25: CPT

## 2020-06-22 RX ADMIN — LORATADINE 10 MILLIGRAM(S): 10 TABLET ORAL at 08:37

## 2020-06-22 RX ADMIN — LIDOCAINE 1 PATCH: 4 CREAM TOPICAL at 08:37

## 2020-06-22 RX ADMIN — Medication 81 MILLIGRAM(S): at 08:36

## 2020-06-22 RX ADMIN — TIOTROPIUM BROMIDE 1 CAPSULE(S): 18 CAPSULE ORAL; RESPIRATORY (INHALATION) at 09:29

## 2020-06-22 RX ADMIN — AMLODIPINE BESYLATE 5 MILLIGRAM(S): 2.5 TABLET ORAL at 20:54

## 2020-06-22 RX ADMIN — Medication 1 TABLET(S): at 08:36

## 2020-06-22 RX ADMIN — RANOLAZINE 500 MILLIGRAM(S): 500 TABLET, FILM COATED, EXTENDED RELEASE ORAL at 08:38

## 2020-06-22 RX ADMIN — LIDOCAINE 1 PATCH: 4 CREAM TOPICAL at 20:55

## 2020-06-22 RX ADMIN — LISINOPRIL 20 MILLIGRAM(S): 2.5 TABLET ORAL at 08:37

## 2020-06-22 RX ADMIN — RANOLAZINE 500 MILLIGRAM(S): 500 TABLET, FILM COATED, EXTENDED RELEASE ORAL at 20:54

## 2020-06-22 RX ADMIN — BUDESONIDE AND FORMOTEROL FUMARATE DIHYDRATE 2 PUFF(S): 160; 4.5 AEROSOL RESPIRATORY (INHALATION) at 20:54

## 2020-06-22 RX ADMIN — Medication 1 PATCH: at 08:37

## 2020-06-22 RX ADMIN — BUDESONIDE AND FORMOTEROL FUMARATE DIHYDRATE 2 PUFF(S): 160; 4.5 AEROSOL RESPIRATORY (INHALATION) at 08:37

## 2020-06-22 RX ADMIN — ATORVASTATIN CALCIUM 40 MILLIGRAM(S): 80 TABLET, FILM COATED ORAL at 20:54

## 2020-06-22 RX ADMIN — Medication 1 PATCH: at 08:35

## 2020-06-22 NOTE — CHART NOTE - NSCHARTNOTEFT_GEN_A_CORE
Ms. Silva remains on the unit for continued treatment, safety and observation. Pt remains verbally aggressive to all staff and instigates arguments with other peers as well. Upon speaking with pt she still reports somatic pains and all require surgery. At this time pt reports that she needs to leave in order to pay bills. Pt that continues to report “Just send me to the shelter”. SW explained that we have to ensure a safe D/C with a safe plan in place. Pt is still visible on the units, is attending groups, and needs to be redirected to use coping skills taught in those groups.     Pt now has a set transfer date for Thursday 6/25/2020 where she will be admitted for [facility]. SW requested COVID test for Tuesday to ensure 48 hr requirement for COVID screening. SW will submit requested D/C paperwork to [facility] asap.     . Mood – Agitated  Sleep - Good  Appetite - Good  ADLs - Fair  Thought Process –Illogical  Observation – [id number]    Ms. Silva will continue to meet with  one on one and with treatment team daily. Pt to be transferred to [facility] on 6/25/2020.

## 2020-06-22 NOTE — PROGRESS NOTE BEHAVIORAL HEALTH - SUMMARY
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 Pt denies any psychiatric diagnoses, states does not take any psychiatric medications, denies all psychiatric complaints. Pt presents with several delusions including living in a private apartment in [place], living with her  Abebe and having "too many children to count". Collateral from only daughter shows otherwise - pt diagnosed with schizophrenia in 2008, has had multiple episodes of medication noncompliance resulting in multiple [facility] hospitalizations (most recent known [facility] 2/2020). TOO approved, started 4/21/2020, haldol started with medical clearance d/t borderline QTc, which had to be switched to abilify d/t prolonged QTc. Pt compensated, agreed to abilify maintena on 5/6, with plan for discharge on 5/8. However, on day of discharge, discharge held d/t pt decompensating with return of prior delusions and agitation. 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 Patient continues to have poor insight in behavioral health diagnosis and long-term treatment, refuses addition of mood stabilizer, only accepting Abilify Maintena. As per TOO, pt will be restarted on haldol 0.5 qhs (with parameter QTc <500 ms).    #Schizophrenia  -d/c haldol 5 mg PO qhs d/t QTc 511 ms (5/1), pt c/o chronic angina, cardiology consulted  -start abilify 5 mg daily (5/8), abilify 2 mg qhs (5/13) --> c/w abilify 10 mg daily (5/16) until 5/27  -start abilify maintena 300 mg IM once (5/6/2020) --> titrate abilify maintena 400 mg (6/1)  -patient has been refusing treatment, TOO approved, started 4/21/2020  -start haldol 0.5 mg qhs (6/2) with daily f/u ECG with parameter QTc <500  -c/w ativan 1 mg q6h prn for agitation with escalation to IM if pt not verbally redirectable    #Prolonged QTc  -PA consulted, daily ECG  -ECG QTc 509 ms (5/13)  -QTc 493 ms (6/2) --> QTc 500 (6/3) --> QTc 484 (6/4) --> QTc 501 ms (6/5) --> QTc 509 ms (6/8) --> QTc 495 ms (6/9) --> QTc 504 ms (6/10) --> QTc 486 ms (6/11) --> QTc 462 ms (6/15)    #Abdominal/Back Pain  -PA consulted  -c/w lidocaine patch daily  -c/w ibuprofen 400 mg q6h prn for pain    #Feet pain, PAD?  -PA consulted, f/u OP  -R>L with +1/+2 pedal pulses    #Chronic angina  -PA consulted, c/w home medication ranexa 500 mg bid ([facility] states last fill 3/2020)  -cardiology consulted    #COPD  -c/w Symbicort 160mcg 2 puffs BID  -c/w Spiriva 1 cap inhal daily      #Hypertension  -elevated BP, PA consulted  -c/w lisinopril 20 mg daily  -c/w norvasc 5 mg qhs    #CAD  -c/w ASA 81mg PO daily  -c/w Atorvastatin 40mg PO QHS    Dispo: [place]

## 2020-06-22 NOTE — PROGRESS NOTE BEHAVIORAL HEALTH - NSBHFUPINTERVALHXFT_PSY_A_CORE
Pt seen and evaluated, chart reviewed. As per nursing report, pt had no acute events over night, medication compliant. On evaluation, pt presents in room superficially cooperative, states "I'm fine" and then refuses to engage further with this writer after several attempts. Informed pt about transfer to [facility], pt verbalized acknowledgement. Limited interview. As per staff, pt observed labile and irritable at times, verbally redirectable, isolative to room. ECG QTc 489 ms (6/18). [facility] approved, transfer date 6/25.

## 2020-06-22 NOTE — PROGRESS NOTE BEHAVIORAL HEALTH - NSBHCHARTREVIEWINVESTIGATE_PSY_A_CORE FT
< from: 12 Lead ECG (06.18.20 @ 09:31) >      Ventricular Rate 75 BPM    Atrial Rate 75 BPM    P-R Interval 168 ms    QRS Duration 150 ms    Q-T Interval 438 ms    QTC Calculation(Bezet) 489 ms    P Axis 63 degrees    R Axis 58 degrees    T Axis 75 degrees    Diagnosis Line Normal sinus rhythm  Right bundle branch block  Abnormal ECG    < end of copied text >

## 2020-06-22 NOTE — PROGRESS NOTE BEHAVIORAL HEALTH - NSBHCHARTREVIEWVS_PSY_A_CORE FT
Vital Signs Last 24 Hrs  T(C): 36.5 (22 Jun 2020 08:43), Max: 36.5 (22 Jun 2020 08:43)  T(F): 97.7 (22 Jun 2020 08:43), Max: 97.7 (22 Jun 2020 08:43)  HR: 75 (22 Jun 2020 08:43) (75 - 80)  BP: 123/59 (22 Jun 2020 08:43) (114/51 - 165/71)  BP(mean): --  RR: 18 (22 Jun 2020 08:43) (18 - 20)  SpO2: --

## 2020-06-23 LAB — SARS-COV-2 RNA SPEC QL NAA+PROBE: SIGNIFICANT CHANGE UP

## 2020-06-23 PROCEDURE — 99231 SBSQ HOSP IP/OBS SF/LOW 25: CPT

## 2020-06-23 RX ADMIN — Medication 81 MILLIGRAM(S): at 09:04

## 2020-06-23 RX ADMIN — Medication 1 PATCH: at 09:22

## 2020-06-23 RX ADMIN — Medication 1 TABLET(S): at 09:04

## 2020-06-23 RX ADMIN — RANOLAZINE 500 MILLIGRAM(S): 500 TABLET, FILM COATED, EXTENDED RELEASE ORAL at 22:11

## 2020-06-23 RX ADMIN — LORATADINE 10 MILLIGRAM(S): 10 TABLET ORAL at 09:04

## 2020-06-23 RX ADMIN — TIOTROPIUM BROMIDE 1 CAPSULE(S): 18 CAPSULE ORAL; RESPIRATORY (INHALATION) at 09:05

## 2020-06-23 RX ADMIN — RANOLAZINE 500 MILLIGRAM(S): 500 TABLET, FILM COATED, EXTENDED RELEASE ORAL at 09:04

## 2020-06-23 RX ADMIN — BUDESONIDE AND FORMOTEROL FUMARATE DIHYDRATE 2 PUFF(S): 160; 4.5 AEROSOL RESPIRATORY (INHALATION) at 09:05

## 2020-06-23 RX ADMIN — LISINOPRIL 20 MILLIGRAM(S): 2.5 TABLET ORAL at 09:04

## 2020-06-23 RX ADMIN — AMLODIPINE BESYLATE 5 MILLIGRAM(S): 2.5 TABLET ORAL at 22:11

## 2020-06-23 RX ADMIN — Medication 1 PATCH: at 09:05

## 2020-06-23 RX ADMIN — Medication 1 PATCH: at 09:04

## 2020-06-23 RX ADMIN — ATORVASTATIN CALCIUM 40 MILLIGRAM(S): 80 TABLET, FILM COATED ORAL at 22:10

## 2020-06-23 RX ADMIN — LIDOCAINE 1 PATCH: 4 CREAM TOPICAL at 09:04

## 2020-06-23 NOTE — PROGRESS NOTE BEHAVIORAL HEALTH - NSBHCHARTREVIEWVS_PSY_A_CORE FT
Vital Signs Last 24 Hrs  T(C): 37 (23 Jun 2020 05:45), Max: 37 (23 Jun 2020 05:45)  T(F): 98.6 (23 Jun 2020 05:45), Max: 98.6 (23 Jun 2020 05:45)  HR: 76 (23 Jun 2020 05:45) (75 - 76)  BP: 92/39 (23 Jun 2020 05:45) (92/39 - 138/58)  BP(mean): --  RR: 17 (23 Jun 2020 05:45) (16 - 17)  SpO2: --

## 2020-06-23 NOTE — PROGRESS NOTE BEHAVIORAL HEALTH - NSBHFUPINTERVALHXFT_PSY_A_CORE
Pt seen and evaluated, refused treatment team, chart reviewed. As per nursing report, pt had no acute events over night, medication compliant. On evaluation, pt presents in room superficially cooperative, states "I'm fine" and then refuses to engage further with this writer after several attempts. Informed pt about transfer to [facility], pt verbalized acknowledgement. Limited interview. As per staff, pt observed labile and irritable at times, verbally redirectable, isolative to room. ECG QTc 489 ms (6/18). [place] referral approved, transfer date 6/25. Repeat covid-19 test negative (6/23).

## 2020-06-23 NOTE — PROGRESS NOTE ADULT - SUBJECTIVE AND OBJECTIVE BOX
pt stable alert in NAD  no new complaints    DEPRESSION  ^DEPRESSION  Handoff  Schizophrenia  Asthma  Hypertension  Coronary artery disease  Schizophrenia  COPD (chronic obstructive pulmonary disease)  Paranoid schizophrenia  Coronary artery disease involving coronary bypass graft of native heart without angina pectoris  Essential hypertension  Schizophrenia  S/P CABG (coronary artery bypass graft)    HEALTH ISSUES - PROBLEM Dx:  COPD (chronic obstructive pulmonary disease)  Paranoid schizophrenia  Coronary artery disease involving coronary bypass graft of native heart without angina pectoris: Coronary artery disease involving coronary bypass graft of native heart without angina pectoris  Essential hypertension: Essential hypertension  Schizophrenia: Schizophrenia        PAST MEDICAL & SURGICAL HISTORY:  Schizophrenia  Asthma  Hypertension  Coronary artery disease  S/P CABG (coronary artery bypass graft)    Bananas (Unknown)  sulfa drugs (Unknown)      FAMILY HISTORY:      acetaminophen   Tablet .. 650 milliGRAM(s) Oral every 6 hours PRN  aluminum hydroxide/magnesium hydroxide/simethicone Suspension 30 milliLiter(s) Oral every 4 hours PRN  amLODIPine   Tablet 5 milliGRAM(s) Oral at bedtime  aspirin  chewable 81 milliGRAM(s) Oral daily  atorvastatin 40 milliGRAM(s) Oral at bedtime  budesonide 160 MICROgram(s)/formoterol 4.5 MICROgram(s) Inhaler 2 Puff(s) Inhalation two times a day  hydrOXYzine hydrochloride 25 milliGRAM(s) Oral every 6 hours PRN  ibuprofen  Tablet. 400 milliGRAM(s) Oral every 6 hours PRN  lactobacillus acidophilus 1 Tablet(s) Oral daily  lidocaine   Patch 1 Patch Transdermal daily  lisinopril 20 milliGRAM(s) Oral daily  loratadine 10 milliGRAM(s) Oral daily  nicotine - 21 mG/24Hr(s) Patch 1 patch Transdermal daily  ranolazine 500 milliGRAM(s) Oral two times a day  tiotropium 18 MICROgram(s) Capsule 1 Capsule(s) Inhalation daily      T(C): 37 (06-23-20 @ 05:45), Max: 37 (06-23-20 @ 05:45)  HR: 76 (06-23-20 @ 05:45) (75 - 76)  BP: 92/39 (06-23-20 @ 05:45) (92/39 - 138/58)  RR: 17 (06-23-20 @ 05:45) (16 - 18)  SpO2: --    PE;  general: stable no cahnges i n status in nad    Lungs:    Heart:    EXT:    Neuro:   alert nod eficits                        CAPILLARY BLOOD GLUCOSE

## 2020-06-23 NOTE — PROGRESS NOTE BEHAVIORAL HEALTH - SUMMARY
63 y/o, female, white, single, one adult child, undomiciled, previously worked in business administration for [employer] but now currently on SSD, PPhx: schizophrenia (dx 2008), PMH: asthma, CAD s/p CABG x2 (2016), HTN, COPD, who was originally admitted to medicine in [facility] for lower left extremity cellulitis, transferred to [facility] d/t bizarre bx, psychiatric medication non-compliance and questionable ability to care for self. Pt denies any psychiatric diagnoses, states does not take any psychiatric medications, denies all psychiatric complaints. Pt presents with several delusions including living in a private apartment in [place], living with her  Abebe and having "too many children to count". Collateral from only daughter shows otherwise - pt diagnosed with schizophrenia in 2008, has had multiple episodes of medication noncompliance resulting in multiple [facility] hospitalizations (most recent known [facility] 2/2020). TOO approved, started 4/21/2020, haldol started with medical clearance d/t borderline QTc, which had to be switched to abilify d/t prolonged QTc. Pt compensated, agreed to abilify maintena on 5/6, with plan for discharge on 5/8. However, on day of discharge, discharge held d/t pt decompensating with return of prior delusions and agitation. 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 Patient continues to have poor insight in behavioral health diagnosis and long-term treatment, refuses addition of mood stabilizer, only accepting Abilify Maintena. As per TOO, pt will be restarted on haldol 0.5 qhs (with parameter QTc <500 ms).    #Schizophrenia  -d/c haldol 5 mg PO qhs d/t QTc 511 ms (5/1), pt c/o chronic angina, cardiology consulted  -start abilify 5 mg daily (5/8), abilify 2 mg qhs (5/13) --> c/w abilify 10 mg daily (5/16) until 5/27  -start abilify maintena 300 mg IM once (5/6/2020) --> titrate abilify maintena 400 mg (6/1)  -patient has been refusing treatment, TOO approved, started 4/21/2020  -start haldol 0.5 mg qhs (6/2) with daily f/u ECG with parameter QTc <500  -c/w ativan 1 mg q6h prn for agitation with escalation to IM if pt not verbally redirectable    #Prolonged QTc  -PA consulted, daily ECG --> ECG q3 days  -ECG QTc 509 ms (5/13) --> QTc 492 ms (6/22)    #Abdominal/Back Pain  -PA consulted  -c/w lidocaine patch daily  -c/w ibuprofen 400 mg q6h prn for pain    #Feet pain, PAD?  -PA consulted, f/u OP  -R>L with +1/+2 pedal pulses    #Chronic angina  -PA consulted, c/w home medication ranexa 500 mg bid ([place] states last fill 3/2020)  -cardiology consulted    #COPD  -c/w Symbicort 160mcg 2 puffs BID  -c/w Spiriva 1 cap inhal daily      #Hypertension  -elevated BP, PA consulted  -c/w lisinopril 20 mg daily  -c/w norvasc 5 mg qhs    #CAD  -c/w ASA 81mg PO daily  -c/w Atorvastatin 40mg PO QHS    Dispo: [facility]

## 2020-06-24 PROCEDURE — 99231 SBSQ HOSP IP/OBS SF/LOW 25: CPT

## 2020-06-24 RX ADMIN — BUDESONIDE AND FORMOTEROL FUMARATE DIHYDRATE 2 PUFF(S): 160; 4.5 AEROSOL RESPIRATORY (INHALATION) at 08:41

## 2020-06-24 RX ADMIN — Medication 1 PATCH: at 08:22

## 2020-06-24 RX ADMIN — Medication 1 PATCH: at 20:17

## 2020-06-24 RX ADMIN — LIDOCAINE 1 PATCH: 4 CREAM TOPICAL at 20:17

## 2020-06-24 RX ADMIN — Medication 1 PATCH: at 08:00

## 2020-06-24 RX ADMIN — LORATADINE 10 MILLIGRAM(S): 10 TABLET ORAL at 08:22

## 2020-06-24 RX ADMIN — AMLODIPINE BESYLATE 5 MILLIGRAM(S): 2.5 TABLET ORAL at 20:16

## 2020-06-24 RX ADMIN — BUDESONIDE AND FORMOTEROL FUMARATE DIHYDRATE 2 PUFF(S): 160; 4.5 AEROSOL RESPIRATORY (INHALATION) at 20:17

## 2020-06-24 RX ADMIN — RANOLAZINE 500 MILLIGRAM(S): 500 TABLET, FILM COATED, EXTENDED RELEASE ORAL at 08:22

## 2020-06-24 RX ADMIN — RANOLAZINE 500 MILLIGRAM(S): 500 TABLET, FILM COATED, EXTENDED RELEASE ORAL at 20:16

## 2020-06-24 RX ADMIN — Medication 1 TABLET(S): at 08:22

## 2020-06-24 RX ADMIN — LIDOCAINE 1 PATCH: 4 CREAM TOPICAL at 08:21

## 2020-06-24 RX ADMIN — Medication 81 MILLIGRAM(S): at 08:22

## 2020-06-24 RX ADMIN — TIOTROPIUM BROMIDE 1 CAPSULE(S): 18 CAPSULE ORAL; RESPIRATORY (INHALATION) at 08:41

## 2020-06-24 RX ADMIN — ATORVASTATIN CALCIUM 40 MILLIGRAM(S): 80 TABLET, FILM COATED ORAL at 20:16

## 2020-06-24 NOTE — PROGRESS NOTE BEHAVIORAL HEALTH - PRIMARY DX
Paranoid schizophrenia
Schizophrenia
Paranoid schizophrenia
Schizophrenia
Paranoid schizophrenia
Schizophrenia

## 2020-06-24 NOTE — PROGRESS NOTE BEHAVIORAL HEALTH - RISK ASSESSMENT
Risk factors include hx of medication and treatment noncompliance, active delusions, possible homelessness, lack of social support. Protective factors include sobriety and willingness to utilize crisis services/ED in times of crisis.
Risk Assessment (consider static vs modifiable risk factors and protective factors; comment on level of risk for dangerous behavior): Risk factors include hx of medication and treatment noncompliance, active delusions, possible homelessness, lack of social support. Protective factors include sobriety and willingness to utilize crisis services/ED in times of crisis.
Risk factors include hx of medication and treatment noncompliance, residential instability. Protective factors include sobriety, social support, willingness to utilize crisis services/ED in times of crisis.
Risk factors include hx of medication and treatment noncompliance, active delusions, possible homelessness, lack of social support. Protective factors include sobriety and willingness to utilize crisis services/ED in times of crisis.
Risk factors include hx of medication and treatment noncompliance, residential instability. Protective factors include sobriety, social support, willingness to utilize crisis services/ED in times of crisis.
Risk factors include hx of medication and treatment noncompliance, active delusions, possible homelessness, lack of social support. Protective factors include sobriety and willingness to utilize crisis services/ED in times of crisis.
Risk factors include hx of medication and treatment noncompliance, residential instability. Protective factors include sobriety, social support, willingness to utilize crisis services/ED in times of crisis.
Risk factors include hx of medication and treatment noncompliance, active delusions, possible homelessness, lack of social support. Protective factors include sobriety and willingness to utilize crisis services/ED in times of crisis.
Risk factors include hx of medication and treatment noncompliance, residential instability. Protective factors include sobriety, social support, willingness to utilize crisis services/ED in times of crisis.
Risk factors include hx of medication and treatment noncompliance, active delusions, possible homelessness, lack of social support. Protective factors include sobriety and willingness to utilize crisis services/ED in times of crisis.
Risk factors include hx of medication and treatment noncompliance, residential instability. Protective factors include sobriety, social support, willingness to utilize crisis services/ED in times of crisis.
Risk Assessment (consider static vs modifiable risk factors and protective factors; comment on level of risk for dangerous behavior): Risk Assessment (consider static vs modifiable risk factors and protective factors; comment on level of risk for dangerous behavior): Risk factors include hx of medication and treatment noncompliance, active delusions, possible homelessness, lack of social support. Protective factors include sobriety and willingness to utilize crisis services/ED in times of crisis.
Risk factors include hx of medication and treatment noncompliance, residential instability. Protective factors include sobriety, social support, willingness to utilize crisis services/ED in times of crisis.
Risk factors include hx of medication and treatment noncompliance, active delusions, possible homelessness, lack of social support. Protective factors include sobriety and willingness to utilize crisis services/ED in times of crisis.

## 2020-06-24 NOTE — PROGRESS NOTE BEHAVIORAL HEALTH - RECENT MEMORY
Normal
Normal
Impaired
Normal
Impaired
Impaired
Other
Impaired
Normal
Normal
Impaired
Impaired
Normal
Normal
Impaired
Normal
Normal
Impaired
Normal
Impaired
Normal
Normal
Impaired
Normal
Other
Normal
Other
Other
Normal
Normal
Impaired
Normal
Other

## 2020-06-24 NOTE — PROGRESS NOTE BEHAVIORAL HEALTH - NS ED [facility] REVIEW OF ED CHART VITAL SIGNS REVIEWED
Yes

## 2020-06-24 NOTE — PROGRESS NOTE BEHAVIORAL HEALTH - NSBHCHARTREVIEWLAB_PSY_A_CORE FT
Complete Blood Count + Automated Diff (03.22.20 @ 03:20)    WBC Count: 5.79 K/uL    RBC Count: 4.82 M/uL    Hemoglobin: 15.6 g/dL    Hematocrit: 46.5 %    Mean Cell Volume: 96.5 fL    Mean Cell Hemoglobin: 32.4 pg    Mean Cell Hemoglobin Conc: 33.5 g/dL    Red Cell Distrib Width: 12.9 %    Platelet Count - Automated: 197 K/uL    Auto Neutrophil #: 3.31 K/uL    Auto Lymphocyte #: 1.93 K/uL    Auto Monocyte #: 0.44 K/uL    Auto Eosinophil #: 0.05 K/uL    Auto Basophil #: 0.03 K/uL    Auto Neutrophil %: 57.2    Auto Lymphocyte %: 33.3 %    Auto Monocyte %: 7.6 %    Auto Eosinophil %: 0.9 %    Auto Basophil %: 0.5 %    Auto Immature Granulocyte %: 0.5 %    Nucleated RBC: 0 /100 WBCs  Comprehensive Metabolic Panel (03.22.20 @ 03:20)    Sodium, Serum: 141 mmol/L    Potassium, Serum: 4.1: Slighty Hemolyzed use with Caution mmol/L    Chloride, Serum: 106 mmol/L    Carbon Dioxide, Serum: 20 mmol/L    Anion Gap, Serum: 15 mmol/L    Blood Urea Nitrogen, Serum: 9 mg/dL    Creatinine, Serum: 0.5 mg/dL    Glucose, Serum: 94 mg/dL    Calcium, Total Serum: 10.0 mg/dL    Protein Total, Serum: 7.3 g/dL    Albumin, Serum: 4.6 g/dL    Bilirubin Total, Serum: 0.6 mg/dL    Alkaline Phosphatase, Serum: 94 U/L    Aspartate Aminotransferase (AST/SGOT): 26: Hemolyzed. Interpret with caution U/L    Alanine Aminotransferase (ALT/SGPT): 16 U/L    eGFR if Non : 104    eGFR if : 120 mL/min/1.73M2  Lipid Profile in AM (03.26.20 @ 07:00)    Total Cholesterol/HDL Ratio Measurement: 4.6 Ratio    Cholesterol, Serum: 148 mg/dL    Triglycerides, Serum: 163 mg/dL    HDL Cholesterol, Serum: 32    Direct LDL: 91  Thyroid Stimulating Hormone, Serum (03.22.20 @ 20:33)    Thyroid Stimulating Hormone, Serum: 0.34 uIU/mL  Hemoglobin A1C with Mean Plasma Glucose (03.26.20 @ 07:00)    Hemoglobin A1C, Whole Blood: 5.2
Complete Blood Count in AM (05.22.20 @ 04:30)    Nucleated RBC: 0 /100 WBCs    WBC Count: 7.05 K/uL    RBC Count: 4.95 M/uL    Hemoglobin: 15.2 g/dL    Hematocrit: 45.0 %    Mean Cell Volume: 90.9 fL    Mean Cell Hemoglobin: 30.7 pg    Mean Cell Hemoglobin Conc: 33.8 g/dL    Red Cell Distrib Width: 13.9 %    Platelet Count - Automated: 162 K/uL  Comprehensive Metabolic Panel in AM (05.22.20 @ 04:30)    Sodium, Serum: 141 mmol/L    Potassium, Serum: 4.2 mmol/L    Chloride, Serum: 106 mmol/L    Carbon Dioxide, Serum: 25 mmol/L    Anion Gap, Serum: 10 mmol/L    Blood Urea Nitrogen, Serum: 16 mg/dL    Creatinine, Serum: 0.6 mg/dL    Glucose, Serum: 93 mg/dL    Calcium, Total Serum: 9.7 mg/dL    Protein Total, Serum: 6.8 g/dL    Albumin, Serum: 4.6 g/dL    Bilirubin Total, Serum: 0.4 mg/dL    Alkaline Phosphatase, Serum: 89 U/L    Aspartate Aminotransferase (AST/SGOT): 12 U/L    Alanine Aminotransferase (ALT/SGPT): 10 U/L    eGFR if Non : 98    eGFR if : 113 mL/min/1.73M2  Lipid Profile in AM (05.22.20 @ 04:30)    Total Cholesterol/HDL Ratio Measurement: 3.0 Ratio    Cholesterol, Serum: 198 mg/dL    Triglycerides, Serum: 104 mg/dL    HDL Cholesterol, Serum: 65    Direct LDL: 129  Thyroid Stimulating Hormone, Serum in AM (04.03.20 @ 07:42)    Thyroid Stimulating Hormone, Serum: 0.53 uIU/mL  A1C with Estimated Average Glucose in AM (05.02.20 @ 07:00)    A1C with Estimated Average Glucose Result: 5.2    Estimated Average Glucose: 103
no new labs
Complete Blood Count in AM (05.22.20 @ 04:30)    Nucleated RBC: 0 /100 WBCs    WBC Count: 7.05 K/uL    RBC Count: 4.95 M/uL    Hemoglobin: 15.2 g/dL    Hematocrit: 45.0 %    Mean Cell Volume: 90.9 fL    Mean Cell Hemoglobin: 30.7 pg    Mean Cell Hemoglobin Conc: 33.8 g/dL    Red Cell Distrib Width: 13.9 %    Platelet Count - Automated: 162 K/uL  Comprehensive Metabolic Panel in AM (05.22.20 @ 04:30)    Sodium, Serum: 141 mmol/L    Potassium, Serum: 4.2 mmol/L    Chloride, Serum: 106 mmol/L    Carbon Dioxide, Serum: 25 mmol/L    Anion Gap, Serum: 10 mmol/L    Blood Urea Nitrogen, Serum: 16 mg/dL    Creatinine, Serum: 0.6 mg/dL    Glucose, Serum: 93 mg/dL    Calcium, Total Serum: 9.7 mg/dL    Protein Total, Serum: 6.8 g/dL    Albumin, Serum: 4.6 g/dL    Bilirubin Total, Serum: 0.4 mg/dL    Alkaline Phosphatase, Serum: 89 U/L    Aspartate Aminotransferase (AST/SGOT): 12 U/L    Alanine Aminotransferase (ALT/SGPT): 10 U/L    eGFR if Non : 98    eGFR if : 113 mL/min/1.73M2  Lipid Profile in AM (05.22.20 @ 04:30)    Total Cholesterol/HDL Ratio Measurement: 3.0 Ratio    Cholesterol, Serum: 198 mg/dL    Triglycerides, Serum: 104 mg/dL    HDL Cholesterol, Serum: 65    Direct LDL: 129  Thyroid Stimulating Hormone, Serum in AM (04.03.20 @ 07:42)    Thyroid Stimulating Hormone, Serum: 0.53 uIU/mL  A1C with Estimated Average Glucose in AM (05.02.20 @ 07:00)    A1C with Estimated Average Glucose Result: 5.2    Estimated Average Glucose: 103
Complete Blood Count in AM (05.22.20 @ 04:30)    Nucleated RBC: 0 /100 WBCs    WBC Count: 7.05 K/uL    RBC Count: 4.95 M/uL    Hemoglobin: 15.2 g/dL    Hematocrit: 45.0 %    Mean Cell Volume: 90.9 fL    Mean Cell Hemoglobin: 30.7 pg    Mean Cell Hemoglobin Conc: 33.8 g/dL    Red Cell Distrib Width: 13.9 %    Platelet Count - Automated: 162 K/uL  Comprehensive Metabolic Panel in AM (05.22.20 @ 04:30)    Sodium, Serum: 141 mmol/L    Potassium, Serum: 4.2 mmol/L    Chloride, Serum: 106 mmol/L    Carbon Dioxide, Serum: 25 mmol/L    Anion Gap, Serum: 10 mmol/L    Blood Urea Nitrogen, Serum: 16 mg/dL    Creatinine, Serum: 0.6 mg/dL    Glucose, Serum: 93 mg/dL    Calcium, Total Serum: 9.7 mg/dL    Protein Total, Serum: 6.8 g/dL    Albumin, Serum: 4.6 g/dL    Bilirubin Total, Serum: 0.4 mg/dL    Alkaline Phosphatase, Serum: 89 U/L    Aspartate Aminotransferase (AST/SGOT): 12 U/L    Alanine Aminotransferase (ALT/SGPT): 10 U/L    eGFR if Non : 98    eGFR if : 113 mL/min/1.73M2  Lipid Profile in AM (05.22.20 @ 04:30)    Total Cholesterol/HDL Ratio Measurement: 3.0 Ratio    Cholesterol, Serum: 198 mg/dL    Triglycerides, Serum: 104 mg/dL    HDL Cholesterol, Serum: 65    Direct LDL: 129  Thyroid Stimulating Hormone, Serum in AM (04.03.20 @ 07:42)    Thyroid Stimulating Hormone, Serum: 0.53 uIU/mL  A1C with Estimated Average Glucose in AM (05.02.20 @ 07:00)    A1C with Estimated Average Glucose Result: 5.2    Estimated Average Glucose: 103
Acute Hepatitis Panel (04.03.20 @ 07:42)    Hepatitis C Virus S/CO Ratio: 12.75 S/CO    Hepatitis C Virus Interpretation: Reactive: Hepatitis C AB   Hepatitis B Core IgM Antibody: Nonreact    Hepatitis B Surface Antigen: Nonreact    Hepatitis A IgM Antibody: Nonreact  Thyroid Stimulating Hormone, Serum in AM (04.03.20 @ 07:42)    Thyroid Stimulating Hormone, Serum: 0.53 uIU/mL  Complete Blood Count + Automated Diff in AM (04.03.20 @ 07:42)    WBC Count: 6.71 K/uL    RBC Count: 4.95 M/uL    Hemoglobin: 15.6 g/dL    Hematocrit: 47.0 %    Mean Cell Volume: 94.9 fL    Mean Cell Hemoglobin: 31.5 pg    Mean Cell Hemoglobin Conc: 33.2 g/dL    Red Cell Distrib Width: 13.0 %    Platelet Count - Automated: 170 K/uL    Auto Neutrophil #: 3.06 K/uL    Auto Lymphocyte #: 2.86 K/uL    Auto Monocyte #: 0.63 K/uL    Auto Eosinophil #: 0.09 K/uL    Auto Basophil #: 0.05 K/uL    Auto Neutrophil %: 45.7: Differential percentages must be correlated with absolute numbers for  clinical significance. %    Auto Lymphocyte %: 42.6 %    Auto Monocyte %: 9.4 %    Auto Eosinophil %: 1.3 %    Auto Basophil %: 0.7 %    Auto Immature Granulocyte %: 0.3 %    Nucleated RBC: 0 /100 WBCs  Comprehensive Metabolic Panel in AM (04.03.20 @ 07:42)    Sodium, Serum: 144 mmol/L    Potassium, Serum: 5.2 mmol/L    Chloride, Serum: 109 mmol/L    Carbon Dioxide, Serum: 21 mmol/L    Anion Gap, Serum: 14 mmol/L    Blood Urea Nitrogen, Serum: 16 mg/dL    Creatinine, Serum: 0.7 mg/dL    Glucose, Serum: 91 mg/dL    Calcium, Total Serum: 9.9 mg/dL    Protein Total, Serum: 6.9 g/dL    Albumin, Serum: 4.3 g/dL    Bilirubin Total, Serum: 0.6 mg/dL    Alkaline Phosphatase, Serum: 90 U/L    Aspartate Aminotransferase (AST/SGOT): 17: Hemolyzed. Interpret with caution U/L    Alanine Aminotransferase (ALT/SGPT): 15 U/L    eGFR if Non : 93: Interpretative comment  eGFR if : 108 mL/min/1.73M2
Complete Blood Count + Automated Diff in AM (04.03.20 @ 07:42)    WBC Count: 6.71 K/uL    RBC Count: 4.95 M/uL    Hemoglobin: 15.6 g/dL    Hematocrit: 47.0 %    Mean Cell Volume: 94.9 fL    Mean Cell Hemoglobin: 31.5 pg    Mean Cell Hemoglobin Conc: 33.2 g/dL    Red Cell Distrib Width: 13.0 %    Platelet Count - Automated: 170 K/uL    Auto Neutrophil #: 3.06 K/uL    Auto Lymphocyte #: 2.86 K/uL    Auto Monocyte #: 0.63 K/uL    Auto Eosinophil #: 0.09 K/uL    Auto Basophil #: 0.05 K/uL    Auto Neutrophil %: 45.7    Auto Lymphocyte %: 42.6 %    Auto Monocyte %: 9.4 %    Auto Eosinophil %: 1.3 %    Auto Basophil %: 0.7 %    Auto Immature Granulocyte %: 0.3 %    Nucleated RBC: 0 /100 WBCs  Comprehensive Metabolic Panel (04.21.20 @ 06:30)    Sodium, Serum: 138 mmol/L    Potassium, Serum: 4.9 mmol/L    Chloride, Serum: 105 mmol/L    Carbon Dioxide, Serum: 22 mmol/L    Anion Gap, Serum: 11 mmol/L    Blood Urea Nitrogen, Serum: 18 mg/dL    Creatinine, Serum: 0.7 mg/dL    Glucose, Serum: 91 mg/dL    Calcium, Total Serum: 9.8 mg/dL    Protein Total, Serum: 6.8 g/dL    Albumin, Serum: 4.3 g/dL    Bilirubin Total, Serum: 0.6 mg/dL    Alkaline Phosphatase, Serum: 97 U/L    Aspartate Aminotransferase (AST/SGOT): 13 U/L    Alanine Aminotransferase (ALT/SGPT): 11 U/L    eGFR if Non : 93  Lipid Profile in AM (03.26.20 @ 07:00)    Total Cholesterol/HDL Ratio Measurement: 4.6 Ratio    Cholesterol, Serum: 148 mg/dL    Triglycerides, Serum: 163 mg/dL    HDL Cholesterol, Serum: 32    Direct LDL: 91  Thyroid Stimulating Hormone, Serum in AM (04.03.20 @ 07:42)    Thyroid Stimulating Hormone, Serum: 0.53 uIU/mL  Hemoglobin A1C with Mean Plasma Glucose (03.26.20 @ 07:00)    Hemoglobin A1C, Whole Blood: 5.2    Mean Plasma Glucose: 103 mg/dL
Complete Blood Count + Automated Diff in AM (04.03.20 @ 07:42)    WBC Count: 6.71 K/uL    RBC Count: 4.95 M/uL    Hemoglobin: 15.6 g/dL    Hematocrit: 47.0 %    Mean Cell Volume: 94.9 fL    Mean Cell Hemoglobin: 31.5 pg    Mean Cell Hemoglobin Conc: 33.2 g/dL    Red Cell Distrib Width: 13.0 %    Platelet Count - Automated: 170 K/uL    Auto Neutrophil #: 3.06 K/uL    Auto Lymphocyte #: 2.86 K/uL    Auto Monocyte #: 0.63 K/uL    Auto Eosinophil #: 0.09 K/uL    Auto Basophil #: 0.05 K/uL    Auto Neutrophil %: 45.7    Auto Lymphocyte %: 42.6 %    Auto Monocyte %: 9.4 %    Auto Eosinophil %: 1.3 %    Auto Basophil %: 0.7 %    Auto Immature Granulocyte %: 0.3 %    Nucleated RBC: 0 /100 WBCs  Comprehensive Metabolic Panel (04.21.20 @ 06:30)    Sodium, Serum: 138 mmol/L    Potassium, Serum: 4.9 mmol/L    Chloride, Serum: 105 mmol/L    Carbon Dioxide, Serum: 22 mmol/L    Anion Gap, Serum: 11 mmol/L    Blood Urea Nitrogen, Serum: 18 mg/dL    Creatinine, Serum: 0.7 mg/dL    Glucose, Serum: 91 mg/dL    Calcium, Total Serum: 9.8 mg/dL    Protein Total, Serum: 6.8 g/dL    Albumin, Serum: 4.3 g/dL    Bilirubin Total, Serum: 0.6 mg/dL    Alkaline Phosphatase, Serum: 97 U/L    Aspartate Aminotransferase (AST/SGOT): 13 U/L    Alanine Aminotransferase (ALT/SGPT): 11 U/L    eGFR if Non : 93  Lipid Profile in AM (03.26.20 @ 07:00)    Total Cholesterol/HDL Ratio Measurement: 4.6 Ratio    Cholesterol, Serum: 148 mg/dL    Triglycerides, Serum: 163 mg/dL    HDL Cholesterol, Serum: 32:    Direct LDL: 91  Thyroid Stimulating Hormone, Serum in AM (04.03.20 @ 07:42)    Thyroid Stimulating Hormone, Serum: 0.53 uIU/mL  Hemoglobin A1C with Mean Plasma Glucose (03.26.20 @ 07:00)    Hemoglobin A1C, Whole Blood: 5.2    Mean Plasma Glucose: 103 mg/dL
Complete Blood Count + Automated Diff in AM (04.03.20 @ 07:42)    WBC Count: 6.71 K/uL    RBC Count: 4.95 M/uL    Hemoglobin: 15.6 g/dL    Hematocrit: 47.0 %    Mean Cell Volume: 94.9 fL    Mean Cell Hemoglobin: 31.5 pg    Mean Cell Hemoglobin Conc: 33.2 g/dL    Red Cell Distrib Width: 13.0 %    Platelet Count - Automated: 170 K/uL    Auto Neutrophil #: 3.06 K/uL    Auto Lymphocyte #: 2.86 K/uL    Auto Monocyte #: 0.63 K/uL    Auto Eosinophil #: 0.09 K/uL    Auto Basophil #: 0.05 K/uL    Auto Neutrophil %: 45.7    Auto Lymphocyte %: 42.6 %    Auto Monocyte %: 9.4 %    Auto Eosinophil %: 1.3 %    Auto Basophil %: 0.7 %    Auto Immature Granulocyte %: 0.3 %    Nucleated RBC: 0 /100 WBCs  Comprehensive Metabolic Panel in AM (04.03.20 @ 07:42)    Sodium, Serum: 144 mmol/L    Potassium, Serum: 5.2 mmol/L    Chloride, Serum: 109 mmol/L    Carbon Dioxide, Serum: 21 mmol/L    Anion Gap, Serum: 14 mmol/L    Blood Urea Nitrogen, Serum: 16 mg/dL    Creatinine, Serum: 0.7 mg/dL    Glucose, Serum: 91 mg/dL    Calcium, Total Serum: 9.9 mg/dL    Protein Total, Serum: 6.9 g/dL    Albumin, Serum: 4.3 g/dL    Bilirubin Total, Serum: 0.6 mg/dL    Alkaline Phosphatase, Serum: 90 U/L    Aspartate Aminotransferase (AST/SGOT): 17: Hemolyzed. Interpret with caution U/L    Alanine Aminotransferase (ALT/SGPT): 15 U/L    eGFR if Non : 93    eGFR if : 108 mL/min/1.73M2  Lipid Profile in AM (03.26.20 @ 07:00)    Total Cholesterol/HDL Ratio Measurement: 4.6 Ratio    Cholesterol, Serum: 148 mg/dL    Triglycerides, Serum: 163 mg/dL    HDL Cholesterol, Serum: 32    Direct LDL: 91  Thyroid Stimulating Hormone, Serum in AM (04.03.20 @ 07:42)    Thyroid Stimulating Hormone, Serum: 0.53 uIU/mL  Hemoglobin A1C with Mean Plasma Glucose (03.26.20 @ 07:00)    Hemoglobin A1C, Whole Blood: 5.2
Complete Blood Count + Automated Diff in AM (04.03.20 @ 07:42)    WBC Count: 6.71 K/uL    RBC Count: 4.95 M/uL    Hemoglobin: 15.6 g/dL    Hematocrit: 47.0 %    Mean Cell Volume: 94.9 fL    Mean Cell Hemoglobin: 31.5 pg    Mean Cell Hemoglobin Conc: 33.2 g/dL    Red Cell Distrib Width: 13.0 %    Platelet Count - Automated: 170 K/uL    Auto Neutrophil #: 3.06 K/uL    Auto Lymphocyte #: 2.86 K/uL    Auto Monocyte #: 0.63 K/uL    Auto Eosinophil #: 0.09 K/uL    Auto Basophil #: 0.05 K/uL    Auto Neutrophil %: 45.7    Auto Lymphocyte %: 42.6 %    Auto Monocyte %: 9.4 %    Auto Eosinophil %: 1.3 %    Auto Basophil %: 0.7 %    Auto Immature Granulocyte %: 0.3 %    Nucleated RBC: 0 /100 WBCs  Comprehensive Metabolic Panel in AM (04.03.20 @ 07:42)    Sodium, Serum: 144 mmol/L    Potassium, Serum: 5.2 mmol/L    Chloride, Serum: 109 mmol/L    Carbon Dioxide, Serum: 21 mmol/L    Anion Gap, Serum: 14 mmol/L    Blood Urea Nitrogen, Serum: 16 mg/dL    Creatinine, Serum: 0.7 mg/dL    Glucose, Serum: 91 mg/dL    Calcium, Total Serum: 9.9 mg/dL    Protein Total, Serum: 6.9 g/dL    Albumin, Serum: 4.3 g/dL    Bilirubin Total, Serum: 0.6 mg/dL    Alkaline Phosphatase, Serum: 90 U/L    Aspartate Aminotransferase (AST/SGOT): 17: Hemolyzed. Interpret with caution U/L    Alanine Aminotransferase (ALT/SGPT): 15 U/L    eGFR if Non : 93  Lipid Profile in AM (03.26.20 @ 07:00)    Total Cholesterol/HDL Ratio Measurement: 4.6 Ratio    Cholesterol, Serum: 148 mg/dL    Triglycerides, Serum: 163 mg/dL    HDL Cholesterol, Serum: 32    Direct LDL: 91  Thyroid Stimulating Hormone, Serum in AM (04.03.20 @ 07:42)    Thyroid Stimulating Hormone, Serum: 0.53 uIU/mL  Hemoglobin A1C with Mean Plasma Glucose (03.26.20 @ 07:00)    Hemoglobin A1C, Whole Blood: 5.2    Mean Plasma Glucose: 103 mg/dL
Complete Blood Count + Automated Diff in AM (04.03.20 @ 07:42)    WBC Count: 6.71 K/uL    RBC Count: 4.95 M/uL    Hemoglobin: 15.6 g/dL    Hematocrit: 47.0 %    Mean Cell Volume: 94.9 fL    Mean Cell Hemoglobin: 31.5 pg    Mean Cell Hemoglobin Conc: 33.2 g/dL    Red Cell Distrib Width: 13.0 %    Platelet Count - Automated: 170 K/uL    Auto Neutrophil #: 3.06 K/uL    Auto Lymphocyte #: 2.86 K/uL    Auto Monocyte #: 0.63 K/uL    Auto Eosinophil #: 0.09 K/uL    Auto Basophil #: 0.05 K/uL    Auto Neutrophil %: 45.7    Auto Lymphocyte %: 42.6 %    Auto Monocyte %: 9.4 %    Auto Eosinophil %: 1.3 %    Auto Basophil %: 0.7 %    Auto Immature Granulocyte %: 0.3 %    Nucleated RBC: 0 /100 WBCs  Comprehensive Metabolic Panel in AM (04.03.20 @ 07:42)    Sodium, Serum: 144 mmol/L    Potassium, Serum: 5.2 mmol/L    Chloride, Serum: 109 mmol/L    Carbon Dioxide, Serum: 21 mmol/L    Anion Gap, Serum: 14 mmol/L    Blood Urea Nitrogen, Serum: 16 mg/dL    Creatinine, Serum: 0.7 mg/dL    Glucose, Serum: 91 mg/dL    Calcium, Total Serum: 9.9 mg/dL    Protein Total, Serum: 6.9 g/dL    Albumin, Serum: 4.3 g/dL    Bilirubin Total, Serum: 0.6 mg/dL    Alkaline Phosphatase, Serum: 90 U/L    Aspartate Aminotransferase (AST/SGOT): 17: Hemolyzed. Interpret with caution U/L    Alanine Aminotransferase (ALT/SGPT): 15 U/L    eGFR if Non : 93  Lipid Profile in AM (03.26.20 @ 07:00)    Total Cholesterol/HDL Ratio Measurement: 4.6 Ratio    Cholesterol, Serum: 148 mg/dL    Triglycerides, Serum: 163 mg/dL    HDL Cholesterol, Serum: 32    Direct LDL: 91  Thyroid Stimulating Hormone, Serum in AM (04.03.20 @ 07:42)    Thyroid Stimulating Hormone, Serum: 0.53 uIU/mL  Hemoglobin A1C with Mean Plasma Glucose (03.26.20 @ 07:00)    Hemoglobin A1C, Whole Blood: 5.2    Mean Plasma Glucose: 103 mg/dL
Complete Blood Count + Automated Diff in AM (04.03.20 @ 07:42)    WBC Count: 6.71 K/uL    RBC Count: 4.95 M/uL    Hemoglobin: 15.6 g/dL    Hematocrit: 47.0 %    Mean Cell Volume: 94.9 fL    Mean Cell Hemoglobin: 31.5 pg    Mean Cell Hemoglobin Conc: 33.2 g/dL    Red Cell Distrib Width: 13.0 %    Platelet Count - Automated: 170 K/uL    Auto Neutrophil #: 3.06 K/uL    Auto Lymphocyte #: 2.86 K/uL    Auto Monocyte #: 0.63 K/uL    Auto Eosinophil #: 0.09 K/uL    Auto Basophil #: 0.05 K/uL    Auto Neutrophil %: 45.7    Auto Lymphocyte %: 42.6 %    Auto Monocyte %: 9.4 %    Auto Eosinophil %: 1.3 %    Auto Basophil %: 0.7 %    Auto Immature Granulocyte %: 0.3 %    Nucleated RBC: 0 /100 WBCs  Comprehensive Metabolic Panel in AM (04.03.20 @ 07:42)    Sodium, Serum: 144 mmol/L    Potassium, Serum: 5.2 mmol/L    Chloride, Serum: 109 mmol/L    Carbon Dioxide, Serum: 21 mmol/L    Anion Gap, Serum: 14 mmol/L    Blood Urea Nitrogen, Serum: 16 mg/dL    Creatinine, Serum: 0.7 mg/dL    Glucose, Serum: 91 mg/dL    Calcium, Total Serum: 9.9 mg/dL    Protein Total, Serum: 6.9 g/dL    Albumin, Serum: 4.3 g/dL    Bilirubin Total, Serum: 0.6 mg/dL    Alkaline Phosphatase, Serum: 90 U/L    Aspartate Aminotransferase (AST/SGOT): 17: Hemolyzed. Interpret with caution U/L    Alanine Aminotransferase (ALT/SGPT): 15 U/L    eGFR if Non : 93:  Lipid Profile in AM (03.26.20 @ 07:00)    Total Cholesterol/HDL Ratio Measurement: 4.6 Ratio    Cholesterol, Serum: 148 mg/dL    Triglycerides, Serum: 163 mg/dL    HDL Cholesterol, Serum: 32    Direct LDL: 91  Thyroid Stimulating Hormone, Serum in AM (04.03.20 @ 07:42)    Thyroid Stimulating Hormone, Serum: 0.53 uIU/mL  Hemoglobin A1C with Mean Plasma Glucose (03.26.20 @ 07:00)    Hemoglobin A1C, Whole Blood: 5.2
Complete Blood Count + Automated Diff in AM (04.03.20 @ 07:42)    WBC Count: 6.71 K/uL    RBC Count: 4.95 M/uL    Hemoglobin: 15.6 g/dL    Hematocrit: 47.0 %    Mean Cell Volume: 94.9 fL    Mean Cell Hemoglobin: 31.5 pg    Mean Cell Hemoglobin Conc: 33.2 g/dL    Red Cell Distrib Width: 13.0 %    Platelet Count - Automated: 170 K/uL    Auto Neutrophil #: 3.06 K/uL    Auto Lymphocyte #: 2.86 K/uL    Auto Monocyte #: 0.63 K/uL    Auto Eosinophil #: 0.09 K/uL    Auto Basophil #: 0.05 K/uL    Auto Neutrophil %: 45.7: Differential percentages must be correlated with absolute numbers for  clinical significance. %    Auto Lymphocyte %: 42.6 %    Auto Monocyte %: 9.4 %    Auto Eosinophil %: 1.3 %    Auto Basophil %: 0.7 %    Auto Immature Granulocyte %: 0.3 %    Nucleated RBC: 0 /100 WBCs  Comprehensive Metabolic Panel in AM (04.03.20 @ 07:42)    Sodium, Serum: 144 mmol/L    Potassium, Serum: 5.2 mmol/L    Chloride, Serum: 109 mmol/L    Carbon Dioxide, Serum: 21 mmol/L    Anion Gap, Serum: 14 mmol/L    Blood Urea Nitrogen, Serum: 16 mg/dL    Creatinine, Serum: 0.7 mg/dL    Glucose, Serum: 91 mg/dL    Calcium, Total Serum: 9.9 mg/dL    Protein Total, Serum: 6.9 g/dL    Albumin, Serum: 4.3 g/dL    Bilirubin Total, Serum: 0.6 mg/dL    Alkaline Phosphatase, Serum: 90 U/L    Aspartate Aminotransferase (AST/SGOT): 17: Hemolyzed. Interpret with caution U/L    Alanine Aminotransferase (ALT/SGPT): 15 U/L    eGFR if Non : 93: Interpretative comment      eGFR if : 108 mL/min/1.73M2
Complete Blood Count in AM (05.02.20 @ 07:00)    Nucleated RBC: 0 /100 WBCs    WBC Count: 5.96 K/uL    RBC Count: 4.92 M/uL    Hemoglobin: 15.2 g/dL    Hematocrit: 44.9 %    Mean Cell Volume: 91.3 fL    Mean Cell Hemoglobin: 30.9 pg    Mean Cell Hemoglobin Conc: 33.9 g/dL    Red Cell Distrib Width: 12.9 %    Platelet Count - Automated: 122 K/uL  Comprehensive Metabolic Panel in AM (05.02.20 @ 07:00)    Sodium, Serum: 142 mmol/L    Potassium, Serum: 4.4 mmol/L    Chloride, Serum: 109 mmol/L    Carbon Dioxide, Serum: 22 mmol/L    Anion Gap, Serum: 11 mmol/L    Blood Urea Nitrogen, Serum: 19 mg/dL    Creatinine, Serum: 0.6 mg/dL    Glucose, Serum: 86 mg/dL    Calcium, Total Serum: 9.6 mg/dL    Protein Total, Serum: 6.5 g/dL    Albumin, Serum: 4.4 g/dL    Bilirubin Total, Serum: 0.3 mg/dL    Alkaline Phosphatase, Serum: 93 U/L    Aspartate Aminotransferase (AST/SGOT): 12 U/L    Alanine Aminotransferase (ALT/SGPT): 10 U/L    eGFR if Non : 98    eGFR if : 113 mL/min/1.73M2  Lipid Profile in AM (05.02.20 @ 07:00)    Total Cholesterol/HDL Ratio Measurement: 3.0 Ratio    Cholesterol, Serum: 139 mg/dL    Triglycerides, Serum: 152 mg/dL    HDL Cholesterol, Serum: 46    Direct LDL: 78  Hemoglobin A1C with Mean Plasma Glucose (03.26.20 @ 07:00)    Hemoglobin A1C, Whole Blood: 5.2    Mean Plasma Glucose: 103 mg/dL  Thyroid Stimulating Hormone, Serum in AM (04.03.20 @ 07:42)    Thyroid Stimulating Hormone, Serum: 0.53 uIU/mL
Complete Blood Count in AM (05.02.20 @ 07:00)    Nucleated RBC: 0 /100 WBCs    WBC Count: 5.96 K/uL    RBC Count: 4.92 M/uL    Hemoglobin: 15.2 g/dL    Hematocrit: 44.9 %    Mean Cell Volume: 91.3 fL    Mean Cell Hemoglobin: 30.9 pg    Mean Cell Hemoglobin Conc: 33.9 g/dL    Red Cell Distrib Width: 12.9 %    Platelet Count - Automated: 122 K/uL  Comprehensive Metabolic Panel in AM (05.02.20 @ 07:00)    Sodium, Serum: 142 mmol/L    Potassium, Serum: 4.4 mmol/L    Chloride, Serum: 109 mmol/L    Carbon Dioxide, Serum: 22 mmol/L    Anion Gap, Serum: 11 mmol/L    Blood Urea Nitrogen, Serum: 19 mg/dL    Creatinine, Serum: 0.6 mg/dL    Glucose, Serum: 86 mg/dL    Calcium, Total Serum: 9.6 mg/dL    Protein Total, Serum: 6.5 g/dL    Albumin, Serum: 4.4 g/dL    Bilirubin Total, Serum: 0.3 mg/dL    Alkaline Phosphatase, Serum: 93 U/L    Aspartate Aminotransferase (AST/SGOT): 12 U/L    Alanine Aminotransferase (ALT/SGPT): 10 U/L    eGFR if Non : 98    eGFR if : 113 mL/min/1.73M2  Lipid Profile in AM (05.02.20 @ 07:00)    Total Cholesterol/HDL Ratio Measurement: 3.0 Ratio    Cholesterol, Serum: 139 mg/dL    Triglycerides, Serum: 152 mg/dL    HDL Cholesterol, Serum: 46    Direct LDL: 78  Hemoglobin A1C with Mean Plasma Glucose (03.26.20 @ 07:00)    Hemoglobin A1C, Whole Blood: 5.2    Mean Plasma Glucose: 103 mg/dL  Thyroid Stimulating Hormone, Serum in AM (04.03.20 @ 07:42)    Thyroid Stimulating Hormone, Serum: 0.53 uIU/mL
Complete Blood Count in AM (05.02.20 @ 07:00)    Nucleated RBC: 0 /100 WBCs    WBC Count: 5.96 K/uL    RBC Count: 4.92 M/uL    Hemoglobin: 15.2 g/dL    Hematocrit: 44.9 %    Mean Cell Volume: 91.3 fL    Mean Cell Hemoglobin: 30.9 pg    Mean Cell Hemoglobin Conc: 33.9 g/dL    Red Cell Distrib Width: 12.9 %    Platelet Count - Automated: 122 K/uL  Comprehensive Metabolic Panel in AM (05.02.20 @ 07:00)    Sodium, Serum: 142 mmol/L    Potassium, Serum: 4.4 mmol/L    Chloride, Serum: 109 mmol/L    Carbon Dioxide, Serum: 22 mmol/L    Anion Gap, Serum: 11 mmol/L    Blood Urea Nitrogen, Serum: 19 mg/dL    Creatinine, Serum: 0.6 mg/dL    Glucose, Serum: 86 mg/dL    Calcium, Total Serum: 9.6 mg/dL    Protein Total, Serum: 6.5 g/dL    Albumin, Serum: 4.4 g/dL    Bilirubin Total, Serum: 0.3 mg/dL    Alkaline Phosphatase, Serum: 93 U/L    Aspartate Aminotransferase (AST/SGOT): 12 U/L    Alanine Aminotransferase (ALT/SGPT): 10 U/L    eGFR if Non : 98    eGFR if : 113 mL/min/1.73M2  Lipid Profile in AM (05.02.20 @ 07:00)    Total Cholesterol/HDL Ratio Measurement: 3.0 Ratio    Cholesterol, Serum: 139 mg/dL    Triglycerides, Serum: 152 mg/dL    HDL Cholesterol, Serum: 46    Direct LDL: 78  Thyroid Stimulating Hormone, Serum in AM (04.03.20 @ 07:42)    Thyroid Stimulating Hormone, Serum: 0.53 uIU/mL  A1C with Estimated Average Glucose in AM (05.02.20 @ 07:00)    A1C with Estimated Average Glucose Result: 5.2
Complete Blood Count in AM (05.02.20 @ 07:00)    Nucleated RBC: 0 /100 WBCs    WBC Count: 5.96 K/uL    RBC Count: 4.92 M/uL    Hemoglobin: 15.2 g/dL    Hematocrit: 44.9 %    Mean Cell Volume: 91.3 fL    Mean Cell Hemoglobin: 30.9 pg    Mean Cell Hemoglobin Conc: 33.9 g/dL    Red Cell Distrib Width: 12.9 %    Platelet Count - Automated: 122 K/uL  Comprehensive Metabolic Panel in AM (05.02.20 @ 07:00)    Sodium, Serum: 142 mmol/L    Potassium, Serum: 4.4 mmol/L    Chloride, Serum: 109 mmol/L    Carbon Dioxide, Serum: 22 mmol/L    Anion Gap, Serum: 11 mmol/L    Blood Urea Nitrogen, Serum: 19 mg/dL    Creatinine, Serum: 0.6 mg/dL    Glucose, Serum: 86 mg/dL    Calcium, Total Serum: 9.6 mg/dL    Protein Total, Serum: 6.5 g/dL    Albumin, Serum: 4.4 g/dL    Bilirubin Total, Serum: 0.3 mg/dL    Alkaline Phosphatase, Serum: 93 U/L    Aspartate Aminotransferase (AST/SGOT): 12 U/L    Alanine Aminotransferase (ALT/SGPT): 10 U/L    eGFR if Non : 98    eGFR if : 113 mL/min/1.73M2  Lipid Profile in AM (05.02.20 @ 07:00)    Total Cholesterol/HDL Ratio Measurement: 3.0 Ratio    Cholesterol, Serum: 139 mg/dL    Triglycerides, Serum: 152 mg/dL    HDL Cholesterol, Serum: 46    Direct LDL: 78  Thyroid Stimulating Hormone, Serum in AM (04.03.20 @ 07:42)    Thyroid Stimulating Hormone, Serum: 0.53 uIU/mL  A1C with Estimated Average Glucose in AM (05.02.20 @ 07:00)    A1C with Estimated Average Glucose Result: 5.2
Complete Blood Count in AM (05.02.20 @ 07:00)    Nucleated RBC: 0 /100 WBCs    WBC Count: 5.96 K/uL    RBC Count: 4.92 M/uL    Hemoglobin: 15.2 g/dL    Hematocrit: 44.9 %    Mean Cell Volume: 91.3 fL    Mean Cell Hemoglobin: 30.9 pg    Mean Cell Hemoglobin Conc: 33.9 g/dL    Red Cell Distrib Width: 12.9 %    Platelet Count - Automated: 122 K/uL  Comprehensive Metabolic Panel in AM (05.02.20 @ 07:00)    Sodium, Serum: 142 mmol/L    Potassium, Serum: 4.4 mmol/L    Chloride, Serum: 109 mmol/L    Carbon Dioxide, Serum: 22 mmol/L    Anion Gap, Serum: 11 mmol/L    Blood Urea Nitrogen, Serum: 19 mg/dL    Creatinine, Serum: 0.6 mg/dL    Glucose, Serum: 86 mg/dL    Calcium, Total Serum: 9.6 mg/dL    Protein Total, Serum: 6.5 g/dL    Albumin, Serum: 4.4 g/dL    Bilirubin Total, Serum: 0.3 mg/dL    Alkaline Phosphatase, Serum: 93 U/L    Aspartate Aminotransferase (AST/SGOT): 12 U/L    Alanine Aminotransferase (ALT/SGPT): 10 U/L    eGFR if Non : 98    eGFR if : 113 mL/min/1.73M2  Lipid Profile in AM (05.02.20 @ 07:00)    Total Cholesterol/HDL Ratio Measurement: 3.0 Ratio    Cholesterol, Serum: 139 mg/dL    Triglycerides, Serum: 152 mg/dL    HDL Cholesterol, Serum: 46    Direct LDL: 78  Thyroid Stimulating Hormone, Serum in AM (04.03.20 @ 07:42)    Thyroid Stimulating Hormone, Serum: 0.53 uIU/mL  Hemoglobin A1C with Mean Plasma Glucose (03.26.20 @ 07:00)    Hemoglobin A1C, Whole Blood: 5.2
Complete Blood Count in AM (05.02.20 @ 07:00)    Nucleated RBC: 0 /100 WBCs    WBC Count: 5.96 K/uL    RBC Count: 4.92 M/uL    Hemoglobin: 15.2 g/dL    Hematocrit: 44.9 %    Mean Cell Volume: 91.3 fL    Mean Cell Hemoglobin: 30.9 pg    Mean Cell Hemoglobin Conc: 33.9 g/dL    Red Cell Distrib Width: 12.9 %    Platelet Count - Automated: 122 K/uL  Comprehensive Metabolic Panel in AM (05.02.20 @ 07:00)    Sodium, Serum: 142 mmol/L    Potassium, Serum: 4.4 mmol/L    Chloride, Serum: 109 mmol/L    Carbon Dioxide, Serum: 22 mmol/L    Anion Gap, Serum: 11 mmol/L    Blood Urea Nitrogen, Serum: 19 mg/dL    Creatinine, Serum: 0.6 mg/dL    Glucose, Serum: 86 mg/dL    Calcium, Total Serum: 9.6 mg/dL    Protein Total, Serum: 6.5 g/dL    Albumin, Serum: 4.4 g/dL    Bilirubin Total, Serum: 0.3 mg/dL    Alkaline Phosphatase, Serum: 93 U/L    Aspartate Aminotransferase (AST/SGOT): 12 U/L    Alanine Aminotransferase (ALT/SGPT): 10 U/L    eGFR if Non : 98  Lipid Profile in AM (05.02.20 @ 07:00)    Total Cholesterol/HDL Ratio Measurement: 3.0 Ratio    Cholesterol, Serum: 139 mg/dL    Triglycerides, Serum: 152 mg/dL    HDL Cholesterol, Serum: 46    Direct LDL: 78  Thyroid Stimulating Hormone, Serum in AM (04.03.20 @ 07:42)    Thyroid Stimulating Hormone, Serum: 0.53 uIU/mL  A1C with Estimated Average Glucose in AM (05.02.20 @ 07:00)    A1C with Estimated Average Glucose Result: 5.2:
Complete Blood Count in AM (05.02.20 @ 07:00)    Nucleated RBC: 0 /100 WBCs    WBC Count: 5.96 K/uL    RBC Count: 4.92 M/uL    Hemoglobin: 15.2 g/dL    Hematocrit: 44.9 %    Mean Cell Volume: 91.3 fL    Mean Cell Hemoglobin: 30.9 pg    Mean Cell Hemoglobin Conc: 33.9 g/dL    Red Cell Distrib Width: 12.9 %    Platelet Count - Automated: 122 K/uL  Comprehensive Metabolic Panel in AM (05.02.20 @ 07:00)    Sodium, Serum: 142 mmol/L    Potassium, Serum: 4.4 mmol/L    Chloride, Serum: 109 mmol/L    Carbon Dioxide, Serum: 22 mmol/L    Anion Gap, Serum: 11 mmol/L    Blood Urea Nitrogen, Serum: 19 mg/dL    Creatinine, Serum: 0.6 mg/dL    Glucose, Serum: 86 mg/dL    Calcium, Total Serum: 9.6 mg/dL    Protein Total, Serum: 6.5 g/dL    Albumin, Serum: 4.4 g/dL    Bilirubin Total, Serum: 0.3 mg/dL    Alkaline Phosphatase, Serum: 93 U/L    Aspartate Aminotransferase (AST/SGOT): 12 U/L    Alanine Aminotransferase (ALT/SGPT): 10 U/L    eGFR if Non : 98  Lipid Profile in AM (05.02.20 @ 07:00)    Total Cholesterol/HDL Ratio Measurement: 3.0 Ratio    Cholesterol, Serum: 139 mg/dL    Triglycerides, Serum: 152 mg/dL    HDL Cholesterol, Serum: 46    Direct LDL: 78  Thyroid Stimulating Hormone, Serum in AM (04.03.20 @ 07:42)    Thyroid Stimulating Hormone, Serum: 0.53 uIU/mL  Hemoglobin A1C with Mean Plasma Glucose (03.26.20 @ 07:00)    Hemoglobin A1C, Whole Blood: 5.2
Complete Blood Count in AM (05.22.20 @ 04:30)    Nucleated RBC: 0 /100 WBCs    WBC Count: 7.05 K/uL    RBC Count: 4.95 M/uL    Hemoglobin: 15.2 g/dL    Hematocrit: 45.0 %    Mean Cell Volume: 90.9 fL    Mean Cell Hemoglobin: 30.7 pg    Mean Cell Hemoglobin Conc: 33.8 g/dL    Red Cell Distrib Width: 13.9 %    Platelet Count - Automated: 162 K/uL  Comprehensive Metabolic Panel in AM (05.22.20 @ 04:30)    Sodium, Serum: 141 mmol/L    Potassium, Serum: 4.2 mmol/L    Chloride, Serum: 106 mmol/L    Carbon Dioxide, Serum: 25 mmol/L    Anion Gap, Serum: 10 mmol/L    Blood Urea Nitrogen, Serum: 16 mg/dL    Creatinine, Serum: 0.6 mg/dL    Glucose, Serum: 93 mg/dL    Calcium, Total Serum: 9.7 mg/dL    Protein Total, Serum: 6.8 g/dL    Albumin, Serum: 4.6 g/dL    Bilirubin Total, Serum: 0.4 mg/dL    Alkaline Phosphatase, Serum: 89 U/L    Aspartate Aminotransferase (AST/SGOT): 12 U/L    Alanine Aminotransferase (ALT/SGPT): 10 U/L    eGFR if Non : 98    eGFR if : 113 mL/min/1.73M2  Lipid Profile in AM (05.22.20 @ 04:30)    Total Cholesterol/HDL Ratio Measurement: 3.0 Ratio    Cholesterol, Serum: 198 mg/dL    Triglycerides, Serum: 104 mg/dL    HDL Cholesterol, Serum: 65    Direct LDL: 129  Thyroid Stimulating Hormone, Serum in AM (04.03.20 @ 07:42)    Thyroid Stimulating Hormone, Serum: 0.53 uIU/mL  A1C with Estimated Average Glucose in AM (05.02.20 @ 07:00)    A1C with Estimated Average Glucose Result: 5.2    Estimated Average Glucose: 103
Complete Blood Count + Automated Diff in AM (04.03.20 @ 07:42)    WBC Count: 6.71 K/uL    RBC Count: 4.95 M/uL    Hemoglobin: 15.6 g/dL    Hematocrit: 47.0 %    Mean Cell Volume: 94.9 fL    Mean Cell Hemoglobin: 31.5 pg    Mean Cell Hemoglobin Conc: 33.2 g/dL    Red Cell Distrib Width: 13.0 %    Platelet Count - Automated: 170 K/uL    Auto Neutrophil #: 3.06 K/uL    Auto Lymphocyte #: 2.86 K/uL    Auto Monocyte #: 0.63 K/uL    Auto Eosinophil #: 0.09 K/uL    Auto Basophil #: 0.05 K/uL    Auto Neutrophil %: 45.7    Auto Lymphocyte %: 42.6 %    Auto Monocyte %: 9.4 %    Auto Eosinophil %: 1.3 %    Auto Basophil %: 0.7 %    Auto Immature Granulocyte %: 0.3 %    Nucleated RBC: 0 /100 WBCs  Comprehensive Metabolic Panel (04.21.20 @ 06:30)    Sodium, Serum: 138 mmol/L    Potassium, Serum: 4.9 mmol/L    Chloride, Serum: 105 mmol/L    Carbon Dioxide, Serum: 22 mmol/L    Anion Gap, Serum: 11 mmol/L    Blood Urea Nitrogen, Serum: 18 mg/dL    Creatinine, Serum: 0.7 mg/dL    Glucose, Serum: 91 mg/dL    Calcium, Total Serum: 9.8 mg/dL    Protein Total, Serum: 6.8 g/dL    Albumin, Serum: 4.3 g/dL    Bilirubin Total, Serum: 0.6 mg/dL    Alkaline Phosphatase, Serum: 97 U/L    Aspartate Aminotransferase (AST/SGOT): 13 U/L    Alanine Aminotransferase (ALT/SGPT): 11 U/L    eGFR if Non : 93  Lipid Profile in AM (03.26.20 @ 07:00)    Total Cholesterol/HDL Ratio Measurement: 4.6 Ratio    Cholesterol, Serum: 148 mg/dL    Triglycerides, Serum: 163 mg/dL    HDL Cholesterol, Serum: 32    Direct LDL: 91  190 and Above           Very high mg/dL  Thyroid Stimulating Hormone, Serum in AM (04.03.20 @ 07:42)    Thyroid Stimulating Hormone, Serum: 0.53 uIU/mL  Hemoglobin A1C with Mean Plasma Glucose (03.26.20 @ 07:00)    Hemoglobin A1C, Whole Blood: 5.2
Complete Blood Count + Automated Diff in AM (04.03.20 @ 07:42)    WBC Count: 6.71 K/uL    RBC Count: 4.95 M/uL    Hemoglobin: 15.6 g/dL    Hematocrit: 47.0 %    Mean Cell Volume: 94.9 fL    Mean Cell Hemoglobin: 31.5 pg    Mean Cell Hemoglobin Conc: 33.2 g/dL    Red Cell Distrib Width: 13.0 %    Platelet Count - Automated: 170 K/uL    Auto Neutrophil #: 3.06 K/uL    Auto Lymphocyte #: 2.86 K/uL    Auto Monocyte #: 0.63 K/uL    Auto Eosinophil #: 0.09 K/uL    Auto Basophil #: 0.05 K/uL    Auto Neutrophil %: 45.7    Auto Lymphocyte %: 42.6 %    Auto Monocyte %: 9.4 %    Auto Eosinophil %: 1.3 %    Auto Basophil %: 0.7 %    Auto Immature Granulocyte %: 0.3 %    Nucleated RBC: 0 /100 WBCs  Comprehensive Metabolic Panel (04.21.20 @ 06:30)    Sodium, Serum: 138 mmol/L    Potassium, Serum: 4.9 mmol/L    Chloride, Serum: 105 mmol/L    Carbon Dioxide, Serum: 22 mmol/L    Anion Gap, Serum: 11 mmol/L    Blood Urea Nitrogen, Serum: 18 mg/dL    Creatinine, Serum: 0.7 mg/dL    Glucose, Serum: 91 mg/dL    Calcium, Total Serum: 9.8 mg/dL    Protein Total, Serum: 6.8 g/dL    Albumin, Serum: 4.3 g/dL    Bilirubin Total, Serum: 0.6 mg/dL    Alkaline Phosphatase, Serum: 97 U/L    Aspartate Aminotransferase (AST/SGOT): 13 U/L    Alanine Aminotransferase (ALT/SGPT): 11 U/L    eGFR if Non : 93  Lipid Profile in AM (03.26.20 @ 07:00)    Total Cholesterol/HDL Ratio Measurement: 4.6 Ratio    Cholesterol, Serum: 148 mg/dL    Triglycerides, Serum: 163 mg/dL    HDL Cholesterol, Serum: 32    Direct LDL: 91  Thyroid Stimulating Hormone, Serum in AM (04.03.20 @ 07:42)    Thyroid Stimulating Hormone, Serum: 0.53 uIU/mL  Hemoglobin A1C with Mean Plasma Glucose (03.26.20 @ 07:00)    Hemoglobin A1C, Whole Blood: 5.2    Mean Plasma Glucose: 103 mg/dL
Complete Blood Count + Automated Diff in AM (04.03.20 @ 07:42)    WBC Count: 6.71 K/uL    RBC Count: 4.95 M/uL    Hemoglobin: 15.6 g/dL    Hematocrit: 47.0 %    Mean Cell Volume: 94.9 fL    Mean Cell Hemoglobin: 31.5 pg    Mean Cell Hemoglobin Conc: 33.2 g/dL    Red Cell Distrib Width: 13.0 %    Platelet Count - Automated: 170 K/uL    Auto Neutrophil #: 3.06 K/uL    Auto Lymphocyte #: 2.86 K/uL    Auto Monocyte #: 0.63 K/uL    Auto Eosinophil #: 0.09 K/uL    Auto Basophil #: 0.05 K/uL    Auto Neutrophil %: 45.7    Auto Lymphocyte %: 42.6 %    Auto Monocyte %: 9.4 %    Auto Eosinophil %: 1.3 %    Auto Basophil %: 0.7 %    Auto Immature Granulocyte %: 0.3 %    Nucleated RBC: 0 /100 WBCs  Comprehensive Metabolic Panel (04.21.20 @ 06:30)    Sodium, Serum: 138 mmol/L    Potassium, Serum: 4.9 mmol/L    Chloride, Serum: 105 mmol/L    Carbon Dioxide, Serum: 22 mmol/L    Anion Gap, Serum: 11 mmol/L    Blood Urea Nitrogen, Serum: 18 mg/dL    Creatinine, Serum: 0.7 mg/dL    Glucose, Serum: 91 mg/dL    Calcium, Total Serum: 9.8 mg/dL    Protein Total, Serum: 6.8 g/dL    Albumin, Serum: 4.3 g/dL    Bilirubin Total, Serum: 0.6 mg/dL    Alkaline Phosphatase, Serum: 97 U/L    Aspartate Aminotransferase (AST/SGOT): 13 U/L    Alanine Aminotransferase (ALT/SGPT): 11 U/L    eGFR if Non : 93  Thyroid Stimulating Hormone, Serum in AM (04.03.20 @ 07:42)    Thyroid Stimulating Hormone, Serum: 0.53 uIU/mL  Lipid Profile in AM (03.26.20 @ 07:00)    Total Cholesterol/HDL Ratio Measurement: 4.6 Ratio    Cholesterol, Serum: 148 mg/dL    Triglycerides, Serum: 163 mg/dL    HDL Cholesterol, Serum: 32    Direct LDL: 91  Hemoglobin A1C with Mean Plasma Glucose (03.26.20 @ 07:00)    Hemoglobin A1C, Whole Blood: 5.2    Mean Plasma Glucose: 103 mg/dL
Complete Blood Count + Automated Diff in AM (04.03.20 @ 07:42)    WBC Count: 6.71 K/uL    RBC Count: 4.95 M/uL    Hemoglobin: 15.6 g/dL    Hematocrit: 47.0 %    Mean Cell Volume: 94.9 fL    Mean Cell Hemoglobin: 31.5 pg    Mean Cell Hemoglobin Conc: 33.2 g/dL    Red Cell Distrib Width: 13.0 %    Platelet Count - Automated: 170 K/uL    Auto Neutrophil #: 3.06 K/uL    Auto Lymphocyte #: 2.86 K/uL    Auto Monocyte #: 0.63 K/uL    Auto Eosinophil #: 0.09 K/uL    Auto Basophil #: 0.05 K/uL    Auto Neutrophil %: 45.7    Auto Lymphocyte %: 42.6 %    Auto Monocyte %: 9.4 %    Auto Eosinophil %: 1.3 %    Auto Basophil %: 0.7 %    Auto Immature Granulocyte %: 0.3 %    Nucleated RBC: 0 /100 WBCs  Comprehensive Metabolic Panel in AM (04.03.20 @ 07:42)    Sodium, Serum: 144 mmol/L    Potassium, Serum: 5.2 mmol/L    Chloride, Serum: 109 mmol/L    Carbon Dioxide, Serum: 21 mmol/L    Anion Gap, Serum: 14 mmol/L    Blood Urea Nitrogen, Serum: 16 mg/dL    Creatinine, Serum: 0.7 mg/dL    Glucose, Serum: 91 mg/dL    Calcium, Total Serum: 9.9 mg/dL    Protein Total, Serum: 6.9 g/dL    Albumin, Serum: 4.3 g/dL    Bilirubin Total, Serum: 0.6 mg/dL    Alkaline Phosphatase, Serum: 90 U/L    Aspartate Aminotransferase (AST/SGOT): 17: Hemolyzed. Interpret with caution U/L    Alanine Aminotransferase (ALT/SGPT): 15 U/L    eGFR if Non : 93  Lipid Profile in AM (03.26.20 @ 07:00)    Total Cholesterol/HDL Ratio Measurement: 4.6 Ratio    Cholesterol, Serum: 148 mg/dL    Triglycerides, Serum: 163 mg/dL    HDL Cholesterol, Serum: 32    Direct LDL: 91  Thyroid Stimulating Hormone, Serum in AM (04.03.20 @ 07:42)    Thyroid Stimulating Hormone, Serum: 0.53 uIU/mL  Hemoglobin A1C with Mean Plasma Glucose (03.26.20 @ 07:00)    Hemoglobin A1C, Whole Blood: 5.2    Mean Plasma Glucose: 103 mg/dL
Complete Blood Count in AM (05.02.20 @ 07:00)    Nucleated RBC: 0 /100 WBCs    WBC Count: 5.96 K/uL    RBC Count: 4.92 M/uL    Hemoglobin: 15.2 g/dL    Hematocrit: 44.9 %    Mean Cell Volume: 91.3 fL    Mean Cell Hemoglobin: 30.9 pg    Mean Cell Hemoglobin Conc: 33.9 g/dL    Red Cell Distrib Width: 12.9 %    Platelet Count - Automated: 122 K/uL  Comprehensive Metabolic Panel in AM (05.02.20 @ 07:00)    Sodium, Serum: 142 mmol/L    Potassium, Serum: 4.4 mmol/L    Chloride, Serum: 109 mmol/L    Carbon Dioxide, Serum: 22 mmol/L    Anion Gap, Serum: 11 mmol/L    Blood Urea Nitrogen, Serum: 19 mg/dL    Creatinine, Serum: 0.6 mg/dL    Glucose, Serum: 86 mg/dL    Calcium, Total Serum: 9.6 mg/dL    Protein Total, Serum: 6.5 g/dL    Albumin, Serum: 4.4 g/dL    Bilirubin Total, Serum: 0.3 mg/dL    Alkaline Phosphatase, Serum: 93 U/L    Aspartate Aminotransferase (AST/SGOT): 12 U/L    Alanine Aminotransferase (ALT/SGPT): 10 U/L    eGFR if Non : 98    eGFR if : 113 mL/min/1.73M2  Lipid Profile in AM (05.02.20 @ 07:00)    Total Cholesterol/HDL Ratio Measurement: 3.0 Ratio    Cholesterol, Serum: 139 mg/dL    Triglycerides, Serum: 152 mg/dL    HDL Cholesterol, Serum: 46    Direct LDL: 78  A1C with Estimated Average Glucose in AM (05.02.20 @ 07:00)    A1C with Estimated Average Glucose Result: 5.2  Thyroid Stimulating Hormone, Serum in AM (04.03.20 @ 07:42)    Thyroid Stimulating Hormone, Serum: 0.53 uIU/mL
Complete Blood Count in AM (05.02.20 @ 07:00)    Nucleated RBC: 0 /100 WBCs    WBC Count: 5.96 K/uL    RBC Count: 4.92 M/uL    Hemoglobin: 15.2 g/dL    Hematocrit: 44.9 %    Mean Cell Volume: 91.3 fL    Mean Cell Hemoglobin: 30.9 pg    Mean Cell Hemoglobin Conc: 33.9 g/dL    Red Cell Distrib Width: 12.9 %    Platelet Count - Automated: 122 K/uL  Comprehensive Metabolic Panel in AM (05.02.20 @ 07:00)    Sodium, Serum: 142 mmol/L    Potassium, Serum: 4.4 mmol/L    Chloride, Serum: 109 mmol/L    Carbon Dioxide, Serum: 22 mmol/L    Anion Gap, Serum: 11 mmol/L    Blood Urea Nitrogen, Serum: 19 mg/dL    Creatinine, Serum: 0.6 mg/dL    Glucose, Serum: 86 mg/dL    Calcium, Total Serum: 9.6 mg/dL    Protein Total, Serum: 6.5 g/dL    Albumin, Serum: 4.4 g/dL    Bilirubin Total, Serum: 0.3 mg/dL    Alkaline Phosphatase, Serum: 93 U/L    Aspartate Aminotransferase (AST/SGOT): 12 U/L    Alanine Aminotransferase (ALT/SGPT): 10 U/L    eGFR if Non : 98    eGFR if : 113 mL/min/1.73M2  Lipid Profile in AM (05.02.20 @ 07:00)    Total Cholesterol/HDL Ratio Measurement: 3.0 Ratio    Cholesterol, Serum: 139 mg/dL    Triglycerides, Serum: 152 mg/dL    HDL Cholesterol, Serum: 46    Direct LDL: 78  Thyroid Stimulating Hormone, Serum in AM (04.03.20 @ 07:42)    Thyroid Stimulating Hormone, Serum: 0.53 uIU/mL  A1C with Estimated Average Glucose in AM (05.02.20 @ 07:00)    A1C with Estimated Average Glucose Result: 5.2
Complete Blood Count in AM (05.02.20 @ 07:00)    Nucleated RBC: 0 /100 WBCs    WBC Count: 5.96 K/uL    RBC Count: 4.92 M/uL    Hemoglobin: 15.2 g/dL    Hematocrit: 44.9 %    Mean Cell Volume: 91.3 fL    Mean Cell Hemoglobin: 30.9 pg    Mean Cell Hemoglobin Conc: 33.9 g/dL    Red Cell Distrib Width: 12.9 %    Platelet Count - Automated: 122 K/uL  Comprehensive Metabolic Panel in AM (05.02.20 @ 07:00)    Sodium, Serum: 142 mmol/L    Potassium, Serum: 4.4 mmol/L    Chloride, Serum: 109 mmol/L    Carbon Dioxide, Serum: 22 mmol/L    Anion Gap, Serum: 11 mmol/L    Blood Urea Nitrogen, Serum: 19 mg/dL    Creatinine, Serum: 0.6 mg/dL    Glucose, Serum: 86 mg/dL    Calcium, Total Serum: 9.6 mg/dL    Protein Total, Serum: 6.5 g/dL    Albumin, Serum: 4.4 g/dL    Bilirubin Total, Serum: 0.3 mg/dL    Alkaline Phosphatase, Serum: 93 U/L    Aspartate Aminotransferase (AST/SGOT): 12 U/L    Alanine Aminotransferase (ALT/SGPT): 10 U/L    eGFR if Non : 98  Lipid Profile in AM (05.02.20 @ 07:00)    Total Cholesterol/HDL Ratio Measurement: 3.0 Ratio    Cholesterol, Serum: 139 mg/dL    Triglycerides, Serum: 152 mg/dL    HDL Cholesterol, Serum: 46    Direct LDL: 78  A1C with Estimated Average Glucose in AM (05.02.20 @ 07:00)    A1C with Estimated Average Glucose Result: 5.2  Thyroid Stimulating Hormone, Serum in AM (04.03.20 @ 07:42)    Thyroid Stimulating Hormone, Serum: 0.53 uIU/mL
Complete Blood Count in AM (05.22.20 @ 04:30)    Nucleated RBC: 0 /100 WBCs    WBC Count: 7.05 K/uL    RBC Count: 4.95 M/uL    Hemoglobin: 15.2 g/dL    Hematocrit: 45.0 %    Mean Cell Volume: 90.9 fL    Mean Cell Hemoglobin: 30.7 pg    Mean Cell Hemoglobin Conc: 33.8 g/dL    Red Cell Distrib Width: 13.9 %    Platelet Count - Automated: 162 K/uL  Comprehensive Metabolic Panel in AM (05.22.20 @ 04:30)    Sodium, Serum: 141 mmol/L    Potassium, Serum: 4.2 mmol/L    Chloride, Serum: 106 mmol/L    Carbon Dioxide, Serum: 25 mmol/L    Anion Gap, Serum: 10 mmol/L    Blood Urea Nitrogen, Serum: 16 mg/dL    Creatinine, Serum: 0.6 mg/dL    Glucose, Serum: 93 mg/dL    Calcium, Total Serum: 9.7 mg/dL    Protein Total, Serum: 6.8 g/dL    Albumin, Serum: 4.6 g/dL    Bilirubin Total, Serum: 0.4 mg/dL    Alkaline Phosphatase, Serum: 89 U/L    Aspartate Aminotransferase (AST/SGOT): 12 U/L    Alanine Aminotransferase (ALT/SGPT): 10 U/L    eGFR if Non : 98    eGFR if : 113 mL/min/1.73M2  Lipid Profile in AM (05.22.20 @ 04:30)    Total Cholesterol/HDL Ratio Measurement: 3.0 Ratio    Cholesterol, Serum: 198 mg/dL    Triglycerides, Serum: 104 mg/dL    HDL Cholesterol, Serum: 65    Direct LDL: 129  Thyroid Stimulating Hormone, Serum in AM (04.03.20 @ 07:42)    Thyroid Stimulating Hormone, Serum: 0.53 uIU/mL  A1C with Estimated Average Glucose in AM (05.02.20 @ 07:00)    A1C with Estimated Average Glucose Result: 5.2    Estimated Average Glucose: 103
Complete Blood Count + Automated Diff (03.22.20 @ 03:20)    WBC Count: 5.79 K/uL    RBC Count: 4.82 M/uL    Hemoglobin: 15.6 g/dL    Hematocrit: 46.5 %    Mean Cell Volume: 96.5 fL    Mean Cell Hemoglobin: 32.4 pg    Mean Cell Hemoglobin Conc: 33.5 g/dL    Red Cell Distrib Width: 12.9 %    Platelet Count - Automated: 197 K/uL    Auto Neutrophil #: 3.31 K/uL    Auto Lymphocyte #: 1.93 K/uL    Auto Monocyte #: 0.44 K/uL    Auto Eosinophil #: 0.05 K/uL    Auto Basophil #: 0.03 K/uL    Auto Neutrophil %: 57.2    Auto Lymphocyte %: 33.3 %    Auto Monocyte %: 7.6 %    Auto Eosinophil %: 0.9 %    Auto Basophil %: 0.5 %    Auto Immature Granulocyte %: 0.5 %    Nucleated RBC: 0 /100 WBCs  Comprehensive Metabolic Panel (03.22.20 @ 03:20)    Sodium, Serum: 141 mmol/L    Potassium, Serum: 4.1: Slighty Hemolyzed use with Caution mmol/L    Chloride, Serum: 106 mmol/L    Carbon Dioxide, Serum: 20 mmol/L    Anion Gap, Serum: 15 mmol/L    Blood Urea Nitrogen, Serum: 9 mg/dL    Creatinine, Serum: 0.5 mg/dL    Glucose, Serum: 94 mg/dL    Calcium, Total Serum: 10.0 mg/dL    Protein Total, Serum: 7.3 g/dL    Albumin, Serum: 4.6 g/dL    Bilirubin Total, Serum: 0.6 mg/dL    Alkaline Phosphatase, Serum: 94 U/L    Aspartate Aminotransferase (AST/SGOT): 26: Hemolyzed. Interpret with caution U/L    Alanine Aminotransferase (ALT/SGPT): 16 U/L    eGFR if Non : 104    eGFR if : 120 mL/min/1.73M2  Lipid Profile in AM (03.26.20 @ 07:00)    Total Cholesterol/HDL Ratio Measurement: 4.6 Ratio    Cholesterol, Serum: 148 mg/dL    Triglycerides, Serum: 163 mg/dL    HDL Cholesterol, Serum: 32    Direct LDL: 91  Thyroid Stimulating Hormone, Serum (03.22.20 @ 20:33)    Thyroid Stimulating Hormone, Serum: 0.34 uIU/mL
Complete Blood Count + Automated Diff (03.22.20 @ 03:20)    WBC Count: 5.79 K/uL    RBC Count: 4.82 M/uL    Hemoglobin: 15.6 g/dL    Hematocrit: 46.5 %    Mean Cell Volume: 96.5 fL    Mean Cell Hemoglobin: 32.4 pg    Mean Cell Hemoglobin Conc: 33.5 g/dL    Red Cell Distrib Width: 12.9 %    Platelet Count - Automated: 197 K/uL    Auto Neutrophil #: 3.31 K/uL    Auto Lymphocyte #: 1.93 K/uL    Auto Monocyte #: 0.44 K/uL    Auto Eosinophil #: 0.05 K/uL    Auto Basophil #: 0.03 K/uL    Auto Neutrophil %: 57.2    Auto Lymphocyte %: 33.3 %    Auto Monocyte %: 7.6 %    Auto Eosinophil %: 0.9 %    Auto Basophil %: 0.5 %    Auto Immature Granulocyte %: 0.5 %    Nucleated RBC: 0 /100 WBCs  Comprehensive Metabolic Panel (03.22.20 @ 03:20)    Sodium, Serum: 141 mmol/L    Potassium, Serum: 4.1: Slighty Hemolyzed use with Caution mmol/L    Chloride, Serum: 106 mmol/L    Carbon Dioxide, Serum: 20 mmol/L    Anion Gap, Serum: 15 mmol/L    Blood Urea Nitrogen, Serum: 9 mg/dL    Creatinine, Serum: 0.5 mg/dL    Glucose, Serum: 94 mg/dL    Calcium, Total Serum: 10.0 mg/dL    Protein Total, Serum: 7.3 g/dL    Albumin, Serum: 4.6 g/dL    Bilirubin Total, Serum: 0.6 mg/dL    Alkaline Phosphatase, Serum: 94 U/L    Aspartate Aminotransferase (AST/SGOT): 26: Hemolyzed. Interpret with caution U/L    Alanine Aminotransferase (ALT/SGPT): 16 U/L    eGFR if Non : 104    eGFR if : 120 mL/min/1.73M2  Lipid Profile in AM (03.26.20 @ 07:00)    Total Cholesterol/HDL Ratio Measurement: 4.6 Ratio    Cholesterol, Serum: 148 mg/dL    Triglycerides, Serum: 163 mg/dL    HDL Cholesterol, Serum: 32    Direct LDL: 91  Thyroid Stimulating Hormone, Serum (03.22.20 @ 20:33)    Thyroid Stimulating Hormone, Serum: 0.34 uIU/mL  Hemoglobin A1C with Mean Plasma Glucose (03.26.20 @ 07:00)    Hemoglobin A1C, Whole Blood: 5.2
Complete Blood Count + Automated Diff in AM (04.03.20 @ 07:42)    WBC Count: 6.71 K/uL    RBC Count: 4.95 M/uL    Hemoglobin: 15.6 g/dL    Hematocrit: 47.0 %    Mean Cell Volume: 94.9 fL    Mean Cell Hemoglobin: 31.5 pg    Mean Cell Hemoglobin Conc: 33.2 g/dL    Red Cell Distrib Width: 13.0 %    Platelet Count - Automated: 170 K/uL    Auto Neutrophil #: 3.06 K/uL    Auto Lymphocyte #: 2.86 K/uL    Auto Monocyte #: 0.63 K/uL    Auto Eosinophil #: 0.09 K/uL    Auto Basophil #: 0.05 K/uL    Auto Neutrophil %: 45.7    Auto Lymphocyte %: 42.6 %    Auto Monocyte %: 9.4 %    Auto Eosinophil %: 1.3 %    Auto Basophil %: 0.7 %    Auto Immature Granulocyte %: 0.3 %    Nucleated RBC: 0 /100 WBCs  Comprehensive Metabolic Panel in AM (04.03.20 @ 07:42)    Sodium, Serum: 144 mmol/L    Potassium, Serum: 5.2 mmol/L    Chloride, Serum: 109 mmol/L    Carbon Dioxide, Serum: 21 mmol/L    Anion Gap, Serum: 14 mmol/L    Blood Urea Nitrogen, Serum: 16 mg/dL    Creatinine, Serum: 0.7 mg/dL    Glucose, Serum: 91 mg/dL    Calcium, Total Serum: 9.9 mg/dL    Protein Total, Serum: 6.9 g/dL    Albumin, Serum: 4.3 g/dL    Bilirubin Total, Serum: 0.6 mg/dL    Alkaline Phosphatase, Serum: 90 U/L    Aspartate Aminotransferase (AST/SGOT): 17: Hemolyzed. Interpret with caution U/L    Alanine Aminotransferase (ALT/SGPT): 15 U/L    eGFR if Non : 93    eGFR if : 108 mL/min/1.73M2  Thyroid Stimulating Hormone, Serum in AM (04.03.20 @ 07:42)    Thyroid Stimulating Hormone, Serum: 0.53 uIU/mL  Lipid Profile in AM (03.26.20 @ 07:00)    Total Cholesterol/HDL Ratio Measurement: 4.6 Ratio    Cholesterol, Serum: 148 mg/dL    Triglycerides, Serum: 163 mg/dL    HDL Cholesterol, Serum: 32    Direct LDL: 91  Hemoglobin A1C with Mean Plasma Glucose (03.26.20 @ 07:00)    Hemoglobin A1C, Whole Blood: 5.2
Complete Blood Count in AM (05.02.20 @ 07:00)    Nucleated RBC: 0 /100 WBCs    WBC Count: 5.96 K/uL    RBC Count: 4.92 M/uL    Hemoglobin: 15.2 g/dL    Hematocrit: 44.9 %    Mean Cell Volume: 91.3 fL    Mean Cell Hemoglobin: 30.9 pg    Mean Cell Hemoglobin Conc: 33.9 g/dL    Red Cell Distrib Width: 12.9 %    Platelet Count - Automated: 122 K/uL  Comprehensive Metabolic Panel in AM (05.02.20 @ 07:00)    Sodium, Serum: 142 mmol/L    Potassium, Serum: 4.4 mmol/L    Chloride, Serum: 109 mmol/L    Carbon Dioxide, Serum: 22 mmol/L    Anion Gap, Serum: 11 mmol/L    Blood Urea Nitrogen, Serum: 19 mg/dL    Creatinine, Serum: 0.6 mg/dL    Glucose, Serum: 86 mg/dL    Calcium, Total Serum: 9.6 mg/dL    Protein Total, Serum: 6.5 g/dL    Albumin, Serum: 4.4 g/dL    Bilirubin Total, Serum: 0.3 mg/dL    Alkaline Phosphatase, Serum: 93 U/L    Aspartate Aminotransferase (AST/SGOT): 12 U/L    Alanine Aminotransferase (ALT/SGPT): 10 U/L    eGFR if Non : 98    eGFR if : 113 mL/min/1.73M2  Lipid Profile in AM (05.02.20 @ 07:00)    Total Cholesterol/HDL Ratio Measurement: 3.0 Ratio    Cholesterol, Serum: 139 mg/dL    Triglycerides, Serum: 152 mg/dL    HDL Cholesterol, Serum: 46    Direct LDL: 78  Thyroid Stimulating Hormone, Serum in AM (04.03.20 @ 07:42)  Thyroid Stimulating Hormone, Serum: 0.53 uIU/mL  A1C with Estimated Average Glucose in AM (05.02.20 @ 07:00)  A1C with Estimated Average Glucose Result: 5.2

## 2020-06-24 NOTE — PROGRESS NOTE BEHAVIORAL HEALTH - NSBHLEGALSTATUS_PSY_A_CORE
9.27 (2PC)
9.39 (Emergency)
9.39 (Emergency)
9.27 (2PC)
9.39 (Emergency)
9.27 (2PC)
9.39 (Emergency)
9.27 (2PC)
9.27 (2PC)

## 2020-06-24 NOTE — PROGRESS NOTE BEHAVIORAL HEALTH - NS ED BHA MED ROS ENT MOUTH
No complaints
Yes
No complaints
No complaints

## 2020-06-24 NOTE — PROGRESS NOTE BEHAVIORAL HEALTH - NSBHFUPINTERVALCCFT_PSY_A_CORE
" Who are you the director of this place"
" im okay"
"..."
"Am I being discharged I need to go fight the COVID outbreak"
"Get me out of here you b****, I need to go to work at [employer], I live at [facility], I'm  to [place]"
"Get out of my face you ****"
"Get out of my room you f***ing c***"
"Give he a shot shit in my.."
"I fine, thank God, the chlorox in the air isn't good for me, I need my medicine"
"I just spoke with [place], I'll be going back to work"
"I need to leave so I can go to work"
"I need to leave to get a job at [employer], I'm an ADHD doctor"
"I need you to order me a car, to go home, I'm an ADHD doctor at [employer], I am a "
"I'm an Army Doctor"
"I'm fine but I need to leave to go to my job at [employer]"
"I'm fine"
"I'm fine, I want to leave"
"I'm fine, thank God"
"I'm fine, thank you nurse"
"I'm fine, thank you"
"I'm good"
"I'm ok, thank you"
"I'm okay"
"I'm okay, when I can leave?"
"I'm ready to leave"
"Leave me alone you c**t"
"Leave me alone"
"Transfer me now"
"Yes"
"You got my blood now leave me alone"
"You're trying to kill me with your meds"
I am fine
Pt presents no complaints
Pt remains disorganized and psychotic. She is less preoccupied with Corona virus, but states that she was a , and then refuses to discuss anything else. No s/h ideation. Continuing attempts to clarify history and past treatment regimen(s)
Not verbally responding
"I'm good, I'm happy"
"Leave me alone"
"I don't have a mental illness"

## 2020-06-24 NOTE — PROGRESS NOTE BEHAVIORAL HEALTH - NS ED BHA MED ROS CARDIOVASCULAR
No complaints
Yes
No complaints

## 2020-06-24 NOTE — PROGRESS NOTE BEHAVIORAL HEALTH - NSBHCONSORIP_PSY_A_CORE
Inpatient Admission...

## 2020-06-24 NOTE — PROGRESS NOTE BEHAVIORAL HEALTH - BODY HABITUS
Underweight
Average build
Underweight
Average build
Underweight
Average build
Average build
Underweight
Average build
Underweight
Average build
Average build
Underweight

## 2020-06-24 NOTE — PROGRESS NOTE BEHAVIORAL HEALTH - NS ED BHA MED ROS NEUROLOGICAL
No complaints

## 2020-06-24 NOTE — PROGRESS NOTE BEHAVIORAL HEALTH - NS ED BHA MSE GENERAL APPEARANCE
Well developed
No deformities present
Well developed
No deformities present
Well developed
No deformities present
No deformities present
Well developed
No deformities present
Well developed

## 2020-06-24 NOTE — PROGRESS NOTE BEHAVIORAL HEALTH - NS ED BHA REVIEW OF ED CHART AVAILABLE INVESTIGATIONS REVIEWED
Yes
Yes
None available
Yes
None available
None available
Yes

## 2020-06-24 NOTE — PROGRESS NOTE BEHAVIORAL HEALTH - NS ED BHA MED ROS ALLERGIC IMMUNOLOGIC
Yes
Yes
No complaints
Yes
Yes
No complaints
No complaints
Yes
No complaints
Yes

## 2020-06-24 NOTE — PROGRESS NOTE BEHAVIORAL HEALTH - FUND OF KNOWLEDGE
Normal
Normal
Impaired
Normal
Impaired
Normal
Normal
Other
Impaired
Impaired
Normal
Impaired
Normal
Impaired
Normal
Other
Normal
Other
Other
Normal
Normal
Impaired
Normal
Other

## 2020-06-24 NOTE — PROGRESS NOTE BEHAVIORAL HEALTH - NSBHADMITIPDSM_PSY_A_CORE
see above for Axis I, II, III

## 2020-06-24 NOTE — PROGRESS NOTE BEHAVIORAL HEALTH - GAIT / STATION
Normal gait / station
Other
Normal gait / station
Other
Normal gait / station
Other
Normal gait / station
Other
Normal gait / station

## 2020-06-24 NOTE — PROGRESS NOTE BEHAVIORAL HEALTH - NSBHLOC_PSY_A_CORE
Alert
Lethargic, arousable to verbal stimulus
Alert
Lethargic, arousable to verbal stimulus
Alert
Lethargic, arousable to verbal stimulus
Alert

## 2020-06-24 NOTE — PROGRESS NOTE BEHAVIORAL HEALTH - NSBHFUPINTERVALHXFT_PSY_A_CORE
Pt seen and evaluated, chart reviewed. As per nursing report, pt had no acute events over night, medication compliant. On evaluation, pt presents in room superficially cooperative, states "I'm fine" and then refuses to engage further with this writer after several attempts. Informed pt about transfer to [facility], pt does not respond, poor eye contact. Limited interview. As per staff, pt observed labile and irritable at times, verbally redirectable, isolative to room. ECG QTc 489 ms (6/18). [facility] referral approved, transfer date 6/25. Repeat covid-19 test negative (6/23).

## 2020-06-24 NOTE — PROGRESS NOTE BEHAVIORAL HEALTH - NSBHADMITDANGERSELF_PSY_A_CORE
unable to care for self
suicidal ideation with plan and means
suicidal ideation with plan and means
unable to care for self

## 2020-06-24 NOTE — PROGRESS NOTE BEHAVIORAL HEALTH - ABNORMAL MOVEMENTS
No abnormal movements
Other
No abnormal movements
No abnormal movements

## 2020-06-24 NOTE — PROGRESS NOTE BEHAVIORAL HEALTH - ORIENTATION OTHER
Wouldn't engage
Wouldn't engage
unable to assess
unable to assess, pt unwilling to engage
pt refuses to engage
Wouldn't engage
pt refuses to engage
unable to assess

## 2020-06-24 NOTE — PROGRESS NOTE BEHAVIORAL HEALTH - NSBHADMITIPREASON_PSY_A_CORE
Danger to self; mental illness expected to respond to inpatient care

## 2020-06-24 NOTE — PROGRESS NOTE BEHAVIORAL HEALTH - INSIGHT (REGARDING PSYCHIATRIC ILLNESS)
Poor

## 2020-06-24 NOTE — PROGRESS NOTE BEHAVIORAL HEALTH - REMOTE MEMORY
Normal
Normal
Impaired
Normal
Impaired
Normal
Other
Normal
Impaired
Impaired
Normal
Normal
Impaired
Normal
Normal
Impaired
Normal
Impaired
Normal
Normal
Impaired
Normal
Other
Normal
Other
Other
Normal
Normal
Impaired
Normal
Other

## 2020-06-24 NOTE — PROGRESS NOTE BEHAVIORAL HEALTH - NS ED BHA MSE SPEECH SPONTANEITY
Normal
Other
Normal
Normal
Increased latency
Normal
Increased latency
Normal
Other
Normal
Normal

## 2020-06-24 NOTE — PROGRESS NOTE BEHAVIORAL HEALTH - NS ED BHA AXIS I PRIMARY CODE FT
F20.0
F20.9
F20.0
F20.9
F20.0
F20.9

## 2020-06-24 NOTE — PROGRESS NOTE BEHAVIORAL HEALTH - NS ED BHA MED ROS GASTROINTESTINAL
No complaints
Yes
No complaints

## 2020-06-24 NOTE — PROGRESS NOTE BEHAVIORAL HEALTH - NSBHADMITIPBHPROVIDER_PSY_A_CORE
no
N/A
no
N/A
no
N/A
N/A
no
N/A
no
N/A
no
N/A
no
no
N/A

## 2020-06-24 NOTE — PROGRESS NOTE BEHAVIORAL HEALTH - LANGUAGE
No abnormalities noted
Other

## 2020-06-24 NOTE — PROGRESS NOTE BEHAVIORAL HEALTH - ESTIMATED INTELLIGENCE
Average
Other

## 2020-06-24 NOTE — PROGRESS NOTE BEHAVIORAL HEALTH - NS ED BHA REVIEW OF ED CHART AVAILABLE IMAGING REVIEWED
Yes
None available
Yes
None available
Yes
Yes
None available
None available
Yes
None available
Yes

## 2020-06-24 NOTE — PROGRESS NOTE BEHAVIORAL HEALTH - AFFECT CONGRUENCE
Congruent
Congruent
Not congruent
Not congruent
Congruent
Not congruent
Congruent
Not congruent
Congruent
Not congruent
Congruent
Not congruent
Congruent
Not congruent
Other
Congruent

## 2020-06-24 NOTE — PROGRESS NOTE BEHAVIORAL HEALTH - NS ED BHA MED ROS PSYCHIATRIC
See HPI

## 2020-06-24 NOTE — PROGRESS NOTE BEHAVIORAL HEALTH - NS ED BHA REVIEW OF ED CHART AVAILABLE LABS REVIEWED
Yes
None available
Yes
None available

## 2020-06-24 NOTE — PROGRESS NOTE BEHAVIORAL HEALTH - NSBHCHARTREVIEWVS_PSY_A_CORE FT
Vital Signs Last 24 Hrs  T(C): 36 (24 Jun 2020 08:15), Max: 37.6 (24 Jun 2020 06:13)  T(F): 96.8 (24 Jun 2020 08:15), Max: 99.7 (24 Jun 2020 06:13)  HR: 77 (24 Jun 2020 08:15) (60 - 85)  BP: 101/53 (24 Jun 2020 08:15) (93/60 - 112/61)  BP(mean): --  RR: 17 (24 Jun 2020 08:15) (16 - 18)  SpO2: --

## 2020-06-24 NOTE — PROGRESS NOTE BEHAVIORAL HEALTH - SUMMARY
63 y/o, female, white, single, one adult child, undomiciled, previously worked in business administration for [employer] but now currently on SSD, PPhx: schizophrenia (dx 2008), PMH: asthma, CAD s/p CABG x2 (2016), HTN, COPD, who was originally admitted to medicine in [facility] for lower left extremity cellulitis, transferred to [facility] d/t bizarre bx, psychiatric medication non-compliance and questionable ability to care for self. Pt denies any psychiatric diagnoses, states does not take any psychiatric medications, denies all psychiatric complaints. Pt presents with several delusions including living in a private apartment in [place], living with her  Abebe and having "too many children to count". Collateral from only daughter shows otherwise - pt diagnosed with schizophrenia in 2008, has had multiple episodes of medication noncompliance resulting in multiple [facility] hospitalizations (most recent known [facility] 2/2020). TOO approved, started 4/21/2020, haldol started with medical clearance d/t borderline QTc, which had to be switched to abilify d/t prolonged QTc. Pt compensated, agreed to abilify maintena on 5/6, with plan for discharge on 5/8. However, on day of discharge, discharge held d/t pt decompensating with return of prior delusions and agitation. 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 Patient continues to have poor insight in behavioral health diagnosis and long-term treatment, refuses addition of mood stabilizer, only accepting Abilify Maintena. As per TOO, pt will be restarted on haldol 0.5 qhs (with parameter QTc <500 ms).    #Schizophrenia  -d/c haldol 5 mg PO qhs d/t QTc 511 ms (5/1), pt c/o chronic angina, cardiology consulted  -start abilify 5 mg daily (5/8), abilify 2 mg qhs (5/13) --> c/w abilify 10 mg daily (5/16) until 5/27  -start abilify maintena 300 mg IM once (5/6/2020) --> titrate abilify maintena 400 mg (6/1)  -patient has been refusing treatment, TOO approved, started 4/21/2020  -start haldol 0.5 mg qhs (6/2) with daily f/u ECG with parameter QTc <500  -c/w ativan 1 mg q6h prn for agitation with escalation to IM if pt not verbally redirectable    #Prolonged QTc  -PA consulted, daily ECG --> ECG q3 days  -ECG QTc 509 ms (5/13) --> QTc 492 ms (6/22)    #Abdominal/Back Pain  -PA consulted  -c/w lidocaine patch daily  -c/w ibuprofen 400 mg q6h prn for pain    #Feet pain, PAD?  -PA consulted, f/u OP  -R>L with +1/+2 pedal pulses    #Chronic angina  -PA consulted, c/w home medication ranexa 500 mg bid ([facility] states last fill 3/2020)  -cardiology consulted    #COPD  -c/w Symbicort 160mcg 2 puffs BID  -c/w Spiriva 1 cap inhal daily      #Hypertension  -elevated BP, PA consulted  -c/w lisinopril 20 mg daily  -c/w norvasc 5 mg qhs    #CAD  -c/w ASA 81mg PO daily  -c/w Atorvastatin 40mg PO QHS    Dispo: [facility]

## 2020-06-24 NOTE — PROGRESS NOTE BEHAVIORAL HEALTH - NSBHPTASSESSDT_PSY_A_CORE
01-Apr-2020 13:13
01-Jun-2020 09:15
01-May-2020 10:30
02-Apr-2020 11:22
02-Jun-2020 10:00
03-Apr-2020 14:09
03-Jun-2020 09:00
04-Apr-2020 12:41
04-Jun-2020 09:30
04-Jun-2020 09:30
04-May-2020 09:00
05-May-2020 10:00
06-Apr-2020 13:00
06-May-2020 10:15
07-Apr-2020 12:56
07-May-2020 08:45
08-Apr-2020 12:54
08-Jun-2020 09:15
08-May-2020 09:30
09-Apr-2020 12:09
09-Jun-2020 10:30
10-Apr-2020 12:47
10-Gaston-2020 10:15
11-Jun-2020 09:15
11-May-2020 09:00
12-Jun-2020 10:30
12-May-2020 10:00
13-Apr-2020 09:30
13-May-2020 09:30
14-Apr-2020
14-May-2020 09:00
15-Apr-2020 09:15
15-Gaston-2020 09:00
15-May-2020 10:15
16-Apr-2020 09:45
16-Jun-2020 08:45
17-Apr-2022 10:00
17-Jun-2020 08:45
18-Jun-2020 09:00
18-May-2020 09:15
19-Jun-2020 09:15
19-May-2020 10:45
20-Apr-2020 09:45
20-May-2020 09:30
21-Apr-2020 09:30
21-May-2020 09:30
22-Apr-2022 09:45
22-Jun-2020 09:30
22-May-2020 09:00
23-Apr-2020 09:30
23-Jun-2020 09:45
23-May-2020 11:13
24-Apr-2020 10:00
24-Jun-2020 09:00
25-May-2020 12:07
26-Mar-2020 10:00
26-May-2020 10:30
27-Apr-2020 09:45
27-Mar-2020 11:44
27-May-2020 09:00
28-Apr-2020 10:30
28-Mar-2020 09:20
28-May-2020 09:00
29-Apr-2020 00:30
29-May-2020 09:45
30-Apr-2020 08:45
30-Mar-2020 09:00
31-Mar-2020 08:45

## 2020-06-24 NOTE — PROGRESS NOTE BEHAVIORAL HEALTH - NSBHFUPVIOL_PSY_A_CORE
none known
unable to assess
none known
unable to assess
none known
none known
unable to assess
none known

## 2020-06-24 NOTE — PROGRESS NOTE BEHAVIORAL HEALTH - NS ED BHA MSE SPEECH ARTICULATION
Normal
Other
Normal
Other
Normal
Normal

## 2020-06-24 NOTE — PROGRESS NOTE BEHAVIORAL HEALTH - NSBHFUPMEDSE_PSY_A_CORE
None known

## 2020-06-24 NOTE — PROGRESS NOTE BEHAVIORAL HEALTH - NSBHFUPTYPE_PSY_A_CORE
Inpatient
Inpatient-On Service Note
Inpatient-On Service Note
Inpatient

## 2020-06-24 NOTE — PROGRESS NOTE BEHAVIORAL HEALTH - NSBHADMITIPOBSFT_PSY_A_CORE
As per unit protocol
as clinically indicated
as clinically indicated
As per unit protocol
patient is psychotic
As per unit protocol
Clinically Indicated
As per unit protocol
Clinically indicated
Clinically indicated
As per unit protocol
Clinically Indicated
As per unit protocol
Clinically indicated
As per unit protocol
As per unit protocol
clinically indicated
As per unit protocol
Clinically Indicated

## 2020-06-24 NOTE — PROGRESS NOTE BEHAVIORAL HEALTH - NSBHATTESTSEENBY_PSY_A_CORE
attending Psychiatrist without NP/Trainee
NP without Attending Psychiatrist
attending Psychiatrist without NP/Trainee
attending Psychiatrist without NP/Trainee
NP without Attending Psychiatrist
attending Psychiatrist without NP/Trainee
NP without Attending Psychiatrist
attending Psychiatrist without NP/Trainee
NP without Attending Psychiatrist

## 2020-06-24 NOTE — PROGRESS NOTE BEHAVIORAL HEALTH - MUSCLE TONE / STRENGTH
Normal muscle tone/strength
Other
Normal muscle tone/strength
Other
Normal muscle tone/strength
Other
Normal muscle tone/strength

## 2020-06-24 NOTE — CHART NOTE - NSCHARTNOTEFT_GEN_A_CORE
Ms. Silva remains on the unit for continued treatment, safety and observation. Pt remains verbally aggressive to all staff and instigates arguments with other peers as well. Upon speaking with pt she still reports somatic pains and all require surgery. At this time pt reports that she needs to leave in order to pay bills. Pt that continues to report “Just send me to the shelter”. YULIANA explained that we have to ensure a safe D/C with a safe plan in place. Pt is still visible on the units, is attending groups, and needs to be redirected to use coping skills taught in those groups.     Pt is set for D/C tomorrow 6/25/2020 to [facility]. YULIANA spoke with Izabela @ [facility] who received all paperwork and is ready to accept the pt. YULIANA will continue to keep contact with Izabela for smooth transition.     Mood – Agitated  Sleep - Good  Appetite - Good  ADLs - Fair  Thought Process –Illogical  Observation – [id number]    Ms. Silva will continue to meet with  one on one and with treatment team daily. Pt to be transferred to [facility] on 6/25/2020.

## 2020-06-24 NOTE — PROGRESS NOTE BEHAVIORAL HEALTH - PRN MEDICATIONS SINCE LAST EVAL
no
yes
no

## 2020-06-25 VITALS
RESPIRATION RATE: 16 BRPM | TEMPERATURE: 96 F | HEART RATE: 75 BPM | SYSTOLIC BLOOD PRESSURE: 141 MMHG | DIASTOLIC BLOOD PRESSURE: 67 MMHG

## 2020-06-25 PROCEDURE — 99238 HOSP IP/OBS DSCHRG MGMT 30/<: CPT

## 2020-06-25 RX ORDER — RANOLAZINE 500 MG/1
1 TABLET, FILM COATED, EXTENDED RELEASE ORAL
Qty: 0 | Refills: 0 | DISCHARGE
Start: 2020-06-25

## 2020-06-25 RX ORDER — NICOTINE POLACRILEX 2 MG
1 GUM BUCCAL
Qty: 0 | Refills: 0 | DISCHARGE
Start: 2020-06-25

## 2020-06-25 RX ORDER — ARIPIPRAZOLE 15 MG/1
400 TABLET ORAL
Qty: 1 | Refills: 0
Start: 2020-06-25 | End: 2020-06-25

## 2020-06-25 RX ORDER — AMLODIPINE BESYLATE 2.5 MG/1
1 TABLET ORAL
Qty: 0 | Refills: 0 | DISCHARGE
Start: 2020-06-25

## 2020-06-25 RX ORDER — RANOLAZINE 500 MG/1
1 TABLET, FILM COATED, EXTENDED RELEASE ORAL
Qty: 0 | Refills: 0 | DISCHARGE

## 2020-06-25 RX ORDER — ATORVASTATIN CALCIUM 80 MG/1
1 TABLET, FILM COATED ORAL
Qty: 0 | Refills: 0 | DISCHARGE
Start: 2020-06-25

## 2020-06-25 RX ORDER — LORATADINE 10 MG/1
1 TABLET ORAL
Qty: 0 | Refills: 0 | DISCHARGE
Start: 2020-06-25

## 2020-06-25 RX ORDER — LACTOBACILLUS ACIDOPHILUS 100MM CELL
1 CAPSULE ORAL
Qty: 0 | Refills: 0 | DISCHARGE
Start: 2020-06-25

## 2020-06-25 RX ORDER — ASPIRIN/CALCIUM CARB/MAGNESIUM 324 MG
1 TABLET ORAL
Qty: 0 | Refills: 0 | DISCHARGE
Start: 2020-06-25

## 2020-06-25 RX ORDER — LIDOCAINE 4 G/100G
1 CREAM TOPICAL
Qty: 0 | Refills: 0 | DISCHARGE
Start: 2020-06-25

## 2020-06-25 RX ORDER — TIOTROPIUM BROMIDE 18 UG/1
1 CAPSULE ORAL; RESPIRATORY (INHALATION)
Qty: 0 | Refills: 0 | DISCHARGE
Start: 2020-06-25

## 2020-06-25 RX ORDER — BUDESONIDE AND FORMOTEROL FUMARATE DIHYDRATE 160; 4.5 UG/1; UG/1
2 AEROSOL RESPIRATORY (INHALATION)
Qty: 0 | Refills: 0 | DISCHARGE
Start: 2020-06-25

## 2020-06-25 RX ORDER — LISINOPRIL 2.5 MG/1
1 TABLET ORAL
Qty: 0 | Refills: 0 | DISCHARGE
Start: 2020-06-25

## 2020-06-25 RX ORDER — ARIPIPRAZOLE 15 MG/1
400 TABLET ORAL
Qty: 0 | Refills: 0 | DISCHARGE

## 2020-06-25 RX ADMIN — TIOTROPIUM BROMIDE 1 CAPSULE(S): 18 CAPSULE ORAL; RESPIRATORY (INHALATION) at 08:52

## 2020-06-25 RX ADMIN — BUDESONIDE AND FORMOTEROL FUMARATE DIHYDRATE 2 PUFF(S): 160; 4.5 AEROSOL RESPIRATORY (INHALATION) at 08:52

## 2020-06-25 RX ADMIN — Medication 81 MILLIGRAM(S): at 08:50

## 2020-06-25 RX ADMIN — RANOLAZINE 500 MILLIGRAM(S): 500 TABLET, FILM COATED, EXTENDED RELEASE ORAL at 08:50

## 2020-06-25 RX ADMIN — LORATADINE 10 MILLIGRAM(S): 10 TABLET ORAL at 08:50

## 2020-06-25 RX ADMIN — Medication 1 TABLET(S): at 08:50

## 2020-06-25 RX ADMIN — LISINOPRIL 20 MILLIGRAM(S): 2.5 TABLET ORAL at 08:51

## 2020-06-25 NOTE — PROGRESS NOTE ADULT - SUBJECTIVE AND OBJECTIVE BOX
pt stable alert in NAD  no new complaints    DEPRESSION  ^DEPRESSION  Handoff  Schizophrenia  Asthma  Hypertension  Coronary artery disease  Schizophrenia  COPD (chronic obstructive pulmonary disease)  Paranoid schizophrenia  Coronary artery disease involving coronary bypass graft of native heart without angina pectoris  Essential hypertension  Schizophrenia  S/P CABG (coronary artery bypass graft)    HEALTH ISSUES - PROBLEM Dx:  COPD (chronic obstructive pulmonary disease)  Paranoid schizophrenia  Coronary artery disease involving coronary bypass graft of native heart without angina pectoris: Coronary artery disease involving coronary bypass graft of native heart without angina pectoris  Essential hypertension: Essential hypertension  Schizophrenia: Schizophrenia        PAST MEDICAL & SURGICAL HISTORY:  Schizophrenia  Asthma  Hypertension  Coronary artery disease  S/P CABG (coronary artery bypass graft)    Bananas (Unknown)  sulfa drugs (Unknown)      FAMILY HISTORY:      acetaminophen   Tablet .. 650 milliGRAM(s) Oral every 6 hours PRN  aluminum hydroxide/magnesium hydroxide/simethicone Suspension 30 milliLiter(s) Oral every 4 hours PRN  amLODIPine   Tablet 5 milliGRAM(s) Oral at bedtime  aspirin  chewable 81 milliGRAM(s) Oral daily  atorvastatin 40 milliGRAM(s) Oral at bedtime  budesonide 160 MICROgram(s)/formoterol 4.5 MICROgram(s) Inhaler 2 Puff(s) Inhalation two times a day  hydrOXYzine hydrochloride 25 milliGRAM(s) Oral every 6 hours PRN  ibuprofen  Tablet. 400 milliGRAM(s) Oral every 6 hours PRN  lactobacillus acidophilus 1 Tablet(s) Oral daily  lidocaine   Patch 1 Patch Transdermal daily  lisinopril 20 milliGRAM(s) Oral daily  loratadine 10 milliGRAM(s) Oral daily  nicotine - 21 mG/24Hr(s) Patch 1 patch Transdermal daily  ranolazine 500 milliGRAM(s) Oral two times a day  tiotropium 18 MICROgram(s) Capsule 1 Capsule(s) Inhalation daily      T(C): 37.1 (06-24-20 @ 17:10), Max: 37.1 (06-24-20 @ 17:10)  HR: 90 (06-24-20 @ 17:10) (77 - 90)  BP: 122/58 (06-24-20 @ 17:10) (101/53 - 122/58)  RR: 16 (06-24-20 @ 17:10) (16 - 17)  SpO2: --    PE;  general:  stable in nad  no acute changes    Lungs:    Heart:    EXT:    Neuro:   aelrt nod eficits                        CAPILLARY BLOOD GLUCOSE

## 2020-06-25 NOTE — PROGRESS NOTE ADULT - REASON FOR ADMISSION
Schizophrenia

## 2020-06-25 NOTE — CHART NOTE - NSCHARTNOTEFT_GEN_A_CORE
Social Work D/C Note.    Pt is set for D/C today 6/25/2020    Pt is set to be transferred to [facility] located @  [address] [place] [address]   Unit assignment:   [facility] Unit, bldg. 2,1st flr  Contact:  Kori Parkinson (Treatment ) or Everardo Ludwig or Bethany Reeves or Sanjeev Melvin (Nurse Coordinator) [phone]    Pt's family was notified of transfer:  Britney Chaves  [phone]

## 2020-06-28 DIAGNOSIS — I10 ESSENTIAL (PRIMARY) HYPERTENSION: ICD-10-CM

## 2020-06-28 DIAGNOSIS — J45.909 UNSPECIFIED ASTHMA, UNCOMPLICATED: ICD-10-CM

## 2020-06-28 DIAGNOSIS — F20.0 PARANOID SCHIZOPHRENIA: ICD-10-CM

## 2020-06-28 DIAGNOSIS — M54.9 DORSALGIA, UNSPECIFIED: ICD-10-CM

## 2020-06-28 DIAGNOSIS — R94.31 ABNORMAL ELECTROCARDIOGRAM [ECG] [EKG]: ICD-10-CM

## 2020-06-28 DIAGNOSIS — J44.9 CHRONIC OBSTRUCTIVE PULMONARY DISEASE, UNSPECIFIED: ICD-10-CM

## 2020-06-28 DIAGNOSIS — L60.0 INGROWING NAIL: ICD-10-CM

## 2020-06-28 DIAGNOSIS — Z95.1 PRESENCE OF AORTOCORONARY BYPASS GRAFT: ICD-10-CM

## 2020-06-28 DIAGNOSIS — I25.709 ATHEROSCLEROSIS OF CORONARY ARTERY BYPASS GRAFT(S), UNSPECIFIED, WITH UNSPECIFIED ANGINA PECTORIS: ICD-10-CM

## 2020-06-28 DIAGNOSIS — L03.119 CELLULITIS OF UNSPECIFIED PART OF LIMB: ICD-10-CM

## 2020-06-28 DIAGNOSIS — F29 UNSPECIFIED PSYCHOSIS NOT DUE TO A SUBSTANCE OR KNOWN PHYSIOLOGICAL CONDITION: ICD-10-CM

## 2020-06-28 DIAGNOSIS — E87.5 HYPERKALEMIA: ICD-10-CM

## 2020-06-28 DIAGNOSIS — I45.10 UNSPECIFIED RIGHT BUNDLE-BRANCH BLOCK: ICD-10-CM

## 2020-06-28 DIAGNOSIS — Z91.19 PATIENT'S NONCOMPLIANCE WITH OTHER MEDICAL TREATMENT AND REGIMEN: ICD-10-CM

## 2020-06-28 DIAGNOSIS — B35.1 TINEA UNGUIUM: ICD-10-CM

## 2020-06-28 DIAGNOSIS — R00.1 BRADYCARDIA, UNSPECIFIED: ICD-10-CM

## 2020-06-28 DIAGNOSIS — I25.2 OLD MYOCARDIAL INFARCTION: ICD-10-CM

## 2020-06-28 DIAGNOSIS — K80.70 CALCULUS OF GALLBLADDER AND BILE DUCT WITHOUT CHOLECYSTITIS WITHOUT OBSTRUCTION: ICD-10-CM

## 2021-10-16 NOTE — CONSULT NOTE ADULT - PROBLEM SELECTOR RECOMMENDATION 9
[facility] FOLLOW-UP NOTE     10/16/2021     Patient was seen and examined in person, Chart reviewed   Patient's case discussed with staff/team    Chief Complaint: depressed mood, suicidal ideation    Interim History:     Patient said she is still quite depressed. She talked about her unhappiness with her son, who is 12 (and who is in the custody of a family from [place]) leaving for the [place] (while she would like him to stay here). She talked also about her daughters who are mocking her and talking badly about her on social media. She said she is \"sad\" and \"tired\". She said she did sleep with the Seroquel. She denied having any suicidal or homicidal ideation currently. She denied hallucinations, paranoia or other delusions. She said she does not want the \"shock treatment\".      Appetite:   [] Normal/Unchanged  [] Increased  [] Decreased      Sleep:       [] Normal/Unchanged  [x] Fair       [] Poor              Energy:    [] Normal/Unchanged  [] Increased  [x] Decreased        SI [] Present  [x] Absent    HI  []Present  [x] Absent     Aggression:  [] yes  [x] no    Patient is [] able  [x] unable to CONTRACT FOR SAFETY     PAST MEDICAL/PSYCHIATRIC HISTORY:   Past Medical History:   Diagnosis Date    Anemia     C. difficile colitis 01/2013    Chronic fatigue syndrome     Depression     Janet    Diabetes mellitus ([address]     Diverticulitis large intestine 2001    Fibromyalgia     Fibromyalgia 03/24/2015    GERD (gastroesophageal reflux disease)     HTN (hypertension)     Hyperlipidemia     Hypothyroidism     Palpitations     Pulmonary embolus ([address] 10/2012    Following GYN procedure    Vitamin D deficiency        FAMILY/SOCIAL HISTORY:  Family History   Problem Relation Age of Onset    COPD Mother     Lung Cancer Father     COPD Maternal Grandmother     Cancer Maternal Grandfather         COLON    Colon Cancer Paternal Grandfather     Cancer Paternal Aunt         LUNG    Cancer Other BLOOD CANCER- UNSPECIFIED    Coronary Art Dis Maternal Aunt     Other Maternal Aunt         Brain aneurysm     Social History     Socioeconomic History    Marital status:      Spouse name: Not on file    Number of children: Not on file    Years of education: Not on file    Highest education level: Not on file   Occupational History    Not on file   Tobacco Use    Smoking status: Never Smoker    Smokeless tobacco: Never Used   Vaping Use    Vaping Use: Never used   Substance and Sexual Activity    Alcohol use: Yes     Comment: OCC.  Drug use: No    Sexual activity: Not on file   Other Topics Concern    Not on file   Social History Narrative    Not on file     Social Determinants of Health     Financial Resource Strain:     Difficulty of Paying Living Expenses:    Food Insecurity:     Worried About Running Out of Food in the Last Year:     [address] in the Last Year:    Transportation Needs:     Lack of Transportation (Medical):  Lack of Transportation (Non-Medical):    Physical Activity:     Days of Exercise per Week:     Minutes of Exercise per Session:    Stress:     Feeling of Stress :    Social Connections:     Frequency of Communication with Friends and Family:     Frequency of Social Gatherings with Friends and Family:     Attends Church Services:     Active Member of Clubs or Organizations:     Attends Club or Organization Meetings:     Marital Status:    Intimate Partner Violence:     Fear of Current or Ex-Partner:     Emotionally Abused:     Physically Abused:     Sexually Abused:            ROS:  [x] All negative/unchanged except if checked.  Explain positive(checked items) below:  [] Constitutional  [] Eyes  [] Ear/Nose/Mouth/Throat  [] Respiratory  [] CV  [] GI  []   [] Musculoskeletal  [] Skin/Breast  [] Neurological  [] Endocrine  [] Heme/Lymph  [] Allergic/Immunologic    Explanation:     MEDICATIONS:    Current Facility-Administered (HALDOL) injection 5 mg, 5 mg, IntraMUSCular, Q6H PRN, Beverly Dukes MD    benztropine mesylate (COGENTIN) injection 2 mg, 2 mg, IntraMUSCular, BID PRN, Beverly Dukes MD    traZODone (DESYREL) tablet 50 mg, 50 mg, Oral, Nightly PRN, Beverly Dukes MD    hydrOXYzine (VISTARIL) capsule 50 mg, 50 mg, Oral, Q6H PRN, 50 mg at 10/14/21 1718 **OR** hydrOXYzine (VISTARIL) injection 50 mg, 50 mg, IntraMUSCular, Q6H PRN, Beverly Dukes MD      Examination:  /72   Pulse 53   Temp 97.9 °F (36.6 °C)   Resp 16   Ht 5' 2\" (1.575 m)   Wt 205 lb (93 kg)   LMP 11/19/2013   SpO2 96%   BMI 37.49 kg/m²   Gait - steady  Medication side effects(SE): none reported    Mental Status Examination:    Level of consciousness:  within normal limits   Appearance:  fair grooming and fair hygiene  Behavior/Motor:  psychomotor retardation  Attitude toward examiner:  cooperative and fair eye contact  Speech:  spontaneous, normal rate, normal volume and slow   Mood: decreased range, depressed and dysthymic  Affect:  mood congruent, depressed, sad  Thought processes:  coherent and slow   Thought content:  Homocidal ideation denies  Suicidal Ideation:  denies suicidal ideation  Delusions:  no evidence of delusions  Perceptual Disturbance:  denies any perceptual disturbance  Cognition:  oriented to person, place, and time   Concentration distractible  Insight fair   Judgement fair     ASSESSMENT:   Patient symptoms are:  [x] Well controlled  [] Improving  [] Worsening  [] No change      Diagnosis:   Major depressive disorder; recurrent and severe  Generalized anxiety disorder  PTSD    LABS:    Recent Labs     10/14/21  0333   WBC 7.6   HGB 13.8        Recent Labs     10/14/21  0333      K 3.2*      CO2 29   BUN 9   CREATININE 0.91*   GLUCOSE 102*     Recent Labs     10/14/21  0333   BILITOT <0.2   ALKPHOS 88   AST 32   ALT 29     Lab Results   Component Value Date    LABAMPH Neg 10/14/2021    BOOGIE Neg 10/14/2021    LABBENZ Neg 10/14/2021    LABBENZ NotDTCD 09/14/2012    LABMETH Neg 10/14/2021    OPIATESCREENURINE Neg 10/14/2021    PHENCYCLIDINESCREENURINE Neg 10/14/2021    ETOH 92 10/14/2021     Lab Results   Component Value Date    TSH 2.010 10/14/2021     No results found for: LITHIUM  No results found for: VALPROATE, CBMZ      RISK ASSESSMENT:   Suicide risk: high    Treatment Plan:  Reviewed current Medications with the patient. Will continue current medications  Risks, benefits, side effects, drug-to-drug interactions and alternatives to treatment were discussed. Collateral information: pending  CD evaluation   Encourage patient to attend group and other milieu activities.   Discharge planning discussed with the patient and treatment team.    PSYCHOTHERAPY/COUNSELING:  [x] Therapeutic interview  [x] Supportive  [] CBT  [] Ongoing  [] Other    [x] Patient continues to need, on a daily basis, active treatment furnished directly by or requiring the supervision of inpatient psychiatric personnel      Anticipated Length of stay:            Electronically signed by Sonia Zabala MD on 10/16/2021 at 1:50 PM continue present treatment as per psych plan as reviewed  Medically stable with no new changes in treatment  will continue to monitor medical status while being treated on psych

## 2022-01-01 NOTE — PROVIDER CONTACT NOTE (OTHER) - SITUATION
pt has dressing change q12hr. pt refused stating that she is not ready to do it at this time after multiple attempts.
pt has BP meds and antibiotics due 6am. pt refused  meds and dressing changed despite multiple attempts.
Pt /51, HR 69
pt refused BP meds and antibiotics from previous RN. BP is now 150/69 HR 67. RN is going to again ask pt is she will take morning meds metoprolol, Lisinopril and her PO Antibiotic.
pt refused Lovenox, aspirin, and ABX despite teaching and education. pt removed wound dsg and refused and to let RN place new dsg. psych also asked for 1:1 sit to be placed. pt refused to
Statement Selected

## 2022-01-27 NOTE — CHART NOTE - NSCHARTNOTEFT_GEN_A_CORE
Routing refill request to provider for review/approval because:  Drug not active on patient's medication list    Marcio Rodrigues RN       Social Work Note:    Treatment team met with patient to discuss treatment plan, medications and discharge plan.  During the day patient is observed to be isolative to his room.  Patient was educated to the benefits of attending and actively participating in groups that are offered on the unit. Patient has been compliant with TOO and had been receiving IM Haldol. Patient is a bit mor coherent and organized. Patient remains labile and with potential for unpredictability. Patient denies SI HI and AVH. Patient contracts for safety and reports she is feeling well.     Discharge to [facility] remains underway.     Mental Status Exam:    Mood – Labile  Sleep - Fair  Appetite - Good  ADLs - Fair  Thought Process – Delusional    Observation – [id number]    No barriers to discharge identified at this time.     At this time patient is not psychiatrically stable for discharge.

## 2022-08-08 NOTE — PROGRESS NOTE BEHAVIORAL HEALTH - ATTENTION / CONCENTRATION
Talked to pt scheduled her Xolair injection for 8/9/22. Pt was informed to bring her Epi pen with her for her appointment. She is also aware she will be here for two hours of monitoring after injection.     Normal

## 2022-08-19 NOTE — PROGRESS NOTE BEHAVIORAL HEALTH - SUMMARY
On  Home BIPAP and O2.   65 y/o, female, white, single, one adult child, domiciled in private residence in [facility]) vs homeless, previously worked in business administration for [employer] but now currently on SSD, PPhx: schizophrenia (dx 2008), PMH: asthma, CAD s/p CABG x2 (2016), HTN, COPD?, who was originally admitted to medicine in [facility] for lower left extremity cellulitis, transferred to [facility] d/t bizarre bx, psychiatric medication non-compliance and questionable ability to care for self. On evaluation, pt presents cooperative and calm affect in bed, states her name is Rubi Silva, she is  to [place] and has "too many children to count", states her daughter's name is . Pt denies any psychiatric diagnoses, states does not take any psychiatric medications, denies all psychiatric complaints. Pt states past medical history include asthma, COPD and heart problems, states "I go to only the best doctors", denies ever seeing a psychiatrist. Denies prior hospitalizations, SA/SIB. Pt appears to be a poor historian. Denies SI/HI, intent and plan. Collateral from only daughter shows otherwise - pt diagnosed with schizophrenia in 2008, has had multiple episodes of medication noncompliance resulting in multiple IPP hospitalizations (most recent in [facility] last year). Pt's daughter is unsure of medications, states haldol decanoate and clozapine, clozapine [facility] states pt is not in system, pt denies any recent shots. Of note, EKG shows QTc 488 ms, repeat QTc 477 ms (3/27). Pt will likely benefit from IPP for medication reinitiation and management at this time.     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 On evaluation, pt presents cooperative and calm affect in bed, states her name is Rubi Silva, she is  to [place] and has "too many children to count", states her daughter's name is . Pt denies any psychiatric diagnoses, states does not take any psychiatric medications, denies all psychiatric complaints. Pt states past medical history include asthma, COPD and heart problems, states "I go to only the best doctors", denies ever seeing a psychiatrist. Denies prior hospitalizations, SA/SIB. Pt appears to be a poor historian. Denies SI/HI, intent and plan. Collateral from only daughter shows otherwise - pt diagnosed with schizophrenia in 2008, has had multiple episodes of medication noncompliance resulting in multiple IPP hospitalizations (most recent in [facility] last year). Pt's daughter is unsure of medications, states haldol decanoate and clozapine, clozapine [facility] states pt is not in system, pt denies any recent shots. Of note, EKG shows QTc 488 ms, repeat QTc 477 ms (3/27). Pt will likely benefit from IPP for medication reinitiation and management at this time.     #Schizophrenia  -c/w abilify 10 mg qhs

## 2023-03-10 NOTE — PROGRESS NOTE BEHAVIORAL HEALTH - NSBHADMITIPOBS_PSY_A_CORE
Enhanced supervision
Universal Safety Interventions
Routine observation
Routine observation
Enhanced supervision
Routine observation
Enhanced supervision
Routine observation
Enhanced supervision
Routine observation
Routine observation
Enhanced supervision
Routine observation
Enhanced supervision
Routine observation
Enhanced supervision
Routine observation

## 2023-05-16 NOTE — PROGRESS NOTE BEHAVIORAL HEALTH - NS ED BHA MSE SPEECH RATE
Called patient back to further assess. Per patient, port has been leaking white discharge. Patient denies any redness, swelling, pain, fever/chills, or warm to touch. Patient does state it's slightly itchy. Port was placed on 5/9 by Dr. Villalta. Reviewed with BEBETO Ann. Per Marissa patient should follow-up with Dr. Villalta for further recommendations. Called patient and informed her of NP's recommendations. Provided patient with phone number for Dr. Villalta's office. Patient states she will call them immediately.    Other

## 2023-07-11 NOTE — BEHAVIORAL HEALTH ASSESSMENT NOTE - NS ED BHA REVIEW OF ED CHART AVAILABLE INVESTIGATIONS REVIEWED
68 yo F evaluated today for screening colonoscopy. Pt referred to clinic by PCP, DR Jeancarlos Max. Copy of patient records will be faxed to referring MD for review.   Pt denies GI sx of abdominal pain, melena or hematochezia. Pt has hx of alternating diarrhea and constipation. Denies unintentional wt loss.   Last colonoscopy 15 yrs ago - poor prep  Maternal Grandmother  from colon cancer.  Paternal grandfather also with hx of colon cancer.   Pt is a chronic smoker - 1.5 packs/day. Denies seeing pulmonary or O2 use. Pulse Ox 94% Yes

## 2023-07-28 NOTE — PROGRESS NOTE BEHAVIORAL HEALTH - THOUGHT ASSOCIATIONS
Loose Soolantra Pregnancy And Lactation Text: This medication is Pregnancy Category C. This medication is considered safe during breast feeding.

## 2024-03-19 NOTE — CHART NOTE - NSCHARTNOTEFT_GEN_A_CORE
Treatment team met with patient to discuss treatment plan, medications and discharge plan.  During the day patient is observed to be isolative to her room. Patient remains delusional unpredictable and with potential for agitation. Patient refused to meet with treatment team. Treatment team unable to conduct meaningful assessment at this time.     Treatment team considering transfer to [facility] due to patients serious persistent mental illness and lack of stable safe support in the community. Treatment and discharge planning ensues.       Mental Status Exam:    Mood – Labile  Sleep - Fair  Appetite - Good  ADLs - Fair  Thought Process – Delusional    Observation – [id number]        At this time patient is not psychiatrically stable for discharge. Improved.

## 2024-07-30 NOTE — PROGRESS NOTE BEHAVIORAL HEALTH - NS ED BHA MED ROS SKIN
DEPO INJECTION NOTE  ~~~~~~~~~~~~~~~~~~~~~     Last depo injection: 4/30/2024  Last ae: 7/23/2024  Pregnancy test? Not indicated; within dates   Side effects? None reported   Depo-Provera 150mg injection IM, given by Katharina Hilton LPN in left deltoid   Next depo injection due: Oct 15 -Oct 29      Administered 150mg/mL Depo-Provera per orders of GENO GARRETT NURSE . Verbal consent received by patient & injection given. Patient tolerated well, no complications and no side effects. Encouraged her to remain in clinic for 15 minutes and immediately report any adverse reactions. Patient informed of next injection date ranges and encouraged to schedule. She verbalized understanding and expressed intent to comply.     Yes